# Patient Record
Sex: MALE | Race: WHITE | NOT HISPANIC OR LATINO | Employment: OTHER | URBAN - NONMETROPOLITAN AREA
[De-identification: names, ages, dates, MRNs, and addresses within clinical notes are randomized per-mention and may not be internally consistent; named-entity substitution may affect disease eponyms.]

---

## 2017-01-24 ENCOUNTER — OFFICE VISIT (OUTPATIENT)
Dept: SURGERY | Facility: CLINIC | Age: 42
End: 2017-01-24

## 2017-01-24 VITALS — WEIGHT: 158 LBS | BODY MASS INDEX: 25.39 KG/M2 | HEIGHT: 66 IN

## 2017-01-24 DIAGNOSIS — H61.91 SKIN LESION OF RIGHT EAR: Primary | ICD-10-CM

## 2017-01-24 PROCEDURE — 11442 EXC FACE-MM B9+MARG 1.1-2 CM: CPT | Performed by: SURGERY

## 2017-01-24 RX ORDER — PANTOPRAZOLE SODIUM 40 MG/1
TABLET, DELAYED RELEASE ORAL
Refills: 5 | Status: ON HOLD | COMMUNITY
Start: 2017-01-19 | End: 2019-09-06

## 2017-01-24 RX ORDER — TOPIRAMATE 100 MG/1
TABLET, FILM COATED ORAL
Refills: 5 | Status: ON HOLD | COMMUNITY
Start: 2017-01-19 | End: 2019-09-05

## 2017-01-24 RX ORDER — METHYLPREDNISOLONE 4 MG/1
TABLET ORAL
Refills: 0 | Status: ON HOLD | COMMUNITY
Start: 2017-01-19 | End: 2019-09-05

## 2017-01-24 RX ORDER — HYDROCODONE BITARTRATE AND ACETAMINOPHEN 7.5; 325 MG/1; MG/1
TABLET ORAL
Refills: 0 | COMMUNITY
Start: 2017-01-19 | End: 2018-02-16

## 2017-01-24 NOTE — MR AVS SNAPSHOT
"                        Samson Kendall   1/24/2017 10:50 AM   Office Visit    Dept Phone:  [phone]   Encounter #:  [id number]    Provider:  Kvng Allen MD   Department:  [facility] GENERAL SURGERY                Your Full Care Plan              Your Updated Medication List          This list is accurate as of: 1/24/17 11:54 AM.  Always use your most recent med list.                HYDROcodone-acetaminophen 7.5-325 MG per tablet   Commonly known as:  NORCO       MethylPREDNISolone 4 MG tablet   Commonly known as:  MEDROL (JESUS)       pantoprazole 40 MG EC tablet   Commonly known as:  PROTONIX       topiramate 100 MG tablet   Commonly known as:  TOPAMAX               You Were Diagnosed With        Codes Comments    Skin lesion of right ear    -  Primary ICD-10-CM: L98.9  ICD-9-CM: 709.9       Instructions     None    Patient Instructions History      Upcoming Appointments     Visit Type Date Time Department    NEW PATIENT 1/24/2017 10:50 AM [facility] SPEC [facility]      MyChart Signup     Our records indicate that you have declined [facility] VectorLearninghart signup. If you would like to sign up for VectorLearninghart, please email [facility] at [email or web address] or call [phone] to obtain an activation code.             Other Info from Your Visit           Allergies     No Known Allergies      Reason for Visit     Skin Lesion Lipmoa On Right Ear       Vital Signs     Height Weight Body Mass Index Smoking Status          66\" (167.6 cm) 158 lb (71.7 kg) 25.5 kg/m2 Never Smoker        Problems and Diagnoses Noted     Skin lesion of right ear        "

## 2017-01-24 NOTE — PROGRESS NOTES
Subjective   Samson Kendall is a 41 y.o. male.     History of Present Illness He had a small lump come up under his right ear a few months ago. It has not drained but there is a pigmented spot over it. It is irritated at times. No drainage. It did not respond to antibiotics.     The following portions of the patient's history were reviewed and updated as appropriate: allergies, current medications, past family history, past medical history, past social history, past surgical history and problem list.    Review of Systems    Objective   Physical Exam 1cm x 5 mm indurated area that feels sub q with a slightly darker pigmented  Area. Excised    Assessment/Plan   Samson was seen today for skin lesion.    Diagnoses and all orders for this visit:    Skin lesion of right ear    Remove sutures  In 1 wk.

## 2017-01-24 NOTE — LETTER
January 24, 2017     Marcio Hurley MD  [address] [place] [id number]    Patient: Samson Kendall   YOB: 1975   Date of Visit: 1/24/2017       Dear Dr. Xavi MD:    Thank you for referring Samson Kendall to me for evaluation. Below are the relevant portions of my assessment and plan of care.      Samson was seen today for skin lesion.    Diagnoses and all orders for this visit:    Skin lesion of right ear    Remove sutures  In 1 wk.                     If you have questions, please do not hesitate to call me. I look forward to following Samson along with you.         Sincerely,        Kvng Allen MD        CC: No Recipients

## 2017-01-31 ENCOUNTER — OFFICE VISIT (OUTPATIENT)
Dept: SURGERY | Facility: CLINIC | Age: 42
End: 2017-01-31

## 2017-01-31 ENCOUNTER — HOSPITAL ENCOUNTER (OUTPATIENT)
Dept: GENERAL RADIOLOGY | Facility: HOSPITAL | Age: 42
Discharge: HOME | End: 2017-01-31
Attending: SURGERY | Admitting: SURGERY

## 2017-01-31 VITALS — BODY MASS INDEX: 25.39 KG/M2 | WEIGHT: 158 LBS | HEIGHT: 66 IN

## 2017-01-31 DIAGNOSIS — R05.9 COUGH: ICD-10-CM

## 2017-01-31 DIAGNOSIS — H61.91 SKIN LESION OF RIGHT EAR: ICD-10-CM

## 2017-01-31 DIAGNOSIS — R05.9 COUGH: Primary | ICD-10-CM

## 2017-01-31 PROCEDURE — 99024 POSTOP FOLLOW-UP VISIT: CPT | Performed by: SURGERY

## 2017-01-31 PROCEDURE — 71020 XR CHEST 2 VW: CPT | Performed by: RADIOLOGY

## 2017-01-31 PROCEDURE — 71020 HC CHEST PA AND LATERAL: CPT

## 2017-01-31 NOTE — MR AVS SNAPSHOT
"                        Samson Kendall   1/31/2017 10:20 AM   Office Visit    Dept Phone:  [phone]   Encounter #:  [id number]    Provider:  Kvng Allen MD   Department:  [facility] GENERAL SURGERY                Your Full Care Plan              Your Updated Medication List          This list is accurate as of: 1/31/17  9:20 AM.  Always use your most recent med list.                HYDROcodone-acetaminophen 7.5-325 MG per tablet   Commonly known as:  NORCO       MethylPREDNISolone 4 MG tablet   Commonly known as:  MEDROL (JESUS)       pantoprazole 40 MG EC tablet   Commonly known as:  PROTONIX       topiramate 100 MG tablet   Commonly known as:  TOPAMAX               You Were Diagnosed With        Codes Comments    Cough    -  Primary ICD-10-CM: R05  ICD-9-CM: 786.2     Skin lesion of right ear     ICD-10-CM: L98.9  ICD-9-CM: 709.9       Instructions     None    Patient Instructions History      Upcoming Appointments     Visit Type Date Time Department    FOLLOW UP 1/31/2017 10:20 AM [facility]      MyChart Signup     Our records indicate that you have declined [facility] [employer]hart signup. If you would like to sign up for CiviQhart, please email [email or web address] or call [phone] to obtain an activation code.             Other Info from Your Visit           Your Appointments     Jan 31, 2017 10:20 AM EST   Follow Up with Kvng Allen MD   [facility] GENERAL SURGERY (--)    [address] [place] [id number] [phone]           Arrive 15 minutes prior to appointment.              Allergies     No Known Allergies      Reason for Visit     Follow-up Removal of sutures       Vital Signs     Height Weight Body Mass Index Smoking Status          66\" (167.6 cm) 158 lb (71.7 kg) 25.5 kg/m2 Never Smoker        Problems and Diagnoses Noted     Cough    Skin lesion of right ear        "

## 2017-01-31 NOTE — LETTER
January 31, 2017     Marcio Hurley MD  [address] [place] [id number]    Patient: Samson Kendall   YOB: 1975   Date of Visit: 1/31/2017       Dear Dr. Xavi MD:    Thank you for referring Samson Kendall to me for evaluation. Below are the relevant portions of my assessment and plan of care.      Samson was seen today for follow-up.    Diagnoses and all orders for this visit:    Cough  -     XR Chest 2 View; Future    Skin lesion of right ear    Check CXR and return if wound has any problems.                     If you have questions, please do not hesitate to call me. I look forward to following Samson along with you.         Sincerely,        Kvng Allen MD        CC: No Recipients

## 2017-01-31 NOTE — PROGRESS NOTES
Subjective   Samson Kendall is a 41 y.o. male.     History of Present Illness His path showed possible ruptured cyst with inflamatory reaction, but also noncaseating granulomas. No organisms. He does have a cough at times and sarcoidosis was mentioned as a possible source on the path.     The following portions of the patient's history were reviewed and updated as appropriate: allergies, current medications, past family history, past medical history, past social history, past surgical history and problem list.    Review of Systems    Objective   Physical Exam wound looks fine and sutures removed.     Assessment/Plan   Samson was seen today for follow-up.    Diagnoses and all orders for this visit:    Cough  -     XR Chest 2 View; Future    Skin lesion of right ear    Check CXR and return if wound has any problems.

## 2018-02-16 ENCOUNTER — HOSPITAL ENCOUNTER (EMERGENCY)
Facility: HOSPITAL | Age: 43
Discharge: HOME | End: 2018-02-16
Attending: EMERGENCY MEDICINE | Admitting: NURSE PRACTITIONER

## 2018-02-16 ENCOUNTER — APPOINTMENT (OUTPATIENT)
Dept: CT IMAGING | Facility: HOSPITAL | Age: 43
End: 2018-02-16

## 2018-02-16 VITALS
TEMPERATURE: 98.1 F | HEIGHT: 67 IN | BODY MASS INDEX: 27 KG/M2 | RESPIRATION RATE: 16 BRPM | WEIGHT: 172 LBS | HEART RATE: 73 BPM | OXYGEN SATURATION: 97 % | DIASTOLIC BLOOD PRESSURE: 88 MMHG | SYSTOLIC BLOOD PRESSURE: 126 MMHG

## 2018-02-16 DIAGNOSIS — N23 RENAL COLIC: Primary | ICD-10-CM

## 2018-02-16 LAB
ALBUMIN SERPL-MCNC: 4.4 G/DL (ref 3.5–5)
ALBUMIN/GLOB SERPL: 1.8 G/DL (ref 1.5–2.5)
ALP SERPL-CCNC: 50 U/L (ref 40–129)
ALT SERPL W P-5'-P-CCNC: 34 U/L (ref 10–44)
ANION GAP SERPL CALCULATED.3IONS-SCNC: 9 MMOL/L (ref 3.6–11.2)
AST SERPL-CCNC: 27 U/L (ref 10–34)
BASOPHILS # BLD AUTO: 0.03 10*3/MM3 (ref 0–0.3)
BASOPHILS NFR BLD AUTO: 0.4 % (ref 0–2)
BILIRUB SERPL-MCNC: 0.3 MG/DL (ref 0.2–1.8)
BILIRUB UR QL STRIP: NEGATIVE
BUN BLD-MCNC: 14 MG/DL (ref 7–21)
BUN/CREAT SERPL: 12.4 (ref 7–25)
CALCIUM SPEC-SCNC: 9.1 MG/DL (ref 7.7–10)
CHLORIDE SERPL-SCNC: 108 MMOL/L (ref 99–112)
CLARITY UR: CLEAR
CO2 SERPL-SCNC: 20 MMOL/L (ref 24.3–31.9)
COLOR UR: YELLOW
CREAT BLD-MCNC: 1.13 MG/DL (ref 0.43–1.29)
DEPRECATED RDW RBC AUTO: 43.2 FL (ref 37–54)
EOSINOPHIL # BLD AUTO: 0.26 10*3/MM3 (ref 0–0.7)
EOSINOPHIL NFR BLD AUTO: 3.6 % (ref 0–5)
ERYTHROCYTE [DISTWIDTH] IN BLOOD BY AUTOMATED COUNT: 13.2 % (ref 11.5–14.5)
GFR SERPL CREATININE-BSD FRML MDRD: 72 ML/MIN/1.73
GLOBULIN UR ELPH-MCNC: 2.5 GM/DL
GLUCOSE BLD-MCNC: 95 MG/DL (ref 70–110)
GLUCOSE UR STRIP-MCNC: NEGATIVE MG/DL
HCT VFR BLD AUTO: 44.2 % (ref 42–52)
HGB BLD-MCNC: 14.7 G/DL (ref 14–18)
HGB UR QL STRIP.AUTO: NEGATIVE
HOLD SPECIMEN: NORMAL
HOLD SPECIMEN: NORMAL
IMM GRANULOCYTES # BLD: 0.01 10*3/MM3 (ref 0–0.03)
IMM GRANULOCYTES NFR BLD: 0.1 % (ref 0–0.5)
KETONES UR QL STRIP: NEGATIVE
LEUKOCYTE ESTERASE UR QL STRIP.AUTO: NEGATIVE
LIPASE SERPL-CCNC: 41 U/L (ref 13–60)
LYMPHOCYTES # BLD AUTO: 2.73 10*3/MM3 (ref 1–3)
LYMPHOCYTES NFR BLD AUTO: 37.4 % (ref 21–51)
MCH RBC QN AUTO: 29.9 PG (ref 27–33)
MCHC RBC AUTO-ENTMCNC: 33.3 G/DL (ref 33–37)
MCV RBC AUTO: 90 FL (ref 80–94)
MONOCYTES # BLD AUTO: 0.46 10*3/MM3 (ref 0.1–0.9)
MONOCYTES NFR BLD AUTO: 6.3 % (ref 0–10)
NEUTROPHILS # BLD AUTO: 3.8 10*3/MM3 (ref 1.4–6.5)
NEUTROPHILS NFR BLD AUTO: 52.2 % (ref 30–70)
NITRITE UR QL STRIP: NEGATIVE
OSMOLALITY SERPL CALC.SUM OF ELEC: 274.1 MOSM/KG (ref 273–305)
PH UR STRIP.AUTO: 5.5 [PH] (ref 5–8)
PLATELET # BLD AUTO: 281 10*3/MM3 (ref 130–400)
PMV BLD AUTO: 11.1 FL (ref 6–10)
POTASSIUM BLD-SCNC: 3.6 MMOL/L (ref 3.5–5.3)
PROT SERPL-MCNC: 6.9 G/DL (ref 6–8)
PROT UR QL STRIP: NEGATIVE
RBC # BLD AUTO: 4.91 10*6/MM3 (ref 4.7–6.1)
SODIUM BLD-SCNC: 137 MMOL/L (ref 135–153)
SP GR UR STRIP: 1.01 (ref 1–1.03)
UROBILINOGEN UR QL STRIP: NORMAL
WBC NRBC COR # BLD: 7.29 10*3/MM3 (ref 4.5–12.5)
WHOLE BLOOD HOLD SPECIMEN: NORMAL
WHOLE BLOOD HOLD SPECIMEN: NORMAL

## 2018-02-16 PROCEDURE — 96376 TX/PRO/DX INJ SAME DRUG ADON: CPT

## 2018-02-16 PROCEDURE — 96374 THER/PROPH/DIAG INJ IV PUSH: CPT

## 2018-02-16 PROCEDURE — 96361 HYDRATE IV INFUSION ADD-ON: CPT

## 2018-02-16 PROCEDURE — 96375 TX/PRO/DX INJ NEW DRUG ADDON: CPT

## 2018-02-16 PROCEDURE — 36415 COLL VENOUS BLD VENIPUNCTURE: CPT

## 2018-02-16 PROCEDURE — 80053 COMPREHEN METABOLIC PANEL: CPT | Performed by: EMERGENCY MEDICINE

## 2018-02-16 PROCEDURE — 74176 CT ABD & PELVIS W/O CONTRAST: CPT | Performed by: RADIOLOGY

## 2018-02-16 PROCEDURE — 81003 URINALYSIS AUTO W/O SCOPE: CPT | Performed by: EMERGENCY MEDICINE

## 2018-02-16 PROCEDURE — 85025 COMPLETE CBC W/AUTO DIFF WBC: CPT | Performed by: EMERGENCY MEDICINE

## 2018-02-16 PROCEDURE — 25010000002 KETOROLAC TROMETHAMINE PER 15 MG: Performed by: NURSE PRACTITIONER

## 2018-02-16 PROCEDURE — 99284 EMERGENCY DEPT VISIT MOD MDM: CPT

## 2018-02-16 PROCEDURE — 25010000002 ONDANSETRON PER 1 MG: Performed by: NURSE PRACTITIONER

## 2018-02-16 PROCEDURE — 74176 CT ABD & PELVIS W/O CONTRAST: CPT

## 2018-02-16 PROCEDURE — 25010000002 HYDROMORPHONE PER 4 MG: Performed by: NURSE PRACTITIONER

## 2018-02-16 PROCEDURE — 83690 ASSAY OF LIPASE: CPT | Performed by: EMERGENCY MEDICINE

## 2018-02-16 RX ORDER — ONDANSETRON 2 MG/ML
4 INJECTION INTRAMUSCULAR; INTRAVENOUS ONCE
Status: COMPLETED | OUTPATIENT
Start: 2018-02-16 | End: 2018-02-16

## 2018-02-16 RX ORDER — NITROFURANTOIN 25; 75 MG/1; MG/1
100 CAPSULE ORAL ONCE
Status: COMPLETED | OUTPATIENT
Start: 2018-02-16 | End: 2018-02-16

## 2018-02-16 RX ORDER — SODIUM CHLORIDE 0.9 % (FLUSH) 0.9 %
10 SYRINGE (ML) INJECTION AS NEEDED
Status: DISCONTINUED | OUTPATIENT
Start: 2018-02-16 | End: 2018-02-17 | Stop reason: HOSPADM

## 2018-02-16 RX ORDER — KETOROLAC TROMETHAMINE 30 MG/ML
30 INJECTION, SOLUTION INTRAMUSCULAR; INTRAVENOUS ONCE
Status: COMPLETED | OUTPATIENT
Start: 2018-02-16 | End: 2018-02-16

## 2018-02-16 RX ORDER — HYDROCODONE BITARTRATE AND ACETAMINOPHEN 7.5; 325 MG/1; MG/1
1 TABLET ORAL EVERY 6 HOURS PRN
Qty: 12 TABLET | Refills: 0 | Status: ON HOLD | OUTPATIENT
Start: 2018-02-16 | End: 2019-09-05

## 2018-02-16 RX ORDER — HYDROMORPHONE HCL 110MG/55ML
0.5 PATIENT CONTROLLED ANALGESIA SYRINGE INTRAVENOUS ONCE
Status: COMPLETED | OUTPATIENT
Start: 2018-02-16 | End: 2018-02-16

## 2018-02-16 RX ORDER — HYDROMORPHONE HCL 110MG/55ML
1 PATIENT CONTROLLED ANALGESIA SYRINGE INTRAVENOUS ONCE
Status: COMPLETED | OUTPATIENT
Start: 2018-02-16 | End: 2018-02-16

## 2018-02-16 RX ORDER — ONDANSETRON 4 MG/1
4 TABLET, ORALLY DISINTEGRATING ORAL EVERY 6 HOURS PRN
Qty: 12 TABLET | Refills: 0 | Status: ON HOLD | OUTPATIENT
Start: 2018-02-16 | End: 2019-09-05

## 2018-02-16 RX ORDER — NITROFURANTOIN 25; 75 MG/1; MG/1
100 CAPSULE ORAL 2 TIMES DAILY
Qty: 14 CAPSULE | Refills: 0 | Status: ON HOLD | OUTPATIENT
Start: 2018-02-16 | End: 2019-09-05

## 2018-02-16 RX ADMIN — SODIUM CHLORIDE 1000 ML: 9 INJECTION, SOLUTION INTRAVENOUS at 18:37

## 2018-02-16 RX ADMIN — HYDROMORPHONE HYDROCHLORIDE 1 MG: 2 INJECTION INTRAMUSCULAR; INTRAVENOUS; SUBCUTANEOUS at 18:46

## 2018-02-16 RX ADMIN — ONDANSETRON 4 MG: 2 INJECTION, SOLUTION INTRAMUSCULAR; INTRAVENOUS at 18:38

## 2018-02-16 RX ADMIN — NITROFURANTOIN MONOHYDRATE/MACROCRYSTALLINE 100 MG: 25; 75 CAPSULE ORAL at 21:07

## 2018-02-16 RX ADMIN — HYDROMORPHONE HYDROCHLORIDE 0.5 MG: 2 INJECTION INTRAMUSCULAR; INTRAVENOUS; SUBCUTANEOUS at 21:13

## 2018-02-16 RX ADMIN — KETOROLAC TROMETHAMINE 30 MG: 30 INJECTION, SOLUTION INTRAMUSCULAR; INTRAVENOUS at 21:12

## 2018-02-17 NOTE — DISCHARGE INSTRUCTIONS
Kidney Stones  Kidney stones (urolithiasis) are rock-like masses that form inside of the kidneys. Kidneys are organs that make pee (urine). A kidney stone can cause very bad pain and can block the flow of pee. The stone usually leaves your body (passes) through your pee. You may need to have a doctor take out the stone.  Follow these instructions at home:  Eating and drinking   · Drink enough fluid to keep your pee clear or pale yellow. This will help you pass the stone.  · If told by your doctor, change the foods you eat (your diet). This may include:  ¨ Limiting how much salt (sodium) you eat.  ¨ Eating more fruits and vegetables.  ¨ Limiting how much meat, poultry, fish, and eggs you eat.  · Follow instructions from your doctor about eating or drinking restrictions.  General instructions   · Collect pee samples as told by your doctor. You may need to collect a pee sample:  ¨ 24 hours after a stone comes out.  ¨ 8-12 weeks after a stone comes out, and every 6-12 months after that.  · Strain your pee every time you pee (urinate), for as long as told. Use the strainer that your doctor recommends.  · Do not throw out the stone. Keep it so that it can be tested by your doctor.  · Take over-the-counter and prescription medicines only as told by your doctor.  · Keep all follow-up visits as told by your doctor. This is important. You may need follow-up tests.  Preventing kidney stones   To prevent another kidney stone:  · Drink enough fluid to keep your pee clear or pale yellow. This is the best way to prevent kidney stones.  · Eat healthy foods.  · Avoid certain foods as told by your doctor. You may be told to eat less protein.  · Stay at a healthy weight.  Contact a doctor if:  · You have pain that gets worse or does not get better with medicine.  Get help right away if:  · You have a fever or chills.  · You get very bad pain.  · You get new pain in your belly (abdomen).  · You pass out (faint).  · You cannot  pee.  This information is not intended to replace advice given to you by your health care provider. Make sure you discuss any questions you have with your health care provider.  Document Released: 06/05/2009 Document Revised: 09/05/2017 Document Reviewed: 09/05/2017  Elsevier Interactive Patient Education © 2017 Elsevier Inc.

## 2018-09-19 ENCOUNTER — HOSPITAL ENCOUNTER (EMERGENCY)
Facility: HOSPITAL | Age: 43
Discharge: HOME | End: 2018-09-20
Attending: EMERGENCY MEDICINE | Admitting: EMERGENCY MEDICINE

## 2018-09-19 ENCOUNTER — TRANSCRIBE ORDERS (OUTPATIENT)
Dept: ADMINISTRATIVE | Facility: HOSPITAL | Age: 43
End: 2018-09-19

## 2018-09-19 DIAGNOSIS — R10.11 RUQ ABDOMINAL PAIN: Primary | ICD-10-CM

## 2018-09-19 DIAGNOSIS — R13.10 PROBLEMS WITH SWALLOWING AND MASTICATION: Primary | ICD-10-CM

## 2018-09-19 LAB
ALBUMIN SERPL-MCNC: 4.4 G/DL (ref 3.5–5)
ALBUMIN/GLOB SERPL: 1.6 G/DL (ref 1.5–2.5)
ALP SERPL-CCNC: 48 U/L (ref 40–129)
ALT SERPL W P-5'-P-CCNC: 53 U/L (ref 10–44)
AMYLASE SERPL-CCNC: 66 U/L (ref 28–100)
ANION GAP SERPL CALCULATED.3IONS-SCNC: 8.3 MMOL/L (ref 3.6–11.2)
AST SERPL-CCNC: 33 U/L (ref 10–34)
BASOPHILS # BLD AUTO: 0.03 10*3/MM3 (ref 0–0.3)
BASOPHILS NFR BLD AUTO: 0.4 % (ref 0–2)
BILIRUB SERPL-MCNC: 0.4 MG/DL (ref 0.2–1.8)
BILIRUB UR QL STRIP: NEGATIVE
BUN BLD-MCNC: 6 MG/DL (ref 7–21)
BUN/CREAT SERPL: 5.1 (ref 7–25)
CALCIUM SPEC-SCNC: 8.9 MG/DL (ref 7.7–10)
CHLORIDE SERPL-SCNC: 107 MMOL/L (ref 99–112)
CLARITY UR: CLEAR
CO2 SERPL-SCNC: 25.7 MMOL/L (ref 24.3–31.9)
COLOR UR: YELLOW
CREAT BLD-MCNC: 1.18 MG/DL (ref 0.43–1.29)
DEPRECATED RDW RBC AUTO: 44.6 FL (ref 37–54)
EOSINOPHIL # BLD AUTO: 0.25 10*3/MM3 (ref 0–0.7)
EOSINOPHIL NFR BLD AUTO: 3.3 % (ref 0–5)
ERYTHROCYTE [DISTWIDTH] IN BLOOD BY AUTOMATED COUNT: 13.5 % (ref 11.5–14.5)
GFR SERPL CREATININE-BSD FRML MDRD: 68 ML/MIN/1.73
GLOBULIN UR ELPH-MCNC: 2.8 GM/DL
GLUCOSE BLD-MCNC: 95 MG/DL (ref 70–110)
GLUCOSE UR STRIP-MCNC: NEGATIVE MG/DL
HCT VFR BLD AUTO: 44.3 % (ref 42–52)
HGB BLD-MCNC: 14.9 G/DL (ref 14–18)
HGB UR QL STRIP.AUTO: NEGATIVE
IMM GRANULOCYTES # BLD: 0.01 10*3/MM3 (ref 0–0.03)
IMM GRANULOCYTES NFR BLD: 0.1 % (ref 0–0.5)
KETONES UR QL STRIP: NEGATIVE
LEUKOCYTE ESTERASE UR QL STRIP.AUTO: NEGATIVE
LIPASE SERPL-CCNC: 49 U/L (ref 13–60)
LYMPHOCYTES # BLD AUTO: 1.91 10*3/MM3 (ref 1–3)
LYMPHOCYTES NFR BLD AUTO: 25 % (ref 21–51)
MCH RBC QN AUTO: 31 PG (ref 27–33)
MCHC RBC AUTO-ENTMCNC: 33.6 G/DL (ref 33–37)
MCV RBC AUTO: 92.1 FL (ref 80–94)
MONOCYTES # BLD AUTO: 0.72 10*3/MM3 (ref 0.1–0.9)
MONOCYTES NFR BLD AUTO: 9.4 % (ref 0–10)
NEUTROPHILS # BLD AUTO: 4.73 10*3/MM3 (ref 1.4–6.5)
NEUTROPHILS NFR BLD AUTO: 61.8 % (ref 30–70)
NITRITE UR QL STRIP: NEGATIVE
OSMOLALITY SERPL CALC.SUM OF ELEC: 278.7 MOSM/KG (ref 273–305)
PH UR STRIP.AUTO: 7 [PH] (ref 5–8)
PLATELET # BLD AUTO: 211 10*3/MM3 (ref 130–400)
PMV BLD AUTO: 10.5 FL (ref 6–10)
POTASSIUM BLD-SCNC: 3.7 MMOL/L (ref 3.5–5.3)
PROT SERPL-MCNC: 7.2 G/DL (ref 6–8)
PROT UR QL STRIP: NEGATIVE
RBC # BLD AUTO: 4.81 10*6/MM3 (ref 4.7–6.1)
SODIUM BLD-SCNC: 141 MMOL/L (ref 135–153)
SP GR UR STRIP: <=1.005 (ref 1–1.03)
TROPONIN I SERPL-MCNC: <0.006 NG/ML
UROBILINOGEN UR QL STRIP: NORMAL
WBC NRBC COR # BLD: 7.65 10*3/MM3 (ref 4.5–12.5)

## 2018-09-19 PROCEDURE — 25010000002 KETOROLAC TROMETHAMINE PER 15 MG: Performed by: PHYSICIAN ASSISTANT

## 2018-09-19 PROCEDURE — 25010000002 ONDANSETRON PER 1 MG: Performed by: PHYSICIAN ASSISTANT

## 2018-09-19 PROCEDURE — 81003 URINALYSIS AUTO W/O SCOPE: CPT | Performed by: PHYSICIAN ASSISTANT

## 2018-09-19 PROCEDURE — 93005 ELECTROCARDIOGRAM TRACING: CPT | Performed by: PHYSICIAN ASSISTANT

## 2018-09-19 PROCEDURE — 99284 EMERGENCY DEPT VISIT MOD MDM: CPT

## 2018-09-19 PROCEDURE — 83690 ASSAY OF LIPASE: CPT | Performed by: PHYSICIAN ASSISTANT

## 2018-09-19 PROCEDURE — 96374 THER/PROPH/DIAG INJ IV PUSH: CPT

## 2018-09-19 PROCEDURE — 85025 COMPLETE CBC W/AUTO DIFF WBC: CPT | Performed by: PHYSICIAN ASSISTANT

## 2018-09-19 PROCEDURE — 84484 ASSAY OF TROPONIN QUANT: CPT | Performed by: PHYSICIAN ASSISTANT

## 2018-09-19 PROCEDURE — 36415 COLL VENOUS BLD VENIPUNCTURE: CPT

## 2018-09-19 PROCEDURE — 96361 HYDRATE IV INFUSION ADD-ON: CPT

## 2018-09-19 PROCEDURE — 82150 ASSAY OF AMYLASE: CPT | Performed by: PHYSICIAN ASSISTANT

## 2018-09-19 PROCEDURE — 96375 TX/PRO/DX INJ NEW DRUG ADDON: CPT

## 2018-09-19 PROCEDURE — 80053 COMPREHEN METABOLIC PANEL: CPT | Performed by: PHYSICIAN ASSISTANT

## 2018-09-19 RX ORDER — KETOROLAC TROMETHAMINE 30 MG/ML
30 INJECTION, SOLUTION INTRAMUSCULAR; INTRAVENOUS ONCE
Status: COMPLETED | OUTPATIENT
Start: 2018-09-19 | End: 2018-09-19

## 2018-09-19 RX ORDER — ONDANSETRON 2 MG/ML
4 INJECTION INTRAMUSCULAR; INTRAVENOUS ONCE
Status: COMPLETED | OUTPATIENT
Start: 2018-09-19 | End: 2018-09-19

## 2018-09-19 RX ORDER — SODIUM CHLORIDE 0.9 % (FLUSH) 0.9 %
10 SYRINGE (ML) INJECTION AS NEEDED
Status: DISCONTINUED | OUTPATIENT
Start: 2018-09-19 | End: 2018-09-20 | Stop reason: HOSPADM

## 2018-09-19 RX ADMIN — ONDANSETRON 4 MG: 2 INJECTION INTRAMUSCULAR; INTRAVENOUS at 23:08

## 2018-09-19 RX ADMIN — KETOROLAC TROMETHAMINE 30 MG: 30 INJECTION, SOLUTION INTRAMUSCULAR; INTRAVENOUS at 23:08

## 2018-09-19 RX ADMIN — SODIUM CHLORIDE 1000 ML: 9 INJECTION, SOLUTION INTRAVENOUS at 23:08

## 2018-09-20 ENCOUNTER — APPOINTMENT (OUTPATIENT)
Dept: ULTRASOUND IMAGING | Facility: HOSPITAL | Age: 43
End: 2018-09-20

## 2018-09-20 VITALS
OXYGEN SATURATION: 98 % | WEIGHT: 190 LBS | RESPIRATION RATE: 18 BRPM | DIASTOLIC BLOOD PRESSURE: 74 MMHG | BODY MASS INDEX: 30.53 KG/M2 | TEMPERATURE: 98.5 F | SYSTOLIC BLOOD PRESSURE: 121 MMHG | HEART RATE: 61 BPM | HEIGHT: 66 IN

## 2018-09-20 PROCEDURE — 76705 ECHO EXAM OF ABDOMEN: CPT | Performed by: RADIOLOGY

## 2018-09-20 PROCEDURE — 76705 ECHO EXAM OF ABDOMEN: CPT

## 2018-09-20 RX ORDER — PROMETHAZINE HYDROCHLORIDE 12.5 MG/1
12.5 TABLET ORAL EVERY 8 HOURS PRN
Qty: 21 TABLET | Refills: 0 | Status: SHIPPED | OUTPATIENT
Start: 2018-09-20 | End: 2018-09-27

## 2018-09-20 NOTE — ED PROVIDER NOTES
Subjective     Abdominal Pain   Pain location:  Epigastric  Pain quality: aching    Pain radiates to:  Chest  Pain severity:  Moderate  Onset quality:  Gradual  Duration:  5 days  Timing:  Intermittent  Progression:  Waxing and waning  Chronicity:  New  Context: eating    Context: not alcohol use, not awakening from sleep, not diet changes, not laxative use, not medication withdrawal, not previous surgeries, not recent illness, not recent sexual activity, not recent travel, not retching, not sick contacts, not suspicious food intake and not trauma    Context comment:  Ate a Cat[employer] dinner and has had intermittent pain since that time  Relieved by:  Nothing  Worsened by:  Position changes, palpation, movement and eating  Ineffective treatments: Zofran.  Associated symptoms: chest pain, diarrhea, nausea and vomiting    Associated symptoms: no anorexia, no belching, no chills, no constipation, no cough, no dysuria, no fatigue, no fever, no flatus, no hematemesis, no hematochezia, no hematuria, no melena, no shortness of breath, no sore throat, no vaginal bleeding and no vaginal discharge    Risk factors: no alcohol abuse, no aspirin use, not elderly, has not had multiple surgeries, no NSAID use, not obese, not pregnant and no recent hospitalization        Review of Systems   Constitutional: Negative.  Negative for chills, fatigue and fever.   HENT: Negative.  Negative for sore throat.    Respiratory: Negative.  Negative for cough and shortness of breath.    Cardiovascular: Positive for chest pain. Negative for palpitations and leg swelling.   Gastrointestinal: Positive for abdominal pain, diarrhea, nausea and vomiting. Negative for abdominal distention, anal bleeding, anorexia, blood in stool, constipation, flatus, hematemesis, hematochezia, melena and rectal pain.   Endocrine: Negative.    Genitourinary: Negative.  Negative for dysuria, hematuria, vaginal bleeding and vaginal discharge.   Skin: Negative.     Neurological: Negative.    Psychiatric/Behavioral: Negative.    All other systems reviewed and are negative.      History reviewed. No pertinent past medical history.    No Known Allergies    Past Surgical History:   Procedure Laterality Date   • HAND SURGERY     • KNEE ARTHROSCOPY     • WISDOM TOOTH EXTRACTION         History reviewed. No pertinent family history.    Social History     Social History   • Marital status:      Social History Main Topics   • Smoking status: Never Smoker   • Smokeless tobacco: Current User     Types: Snuff   • Alcohol use Yes   • Drug use: No     Other Topics Concern   • Not on file           Objective   Physical Exam   Constitutional: He is oriented to person, place, and time. He appears well-developed and well-nourished. No distress.   HENT:   Head: Normocephalic and atraumatic.   Right Ear: External ear normal.   Left Ear: External ear normal.   Nose: Nose normal.   Eyes: Pupils are equal, round, and reactive to light. Conjunctivae and EOM are normal.   Neck: Normal range of motion. Neck supple. No JVD present. No tracheal deviation present.   Cardiovascular: Normal rate, regular rhythm and normal heart sounds.  Exam reveals no gallop and no friction rub.    No murmur heard.  Pulmonary/Chest: Effort normal and breath sounds normal. No respiratory distress. He has no wheezes. He has no rales. He exhibits no tenderness.   Abdominal: Soft. Bowel sounds are normal. He exhibits no distension and no mass. There is tenderness. There is no rebound and no guarding. No hernia.   Tenderness to palpation in the epigastric region and positive Mccormack's sign    Musculoskeletal: Normal range of motion. He exhibits no edema or deformity.   Neurological: He is alert and oriented to person, place, and time. No cranial nerve deficit.   Skin: Skin is warm and dry. No rash noted. He is not diaphoretic. No erythema. No pallor.   Psychiatric: He has a normal mood and affect. His behavior is  normal. Thought content normal.   Nursing note and vitals reviewed.      Procedures           ED Course  ED Course as of Sep 20 0126   Wed Sep 19, 2018   2326 Sinus rhythm; no acute findings per Dr. Roberts.  ECG 12 Lead [MM]   Thu Sep 20, 2018   0110 Contracted gallbladder with sludge but no stones per [facility] report.  US Gallbladder [MM]   0123 Patient diagnosed with RUQ abdominal pain. Will be d/c home with rx for phenergan and see his PCP in 2 days. Will return to ER if symptoms worsen.   [MM]      ED Course User Index  [MM] Audrey Figueroa PA                HEART Score (for prediction of 6-week risk of major adverse cardiac event) reviewed and/or performed as part of the patient evaluation and treatment planning process.  The result associated with this review/performance is: 0       MDM  Number of Diagnoses or Management Options  RUQ abdominal pain:      Amount and/or Complexity of Data Reviewed  Clinical lab tests: reviewed and ordered  Tests in the radiology section of CPT®: ordered and reviewed  Discuss the patient with other providers: yes          Final diagnoses:   RUQ abdominal pain            Audrey Figueroa PA  09/20/18 0126

## 2018-09-20 NOTE — DISCHARGE INSTRUCTIONS
Please utilize a bland diet and use phenergan for nausea. Please call your PCP tomorrow to have further outpatient testing. Please return to ER if symptoms worsen.

## 2018-09-21 ENCOUNTER — TRANSCRIBE ORDERS (OUTPATIENT)
Dept: ADMINISTRATIVE | Facility: HOSPITAL | Age: 43
End: 2018-09-21

## 2018-09-21 DIAGNOSIS — R10.13 EPIGASTRIC PAIN: Primary | ICD-10-CM

## 2018-09-28 ENCOUNTER — HOSPITAL ENCOUNTER (OUTPATIENT)
Dept: NUCLEAR MEDICINE | Facility: HOSPITAL | Age: 43
Discharge: HOME | End: 2018-09-28

## 2018-09-28 DIAGNOSIS — R10.13 EPIGASTRIC PAIN: ICD-10-CM

## 2018-09-28 PROCEDURE — 78226 HEPATOBILIARY SYSTEM IMAGING: CPT

## 2018-09-28 PROCEDURE — 0 TECHNETIUM TC 99M MEBROFENIN KIT: Performed by: NURSE PRACTITIONER

## 2018-09-28 PROCEDURE — A9537 TC99M MEBROFENIN: HCPCS | Performed by: NURSE PRACTITIONER

## 2018-09-28 PROCEDURE — 78226 HEPATOBILIARY SYSTEM IMAGING: CPT | Performed by: RADIOLOGY

## 2018-09-28 RX ORDER — KIT FOR THE PREPARATION OF TECHNETIUM TC 99M MEBROFENIN 45 MG/10ML
1 INJECTION, POWDER, LYOPHILIZED, FOR SOLUTION INTRAVENOUS
Status: COMPLETED | OUTPATIENT
Start: 2018-09-28 | End: 2018-09-28

## 2018-09-28 RX ADMIN — MEBROFENIN 1 DOSE: 45 INJECTION, POWDER, LYOPHILIZED, FOR SOLUTION INTRAVENOUS at 08:08

## 2019-09-05 ENCOUNTER — HOSPITAL ENCOUNTER (OUTPATIENT)
Facility: HOSPITAL | Age: 44
Setting detail: OBSERVATION
Discharge: HOME | End: 2019-09-06
Attending: EMERGENCY MEDICINE | Admitting: INTERNAL MEDICINE

## 2019-09-05 ENCOUNTER — APPOINTMENT (OUTPATIENT)
Dept: GENERAL RADIOLOGY | Facility: HOSPITAL | Age: 44
End: 2019-09-05

## 2019-09-05 DIAGNOSIS — R55 SYNCOPE, UNSPECIFIED SYNCOPE TYPE: ICD-10-CM

## 2019-09-05 DIAGNOSIS — R07.9 CHEST PAIN, UNSPECIFIED TYPE: Primary | ICD-10-CM

## 2019-09-05 LAB
ALBUMIN SERPL-MCNC: 4.31 G/DL (ref 3.5–5.2)
ALBUMIN/GLOB SERPL: 1.2 G/DL
ALP SERPL-CCNC: 70 U/L (ref 39–117)
ALT SERPL W P-5'-P-CCNC: 33 U/L (ref 1–41)
ANION GAP SERPL CALCULATED.3IONS-SCNC: 15.6 MMOL/L (ref 5–15)
ANION GAP SERPL CALCULATED.3IONS-SCNC: 18.1 MMOL/L (ref 5–15)
AST SERPL-CCNC: 23 U/L (ref 1–40)
BASOPHILS # BLD AUTO: 0.03 10*3/MM3 (ref 0–0.2)
BASOPHILS NFR BLD AUTO: 0.3 % (ref 0–1.5)
BILIRUB SERPL-MCNC: 0.3 MG/DL (ref 0.2–1.2)
BUN BLD-MCNC: 10 MG/DL (ref 6–20)
BUN BLD-MCNC: 11 MG/DL (ref 6–20)
BUN/CREAT SERPL: 9.7 (ref 7–25)
BUN/CREAT SERPL: 9.8 (ref 7–25)
CALCIUM SPEC-SCNC: 9.3 MG/DL (ref 8.6–10.5)
CALCIUM SPEC-SCNC: 9.7 MG/DL (ref 8.6–10.5)
CHLORIDE SERPL-SCNC: 102 MMOL/L (ref 98–107)
CHLORIDE SERPL-SCNC: 104 MMOL/L (ref 98–107)
CO2 SERPL-SCNC: 17.9 MMOL/L (ref 22–29)
CO2 SERPL-SCNC: 18.4 MMOL/L (ref 22–29)
CREAT BLD-MCNC: 1.02 MG/DL (ref 0.76–1.27)
CREAT BLD-MCNC: 1.13 MG/DL (ref 0.76–1.27)
D DIMER PPP FEU-MCNC: <0.27 MCGFEU/ML (ref 0–0.5)
DEPRECATED RDW RBC AUTO: 44.3 FL (ref 37–54)
EOSINOPHIL # BLD AUTO: 0.3 10*3/MM3 (ref 0–0.4)
EOSINOPHIL NFR BLD AUTO: 3.5 % (ref 0.3–6.2)
ERYTHROCYTE [DISTWIDTH] IN BLOOD BY AUTOMATED COUNT: 13.5 % (ref 12.3–15.4)
GFR SERPL CREATININE-BSD FRML MDRD: 71 ML/MIN/1.73
GFR SERPL CREATININE-BSD FRML MDRD: 80 ML/MIN/1.73
GLOBULIN UR ELPH-MCNC: 3.7 GM/DL
GLUCOSE BLD-MCNC: 103 MG/DL (ref 65–99)
GLUCOSE BLD-MCNC: 99 MG/DL (ref 65–99)
HCT VFR BLD AUTO: 43.6 % (ref 37.5–51)
HGB BLD-MCNC: 14.4 G/DL (ref 13–17.7)
HOLD SPECIMEN: NORMAL
HOLD SPECIMEN: NORMAL
IMM GRANULOCYTES # BLD AUTO: 0.02 10*3/MM3 (ref 0–0.05)
IMM GRANULOCYTES NFR BLD AUTO: 0.2 % (ref 0–0.5)
LYMPHOCYTES # BLD AUTO: 2.44 10*3/MM3 (ref 0.7–3.1)
LYMPHOCYTES NFR BLD AUTO: 28.1 % (ref 19.6–45.3)
MAGNESIUM SERPL-MCNC: 2.2 MG/DL (ref 1.6–2.6)
MCH RBC QN AUTO: 30.2 PG (ref 26.6–33)
MCHC RBC AUTO-ENTMCNC: 33 G/DL (ref 31.5–35.7)
MCV RBC AUTO: 91.4 FL (ref 79–97)
MONOCYTES # BLD AUTO: 0.61 10*3/MM3 (ref 0.1–0.9)
MONOCYTES NFR BLD AUTO: 7 % (ref 5–12)
NEUTROPHILS # BLD AUTO: 5.29 10*3/MM3 (ref 1.7–7)
NEUTROPHILS NFR BLD AUTO: 60.9 % (ref 42.7–76)
PLATELET # BLD AUTO: 329 10*3/MM3 (ref 140–450)
PMV BLD AUTO: 10.3 FL (ref 6–12)
POTASSIUM BLD-SCNC: 4 MMOL/L (ref 3.5–5.2)
POTASSIUM BLD-SCNC: 4.1 MMOL/L (ref 3.5–5.2)
PROT SERPL-MCNC: 8 G/DL (ref 6–8.5)
RBC # BLD AUTO: 4.77 10*6/MM3 (ref 4.14–5.8)
SODIUM BLD-SCNC: 138 MMOL/L (ref 136–145)
SODIUM BLD-SCNC: 138 MMOL/L (ref 136–145)
TROPONIN T SERPL-MCNC: <0.01 NG/ML (ref 0–0.03)
TROPONIN T SERPL-MCNC: <0.01 NG/ML (ref 0–0.03)
WBC NRBC COR # BLD: 8.69 10*3/MM3 (ref 3.4–10.8)
WHOLE BLOOD HOLD SPECIMEN: NORMAL
WHOLE BLOOD HOLD SPECIMEN: NORMAL

## 2019-09-05 PROCEDURE — 93005 ELECTROCARDIOGRAM TRACING: CPT | Performed by: NURSE PRACTITIONER

## 2019-09-05 PROCEDURE — 85025 COMPLETE CBC W/AUTO DIFF WBC: CPT | Performed by: PHYSICIAN ASSISTANT

## 2019-09-05 PROCEDURE — 71045 X-RAY EXAM CHEST 1 VIEW: CPT

## 2019-09-05 PROCEDURE — 83735 ASSAY OF MAGNESIUM: CPT | Performed by: NURSE PRACTITIONER

## 2019-09-05 PROCEDURE — G0378 HOSPITAL OBSERVATION PER HR: HCPCS

## 2019-09-05 PROCEDURE — 71045 X-RAY EXAM CHEST 1 VIEW: CPT | Performed by: RADIOLOGY

## 2019-09-05 PROCEDURE — 85379 FIBRIN DEGRADATION QUANT: CPT | Performed by: PHYSICIAN ASSISTANT

## 2019-09-05 PROCEDURE — 80053 COMPREHEN METABOLIC PANEL: CPT | Performed by: PHYSICIAN ASSISTANT

## 2019-09-05 PROCEDURE — 84484 ASSAY OF TROPONIN QUANT: CPT | Performed by: NURSE PRACTITIONER

## 2019-09-05 PROCEDURE — 93010 ELECTROCARDIOGRAM REPORT: CPT | Performed by: INTERNAL MEDICINE

## 2019-09-05 PROCEDURE — 84484 ASSAY OF TROPONIN QUANT: CPT | Performed by: PHYSICIAN ASSISTANT

## 2019-09-05 PROCEDURE — 25010000002 MORPHINE PER 10 MG: Performed by: EMERGENCY MEDICINE

## 2019-09-05 PROCEDURE — 99284 EMERGENCY DEPT VISIT MOD MDM: CPT

## 2019-09-05 PROCEDURE — 93005 ELECTROCARDIOGRAM TRACING: CPT | Performed by: PHYSICIAN ASSISTANT

## 2019-09-05 PROCEDURE — 80048 BASIC METABOLIC PNL TOTAL CA: CPT | Performed by: NURSE PRACTITIONER

## 2019-09-05 PROCEDURE — 96374 THER/PROPH/DIAG INJ IV PUSH: CPT

## 2019-09-05 RX ORDER — SODIUM CHLORIDE 0.9 % (FLUSH) 0.9 %
10 SYRINGE (ML) INJECTION AS NEEDED
Status: DISCONTINUED | OUTPATIENT
Start: 2019-09-05 | End: 2019-09-06 | Stop reason: HOSPADM

## 2019-09-05 RX ORDER — ALUMINA, MAGNESIA, AND SIMETHICONE 2400; 2400; 240 MG/30ML; MG/30ML; MG/30ML
15 SUSPENSION ORAL EVERY 6 HOURS PRN
Status: DISCONTINUED | OUTPATIENT
Start: 2019-09-05 | End: 2019-09-06 | Stop reason: HOSPADM

## 2019-09-05 RX ORDER — POTASSIUM CHLORIDE 7.45 MG/ML
10 INJECTION INTRAVENOUS
Status: DISCONTINUED | OUTPATIENT
Start: 2019-09-05 | End: 2019-09-06 | Stop reason: HOSPADM

## 2019-09-05 RX ORDER — NITROGLYCERIN 0.4 MG/1
0.4 TABLET SUBLINGUAL
Status: DISCONTINUED | OUTPATIENT
Start: 2019-09-05 | End: 2019-09-06 | Stop reason: HOSPADM

## 2019-09-05 RX ORDER — ACETAMINOPHEN 650 MG/1
650 SUPPOSITORY RECTAL EVERY 4 HOURS PRN
Status: DISCONTINUED | OUTPATIENT
Start: 2019-09-05 | End: 2019-09-06 | Stop reason: HOSPADM

## 2019-09-05 RX ORDER — ACETAMINOPHEN 160 MG/5ML
650 SOLUTION ORAL EVERY 4 HOURS PRN
Status: DISCONTINUED | OUTPATIENT
Start: 2019-09-05 | End: 2019-09-06 | Stop reason: HOSPADM

## 2019-09-05 RX ORDER — SODIUM CHLORIDE 0.9 % (FLUSH) 0.9 %
10 SYRINGE (ML) INJECTION EVERY 12 HOURS SCHEDULED
Status: DISCONTINUED | OUTPATIENT
Start: 2019-09-05 | End: 2019-09-06 | Stop reason: HOSPADM

## 2019-09-05 RX ORDER — ACETAMINOPHEN 325 MG/1
650 TABLET ORAL ONCE
Status: COMPLETED | OUTPATIENT
Start: 2019-09-05 | End: 2019-09-05

## 2019-09-05 RX ORDER — ASPIRIN 325 MG
325 TABLET ORAL DAILY
Status: DISCONTINUED | OUTPATIENT
Start: 2019-09-06 | End: 2019-09-06

## 2019-09-05 RX ORDER — ASPIRIN 81 MG/1
324 TABLET, CHEWABLE ORAL ONCE
Status: COMPLETED | OUTPATIENT
Start: 2019-09-05 | End: 2019-09-05

## 2019-09-05 RX ORDER — ONDANSETRON 2 MG/ML
4 INJECTION INTRAMUSCULAR; INTRAVENOUS EVERY 6 HOURS PRN
Status: DISCONTINUED | OUTPATIENT
Start: 2019-09-05 | End: 2019-09-06 | Stop reason: HOSPADM

## 2019-09-05 RX ORDER — POTASSIUM CHLORIDE 1.5 G/1.77G
40 POWDER, FOR SOLUTION ORAL AS NEEDED
Status: DISCONTINUED | OUTPATIENT
Start: 2019-09-05 | End: 2019-09-06 | Stop reason: HOSPADM

## 2019-09-05 RX ORDER — HEPARIN SODIUM 5000 [USP'U]/ML
5000 INJECTION, SOLUTION INTRAVENOUS; SUBCUTANEOUS EVERY 12 HOURS SCHEDULED
Status: DISCONTINUED | OUTPATIENT
Start: 2019-09-05 | End: 2019-09-06 | Stop reason: HOSPADM

## 2019-09-05 RX ORDER — ACETAMINOPHEN 325 MG/1
650 TABLET ORAL EVERY 4 HOURS PRN
Status: DISCONTINUED | OUTPATIENT
Start: 2019-09-05 | End: 2019-09-06 | Stop reason: HOSPADM

## 2019-09-05 RX ORDER — SODIUM CHLORIDE 9 MG/ML
100 INJECTION, SOLUTION INTRAVENOUS CONTINUOUS
Status: DISCONTINUED | OUTPATIENT
Start: 2019-09-05 | End: 2019-09-06 | Stop reason: HOSPADM

## 2019-09-05 RX ORDER — POTASSIUM CHLORIDE 750 MG/1
40 TABLET, FILM COATED, EXTENDED RELEASE ORAL AS NEEDED
Status: DISCONTINUED | OUTPATIENT
Start: 2019-09-05 | End: 2019-09-06 | Stop reason: HOSPADM

## 2019-09-05 RX ORDER — ONDANSETRON 4 MG/1
4 TABLET, FILM COATED ORAL EVERY 6 HOURS PRN
Status: DISCONTINUED | OUTPATIENT
Start: 2019-09-05 | End: 2019-09-06 | Stop reason: HOSPADM

## 2019-09-05 RX ORDER — ASPIRIN 300 MG/1
300 SUPPOSITORY RECTAL DAILY
Status: DISCONTINUED | OUTPATIENT
Start: 2019-09-06 | End: 2019-09-06

## 2019-09-05 RX ADMIN — NITROGLYCERIN 1 INCH: 20 OINTMENT TOPICAL at 19:32

## 2019-09-05 RX ADMIN — MORPHINE SULFATE 4 MG: 4 INJECTION, SOLUTION INTRAMUSCULAR; INTRAVENOUS at 19:29

## 2019-09-05 RX ADMIN — ACETAMINOPHEN 650 MG: 325 TABLET ORAL at 20:29

## 2019-09-05 RX ADMIN — SODIUM CHLORIDE 1000 ML: 9 INJECTION, SOLUTION INTRAVENOUS at 19:46

## 2019-09-05 RX ADMIN — ASPIRIN 324 MG: 81 TABLET, CHEWABLE ORAL at 19:21

## 2019-09-05 NOTE — ED PROVIDER NOTES
"Subjective   This is a 44-year-old male who comes in with chief complaint \"left-sided chest tightness, pressure\" that started approximately 3 hours before arrival.  Patient reports he was cutting grass when chest pain.  Patient states that he has had left-sided chest pressure that radiates to his left neck and left shoulder blade.  Has had associated shortness of breath.  Patient does have significant history for hypertension.  Patient denies previous history of cardiac disease, diabetes.  Denies smoking.  Does drink alcohol occasionally.        History provided by:  Patient   used: No    Chest Pain   Pain location:  L chest  Pain quality: pressure and tightness    Pain radiates to:  Does not radiate  Pain severity:  Moderate  Onset quality:  Sudden  Duration:  2 days  Timing:  Intermittent  Progression:  Worsening  Chronicity:  New  Context: breathing    Relieved by:  Nothing  Worsened by:  Nothing  Ineffective treatments:  None tried  Associated symptoms: fatigue, palpitations and shortness of breath    Associated symptoms: no abdominal pain, no AICD problem, no altered mental status, no back pain, no dizziness, no fever, no headache, no numbness and no PND    Risk factors: hypertension    Risk factors: no aortic disease, no coronary artery disease and no diabetes mellitus        Review of Systems   Constitutional: Positive for fatigue. Negative for fever.   HENT: Negative.  Negative for drooling and ear discharge.    Eyes: Negative.  Negative for discharge, redness and itching.   Respiratory: Positive for shortness of breath.    Cardiovascular: Positive for chest pain and palpitations. Negative for PND.   Gastrointestinal: Negative for abdominal pain.   Endocrine: Negative.  Negative for cold intolerance, heat intolerance and polyphagia.   Genitourinary: Negative for difficulty urinating, dysuria, enuresis and flank pain.   Musculoskeletal: Negative for back pain.   Neurological: Negative for " dizziness, numbness and headaches.   All other systems reviewed and are negative.      No past medical history on file.    No Known Allergies    Past Surgical History:   Procedure Laterality Date   • HAND SURGERY     • KNEE ARTHROSCOPY     • WISDOM TOOTH EXTRACTION         No family history on file.    Social History     Socioeconomic History   • Marital status:      Spouse name: Not on file   • Number of children: Not on file   • Years of education: Not on file   • Highest education level: Not on file   Tobacco Use   • Smoking status: Never Smoker   • Smokeless tobacco: Current User     Types: Snuff   Substance and Sexual Activity   • Alcohol use: Yes   • Drug use: No           Objective   Physical Exam   Constitutional: He is oriented to person, place, and time. He appears well-developed and well-nourished.  Non-toxic appearance. He does not appear ill. No distress.   HENT:   Head: Normocephalic and atraumatic.   Eyes: EOM are normal. Pupils are equal, round, and reactive to light.   Neck: Normal range of motion. Neck supple. No hepatojugular reflux and no JVD present. No tracheal deviation present. No thyromegaly present.   Cardiovascular: Tachycardia present. Exam reveals no gallop, no S3, no S4, no distant heart sounds and no friction rub.   Pulses:       Carotid pulses are 2+ on the right side, and 2+ on the left side.       Radial pulses are 2+ on the right side, and 2+ on the left side.        Dorsalis pedis pulses are 2+ on the right side, and 2+ on the left side.        Posterior tibial pulses are 2+ on the right side, and 2+ on the left side.   Pulmonary/Chest: Effort normal and breath sounds normal. No accessory muscle usage or stridor. No respiratory distress. He has no decreased breath sounds. He has no wheezes. He has no rhonchi. He has no rales.   Abdominal: Soft. Bowel sounds are normal. He exhibits no distension, no ascites and no mass. There is no tenderness. There is no rebound and no  guarding.   Musculoskeletal: Normal range of motion.        Right lower leg: Normal. He exhibits no tenderness and no edema.        Left lower leg: Normal. He exhibits no tenderness and no edema.   Lymphadenopathy:     He has no cervical adenopathy.   Neurological: He is alert and oriented to person, place, and time. He is not disoriented. No cranial nerve deficit.   Skin: Skin is warm and dry. Capillary refill takes less than 2 seconds. No abrasion, no ecchymosis and no rash noted. He is not diaphoretic. No cyanosis or erythema. No pallor. Nails show no clubbing.   Psychiatric: He has a normal mood and affect. His mood appears not anxious. He is not agitated.   Nursing note and vitals reviewed.      Procedures           ED Course  ED Course as of Sep 05 2124   Thu Sep 05, 2019   1948 Endorsed to Dave Hanson.   []   2028 D/w Marjorie Grigsby- accepts.  [JI]   2121 44-year-old white male presents secondary to chest pain and syncope.  Patient states that he had mowed grass with a riding mower and had gone into talk to his mother.  He subsequently had an episode where he passed out.  He had another episode of syncope when he arrived at home.  At that time he checked his blood pressure and his heart rate was in the 170s and his systolic blood pressure was in the 80s.  He states he had pressure in the left side of his chest that radiated into his left arm and neck.  He is never had any previous similar episodes.  No history of heart disease.  No stress test or echo.  [JI]      ED Course User Index  [] Vishnu Hurtado PA-C  [JI] Corey Hanson PA                  MDM  Number of Diagnoses or Management Options  Chest pain, unspecified type: new and requires workup  Syncope, unspecified syncope type: new and requires workup     Amount and/or Complexity of Data Reviewed  Clinical lab tests: reviewed and ordered  Tests in the radiology section of CPT®: reviewed and ordered  Tests in the medicine section of CPT®:  reviewed and ordered  Discuss the patient with other providers: yes    Risk of Complications, Morbidity, and/or Mortality  Presenting problems: moderate        Final diagnoses:   Chest pain, unspecified type   Syncope, unspecified syncope type              Corey Hanson PA  09/05/19 2123       Corey Hanson PA  09/05/19 2124

## 2019-09-05 NOTE — ED NOTES
EKG completed by jermaine Hirsch @1908, and was given to Dr. Kunz.     Symes, Heather  09/05/19 1910

## 2019-09-06 ENCOUNTER — APPOINTMENT (OUTPATIENT)
Dept: CARDIOLOGY | Facility: HOSPITAL | Age: 44
End: 2019-09-06

## 2019-09-06 ENCOUNTER — APPOINTMENT (OUTPATIENT)
Dept: NUCLEAR MEDICINE | Facility: HOSPITAL | Age: 44
End: 2019-09-06

## 2019-09-06 ENCOUNTER — APPOINTMENT (OUTPATIENT)
Dept: ULTRASOUND IMAGING | Facility: HOSPITAL | Age: 44
End: 2019-09-06

## 2019-09-06 ENCOUNTER — APPOINTMENT (OUTPATIENT)
Dept: CT IMAGING | Facility: HOSPITAL | Age: 44
End: 2019-09-06

## 2019-09-06 VITALS
DIASTOLIC BLOOD PRESSURE: 82 MMHG | SYSTOLIC BLOOD PRESSURE: 150 MMHG | TEMPERATURE: 98.3 F | OXYGEN SATURATION: 98 % | RESPIRATION RATE: 18 BRPM | HEART RATE: 110 BPM | HEIGHT: 66 IN | WEIGHT: 192.2 LBS | BODY MASS INDEX: 30.89 KG/M2

## 2019-09-06 LAB
ANION GAP SERPL CALCULATED.3IONS-SCNC: 12.8 MMOL/L (ref 5–15)
BASOPHILS # BLD AUTO: 0.03 10*3/MM3 (ref 0–0.2)
BASOPHILS NFR BLD AUTO: 0.3 % (ref 0–1.5)
BH CV ECHO MEAS - % IVS THICK: -15.3 %
BH CV ECHO MEAS - % LVPW THICK: 2.6 %
BH CV ECHO MEAS - ACS: 1.4 CM
BH CV ECHO MEAS - AO MAX PG: 6.5 MMHG
BH CV ECHO MEAS - AO MEAN PG: 3.6 MMHG
BH CV ECHO MEAS - AO ROOT AREA (BSA CORRECTED): 1.4
BH CV ECHO MEAS - AO ROOT AREA: 6.3 CM^2
BH CV ECHO MEAS - AO ROOT DIAM: 2.8 CM
BH CV ECHO MEAS - AO V2 MAX: 127.6 CM/SEC
BH CV ECHO MEAS - AO V2 MEAN: 87.6 CM/SEC
BH CV ECHO MEAS - AO V2 VTI: 27.4 CM
BH CV ECHO MEAS - BSA(HAYCOCK): 2 M^2
BH CV ECHO MEAS - BSA: 2 M^2
BH CV ECHO MEAS - BZI_BMI: 31 KILOGRAMS/M^2
BH CV ECHO MEAS - BZI_METRIC_HEIGHT: 167.6 CM
BH CV ECHO MEAS - BZI_METRIC_WEIGHT: 87.1 KG
BH CV ECHO MEAS - EDV(CUBED): 59.8 ML
BH CV ECHO MEAS - EDV(MOD-SP4): 46 ML
BH CV ECHO MEAS - EDV(TEICH): 66.3 ML
BH CV ECHO MEAS - EF(CUBED): 50.1 %
BH CV ECHO MEAS - EF(MOD-SP4): 63 %
BH CV ECHO MEAS - EF(TEICH): 42.7 %
BH CV ECHO MEAS - ESV(CUBED): 29.8 ML
BH CV ECHO MEAS - ESV(MOD-SP4): 17 ML
BH CV ECHO MEAS - ESV(TEICH): 38 ML
BH CV ECHO MEAS - FS: 20.7 %
BH CV ECHO MEAS - IVS/LVPW: 0.92
BH CV ECHO MEAS - IVSD: 1.1 CM
BH CV ECHO MEAS - IVSS: 0.93 CM
BH CV ECHO MEAS - LA DIMENSION: 3.1 CM
BH CV ECHO MEAS - LA/AO: 1.1
BH CV ECHO MEAS - LV DIASTOLIC VOL/BSA (35-75): 23.4 ML/M^2
BH CV ECHO MEAS - LV MASS(C)D: 148.8 GRAMS
BH CV ECHO MEAS - LV MASS(C)DI: 75.7 GRAMS/M^2
BH CV ECHO MEAS - LV MASS(C)S: 96.3 GRAMS
BH CV ECHO MEAS - LV MASS(C)SI: 49 GRAMS/M^2
BH CV ECHO MEAS - LV SYSTOLIC VOL/BSA (12-30): 8.6 ML/M^2
BH CV ECHO MEAS - LVIDD: 3.9 CM
BH CV ECHO MEAS - LVIDS: 3.1 CM
BH CV ECHO MEAS - LVLD AP4: 6.9 CM
BH CV ECHO MEAS - LVLS AP4: 5.9 CM
BH CV ECHO MEAS - LVOT AREA (M): 2.8 CM^2
BH CV ECHO MEAS - LVOT AREA: 3 CM^2
BH CV ECHO MEAS - LVOT DIAM: 1.9 CM
BH CV ECHO MEAS - LVPWD: 1.2 CM
BH CV ECHO MEAS - LVPWS: 1.2 CM
BH CV ECHO MEAS - MV A MAX VEL: 68.1 CM/SEC
BH CV ECHO MEAS - MV E MAX VEL: 90.3 CM/SEC
BH CV ECHO MEAS - MV E/A: 1.3
BH CV ECHO MEAS - PA ACC SLOPE: 696 CM/SEC^2
BH CV ECHO MEAS - PA ACC TIME: 0.13 SEC
BH CV ECHO MEAS - PA PR(ACCEL): 22 MMHG
BH CV ECHO MEAS - RAP SYSTOLE: 10 MMHG
BH CV ECHO MEAS - RVSP: 28.4 MMHG
BH CV ECHO MEAS - SI(AO): 88.4 ML/M^2
BH CV ECHO MEAS - SI(CUBED): 15.2 ML/M^2
BH CV ECHO MEAS - SI(MOD-SP4): 14.8 ML/M^2
BH CV ECHO MEAS - SI(TEICH): 14.4 ML/M^2
BH CV ECHO MEAS - SV(AO): 173.8 ML
BH CV ECHO MEAS - SV(CUBED): 29.9 ML
BH CV ECHO MEAS - SV(MOD-SP4): 29 ML
BH CV ECHO MEAS - SV(TEICH): 28.3 ML
BH CV ECHO MEAS - TR MAX VEL: 214.5 CM/SEC
BH CV NUCLEAR PRIOR STUDY: 3
BH CV STRESS BP STAGE 1: NORMAL
BH CV STRESS BP STAGE 2: NORMAL
BH CV STRESS BP STAGE 3: NORMAL
BH CV STRESS DURATION MIN STAGE 1: 3
BH CV STRESS DURATION MIN STAGE 2: 3
BH CV STRESS DURATION MIN STAGE 3: 3
BH CV STRESS DURATION SEC STAGE 1: 0
BH CV STRESS DURATION SEC STAGE 2: 0
BH CV STRESS DURATION SEC STAGE 3: 0
BH CV STRESS GRADE STAGE 1: 10
BH CV STRESS GRADE STAGE 2: 12
BH CV STRESS GRADE STAGE 3: 14
BH CV STRESS HR STAGE 1: 117
BH CV STRESS HR STAGE 2: 141
BH CV STRESS HR STAGE 3: 159
BH CV STRESS METS STAGE 1: 5
BH CV STRESS METS STAGE 2: 7.5
BH CV STRESS METS STAGE 3: 10
BH CV STRESS PROTOCOL 1: NORMAL
BH CV STRESS RECOVERY BP: NORMAL MMHG
BH CV STRESS RECOVERY HR: 96 BPM
BH CV STRESS SPEED STAGE 1: 1.7
BH CV STRESS SPEED STAGE 2: 2.5
BH CV STRESS SPEED STAGE 3: 3.4
BH CV STRESS STAGE 1: 1
BH CV STRESS STAGE 2: 2
BH CV STRESS STAGE 3: 3
BUN BLD-MCNC: 11 MG/DL (ref 6–20)
BUN/CREAT SERPL: 10.9 (ref 7–25)
CALCIUM SPEC-SCNC: 8.6 MG/DL (ref 8.6–10.5)
CHLORIDE SERPL-SCNC: 105 MMOL/L (ref 98–107)
CHOLEST SERPL-MCNC: 161 MG/DL (ref 0–200)
CK SERPL-CCNC: 92 U/L (ref 20–200)
CO2 SERPL-SCNC: 21.2 MMOL/L (ref 22–29)
CREAT BLD-MCNC: 1.01 MG/DL (ref 0.76–1.27)
DEPRECATED RDW RBC AUTO: 44.8 FL (ref 37–54)
EOSINOPHIL # BLD AUTO: 0.29 10*3/MM3 (ref 0–0.4)
EOSINOPHIL NFR BLD AUTO: 3.2 % (ref 0.3–6.2)
ERYTHROCYTE [DISTWIDTH] IN BLOOD BY AUTOMATED COUNT: 13.6 % (ref 12.3–15.4)
GFR SERPL CREATININE-BSD FRML MDRD: 81 ML/MIN/1.73
GLUCOSE BLD-MCNC: 104 MG/DL (ref 65–99)
HBA1C MFR BLD: 5.8 % (ref 4.8–5.6)
HCT VFR BLD AUTO: 39.5 % (ref 37.5–51)
HDLC SERPL-MCNC: 25 MG/DL (ref 40–60)
HGB BLD-MCNC: 12.9 G/DL (ref 13–17.7)
IMM GRANULOCYTES # BLD AUTO: 0.02 10*3/MM3 (ref 0–0.05)
IMM GRANULOCYTES NFR BLD AUTO: 0.2 % (ref 0–0.5)
LDLC SERPL CALC-MCNC: 73 MG/DL (ref 0–100)
LDLC/HDLC SERPL: 2.92 {RATIO}
LV EF NUC BP: 60 %
LYMPHOCYTES # BLD AUTO: 2.5 10*3/MM3 (ref 0.7–3.1)
LYMPHOCYTES NFR BLD AUTO: 27.6 % (ref 19.6–45.3)
MAGNESIUM SERPL-MCNC: 1.9 MG/DL (ref 1.6–2.6)
MAXIMAL PREDICTED HEART RATE: 177 BPM
MAXIMAL PREDICTED HEART RATE: 177 BPM
MCH RBC QN AUTO: 30.1 PG (ref 26.6–33)
MCHC RBC AUTO-ENTMCNC: 32.7 G/DL (ref 31.5–35.7)
MCV RBC AUTO: 92.1 FL (ref 79–97)
MONOCYTES # BLD AUTO: 0.58 10*3/MM3 (ref 0.1–0.9)
MONOCYTES NFR BLD AUTO: 6.4 % (ref 5–12)
NEUTROPHILS # BLD AUTO: 5.63 10*3/MM3 (ref 1.7–7)
NEUTROPHILS NFR BLD AUTO: 62.3 % (ref 42.7–76)
PERCENT MAX PREDICTED HR: 89.83 %
PLATELET # BLD AUTO: 288 10*3/MM3 (ref 140–450)
PMV BLD AUTO: 10.2 FL (ref 6–12)
POTASSIUM BLD-SCNC: 4 MMOL/L (ref 3.5–5.2)
RBC # BLD AUTO: 4.29 10*6/MM3 (ref 4.14–5.8)
SODIUM BLD-SCNC: 139 MMOL/L (ref 136–145)
STRESS BASELINE BP: NORMAL MMHG
STRESS BASELINE HR: 98 BPM
STRESS PERCENT HR: 106 %
STRESS POST ESTIMATED WORKLOAD: 10.1 METS
STRESS POST EXERCISE DUR MIN: 7 MIN
STRESS POST EXERCISE DUR SEC: 0 SEC
STRESS POST PEAK BP: NORMAL MMHG
STRESS POST PEAK HR: 159 BPM
STRESS TARGET HR: 150 BPM
STRESS TARGET HR: 150 BPM
T4 FREE SERPL-MCNC: 1.12 NG/DL (ref 0.93–1.7)
TRIGL SERPL-MCNC: 315 MG/DL (ref 0–150)
TROPONIN T SERPL-MCNC: <0.01 NG/ML (ref 0–0.03)
TSH SERPL DL<=0.05 MIU/L-ACNC: 4.79 UIU/ML (ref 0.27–4.2)
VLDLC SERPL-MCNC: 63 MG/DL
WBC NRBC COR # BLD: 9.05 10*3/MM3 (ref 3.4–10.8)

## 2019-09-06 PROCEDURE — 93010 ELECTROCARDIOGRAM REPORT: CPT | Performed by: INTERNAL MEDICINE

## 2019-09-06 PROCEDURE — 78452 HT MUSCLE IMAGE SPECT MULT: CPT | Performed by: INTERNAL MEDICINE

## 2019-09-06 PROCEDURE — 93880 EXTRACRANIAL BILAT STUDY: CPT | Performed by: RADIOLOGY

## 2019-09-06 PROCEDURE — 83735 ASSAY OF MAGNESIUM: CPT | Performed by: NURSE PRACTITIONER

## 2019-09-06 PROCEDURE — 96361 HYDRATE IV INFUSION ADD-ON: CPT

## 2019-09-06 PROCEDURE — 0 TECHNETIUM SESTAMIBI: Performed by: INTERNAL MEDICINE

## 2019-09-06 PROCEDURE — G0378 HOSPITAL OBSERVATION PER HR: HCPCS

## 2019-09-06 PROCEDURE — 93005 ELECTROCARDIOGRAM TRACING: CPT | Performed by: NURSE PRACTITIONER

## 2019-09-06 PROCEDURE — 78452 HT MUSCLE IMAGE SPECT MULT: CPT

## 2019-09-06 PROCEDURE — 93306 TTE W/DOPPLER COMPLETE: CPT | Performed by: INTERNAL MEDICINE

## 2019-09-06 PROCEDURE — 25010000002 HEPARIN (PORCINE) PER 1000 UNITS: Performed by: NURSE PRACTITIONER

## 2019-09-06 PROCEDURE — 93880 EXTRACRANIAL BILAT STUDY: CPT

## 2019-09-06 PROCEDURE — 93018 CV STRESS TEST I&R ONLY: CPT | Performed by: INTERNAL MEDICINE

## 2019-09-06 PROCEDURE — 84439 ASSAY OF FREE THYROXINE: CPT | Performed by: NURSE PRACTITIONER

## 2019-09-06 PROCEDURE — 94799 UNLISTED PULMONARY SVC/PX: CPT

## 2019-09-06 PROCEDURE — 96372 THER/PROPH/DIAG INJ SC/IM: CPT

## 2019-09-06 PROCEDURE — 82550 ASSAY OF CK (CPK): CPT | Performed by: NURSE PRACTITIONER

## 2019-09-06 PROCEDURE — 80061 LIPID PANEL: CPT | Performed by: NURSE PRACTITIONER

## 2019-09-06 PROCEDURE — 83036 HEMOGLOBIN GLYCOSYLATED A1C: CPT | Performed by: NURSE PRACTITIONER

## 2019-09-06 PROCEDURE — 80048 BASIC METABOLIC PNL TOTAL CA: CPT | Performed by: NURSE PRACTITIONER

## 2019-09-06 PROCEDURE — 70450 CT HEAD/BRAIN W/O DYE: CPT

## 2019-09-06 PROCEDURE — 93017 CV STRESS TEST TRACING ONLY: CPT

## 2019-09-06 PROCEDURE — 70450 CT HEAD/BRAIN W/O DYE: CPT | Performed by: RADIOLOGY

## 2019-09-06 PROCEDURE — 99244 OFF/OP CNSLTJ NEW/EST MOD 40: CPT | Performed by: INTERNAL MEDICINE

## 2019-09-06 PROCEDURE — 85025 COMPLETE CBC W/AUTO DIFF WBC: CPT | Performed by: NURSE PRACTITIONER

## 2019-09-06 PROCEDURE — 84484 ASSAY OF TROPONIN QUANT: CPT | Performed by: NURSE PRACTITIONER

## 2019-09-06 PROCEDURE — A9500 TC99M SESTAMIBI: HCPCS | Performed by: INTERNAL MEDICINE

## 2019-09-06 PROCEDURE — 84443 ASSAY THYROID STIM HORMONE: CPT | Performed by: NURSE PRACTITIONER

## 2019-09-06 PROCEDURE — 93306 TTE W/DOPPLER COMPLETE: CPT

## 2019-09-06 RX ORDER — MAGNESIUM SULFATE HEPTAHYDRATE 40 MG/ML
2 INJECTION, SOLUTION INTRAVENOUS AS NEEDED
Status: DISCONTINUED | OUTPATIENT
Start: 2019-09-06 | End: 2019-09-06 | Stop reason: HOSPADM

## 2019-09-06 RX ORDER — DEXLANSOPRAZOLE 60 MG/1
60 CAPSULE, DELAYED RELEASE ORAL DAILY
COMMUNITY

## 2019-09-06 RX ORDER — HYDROCODONE BITARTRATE AND ACETAMINOPHEN 7.5; 325 MG/1; MG/1
1 TABLET ORAL DAILY PRN
Status: CANCELLED | OUTPATIENT
Start: 2019-09-06

## 2019-09-06 RX ORDER — HYDROCODONE BITARTRATE AND ACETAMINOPHEN 7.5; 325 MG/1; MG/1
1 TABLET ORAL DAILY PRN
Status: DISCONTINUED | OUTPATIENT
Start: 2019-09-06 | End: 2019-09-06 | Stop reason: HOSPADM

## 2019-09-06 RX ORDER — LOSARTAN POTASSIUM 50 MG/1
100 TABLET ORAL DAILY
Status: DISCONTINUED | OUTPATIENT
Start: 2019-09-06 | End: 2019-09-06

## 2019-09-06 RX ORDER — LOSARTAN POTASSIUM 50 MG/1
100 TABLET ORAL DAILY
Status: CANCELLED | OUTPATIENT
Start: 2019-09-06

## 2019-09-06 RX ORDER — LOSARTAN POTASSIUM 50 MG/1
50 TABLET ORAL DAILY
Qty: 30 TABLET | Refills: 2 | Status: SHIPPED | OUTPATIENT
Start: 2019-09-07 | End: 2019-09-10 | Stop reason: DRUGHIGH

## 2019-09-06 RX ORDER — LOSARTAN POTASSIUM 100 MG/1
100 TABLET ORAL DAILY
COMMUNITY
End: 2019-09-06 | Stop reason: HOSPADM

## 2019-09-06 RX ORDER — LOSARTAN POTASSIUM 50 MG/1
50 TABLET ORAL DAILY
Status: DISCONTINUED | OUTPATIENT
Start: 2019-09-07 | End: 2019-09-06 | Stop reason: HOSPADM

## 2019-09-06 RX ORDER — MAGNESIUM SULFATE HEPTAHYDRATE 40 MG/ML
4 INJECTION, SOLUTION INTRAVENOUS ONCE
Status: COMPLETED | OUTPATIENT
Start: 2019-09-06 | End: 2019-09-06

## 2019-09-06 RX ORDER — PANTOPRAZOLE SODIUM 40 MG/1
40 TABLET, DELAYED RELEASE ORAL EVERY MORNING
Status: DISCONTINUED | OUTPATIENT
Start: 2019-09-06 | End: 2019-09-06 | Stop reason: HOSPADM

## 2019-09-06 RX ORDER — HYDROCODONE BITARTRATE AND ACETAMINOPHEN 7.5; 325 MG/1; MG/1
1 TABLET ORAL DAILY PRN
COMMUNITY

## 2019-09-06 RX ORDER — LOSARTAN POTASSIUM 50 MG/1
50 TABLET ORAL
Status: DISCONTINUED | OUTPATIENT
Start: 2019-09-06 | End: 2019-09-06 | Stop reason: HOSPADM

## 2019-09-06 RX ORDER — MAGNESIUM SULFATE HEPTAHYDRATE 40 MG/ML
4 INJECTION, SOLUTION INTRAVENOUS AS NEEDED
Status: DISCONTINUED | OUTPATIENT
Start: 2019-09-06 | End: 2019-09-06 | Stop reason: HOSPADM

## 2019-09-06 RX ORDER — PANTOPRAZOLE SODIUM 40 MG/1
40 TABLET, DELAYED RELEASE ORAL EVERY MORNING
Status: CANCELLED | OUTPATIENT
Start: 2019-09-06

## 2019-09-06 RX ORDER — PANTOPRAZOLE SODIUM 40 MG/1
40 TABLET, DELAYED RELEASE ORAL
Status: CANCELLED | OUTPATIENT
Start: 2019-09-06

## 2019-09-06 RX ADMIN — TECHNETIUM TC 99M SESTAMIBI 1 DOSE: 1 INJECTION INTRAVENOUS at 11:35

## 2019-09-06 RX ADMIN — HYDROCODONE BITARTRATE AND ACETAMINOPHEN 1 TABLET: 7.5; 325 TABLET ORAL at 17:59

## 2019-09-06 RX ADMIN — MAGNESIUM SULFATE HEPTAHYDRATE 4 G: 40 INJECTION, SOLUTION INTRAVENOUS at 16:03

## 2019-09-06 RX ADMIN — ASPIRIN 325 MG: 325 TABLET ORAL at 10:42

## 2019-09-06 RX ADMIN — ACETAMINOPHEN 650 MG: 325 TABLET ORAL at 01:41

## 2019-09-06 RX ADMIN — HEPARIN SODIUM 5000 UNITS: 5000 INJECTION INTRAVENOUS; SUBCUTANEOUS at 10:43

## 2019-09-06 RX ADMIN — SODIUM CHLORIDE 100 ML/HR: 9 INJECTION, SOLUTION INTRAVENOUS at 10:07

## 2019-09-06 RX ADMIN — LOSARTAN POTASSIUM 50 MG: 50 TABLET ORAL at 16:05

## 2019-09-06 RX ADMIN — TECHNETIUM TC 99M SESTAMIBI 1 DOSE: 1 INJECTION INTRAVENOUS at 10:14

## 2019-09-06 RX ADMIN — HEPARIN SODIUM 5000 UNITS: 5000 INJECTION INTRAVENOUS; SUBCUTANEOUS at 01:40

## 2019-09-06 RX ADMIN — SODIUM CHLORIDE 100 ML/HR: 9 INJECTION, SOLUTION INTRAVENOUS at 01:40

## 2019-09-06 RX ADMIN — PANTOPRAZOLE SODIUM 40 MG: 40 TABLET, DELAYED RELEASE ORAL at 16:05

## 2019-09-06 NOTE — PLAN OF CARE
Problem: Cardiac: ACS (Acute Coronary Syndrome) (Adult)  Goal: Signs and Symptoms of Listed Potential Problems Will be Absent, Minimized or Managed (Cardiac: ACS)  Outcome: Ongoing (interventions implemented as appropriate)      Problem: Syncope (Adult)  Goal: Identify Related Risk Factors and Signs and Symptoms  Outcome: Ongoing (interventions implemented as appropriate)    Goal: Physical Safety/Health Maintenance  Outcome: Ongoing (interventions implemented as appropriate)    Goal: Optimal Emotional/Functional [place]  Outcome: Ongoing (interventions implemented as appropriate)

## 2019-09-06 NOTE — CONSULTS
"Date of Admit: 9/5/2019  Date of Consult: 09/06/19  No ref. provider found        Syncope      Assessment      1. Recurrent syncope, possibly due to hypotension, cannot rule out significant cardiac arrhythmias.   2. Chest pains with some typical and some atypical features for angina pectoris.Troponin negative x3, EKG reveals no evidence of ischemia.  No previously known coronary artery disease.  3. Essential hypertension, controlled with episodes of hypotension at home associated with the episodes of syncope as stated by the patient.    Recommendations     1. For his chest pains, Will schedule him to undergo a nuclear treadmill stress test today to evaluate for any possible ischemia or any exercise-induced arrhythmias.   2. For his syncope, continue to monitor on telemetry for any significant tachycardia or bradycardia arrhythmias.   3. If he has recurrent episodes of dizziness or syncope, may consider placing a 30 day event monitor as an outpatient.  4. We will decrease the losartan dose to 50 mg daily as he could potentially be having episodes of hypotension with 100 mg dose that may be potentially causing his syncopal episodes.    Reason for consultation: Recurrent syncope and Chest Pain.     Subjective       Subjective     Samson Kendall is a 44 y.o. male with problems as listed above presented to [facility] ED on 9/5/2019 with complaints of chest pain and syncope.    History of Present Illness     Samson Kendall is a 44-year-old male who presented to [facility] ED on 9/5/2019 with complaints of chest pain and syncope.  He was admitted to observation unit for further evaluation.  Cardiology was consulted for recurrent syncope and chest pain.  Upon evaluation today, the patient states that yesterday he was mowing his mother's yard and came inside to take a break and felt weak and \"blacked out\".  He states that he did not lose consciousness but that he did hit the floor and was down for a few " seconds before he could get back up.  After this he states that he still felt really bad and went home to take a shower.  Before he could take a shower as he was trying to get up from couch, he once again became weak and passed out again at home lasting again for minute or so.  After passing out for the second time he checked his blood pressure which was in the 80s systolically and his heart rate was in the 170s.  He has taken losartan 100 mg p.o. that morning before he started to mow the yard.  He also states that he had associated left-sided chest pains that were sharp with pressure and would get worse with deep breath.  They were rated as 3 out of 10 on the pain scale.  They were intermittent at first but then got worse and became constant which is why he sought medical attention.  He had associated shortness of breath.  He states that he got better when he was able to rest.    He denies any history of coronary artery disease.  He states that his grandparents did have significant heart disease in both had MIs at an early age.  He denies any heart history in his parents.  Only medical history is essential hypertension for which he takes losartan at home.  He is not a smoker.  He drinks alcohol occasionally.  Troponins have been negative x3.  Hemoglobin A1c noted to be 5.80.  TSH slightly elevated at 4.790.  triglycerides elevated at 315.  EKG revealed normal sinus rhythm with no evidence of ischemia. Echocardiogram and carotid ultrasound are currently pending.  Chest x-ray is unremarkable per radiology read.  CT of the head was negative.    Cardiac risk factors:hypertension and Positive family Hx. of premature athersclerotivc disease.    Last Echo:  Samson Kendall   Echocardiogram   Order# [id number]   Reading physician: Randall Fernández MD Ordering physician: Marjorie Grigsby APRN Study date: 19   Patient Information     Patient Name  Samson Kendall MRN  [id number] Sex  Male  (Age)  1975 (44 y.o.)    Admission Information     Admission Date/Time Discharge Date/Time Room/Bed   09/05/19  1902  216/2S   Interpretation Summary     · Normal left ventricular cavity size and wall thickness noted. There is left ventricular global hypokinesis noted.  · Left ventricular systolic function is mildly decreased  · Estimated EF appears to be in the range of 46 - 50%  · The aortic valve is structurally normal. No aortic valve regurgitation is present. No aortic valve stenosis is present.  · The mitral valve is normal in structure. No mitral valve regurgitation is present. No significant mitral valve stenosis is present.  · The tricuspid valve is normal. No evidence of tricuspid valve stenosis is present. Mild tricuspid valve regurgitation is present. Estimated right ventricular systolic pressure from tricuspid regurgitation is normal (<35 mmHg).  · There is no evidence of pericardial effusion.  · No previous studies available for comparison        No past medical history on file.  Past Surgical History:   Procedure Laterality Date   • HAND SURGERY     • KNEE ARTHROSCOPY     • WISDOM TOOTH EXTRACTION       No family history on file.  Social History     Tobacco Use   • Smoking status: Never Smoker   • Smokeless tobacco: Current User     Types: Snuff   Substance Use Topics   • Alcohol use: Yes   • Drug use: No     Medications Prior to Admission   Medication Sig Dispense Refill Last Dose   • pantoprazole (PROTONIX) 40 MG EC tablet   5 Taking     Allergies:  Patient has no known allergies.    Review of Systems   Constitutional: Negative for diaphoresis, fatigue and fever.   HENT: Negative for congestion and trouble swallowing.    Eyes: Negative for photophobia and visual disturbance.   Respiratory: Positive for chest tightness and shortness of breath. Negative for apnea.    Cardiovascular: Positive for chest pain. Negative for palpitations and leg swelling.   Gastrointestinal: Negative for abdominal distention, abdominal pain,  nausea and vomiting.   Endocrine: Negative for polyphagia and polyuria.   Genitourinary: Negative for dysuria and hematuria.   Musculoskeletal: Negative for neck pain and neck stiffness.   Skin: Negative for rash and wound.   Allergic/Immunologic: Negative for food allergies and immunocompromised state.   Neurological: Positive for dizziness, syncope and weakness. Negative for tremors.   Hematological: Does not bruise/bleed easily.   Psychiatric/Behavioral: Negative for confusion and suicidal ideas.      Objective     Objective      Vital Signs  Temp:  [98.2 °F (36.8 °C)-98.6 °F (37 °C)] 98.3 °F (36.8 °C)  Heart Rate:  [] 84  Resp:  [18-20] 18  BP: (107-124)/(64-79) 114/71  Vital Signs (last 72 hrs)       09/03 0700  -  09/04 0659 09/04 0700  -  09/05 0659 09/05 0700  -  09/06 0659 09/06 0700  -  09/06 0842   Most Recent    Temp (°F)     98.2 -  98.6      98.3     98.3 (36.8)    Heart Rate     103 -  (!)128      84     84    Resp     18 -  20      18     18    BP     107/64 -  124/79      114/71     114/71    SpO2 (%)     94 -  98      96     96        Body mass index is 31.02 kg/m².  No intake or output data in the 24 hours ending 09/06/19 0842     Physical Exam   Constitutional: He is oriented to person, place, and time. He appears well-developed and well-nourished.   HENT:   Head: Normocephalic and atraumatic.   Neck: Normal range of motion. No JVD present.   Cardiovascular: Normal rate and regular rhythm. Exam reveals no S3 and no S4.   No murmur heard.  Pulses:       Dorsalis pedis pulses are 2+ on the right side, and 2+ on the left side.        Posterior tibial pulses are 2+ on the right side, and 2+ on the left side.   Pulmonary/Chest: Effort normal and breath sounds normal. No respiratory distress. He has no wheezes.   Abdominal: Soft. Bowel sounds are normal.   Musculoskeletal: Normal range of motion. He exhibits no edema.   Lymphadenopathy:     He has no cervical adenopathy.   Neurological: He is  alert and oriented to person, place, and time.   Skin: Skin is warm and dry.   Psychiatric: He has a normal mood and affect. His behavior is normal.     Results review     Results Review:    I reviewed the patient's new clinical results.  Results from last 7 days   Lab Units 09/06/19 0352 09/05/19 2110 09/05/19 1926   CK TOTAL U/L 92  --   --    TROPONIN T ng/mL <0.010 <0.010 <0.010     Results from last 7 days   Lab Units 09/06/19  0352 09/05/19 1926   WBC 10*3/mm3 9.05 8.69   HEMOGLOBIN g/dL 12.9* 14.4   PLATELETS 10*3/mm3 288 329     Results from last 7 days   Lab Units 09/06/19 0352 09/05/19 2110 09/05/19 1926   SODIUM mmol/L 139 138 138   POTASSIUM mmol/L 4.0 4.0 4.1   CHLORIDE mmol/L 105 104 102   CO2 mmol/L 21.2* 18.4* 17.9*   BUN mg/dL 11 10 11   CREATININE mg/dL 1.01 1.02 1.13   CALCIUM mg/dL 8.6 9.3 9.7   GLUCOSE mg/dL 104* 99 103*   ALT (SGPT) U/L  --   --  33   AST (SGOT) U/L  --   --  23     No results found for: INR  Lab Results   Component Value Date    MG 1.9 09/06/2019    MG 2.2 09/05/2019     Lab Results   Component Value Date    TSH 4.790 (H) 09/06/2019    TRIG 315 (H) 09/06/2019    HDL 25 (L) 09/06/2019    LDL 73 09/06/2019      ECG  ECG/EMG Results (last 24 hours)     Procedure Component Value Units Date/Time    ECG 12 Lead [[id number]] Collected:  09/05/19 1908     Updated:  09/05/19 1940    ECG 12 Lead [[id number]] Collected:  09/05/19 2145     Updated:  09/05/19 2146    Narrative:       Test Reason : Chest Pain  Blood Pressure : **/** mmHG  Vent. Rate : 105 BPM     Atrial Rate : 105 BPM     P-R Int : 118 ms          QRS Dur : 084 ms      QT Int : 332 ms       P-R-T Axes : 021 -06 018 degrees     QTc Int : 438 ms    Sinus tachycardia  Minimal voltage criteria for LVH, may be normal variant  Borderline ECG  When compared with ECG of 05-SEP-2019 19:08, (Unconfirmed)  No significant change was found    Referred By:  LACEY           Confirmed By:     ECG 12 Lead [[id number]] Collected:   09/06/19 0403     Updated:  09/06/19 0405    Narrative:       Test Reason : Chest Pain  Blood Pressure : **/** mmHG  Vent. Rate : 079 BPM     Atrial Rate : 079 BPM     P-R Int : 118 ms          QRS Dur : 102 ms      QT Int : 382 ms       P-R-T Axes : 010 -08 009 degrees     QTc Int : 438 ms    Normal sinus rhythm  Minimal voltage criteria for LVH, may be normal variant  Cannot rule out Anterior infarct , age undetermined  Abnormal ECG  When compared with ECG of 05-SEP-2019 21:45, (Unconfirmed)  No significant change was found    Referred By:             Confirmed By:           Imaging Results (last 72 hours)     Procedure Component Value Units Date/Time     Carotid Bilateral [[id number]] Updated:  09/06/19 0755    CT Head Without Contrast [[id number]] Collected:  09/06/19 0739     Updated:  09/06/19 0742    Narrative:       CT HEAD WO CONTRAST-     CLINICAL INDICATION: Syncope/fainting; R07.9-Chest pain, unspecified;  R55-Syncope and collapse        COMPARISON: None available      TECHNIQUE: Axial images of the brain were obtained with out intravenous  contrast.  Reformatted images were created in the sagittal and coronal  planes.     DOSE: 1093.89 mGy.cm     Radiation dose reduction techniques were utilized per ALARA protocol.  Automated exposure control was initiated through either or [employer] or  [employer] software packages by  protocol.           FINDINGS:   Today's study shows no mass, hemorrhage, or midline shift.   The ventricles, cisterns, and sulci are unremarkable. There is no  hydrocephalus.   There is no evidence of acute ischemia.  I do not see epidural or subdural hematoma.  The gray-white differentiation is appropriate.   The bone window setting images show no destructive calvarial lesion or  acute calvarial fracture.   The posterior fossa is unremarkable.          Impression:       No acute intracranial pathology. Nothing is seen on this exam to  specifically account for the patient's  symptoms.     This report was finalized on 9/6/2019 7:40 AM by Dr. Nazario Lozano MD.       XR Chest 1 View [[id number]] Collected:  09/05/19 1936     Updated:  09/05/19 1939    Narrative:       XR CHEST 1 VW-     CLINICAL INDICATION: Chest pain protocol        COMPARISON: 01/31/2017      TECHNIQUE: Single frontal view of the chest.     FINDINGS:     There is no focal alveolar infiltrate or effusion.  The cardiac silhouette is normal. The pulmonary vasculature is  unremarkable.  There is no evidence of an acute osseous abnormality.   There are no suspicious-appearing parenchymal soft tissue nodules.          Impression:       No evidence of active or acute cardiopulmonary disease on today's chest  radiograph.     This report was finalized on 9/5/2019 7:36 PM by Dr. Nazario Lozano MD.             I have discussed my impression and recommendations with the patient and family.    Thank you very much for asking us to be involved in this patient's care.  We will follow along with you.      Isadora Hollins, JOSE J  09/06/19  8:42 AM     I, Randall Fernández MD, FACC, personally performed the services described in this documentation as documented by the above named individual in my presence, made necessary changes and the note is both accurate and complete.     Randall Fernández MD, FACC  9/6/2019  2:50 PM      Please note that portions of this note were completed with a voice recognition program.

## 2019-09-06 NOTE — H&P
"         HISTORY AND PHYSICAL        Patient Identification:  Name:  Samson Kendall  Age:  44 y.o.  Sex:  male  :  1975  MRN:  [id number]   Visit Number:  [id number]  Primary Care Physician:  Marcio Hurley MD       Subjective     Subjective     Chief complaint:     Chief Complaint   Patient presents with   • Chest Pain       History of presenting illness:     Patient is a 44 year old male that presented to the ED with complaints of syncopal episodes x3 with chest pain radiating to left arm and jaw. Patient reports he was riding a riding  and got up off the lawnmower to walk home and \"everything went black\". He had two more syncopal episodes at his home. Chest pain associated with syncopal episodes radiating into left arm an jaw. Patient's past medical history includes hypertension. Patient was tachycardic upon arrival to ED, now resolved. Troponins thus far negative. EKG reports as Sinus rhythym, cannot rule out anterior infarct. D dimer negative. CK normal. Hemoglobin A1C borderline at 5.8. TSH elevated at 4.79, Free T4 normal. Triglycerides elevated at 315. Chest x-ray unremarkable. CT head unremarkable. Carotid duplex reported no significant stenosis.       ---------------------------------------------------------------------------------------------------------------------     Review Of Systems:    Constitutional: no fever, chills and night sweats. No appetite change or unexpected weight change. No fatigue.  Eyes: no eye drainage, itching or redness.  HEENT: no mouth sores, dysphagia or nose bleed.  Respiratory: no for shortness of breath, cough or production of sputum.  Cardiovascular: no chest pain, no palpitations, no orthopnea.  Gastrointestinal: no nausea, vomiting or diarrhea. No abdominal pain, hematemesis or rectal bleeding.  Genitourinary: no dysuria or polyuria.  Hematologic/lymphatic: no lymph node abnormalities, no easy bruising or easy bleeding.  Musculoskeletal: no muscle " or joint pain.  Skin: No rash and no itching.  Neurological: no loss of consciousness, no seizure, no headache.  Behavioral/Psych: no depression or suicidal ideation.  Endocrine: no hot flashes.  Immunologic: negative.    ---------------------------------------------------------------------------------------------------------------------     Past Medical History    No past medical history on file.    Past Surgical History    Past Surgical History:   Procedure Laterality Date   • HAND SURGERY     • KNEE ARTHROSCOPY     • WISDOM TOOTH EXTRACTION         Family History    No family history on file.    Social History    Social History     Tobacco Use   • Smoking status: Never Smoker   • Smokeless tobacco: Current User     Types: Snuff   Substance Use Topics   • Alcohol use: Yes   • Drug use: No       Allergies    Patient has no known allergies.  ---------------------------------------------------------------------------------------------------------------------     Home Medications:    Prior to Admission Medications     Prescriptions Last Dose Informant Patient Reported? Taking?    dexlansoprazole (DEXILANT) 60 MG capsule 9/5/2019 Pharmacy Yes Yes    Take 60 mg by mouth Daily.    losartan (COZAAR) 100 MG tablet 9/5/2019 Pharmacy Yes Yes    Take 100 mg by mouth Daily.        ---------------------------------------------------------------------------------------------------------------------    Objective     Objective     Hospital Scheduled Meds:    heparin (porcine) 5,000 Units Subcutaneous Q12H   nitroglycerin 0.5 inch Topical Q6H   sodium chloride 10 mL Intravenous Q12H       sodium chloride 100 mL/hr Last Rate: 100 mL/hr (09/06/19 0556)     ---------------------------------------------------------------------------------------------------------------------   Vital Signs:  Temp:  [98.2 °F (36.8 °C)-98.6 °F (37 °C)] 98.3 °F (36.8 °C)  Heart Rate:  [] 84  Resp:  [18-20] 18  BP: (107-124)/(64-79) 114/71  Mean  Arterial Pressure (Non-Invasive) for the past 24 hrs (Last 3 readings):   Noninvasive MAP (mmHg)   09/06/19 0832 91   09/06/19 0300 88   09/05/19 2304 84     SpO2 Percentage    09/05/19 2101 09/06/19 0300 09/06/19 0832   SpO2: 96% 94% 96%     SpO2:  [94 %-98 %] 96 %  on   ;   Device (Oxygen Therapy): room air    Body mass index is 31.02 kg/m².  Wt Readings from Last 3 Encounters:   09/05/19 87.2 kg (192 lb 3.2 oz)   09/19/18 86.2 kg (190 lb)   02/16/18 78 kg (172 lb)     ---------------------------------------------------------------------------------------------------------------------     Physical Exam:    Constitutional:  Well-developed and well-nourished.  No respiratory distress.      HENT:  Head: Normocephalic and atraumatic.  Mouth:  Moist mucous membranes.    Eyes:  Conjunctivae and EOM are normal.  No scleral icterus.  Neck:  Neck supple.  No JVD present.    Cardiovascular:  Normal rate, regular rhythm and normal heart sounds with no murmur. No edema.  Pulmonary/Chest:  No respiratory distress, no wheezes, no crackles, with normal breath sounds and good air movement.  Abdominal:  Soft.  Bowel sounds are normal.  No distension and no tenderness.   Musculoskeletal:  No edema, no tenderness, and no deformity.  No swelling or redness of joints.  Neurological:  Alert and oriented to person, place, and time.  No facial droop.  No slurred speech.   Skin:  Skin is warm and dry.  No rash noted.  No pallor.   Psychiatric:  Normal mood and affect.  Behavior is normal.    ---------------------------------------------------------------------------------------------------------------------  I have personally reviewed the EKG/Telemetry strip  ---------------------------------------------------------------------------------------------------------------------   Results from last 7 days   Lab Units 09/06/19  0352 09/05/19 2110 09/05/19  1926   CK TOTAL U/L 92  --   --    TROPONIN T ng/mL <0.010 <0.010 <0.010       Results  from last 7 days   Lab Units 09/06/19  0352   CHOLESTEROL mg/dL 161   TRIGLYCERIDES mg/dL 315*   HDL CHOL mg/dL 25*   LDL CHOL mg/dL 73       Results from last 7 days   Lab Units 09/06/19  0352 09/05/19  1926   WBC 10*3/mm3 9.05 8.69   HEMOGLOBIN g/dL 12.9* 14.4   HEMATOCRIT % 39.5 43.6   MCV fL 92.1 91.4   MCHC g/dL 32.7 33.0   PLATELETS 10*3/mm3 288 329     Results from last 7 days   Lab Units 09/06/19  0352 09/05/19 2110 09/05/19  1926   SODIUM mmol/L 139 138 138   POTASSIUM mmol/L 4.0 4.0 4.1   MAGNESIUM mg/dL 1.9 2.2  --    CHLORIDE mmol/L 105 104 102   CO2 mmol/L 21.2* 18.4* 17.9*   BUN mg/dL 11 10 11   CREATININE mg/dL 1.01 1.02 1.13   EGFR IF NONAFRICN AM mL/min/1.73 81 80 71   CALCIUM mg/dL 8.6 9.3 9.7   GLUCOSE mg/dL 104* 99 103*   ALBUMIN g/dL  --   --  4.31   BILIRUBIN mg/dL  --   --  0.3   ALK PHOS U/L  --   --  70   AST (SGOT) U/L  --   --  23   ALT (SGPT) U/L  --   --  33   Estimated Creatinine Clearance: 97.6 mL/min (by C-G formula based on SCr of 1.01 mg/dL).  No results found for: AMMONIA    Hemoglobin A1C   Date/Time Value Ref Range Status   09/06/2019 0352 5.80 (H) 4.80 - 5.60 % Final     Lab Results   Component Value Date    HGBA1C 5.80 (H) 09/06/2019     Lab Results   Component Value Date    TSH 4.790 (H) 09/06/2019    FREET4 1.12 09/06/2019                      Pain Management Panel     There is no flowsheet data to display.        I have personally reviewed the above laboratory results.   ---------------------------------------------------------------------------------------------------------------------  Imaging Results (last 7 days)     Procedure Component Value Units Date/Time    US Carotid Bilateral [[id number]] Collected:  09/06/19 0846     Updated:  09/06/19 0848    Narrative:       EXAMINATION: US CAROTID BILATERAL-      Technique: Multiple real-time color Doppler images were acquired of  bilateral carotid arteries.     Stenosis measurements if obtained, were performed by the NASCET  or  similar method.        CLINICAL INDICATION:     syncope; R07.9-Chest pain, unspecified;  R55-Syncope and collapse 0      COMPARISON:    None     FINDINGS:         Right:        Mid internal carotid artery peak systolic velocity of -782.00 mm/s and  end-diastolic velocity of -309.00 mm/s.   Right ICA/CCA Ratio:   0.71     Left:     Mid internal carotid artery peak systolic velocity of -897.00 mm/s and  end-diastolic velocity of -403.00 mm/s.   Left ICA/CCA Ratio:   0.84           Anterograde flow is demonstrated in bilateral vertebral arteries.       Impression:       Impression:  No significant stenosis.     Comments:  <50% stenosis: PSV <125cm/s and Ratio of <2  50-69% stenosis: -229cm/s and Ratio of 2-3.9  70-99% stenosis: PSV >230cm/s and Ratio of >4     This report was finalized on 9/6/2019 8:46 AM by Dr. Colby Marley MD.       CT Head Without Contrast [[id number]] Collected:  09/06/19 0739     Updated:  09/06/19 0742    Narrative:       CT HEAD WO CONTRAST-     CLINICAL INDICATION: Syncope/fainting; R07.9-Chest pain, unspecified;  R55-Syncope and collapse        COMPARISON: None available      TECHNIQUE: Axial images of the brain were obtained with out intravenous  contrast.  Reformatted images were created in the sagittal and coronal  planes.     DOSE: 1093.89 mGy.cm     Radiation dose reduction techniques were utilized per ALARA protocol.  Automated exposure control was initiated through either or CareDose or  DoseRight software packages by  protocol.           FINDINGS:   Today's study shows no mass, hemorrhage, or midline shift.   The ventricles, cisterns, and sulci are unremarkable. There is no  hydrocephalus.   There is no evidence of acute ischemia.  I do not see epidural or subdural hematoma.  The gray-white differentiation is appropriate.   The bone window setting images show no destructive calvarial lesion or  acute calvarial fracture.   The posterior fossa is unremarkable.       "    Impression:       No acute intracranial pathology. Nothing is seen on this exam to  specifically account for the patient's symptoms.     This report was finalized on 9/6/2019 7:40 AM by Dr. Nazario Lozano MD.       XR Chest 1 View [[id number]] Collected:  09/05/19 1936     Updated:  09/05/19 1939    Narrative:       XR CHEST 1 VW-     CLINICAL INDICATION: Chest pain protocol        COMPARISON: 01/31/2017      TECHNIQUE: Single frontal view of the chest.     FINDINGS:     There is no focal alveolar infiltrate or effusion.  The cardiac silhouette is normal. The pulmonary vasculature is  unremarkable.  There is no evidence of an acute osseous abnormality.   There are no suspicious-appearing parenchymal soft tissue nodules.          Impression:       No evidence of active or acute cardiopulmonary disease on today's chest  radiograph.     This report was finalized on 9/5/2019 7:36 PM by Dr. Nazario Lozano MD.           I have personally reviewed the above radiology results.   ---------------------------------------------------------------------------------------------------------------------      Assessment & Plan        Assessment/Plan       ASSESSMENT:    1. Syncope  2. Chest pain    PLAN:    Patient is a 44 year old male that presented to the ED with complaints of syncopal episodes x3 with chest pain radiating to left arm and jaw. Patient reports he was riding a riding  and got up off the lawnmower to walk home and \"everything went black\". He had two more syncopal episodes at his home. Chest pain associated with syncopal episodes radiating into left arm an jaw. Patient's past medical history includes hypertension. Patient was tachycardic upon arrival to ED, now resolved. Troponins thus far negative. EKG reports as Sinus rhythym, cannot rule out anterior infarct. D dimer negative. CK normal. Hemoglobin A1C borderline at 5.8. TSH elevated at 4.79, Free T4 normal. Triglycerides elevated at 315. Chest x-ray " unremarkable. CT head unremarkable. Carotid duplex reported no significant stenosis. Chest pain has resolved overnight.    Patient was admitted to the observation unit for further monitoring and evaluation. Continuous telemetry and pulse oximetry monitoring. Serial troponins and EKGs ordered. Cardiology consulted for evaluation and recommendations. 2D Echo planned for today. Orthostatic vital signs every shift. IV hydration with NS @ 100ml/hr.         Patient's findings and recommendations were discussed with patient and nursing staff      Code Status:   Code Status and Medical Interventions:   Ordered at: 09/05/19 2107     Code Status:    CPR     Medical Interventions (Level of Support Prior to Arrest):    Full           JOSE J Mcmahon  09/06/19  9:35 AM

## 2019-09-06 NOTE — DISCHARGE SUMMARY
DISCHARGE SUMMARY        Patient Identification:  Name:  Samson Kendall  Age:  44 y.o.  Sex:  male  :  1975  MRN:  [id number]  Visit Number:  [id number]    Date of Admission: 2019  Date of Discharge:  2019    PCP: Marcio Hurley MD    Discharging Provider: JOSE J Mcmahon      Discharge Diagnoses     Chest pain, resolved  Syncope, resolved      Consults/Procedures     Consults:   Consults     Date and Time Order Name Status Description    2019 Inpatient Cardiology Consult Completed     2019 IP General Consult (Use specialty-specific consult if known)            Procedures Performed:         History of Presenting Illness       Patient is a 44 year old male that presented to the ED with complaints of syncopal episodes x3 with chest pain radiating to left arm and jaw. See admission history and physical for further details.     Hospital Course     Patient was admitted to the Observation unit for further monitoring and evaluation. Continuous cardiac and pulse oximetry monitoring. Serial troponins were completed and remained negative. Serial EKGs were completed and remained unchanged. Chest pain resolved during hospital stay. Patient had no syncopal episodes during hospital stay. CT head unremarkable. Carotid duplex reported no significant stenosis. 2D Echo reported no significant abnormalities with and EF of 50%. Stress test was completed reporting no evidence of ischemia, EF of 60%, findings consistent with low risk study. Dr. Fernández cleared patient for discharge with outpatient follow up in 1 week. Patient deemed stable for discharge with follow up with PCP and Cardiology in 1 week.     Discharge Vitals/Physical Examination     Vital Signs:  Temp:  [98.2 °F (36.8 °C)-98.6 °F (37 °C)] 98.3 °F (36.8 °C)  Heart Rate:  [] 110  Resp:  [18-20] 18  BP: (107-150)/(64-82) 150/82  Mean Arterial Pressure (Non-Invasive) for the past 24 hrs (Last 3 readings):   Noninvasive  MAP (mmHg)   09/06/19 1647 107   09/06/19 0832 91   09/06/19 0300 88     SpO2 Percentage    09/06/19 0300 09/06/19 0832 09/06/19 1647   SpO2: 94% 96% 98%     SpO2:  [94 %-98 %] 98 %  on   ;   Device (Oxygen Therapy): room air    Body mass index is 31.02 kg/m².  Wt Readings from Last 3 Encounters:   09/05/19 87.2 kg (192 lb 3.2 oz)   09/19/18 86.2 kg (190 lb)   02/16/18 78 kg (172 lb)         Physical Exam:    Constitutional:  Well-developed and well-nourished.  No respiratory distress.      HENT:  Head: Normocephalic and atraumatic.  Mouth:  Moist mucous membranes.    Eyes:  Conjunctivae and EOM are normal.  No scleral icterus.  Neck:  Neck supple.  No JVD present.    Cardiovascular:  Normal rate, regular rhythm and normal heart sounds with no murmur. No edema.  Pulmonary/Chest:  No respiratory distress, no wheezes, no crackles, with normal breath sounds and good air movement.  Abdominal:  Soft.  Bowel sounds are normal.  No distension and no tenderness.   Musculoskeletal:  No edema, no tenderness, and no deformity.  No swelling or redness of joints.  Neurological:  Alert and oriented to person, place, and time.  No facial droop.  No slurred speech.   Skin:  Skin is warm and dry.  No rash noted.  No pallor.   Psychiatric:  Normal mood and affect.  Behavior is normal.      Pertinent Laboratory/Radiology Results     Pertinent Laboratory Results:    Results from last 7 days   Lab Units 09/06/19  0352 09/05/19 2110 09/05/19 1926   CK TOTAL U/L 92  --   --    TROPONIN T ng/mL <0.010 <0.010 <0.010       Results from last 7 days   Lab Units 09/06/19  0352   CHOLESTEROL mg/dL 161   TRIGLYCERIDES mg/dL 315*   HDL CHOL mg/dL 25*   LDL CHOL mg/dL 73       Results from last 7 days   Lab Units 09/06/19  0352 09/05/19 1926   WBC 10*3/mm3 9.05 8.69   HEMOGLOBIN g/dL 12.9* 14.4   HEMATOCRIT % 39.5 43.6   MCV fL 92.1 91.4   MCHC g/dL 32.7 33.0   PLATELETS 10*3/mm3 288 329     Results from last 7 days   Lab Units 09/06/19  6264  09/05/19 2110 09/05/19  1926   SODIUM mmol/L 139 138 138   POTASSIUM mmol/L 4.0 4.0 4.1   MAGNESIUM mg/dL 1.9 2.2  --    CHLORIDE mmol/L 105 104 102   CO2 mmol/L 21.2* 18.4* 17.9*   BUN mg/dL 11 10 11   CREATININE mg/dL 1.01 1.02 1.13   EGFR IF NONAFRICN AM mL/min/1.73 81 80 71   CALCIUM mg/dL 8.6 9.3 9.7   GLUCOSE mg/dL 104* 99 103*   ALBUMIN g/dL  --   --  4.31   BILIRUBIN mg/dL  --   --  0.3   ALK PHOS U/L  --   --  70   AST (SGOT) U/L  --   --  23   ALT (SGPT) U/L  --   --  33   Estimated Creatinine Clearance: 97.6 mL/min (by C-G formula based on SCr of 1.01 mg/dL).  No results found for: AMMONIA    Hemoglobin A1C   Date/Time Value Ref Range Status   09/06/2019 0352 5.80 (H) 4.80 - 5.60 % Final     Lab Results   Component Value Date    HGBA1C 5.80 (H) 09/06/2019     Lab Results   Component Value Date    TSH 4.790 (H) 09/06/2019    FREET4 1.12 09/06/2019                      Pain Management Panel     There is no flowsheet data to display.          Pertinent Radiology Results:  Imaging Results (all)     Procedure Component Value Units Date/Time    US Carotid Bilateral [[id number]] Collected:  09/06/19 0846     Updated:  09/06/19 0848    Narrative:       EXAMINATION: US CAROTID BILATERAL-      Technique: Multiple real-time color Doppler images were acquired of  bilateral carotid arteries.     Stenosis measurements if obtained, were performed by the NASCET or  similar method.        CLINICAL INDICATION:     syncope; R07.9-Chest pain, unspecified;  R55-Syncope and collapse 0      COMPARISON:    None     FINDINGS:         Right:        Mid internal carotid artery peak systolic velocity of -782.00 mm/s and  end-diastolic velocity of -309.00 mm/s.   Right ICA/CCA Ratio:   0.71     Left:     Mid internal carotid artery peak systolic velocity of -897.00 mm/s and  end-diastolic velocity of -403.00 mm/s.   Left ICA/CCA Ratio:   0.84           Anterograde flow is demonstrated in bilateral vertebral arteries.        Impression:       Impression:  No significant stenosis.     Comments:  <50% stenosis: PSV <125cm/s and Ratio of <2  50-69% stenosis: -229cm/s and Ratio of 2-3.9  70-99% stenosis: PSV >230cm/s and Ratio of >4     This report was finalized on 9/6/2019 8:46 AM by Dr. Colby Marley MD.       CT Head Without Contrast [[id number]] Collected:  09/06/19 0739     Updated:  09/06/19 0742    Narrative:       CT HEAD WO CONTRAST-     CLINICAL INDICATION: Syncope/fainting; R07.9-Chest pain, unspecified;  R55-Syncope and collapse        COMPARISON: None available      TECHNIQUE: Axial images of the brain were obtained with out intravenous  contrast.  Reformatted images were created in the sagittal and coronal  planes.     DOSE: 1093.89 mGy.cm     Radiation dose reduction techniques were utilized per ALARA protocol.  Automated exposure control was initiated through either or [email or web address] or  DoseRight software packages by  protocol.           FINDINGS:   Today's study shows no mass, hemorrhage, or midline shift.   The ventricles, cisterns, and sulci are unremarkable. There is no  hydrocephalus.   There is no evidence of acute ischemia.  I do not see epidural or subdural hematoma.  The gray-white differentiation is appropriate.   The bone window setting images show no destructive calvarial lesion or  acute calvarial fracture.   The posterior fossa is unremarkable.          Impression:       No acute intracranial pathology. Nothing is seen on this exam to  specifically account for the patient's symptoms.     This report was finalized on 9/6/2019 7:40 AM by Dr. Nazario Lozano MD.       XR Chest 1 View [[id number]] Collected:  09/05/19 1936     Updated:  09/05/19 1939    Narrative:       XR CHEST 1 VW-     CLINICAL INDICATION: Chest pain protocol        COMPARISON: 01/31/2017      TECHNIQUE: Single frontal view of the chest.     FINDINGS:     There is no focal alveolar infiltrate or effusion.  The cardiac silhouette is  normal. The pulmonary vasculature is  unremarkable.  There is no evidence of an acute osseous abnormality.   There are no suspicious-appearing parenchymal soft tissue nodules.          Impression:       No evidence of active or acute cardiopulmonary disease on today's chest  radiograph.     This report was finalized on 9/5/2019 7:36 PM by Dr. Nazario Lozano MD.             Test Results Pending at Discharge:      Discharge Disposition/Discharge Medications/Discharge Appointments     Discharge Disposition:   Home or Self Care    Condition at Discharge:  Stable, much improved with no issues today     Code Status While Inpatient:  Code Status and Medical Interventions:   Ordered at: 09/05/19 2107     Code Status:    CPR     Medical Interventions (Level of Support Prior to Arrest):    Full       Discharge Medications:     Discharge Medications      Changes to Medications      Instructions Start Date   losartan 50 MG tablet  Commonly known as:  COZAAR  What changed:    · medication strength  · how much to take   50 mg, Oral, Daily   Start Date:  9/7/2019        Continue These Medications      Instructions Start Date   dexlansoprazole 60 MG capsule  Commonly known as:  DEXILANT   60 mg, Oral, Daily      HYDROcodone-acetaminophen 7.5-325 MG per tablet  Commonly known as:  NORCO   1 tablet, Oral, Daily PRN             Discharge Diet:   Regular    Discharge Activity:  As tolerated    Discharge Appointments:      Follow-up Information     Marcio Hurley MD Follow up in 1 week(s).    Specialty:  Family Medicine  Contact information:  [address]  [facility] [address]  [phone]             Randall Fernández MD Follow up in 1 week(s).    Specialty:  Cardiology  Contact information:  [address] [facility] [place] [address]  [phone]                           JOSE J Mcmahon  09/06/19  6:30 PM          Please note that this discharge summary required more than 30 minutes to complete.

## 2019-09-06 NOTE — PLAN OF CARE
Problem: Patient Care Overview  Goal: Plan of Care Review  Outcome: Ongoing (interventions implemented as appropriate)      Problem: Cardiac: ACS (Acute Coronary Syndrome) (Adult)  Goal: Signs and Symptoms of Listed Potential Problems Will be Absent, Minimized or Managed (Cardiac: ACS)  Outcome: Ongoing (interventions implemented as appropriate)   09/06/19 0031   Goal/Outcome Evaluation   Problems Assessed (Acute Coronary Syndrome) all   Problems Present (Acute Coronary Syn) chest pain (angina)       Problem: Syncope (Adult)  Goal: Identify Related Risk Factors and Signs and Symptoms  Outcome: Ongoing (interventions implemented as appropriate)

## 2019-09-09 ENCOUNTER — TELEPHONE (OUTPATIENT)
Dept: CARDIOLOGY | Facility: CLINIC | Age: 44
End: 2019-09-09

## 2019-09-09 NOTE — TELEPHONE ENCOUNTER
Per Dr. Fernández, make pt appt for tomorrow to discuss. Appt made for 9/10/19 @ 12:30 w/Dr. Fernández.  Called pt-no answer-left VM req callback.

## 2019-09-09 NOTE — TELEPHONE ENCOUNTER
Pt called and freddy an appt for [facility] follow up, dx syncope and cp. Dr. Fernández consulted pt 9/6/19.      Follow up is scheduled for 9/17/19. Pt works as a  and is asking if it is ok for him to return to work or if he should wait until after his follow up.  If he is to be off, he will need a letter stating so.

## 2019-09-10 ENCOUNTER — OFFICE VISIT (OUTPATIENT)
Dept: CARDIOLOGY | Facility: CLINIC | Age: 44
End: 2019-09-10

## 2019-09-10 VITALS
DIASTOLIC BLOOD PRESSURE: 86 MMHG | HEIGHT: 66 IN | BODY MASS INDEX: 30.05 KG/M2 | WEIGHT: 187 LBS | SYSTOLIC BLOOD PRESSURE: 128 MMHG | OXYGEN SATURATION: 98 % | HEART RATE: 120 BPM

## 2019-09-10 DIAGNOSIS — R00.2 PALPITATIONS: ICD-10-CM

## 2019-09-10 DIAGNOSIS — R55 SYNCOPE, UNSPECIFIED SYNCOPE TYPE: Primary | ICD-10-CM

## 2019-09-10 DIAGNOSIS — R07.9 CHEST PAIN, UNSPECIFIED TYPE: ICD-10-CM

## 2019-09-10 PROBLEM — K21.00 GERD WITH ESOPHAGITIS: Status: ACTIVE | Noted: 2019-09-10

## 2019-09-10 PROCEDURE — 93000 ELECTROCARDIOGRAM COMPLETE: CPT | Performed by: INTERNAL MEDICINE

## 2019-09-10 PROCEDURE — 93270 REMOTE 30 DAY ECG REV/REPORT: CPT | Performed by: INTERNAL MEDICINE

## 2019-09-10 PROCEDURE — 99214 OFFICE O/P EST MOD 30 MIN: CPT | Performed by: INTERNAL MEDICINE

## 2019-09-10 RX ORDER — SILDENAFIL CITRATE 20 MG/1
1 TABLET ORAL
COMMUNITY
Start: 2019-08-13

## 2019-09-10 RX ORDER — LOSARTAN POTASSIUM 100 MG/1
100 TABLET ORAL DAILY
COMMUNITY
End: 2019-09-10 | Stop reason: SINTOL

## 2019-09-10 RX ORDER — TOPIRAMATE 100 MG/1
100 TABLET, FILM COATED ORAL 2 TIMES DAILY
COMMUNITY

## 2019-09-10 RX ORDER — METOPROLOL SUCCINATE 50 MG/1
50 TABLET, EXTENDED RELEASE ORAL DAILY
Qty: 30 TABLET | Refills: 3 | Status: SHIPPED | OUTPATIENT
Start: 2019-09-10 | End: 2019-09-24 | Stop reason: SDUPTHER

## 2019-09-10 NOTE — PROGRESS NOTES
Marcio Hurley MD  Samson Kendall  1975  09/10/2019    Patient Active Problem List   Diagnosis   • Skin lesion of right ear   • Cough   • Syncope   • GERD with esophagitis   • Palpitations   • Chest pain       Dear Marcio Hurley MD:    Subjective     Samson Kendall is a 44 y.o. male with the problems as listed above, presents    Chief complaint: Follow-up of recent hospitalization for syncope and chest pains.    History of Present Illness: Ms. Kendall is a pleasant 43-year-old  male with recent history of syncope and chest pains for which she was evaluated with a stress sestamibi study and echo Doppler study recently.  The nuclear stress test was normal at moderate level of exercise and at more than target heart rate.  His echo Doppler study revealed mildly depressed LV systolic function with an estimated allergic fraction about 45 to 50% with no significant valvular abnormalities noted.  He is here for regular cardiology follow-up.  On today's visit he says that he passed out once this past Sunday since his been discharged from the hospital.  This apparently happened as he was trying to get out of the truck.  This reportedly lasted for about 20 to 30 minutes when he finally woke up on his own and called his parents to come and help him.  He says he feels like his heart is running fast all the time.  His heart rate today in the office was 120 bpm.  He denies any recent fever or chills.  He has some intermittent chest pains.  He denies excessive caffeine consumption.  He says he has been having diarrhea since he started taking losartan.    No Known Allergies:      Current Outpatient Medications:   •  dexlansoprazole (DEXILANT) 60 MG capsule, Take 60 mg by mouth Daily., Disp: , Rfl:   •  HYDROcodone-acetaminophen (NORCO) 7.5-325 MG per tablet, Take 1 tablet by mouth Daily As Needed for Moderate Pain ., Disp: , Rfl:   •  sildenafil (REVATIO) 20 MG tablet, Take 1 tablet by mouth., Disp: ,  "Rfl:   •  topiramate (TOPAMAX) 100 MG tablet, Take 100 mg by mouth 2 (Two) Times a Day., Disp: , Rfl:   •  metoprolol succinate XL (TOPROL-XL) 50 MG 24 hr tablet, Take 1 tablet by mouth Daily., Disp: 30 tablet, Rfl: 3      The following portions of the patient's history were reviewed and updated as appropriate: allergies, current medications, past family history, past medical history, past social history, past surgical history and problem list.    Social History     Tobacco Use   • Smoking status: Never Smoker   • Smokeless tobacco: Current User     Types: Snuff   Substance Use Topics   • Alcohol use: Yes   • Drug use: No       Review of Systems   Constitution: Negative for chills and fever.   HENT: Negative for nosebleeds and sore throat.    Cardiovascular: Positive for chest pain, near-syncope and syncope. Palpitations: racing.   Respiratory: Negative for cough, hemoptysis and wheezing.    Gastrointestinal: Negative for abdominal pain, hematemesis, hematochezia, melena, nausea and vomiting.   Genitourinary: Negative for dysuria and hematuria.   Neurological: Positive for dizziness. Negative for headaches.       Objective   Vitals:    09/10/19 1158   BP: 128/86   BP Location: Left arm   Patient Position: Sitting   Cuff Size: Adult   Pulse: 120   SpO2: 98%   Weight: 84.8 kg (187 lb)   Height: 167.6 cm (66\")     Body mass index is 30.18 kg/m².    Physical Exam   Constitutional: He is oriented to person, place, and time. He appears well-developed and well-nourished.   HENT:   Mouth/Throat: Oropharynx is clear and moist.   Eyes: EOM are normal. Pupils are equal, round, and reactive to light.   Neck: Neck supple. No JVD present. No tracheal deviation present. No thyromegaly present.   Cardiovascular: Normal rate, regular rhythm, S1 normal and S2 normal. Exam reveals no gallop and no friction rub.   No murmur heard.  Pulmonary/Chest: Effort normal and breath sounds normal.   Abdominal: Soft. Bowel sounds are normal. He " exhibits no mass. There is no tenderness.   Musculoskeletal: Normal range of motion. He exhibits no edema.   Lymphadenopathy:     He has no cervical adenopathy.   Neurological: He is alert and oriented to person, place, and time.   Skin: Skin is warm and dry. No rash noted.   Psychiatric: He has a normal mood and affect.       Lab Results   Component Value Date     2019    K 4.0 2019     2019    CO2 21.2 (L) 2019    BUN 11 2019    CREATININE 1.01 2019    GLUCOSE 104 (H) 2019    CALCIUM 8.6 2019    AST 23 2019    ALT 33 2019    ALKPHOS 70 2019     Lab Results   Component Value Date    CKTOTAL 92 2019     Lab Results   Component Value Date    WBC 9.05 2019    HGB 12.9 (L) 2019    HCT 39.5 2019     2019     No results found for: INR  Lab Results   Component Value Date    MG 1.9 2019     Lab Results   Component Value Date    TSH 4.790 (H) 2019    TRIG 315 (H) 2019    HDL 25 (L) 2019    LDL 73 2019      Samson Kendall   Exercise Stress Test With Myocardial Perfusion SPECT (Multi Study)   Order# [id number]   Reading physician: Randall Fernández MD Ordering physician: Isadora Hollins APRN Study date: 19   Patient Information     Patient Name  Samson Kendall MRN  [id number] Sex  Male  (Age)  1975 (44 y.o.)   Interpretation Summary     · Stress Procedure  · A stress test was performed following the Masood protocol.  · Exercise duration (min) 7 min Exercise duration (sec) 0 sec Estimated workload 10.1 METS  · Baseline Vitals Baseline HR 98 bpm Baseline /78 mmHg Peak Stress Vitals Peak  bpm Peak /130 mmHg Recovery Vitals Recovery HR 96 bpm Recovery /71 mmHg Exercise Data Target HR (85%) 150 bpm Max. Pred. HR (100%) 177 bpm Percent Max Pred HR 89.83 %  · No complaint of chest pain  · There was no ST segment deviation noted during  stress.  · There were no significant arrhythmias noted during the test.  · No ECG evidence of myocardial ischemia  · Findings consistent with a normal ECG stress test.  · Nuclear Perfusion Findings  · Myocardial perfusion imaging indicates a normal myocardial perfusion study with no evidence of ischemia.  · Normal LV cavity size. Normal LV wall motion noted.  · Left ventricular ejection fraction is normal (Calculated EF = 60%).  · Impressions are consistent with a low risk study.     Samson Kendall   Echocardiogram   Order# [id number]   Reading physician: Randall Fernández MD Ordering physician: Marjorie Grigsby APRN Study date: 19   Patient Information     Patient Name  Samson Kendall MRN  [id number] Sex  Male  (Age)  1975 (44 y.o.)   Sedation Narrator Report     Interpretation Summary     · Normal left ventricular cavity size and wall thickness noted. There is left ventricular global hypokinesis noted.  · Left ventricular systolic function is mildly decreased  · Estimated EF appears to be in the range of 46 - 50%  · The aortic valve is structurally normal. No aortic valve regurgitation is present. No aortic valve stenosis is present.  · The mitral valve is normal in structure. No mitral valve regurgitation is present. No significant mitral valve stenosis is present.  · The tricuspid valve is normal. No evidence of tricuspid valve stenosis is present. Mild tricuspid valve regurgitation is present. Estimated right ventricular systolic pressure from tricuspid regurgitation is normal (<35 mmHg).  · There is no evidence of pericardial effusion.  · No previous studies available for comparison       During this visit the following were done:  Labs Reviewed [x]    Labs Ordered []    Radiology Reports Reviewed [x]    Referring Provider Records Reviewed []    ER Records Reviewed [x]    Hospital Records Reviewed [x]        ECG 12 Lead  Date/Time: 9/10/2019 4:32 PM  Performed by: Randall Fernández MD  Authorized by:  Randall Fernández MD   Comparison: compared with previous ECG from 9/6/2019  Similar to previous ECG  Rhythm: sinus tachycardia  Conduction: conduction normal  ST Segments: ST segments normal  Comments: Poor R wave progression across leads V3 through V5.            Assessment/Plan :   Diagnosis Plan   1. Syncope, unspecified syncope type     2. Palpitations     3. Chest pain, unspecified type          Recommendations:  1. Since he is still having episodes of syncope, will go ahead and arrange an event monitor today.  2. Since he is having diarrhea with losartan, will go to discontinue this and start him on Toprol-XL 50 mg daily.  3. I have encouraged him to drink a lot of liquids such as Gatorade and/or water.  4. I told him that he should not be driving until we make sure that he is not going to be passing out anymore.  He will be staying off work until we complete the monitoring.  5. I told him to keep a check on his blood pressure twice a day at home and bring it with next visit.      Return in about 2 weeks (around 9/24/2019).    As always, Rodolfo I appreciate very much the opportunity to participate in the cardiovascular care of your patients. Please do not hesitate to call me with any questions with regards to Samson Kendall's evaluation and management.           With Best Regards,        Randall Fernández MD, [facility]    Dragon disclaimer:  Much of this encounter note is an electronic transcription/translation of spoken language to printed text. The electronic translation of spoken language may permit erroneous, or at times, nonsensical words or phrases to be inadvertently transcribed; Although I have reviewed the note for such errors, some may still exist.

## 2019-09-24 ENCOUNTER — OFFICE VISIT (OUTPATIENT)
Dept: CARDIOLOGY | Facility: CLINIC | Age: 44
End: 2019-09-24

## 2019-09-24 VITALS
HEIGHT: 67 IN | DIASTOLIC BLOOD PRESSURE: 86 MMHG | SYSTOLIC BLOOD PRESSURE: 130 MMHG | RESPIRATION RATE: 16 BRPM | BODY MASS INDEX: 29.82 KG/M2 | WEIGHT: 190 LBS | HEART RATE: 99 BPM

## 2019-09-24 DIAGNOSIS — R55 SYNCOPE, UNSPECIFIED SYNCOPE TYPE: Primary | ICD-10-CM

## 2019-09-24 DIAGNOSIS — R00.2 PALPITATIONS: ICD-10-CM

## 2019-09-24 DIAGNOSIS — R53.83 FATIGUE, UNSPECIFIED TYPE: ICD-10-CM

## 2019-09-24 PROCEDURE — 99213 OFFICE O/P EST LOW 20 MIN: CPT | Performed by: INTERNAL MEDICINE

## 2019-09-24 RX ORDER — METOPROLOL SUCCINATE 50 MG/1
100 TABLET, EXTENDED RELEASE ORAL DAILY
Qty: 60 TABLET | Refills: 3 | Status: SHIPPED | OUTPATIENT
Start: 2019-09-24 | End: 2019-10-14 | Stop reason: SDUPTHER

## 2019-10-14 ENCOUNTER — TREATMENT (OUTPATIENT)
Dept: CARDIOLOGY | Facility: CLINIC | Age: 44
End: 2019-10-14

## 2019-10-14 ENCOUNTER — OFFICE VISIT (OUTPATIENT)
Dept: CARDIOLOGY | Facility: CLINIC | Age: 44
End: 2019-10-14

## 2019-10-14 VITALS
HEART RATE: 74 BPM | WEIGHT: 195 LBS | OXYGEN SATURATION: 96 % | SYSTOLIC BLOOD PRESSURE: 140 MMHG | HEIGHT: 67 IN | BODY MASS INDEX: 30.61 KG/M2 | DIASTOLIC BLOOD PRESSURE: 88 MMHG

## 2019-10-14 DIAGNOSIS — R55 SYNCOPE AND COLLAPSE: Primary | ICD-10-CM

## 2019-10-14 DIAGNOSIS — R55 SYNCOPE, UNSPECIFIED SYNCOPE TYPE: ICD-10-CM

## 2019-10-14 DIAGNOSIS — R00.2 PALPITATIONS: Primary | ICD-10-CM

## 2019-10-14 PROCEDURE — 93272 ECG/REVIEW INTERPRET ONLY: CPT | Performed by: INTERNAL MEDICINE

## 2019-10-14 PROCEDURE — 99213 OFFICE O/P EST LOW 20 MIN: CPT | Performed by: PHYSICIAN ASSISTANT

## 2019-10-14 RX ORDER — METOPROLOL SUCCINATE 100 MG/1
100 TABLET, EXTENDED RELEASE ORAL DAILY
Qty: 60 TABLET | Refills: 3 | Status: SHIPPED | OUTPATIENT
Start: 2019-10-14 | End: 2019-11-07

## 2019-10-14 RX ORDER — METOPROLOL SUCCINATE 25 MG/1
25 TABLET, EXTENDED RELEASE ORAL DAILY
Qty: 90 TABLET | Refills: 3 | Status: SHIPPED | OUTPATIENT
Start: 2019-10-14 | End: 2019-11-07 | Stop reason: DRUGHIGH

## 2019-10-14 NOTE — PROGRESS NOTES
Marcio Hurley MD  Samson Kendall  1975  10/14/2019    Patient Active Problem List   Diagnosis   • Skin lesion of right ear   • Cough   • Syncope   • GERD with esophagitis   • Palpitations   • Chest pain   • Fatigue       Dear Marcio Hurley MD:    Subjective     History of Present Illness:    Chief Complaint   Patient presents with   • Follow-up   • Med Management     Verbal.   • Shortness of Breath   • Palpitations   • Edema       Samson Kendall is a pleasant 44 y.o. male with a past medical history significant for recent episode of syncope and palpitations.  He comes in to discuss results of event monitor.    Patient's event monitor revealed frequent episodes of sinus tachycardia but otherwise showed no dysrhythmias.  He was reevaluated on 9/24/2019 at that time he was still wearing his event monitor Dr. Fernández increase his Toprol to 100 mg daily this did have a good correlation with his event monitor as after that date his heart rates were much lower but he was still having some episodes of tachycardia all in sinus rhythm.  Speaking to the patient he does report improvement with the symptoms on the higher dose of metoprolol succinate but he has still been having the palpitations that also causes some chest pressure when they come on.  He does report that these have been coming on at random with no correlation that he is aware of.  Thankfully, however, he does deny any further episodes of syncope since he was seen in the first time on September 10.      No Known Allergies:      Current Outpatient Medications:   •  dexlansoprazole (DEXILANT) 60 MG capsule, Take 60 mg by mouth Daily., Disp: , Rfl:   •  HYDROcodone-acetaminophen (NORCO) 7.5-325 MG per tablet, Take 1 tablet by mouth Daily As Needed for Moderate Pain ., Disp: , Rfl:   •  metoprolol succinate XL (TOPROL-XL) 100 MG 24 hr tablet, Take 1 tablet by mouth Daily., Disp: 60 tablet, Rfl: 3  •  metoprolol succinate XL (TOPROL-XL) 25 MG 24 hr  "tablet, Take 1 tablet by mouth Daily., Disp: 90 tablet, Rfl: 3  •  sildenafil (REVATIO) 20 MG tablet, Take 1 tablet by mouth., Disp: , Rfl:   •  topiramate (TOPAMAX) 100 MG tablet, Take 100 mg by mouth 2 (Two) Times a Day., Disp: , Rfl:     The following portions of the patient's history were reviewed and updated as appropriate: allergies, current medications, past family history, past medical history, past social history, past surgical history and problem list.    Social History     Tobacco Use   • Smoking status: Never Smoker   • Smokeless tobacco: Current User     Types: Snuff   Substance Use Topics   • Alcohol use: Yes   • Drug use: No       Review of Systems   Constitution: Negative for weakness and malaise/fatigue.   Cardiovascular: Positive for chest pain, irregular heartbeat and palpitations. Negative for dyspnea on exertion.   Respiratory: Negative for cough and shortness of breath.    Hematologic/Lymphatic: Negative for bleeding problem. Does not bruise/bleed easily.   Gastrointestinal: Negative for nausea and vomiting.       Objective   Vitals:    10/14/19 1004   BP: 140/88   BP Location: Left arm   Patient Position: Sitting   Cuff Size: Adult   Pulse: 74   SpO2: 96%   Weight: 88.5 kg (195 lb)   Height: 170.2 cm (67\")     Body mass index is 30.54 kg/m².    Physical Exam   Constitutional: He is oriented to person, place, and time. He appears well-developed and well-nourished. No distress.   HENT:   Head: Normocephalic and atraumatic.   Cardiovascular: Normal rate, regular rhythm and normal heart sounds.   Pulmonary/Chest: Effort normal and breath sounds normal. No respiratory distress.   Musculoskeletal: He exhibits no edema.   Neurological: He is alert and oriented to person, place, and time.   Skin: He is not diaphoretic.       Lab Results   Component Value Date     09/06/2019    K 4.0 09/06/2019     09/06/2019    CO2 21.2 (L) 09/06/2019    BUN 11 09/06/2019    CREATININE 1.01 09/06/2019    " GLUCOSE 104 (H) 09/06/2019    CALCIUM 8.6 09/06/2019    AST 23 09/05/2019    ALT 33 09/05/2019    ALKPHOS 70 09/05/2019     Lab Results   Component Value Date    CKTOTAL 92 09/06/2019     Lab Results   Component Value Date    WBC 9.05 09/06/2019    HGB 12.9 (L) 09/06/2019    HCT 39.5 09/06/2019     09/06/2019     No results found for: INR  Lab Results   Component Value Date    MG 1.9 09/06/2019     Lab Results   Component Value Date    TSH 4.790 (H) 09/06/2019    TRIG 315 (H) 09/06/2019    HDL 25 (L) 09/06/2019    LDL 73 09/06/2019      No results found for: BNP    During this visit the following were done:  Labs Reviewed [x]    Labs Ordered []    Radiology Reports Reviewed [x]    Radiology Ordered []    PCP Records Reviewed []    Referring Provider Records Reviewed []    ER Records Reviewed []    Hospital Records Reviewed []    History Obtained From Family []    Radiology Images Reviewed []    Other Reviewed []    Records Requested []       Procedures    Assessment/Plan    Diagnosis Plan   1. Palpitations     2. Syncope, unspecified syncope type              Recommendations:  1. I did discuss the case with Dr. Bojorquez who recommended increasing his metoprolol to 125 mg daily, so I will do this and I explained to the patient.  I asked him to continue monitoring blood pressure and heart rate daily which she reports he does.  2. Regards to being cleared to go back to work, I did discuss the case with Dr. Fernández since he does feel the work does require prolonged areas of driving Dr. Fernández feels he should still have with restrictions for at least 3 months from the last syncopal episode which was on 9/5/2019 which means you will have to have with restrictions at least until December 5.  As long as he can go back to work and avoid driving he will clear him.    Return in about 4 weeks (around 11/11/2019).    As always, I appreciate very much the opportunity to participate in the cardiovascular care of your  patients.      With Best Regards,    Dustin Galindo PA-C

## 2019-10-15 ENCOUNTER — TELEPHONE (OUTPATIENT)
Dept: CARDIOLOGY | Facility: CLINIC | Age: 44
End: 2019-10-15

## 2019-10-15 NOTE — TELEPHONE ENCOUNTER
----- Message from Dustin Galindo PA-C sent at 10/15/2019  9:35 AM EDT -----  Can we try to fill out a letter stating that it is okay for the patient to go back to work so long as he avoids driving.

## 2019-10-15 NOTE — TELEPHONE ENCOUNTER
He will likely need a letter for his work, if they dont, then I do not have any problem with this.

## 2019-10-15 NOTE — TELEPHONE ENCOUNTER
Please call patient regarding a release to go back to work.  Patient was under the understanding he was cleared to return to work after yesterdays appointment.

## 2019-11-07 ENCOUNTER — OFFICE VISIT (OUTPATIENT)
Dept: CARDIOLOGY | Facility: CLINIC | Age: 44
End: 2019-11-07

## 2019-11-07 VITALS
SYSTOLIC BLOOD PRESSURE: 139 MMHG | DIASTOLIC BLOOD PRESSURE: 92 MMHG | HEART RATE: 91 BPM | WEIGHT: 196.4 LBS | OXYGEN SATURATION: 99 % | BODY MASS INDEX: 30.83 KG/M2 | HEIGHT: 67 IN

## 2019-11-07 DIAGNOSIS — R00.2 PALPITATIONS: Primary | ICD-10-CM

## 2019-11-07 DIAGNOSIS — R55 SYNCOPE, UNSPECIFIED SYNCOPE TYPE: ICD-10-CM

## 2019-11-07 DIAGNOSIS — I42.9 CARDIOMYOPATHY, UNSPECIFIED TYPE (HCC): ICD-10-CM

## 2019-11-07 PROCEDURE — 99213 OFFICE O/P EST LOW 20 MIN: CPT | Performed by: INTERNAL MEDICINE

## 2019-11-07 RX ORDER — LOSARTAN POTASSIUM 25 MG/1
25 TABLET ORAL DAILY
Qty: 30 TABLET | Refills: 5 | Status: SHIPPED | OUTPATIENT
Start: 2019-11-07 | End: 2020-08-12 | Stop reason: SDUPTHER

## 2019-11-07 RX ORDER — METOPROLOL SUCCINATE 100 MG/1
100 TABLET, EXTENDED RELEASE ORAL DAILY
Qty: 90 TABLET | Refills: 3 | Status: SHIPPED | OUTPATIENT
Start: 2019-11-07 | End: 2020-06-05

## 2019-11-07 RX ORDER — METOPROLOL SUCCINATE 100 MG/1
100 TABLET, EXTENDED RELEASE ORAL DAILY
Qty: 90 TABLET | Refills: 3 | Status: SHIPPED | OUTPATIENT
Start: 2019-11-07 | End: 2019-11-07 | Stop reason: SDUPTHER

## 2019-11-07 NOTE — PROGRESS NOTES
Marcio Hurley MD  Samson Kendall  1975 11/07/2019    Patient Active Problem List   Diagnosis   • Skin lesion of right ear   • Cough   • Syncope   • GERD with esophagitis   • Palpitations   • Chest pain   • Fatigue   • Cardiomyopathy (CMS/HCC)       Dear Marcio Hurley MD:    Tal Kendall is a 44 y.o. male with the problems as listed above, presents    Chief Complaint   Patient presents with   • Palpitations     follow- up   • Med Management     pt brought med list to appt       History of Present Illness: Mr. Kendall is a pleasant 43-year-old  male with history of palpitations and episodes of sinus tachycardia up to 156 bpm associated with symptoms of lightheadedness.  Patient has been started on Toprol-XL which seems to be helping his symptoms although he still has some intermittent palpitations especially in the evenings.  He denies any more episodes of syncope.  His nuclear stress test recently revealed no evidence of myocardial ischemia.  His echo Doppler study revealed mildly depressed LV systolic function with an estimated allergic fraction about 45 to 50%. He denies any significant dyspnea, PND, orthopnea pedal edema.    No Known Allergies:      Current Outpatient Medications:   •  dexlansoprazole (DEXILANT) 60 MG capsule, Take 60 mg by mouth Daily., Disp: , Rfl:   •  HYDROcodone-acetaminophen (NORCO) 7.5-325 MG per tablet, Take 1 tablet by mouth Daily As Needed for Moderate Pain ., Disp: , Rfl:   •  metoprolol succinate XL (TOPROL-XL) 100 MG 24 hr tablet, Take 1 tablet by mouth Daily. Take 100 mg in a.m. and 50 mg in p.m., Disp: 90 tablet, Rfl: 3  •  losartan (COZAAR) 25 MG tablet, Take 1 tablet by mouth Daily., Disp: 30 tablet, Rfl: 5  •  sildenafil (REVATIO) 20 MG tablet, Take 1 tablet by mouth., Disp: , Rfl:   •  topiramate (TOPAMAX) 100 MG tablet, Take 100 mg by mouth 2 (Two) Times a Day., Disp: , Rfl:       The following portions of the patient's history  "were reviewed and updated as appropriate: allergies, current medications, past family history, past medical history, past social history, past surgical history and problem list.    Social History     Tobacco Use   • Smoking status: Never Smoker   • Smokeless tobacco: Current User     Types: Snuff   Substance Use Topics   • Alcohol use: Yes   • Drug use: No       Review of Systems   Constitution: Negative for chills and fever.   HENT: Negative for nosebleeds and sore throat.    Cardiovascular: Positive for palpitations.   Respiratory: Negative for cough, hemoptysis and wheezing.    Gastrointestinal: Negative for abdominal pain, hematemesis, hematochezia, melena, nausea and vomiting.   Genitourinary: Negative for dysuria and hematuria.   Neurological: Negative for headaches.       Objective   Vitals:    11/07/19 1339   BP: 139/92   BP Location: Right arm   Patient Position: Sitting   Cuff Size: Adult   Pulse: 91   SpO2: 99%   Weight: 89.1 kg (196 lb 6.4 oz)   Height: 170.2 cm (67\")     Body mass index is 30.76 kg/m².        Physical Exam   Constitutional: He is oriented to person, place, and time. He appears well-developed and well-nourished.   HENT:   Mouth/Throat: Oropharynx is clear and moist.   Eyes: EOM are normal. Pupils are equal, round, and reactive to light.   Neck: Neck supple. No JVD present. No tracheal deviation present. No thyromegaly present.   Cardiovascular: Normal rate, regular rhythm, S1 normal and S2 normal. Exam reveals no gallop and no friction rub.   No murmur heard.  Pulmonary/Chest: Effort normal and breath sounds normal.   Abdominal: Soft. Bowel sounds are normal. He exhibits no mass. There is no tenderness.   Musculoskeletal: Normal range of motion. He exhibits no edema.   Lymphadenopathy:     He has no cervical adenopathy.   Neurological: He is alert and oriented to person, place, and time.   Skin: Skin is warm and dry. No rash noted.   Psychiatric: He has a normal mood and affect. "       Lab Results   Component Value Date     09/06/2019    K 4.0 09/06/2019     09/06/2019    CO2 21.2 (L) 09/06/2019    BUN 11 09/06/2019    CREATININE 1.01 09/06/2019    GLUCOSE 104 (H) 09/06/2019    CALCIUM 8.6 09/06/2019    AST 23 09/05/2019    ALT 33 09/05/2019    ALKPHOS 70 09/05/2019     Lab Results   Component Value Date    CKTOTAL 92 09/06/2019     Lab Results   Component Value Date    WBC 9.05 09/06/2019    HGB 12.9 (L) 09/06/2019    HCT 39.5 09/06/2019     09/06/2019     No results found for: INR  Lab Results   Component Value Date    MG 1.9 09/06/2019     Lab Results   Component Value Date    TSH 4.790 (H) 09/06/2019    TRIG 315 (H) 09/06/2019    HDL 25 (L) 09/06/2019    LDL 73 09/06/2019        Procedures    Assessment/Plan    Diagnosis Plan   1. Palpitations improved with Toprol-XL.     2. Syncope, unspecified syncope type no recurrence.  Basic Metabolic Panel   3. Cardiomyopathy, unspecified type probably rate related with mildly depressed LV systolic function with LV ejection fraction 45 to 50%, well compensated..         Recommendations:  1. Since he is having symptoms towards the evening, will change his Toprol-XL to 100 in the morning and 50 mg in the evening.  2. We will add losartan 25 mg daily for his cardiomyopathy as well.  3. BMP in 1 week.    Return in about 3 months (around 2/7/2020).    As always, Rodolfo  I appreciate very much the opportunity to participate in the cardiovascular care of your patients. Please do not hesitate to call me with any questions with regards to Samson Enoch evaluation and management.           With Best Regards,        Randall Fernández MD, [facility]    Please note that portions of this note were completed with a voice recognition program.

## 2020-01-15 ENCOUNTER — APPOINTMENT (OUTPATIENT)
Dept: CT IMAGING | Facility: HOSPITAL | Age: 45
End: 2020-01-15

## 2020-01-15 ENCOUNTER — HOSPITAL ENCOUNTER (EMERGENCY)
Facility: HOSPITAL | Age: 45
Discharge: HOME | End: 2020-01-15
Attending: EMERGENCY MEDICINE | Admitting: EMERGENCY MEDICINE

## 2020-01-15 VITALS
BODY MASS INDEX: 30.76 KG/M2 | WEIGHT: 196 LBS | HEIGHT: 67 IN | DIASTOLIC BLOOD PRESSURE: 92 MMHG | TEMPERATURE: 98 F | RESPIRATION RATE: 18 BRPM | SYSTOLIC BLOOD PRESSURE: 148 MMHG | OXYGEN SATURATION: 98 % | HEART RATE: 100 BPM

## 2020-01-15 DIAGNOSIS — N20.0 KIDNEY STONE: Primary | ICD-10-CM

## 2020-01-15 LAB
ALBUMIN SERPL-MCNC: 4.54 G/DL (ref 3.5–5.2)
ALBUMIN/GLOB SERPL: 1.3 G/DL
ALP SERPL-CCNC: 56 U/L (ref 39–117)
ALT SERPL W P-5'-P-CCNC: 52 U/L (ref 1–41)
ANION GAP SERPL CALCULATED.3IONS-SCNC: 13.6 MMOL/L (ref 5–15)
AST SERPL-CCNC: 29 U/L (ref 1–40)
BASOPHILS # BLD AUTO: 0.05 10*3/MM3 (ref 0–0.2)
BASOPHILS NFR BLD AUTO: 0.8 % (ref 0–1.5)
BILIRUB SERPL-MCNC: 0.3 MG/DL (ref 0.2–1.2)
BUN BLD-MCNC: 10 MG/DL (ref 6–20)
BUN/CREAT SERPL: 9.3 (ref 7–25)
CALCIUM SPEC-SCNC: 9.6 MG/DL (ref 8.6–10.5)
CHLORIDE SERPL-SCNC: 103 MMOL/L (ref 98–107)
CO2 SERPL-SCNC: 23.4 MMOL/L (ref 22–29)
CREAT BLD-MCNC: 1.07 MG/DL (ref 0.76–1.27)
DEPRECATED RDW RBC AUTO: 40.1 FL (ref 37–54)
EOSINOPHIL # BLD AUTO: 0.34 10*3/MM3 (ref 0–0.4)
EOSINOPHIL NFR BLD AUTO: 5.4 % (ref 0.3–6.2)
ERYTHROCYTE [DISTWIDTH] IN BLOOD BY AUTOMATED COUNT: 12.1 % (ref 12.3–15.4)
GFR SERPL CREATININE-BSD FRML MDRD: 75 ML/MIN/1.73
GLOBULIN UR ELPH-MCNC: 3.5 GM/DL
GLUCOSE BLD-MCNC: 121 MG/DL (ref 65–99)
HCT VFR BLD AUTO: 44.8 % (ref 37.5–51)
HGB BLD-MCNC: 15.2 G/DL (ref 13–17.7)
IMM GRANULOCYTES # BLD AUTO: 0.01 10*3/MM3 (ref 0–0.05)
IMM GRANULOCYTES NFR BLD AUTO: 0.2 % (ref 0–0.5)
LYMPHOCYTES # BLD AUTO: 2.01 10*3/MM3 (ref 0.7–3.1)
LYMPHOCYTES NFR BLD AUTO: 31.7 % (ref 19.6–45.3)
MCH RBC QN AUTO: 30.8 PG (ref 26.6–33)
MCHC RBC AUTO-ENTMCNC: 33.9 G/DL (ref 31.5–35.7)
MCV RBC AUTO: 90.7 FL (ref 79–97)
MONOCYTES # BLD AUTO: 0.45 10*3/MM3 (ref 0.1–0.9)
MONOCYTES NFR BLD AUTO: 7.1 % (ref 5–12)
NEUTROPHILS # BLD AUTO: 3.49 10*3/MM3 (ref 1.7–7)
NEUTROPHILS NFR BLD AUTO: 54.8 % (ref 42.7–76)
NRBC BLD AUTO-RTO: 0 /100 WBC (ref 0–0.2)
PLATELET # BLD AUTO: 262 10*3/MM3 (ref 140–450)
PMV BLD AUTO: 11.1 FL (ref 6–12)
POTASSIUM BLD-SCNC: 4.1 MMOL/L (ref 3.5–5.2)
PROT SERPL-MCNC: 8 G/DL (ref 6–8.5)
RBC # BLD AUTO: 4.94 10*6/MM3 (ref 4.14–5.8)
SODIUM BLD-SCNC: 140 MMOL/L (ref 136–145)
WBC NRBC COR # BLD: 6.35 10*3/MM3 (ref 3.4–10.8)

## 2020-01-15 PROCEDURE — 85025 COMPLETE CBC W/AUTO DIFF WBC: CPT | Performed by: EMERGENCY MEDICINE

## 2020-01-15 PROCEDURE — 96375 TX/PRO/DX INJ NEW DRUG ADDON: CPT

## 2020-01-15 PROCEDURE — 74176 CT ABD & PELVIS W/O CONTRAST: CPT | Performed by: RADIOLOGY

## 2020-01-15 PROCEDURE — 74176 CT ABD & PELVIS W/O CONTRAST: CPT

## 2020-01-15 PROCEDURE — 25010000002 KETOROLAC TROMETHAMINE PER 15 MG: Performed by: EMERGENCY MEDICINE

## 2020-01-15 PROCEDURE — 80053 COMPREHEN METABOLIC PANEL: CPT | Performed by: EMERGENCY MEDICINE

## 2020-01-15 PROCEDURE — 96374 THER/PROPH/DIAG INJ IV PUSH: CPT

## 2020-01-15 PROCEDURE — 96376 TX/PRO/DX INJ SAME DRUG ADON: CPT

## 2020-01-15 PROCEDURE — 99284 EMERGENCY DEPT VISIT MOD MDM: CPT

## 2020-01-15 PROCEDURE — 25010000002 DIPHENHYDRAMINE PER 50 MG: Performed by: EMERGENCY MEDICINE

## 2020-01-15 PROCEDURE — 25010000002 HYDROMORPHONE 1 MG/ML SOLUTION: Performed by: EMERGENCY MEDICINE

## 2020-01-15 PROCEDURE — 25010000002 ONDANSETRON PER 1 MG: Performed by: EMERGENCY MEDICINE

## 2020-01-15 RX ORDER — DIPHENHYDRAMINE HYDROCHLORIDE 50 MG/ML
12.5 INJECTION INTRAMUSCULAR; INTRAVENOUS ONCE
Status: COMPLETED | OUTPATIENT
Start: 2020-01-15 | End: 2020-01-15

## 2020-01-15 RX ORDER — HYDROCODONE BITARTRATE AND ACETAMINOPHEN 7.5; 325 MG/1; MG/1
1 TABLET ORAL EVERY 8 HOURS PRN
Qty: 12 TABLET | Refills: 0 | Status: SHIPPED | OUTPATIENT
Start: 2020-01-15 | End: 2023-01-26 | Stop reason: SDUPTHER

## 2020-01-15 RX ORDER — ONDANSETRON 2 MG/ML
4 INJECTION INTRAMUSCULAR; INTRAVENOUS ONCE
Status: COMPLETED | OUTPATIENT
Start: 2020-01-15 | End: 2020-01-15

## 2020-01-15 RX ORDER — HYDROMORPHONE HYDROCHLORIDE 1 MG/ML
0.5 INJECTION, SOLUTION INTRAMUSCULAR; INTRAVENOUS; SUBCUTANEOUS ONCE
Status: DISCONTINUED | OUTPATIENT
Start: 2020-01-15 | End: 2020-01-15

## 2020-01-15 RX ORDER — TAMSULOSIN HYDROCHLORIDE 0.4 MG/1
1 CAPSULE ORAL DAILY
Qty: 30 CAPSULE | Refills: 0 | Status: SHIPPED | OUTPATIENT
Start: 2020-01-15 | End: 2023-01-26

## 2020-01-15 RX ORDER — KETOROLAC TROMETHAMINE 30 MG/ML
15 INJECTION, SOLUTION INTRAMUSCULAR; INTRAVENOUS ONCE
Status: COMPLETED | OUTPATIENT
Start: 2020-01-15 | End: 2020-01-15

## 2020-01-15 RX ORDER — SODIUM CHLORIDE 0.9 % (FLUSH) 0.9 %
10 SYRINGE (ML) INJECTION AS NEEDED
Status: DISCONTINUED | OUTPATIENT
Start: 2020-01-15 | End: 2020-01-15 | Stop reason: HOSPADM

## 2020-01-15 RX ORDER — CIPROFLOXACIN 250 MG/1
250 TABLET, FILM COATED ORAL 2 TIMES DAILY
Qty: 14 TABLET | Refills: 0 | Status: SHIPPED | OUTPATIENT
Start: 2020-01-15 | End: 2023-01-26

## 2020-01-15 RX ORDER — LIDOCAINE HYDROCHLORIDE ANHYDROUS AND DEXTROSE MONOHYDRATE 5; 400 G/100ML; MG/100ML
2 INJECTION, SOLUTION INTRAVENOUS CONTINUOUS
Status: DISCONTINUED | OUTPATIENT
Start: 2020-01-15 | End: 2020-01-15

## 2020-01-15 RX ADMIN — HYDROMORPHONE HYDROCHLORIDE 1 MG: 1 INJECTION, SOLUTION INTRAMUSCULAR; INTRAVENOUS; SUBCUTANEOUS at 14:45

## 2020-01-15 RX ADMIN — HYDROMORPHONE HYDROCHLORIDE 1 MG: 1 INJECTION, SOLUTION INTRAMUSCULAR; INTRAVENOUS; SUBCUTANEOUS at 13:07

## 2020-01-15 RX ADMIN — ONDANSETRON 4 MG: 2 INJECTION INTRAMUSCULAR; INTRAVENOUS at 13:07

## 2020-01-15 RX ADMIN — KETOROLAC TROMETHAMINE 15 MG: 30 INJECTION, SOLUTION INTRAMUSCULAR at 13:09

## 2020-01-15 RX ADMIN — SODIUM CHLORIDE 1000 ML: 9 INJECTION, SOLUTION INTRAVENOUS at 14:37

## 2020-01-15 RX ADMIN — HYDROMORPHONE HYDROCHLORIDE 1 MG: 1 INJECTION, SOLUTION INTRAMUSCULAR; INTRAVENOUS; SUBCUTANEOUS at 16:05

## 2020-01-15 RX ADMIN — LIDOCAINE HYDROCHLORIDE 125 MG: 10 INJECTION, SOLUTION INFILTRATION; PERINEURAL at 16:45

## 2020-01-15 RX ADMIN — DIPHENHYDRAMINE HYDROCHLORIDE 12.5 MG: 50 INJECTION, SOLUTION INTRAMUSCULAR; INTRAVENOUS at 16:05

## 2020-01-15 NOTE — ED PROVIDER NOTES
Subjective   Patient presents to ER with severe right flank pain. He states he has history of kidney stones.      Flank Pain   Pain location:  R flank  Pain quality: aching, cramping, sharp and shooting    Pain radiates to:  RLQ  Pain severity:  Severe  Onset quality:  Sudden  Chronicity:  Recurrent  Context comment:  Hx of kidney stones  Associated symptoms: fatigue, hematuria and nausea        Review of Systems   Constitutional: Positive for activity change and fatigue.   HENT: Negative.    Eyes: Negative.    Respiratory: Negative.    Cardiovascular: Negative.    Gastrointestinal: Positive for abdominal pain and nausea.   Endocrine: Negative.    Genitourinary: Positive for flank pain and hematuria.   Skin: Negative.    Allergic/Immunologic: Negative.    Neurological: Negative.    Hematological: Negative.    Psychiatric/Behavioral: Negative.        No past medical history on file.    No Known Allergies    Past Surgical History:   Procedure Laterality Date   • HAND SURGERY     • KNEE ARTHROSCOPY     • WISDOM TOOTH EXTRACTION         Family History   Problem Relation Age of Onset   • Heart attack Maternal Grandfather    • Heart failure Maternal Grandfather    • Heart disease Maternal Grandfather        Social History     Socioeconomic History   • Marital status:      Spouse name: Not on file   • Number of children: Not on file   • Years of education: Not on file   • Highest education level: Not on file   Tobacco Use   • Smoking status: Never Smoker   • Smokeless tobacco: Current User     Types: Snuff   Substance and Sexual Activity   • Alcohol use: Yes   • Drug use: No           Objective   Physical Exam   Constitutional: He appears well-developed. He appears distressed.   HENT:   Head: Normocephalic and atraumatic.   Eyes: Pupils are equal, round, and reactive to light.   Neck: Normal range of motion.   Cardiovascular: Normal rate and regular rhythm.   Pulmonary/Chest: Effort normal.   Abdominal: Soft.    Tender right flank   Musculoskeletal: Normal range of motion.   Neurological: He is alert.   Skin: Skin is warm.   Psychiatric: He has a normal mood and affect.   Nursing note and vitals reviewed.      Procedures           ED Course  ED Course as of Jan 21 1340   Wed Carroll 15, 2020   1544 CT 4.2mm ureteral stone mid right ureter with hydronephrosis    [SALONI]      ED Course User Index  [SALONI] William Roberts MD                                               [facility]    Final diagnoses:   Kidney stone            William Roberts MD  01/21/20 1340

## 2020-01-15 NOTE — ED NOTES
Patient continues to complain of sharp left flank pain currently rated at a 10; Dr. Roberts aware with orders noted     Robin Pat, RN  01/15/20 0380

## 2020-01-17 ENCOUNTER — OFFICE VISIT (OUTPATIENT)
Dept: UROLOGY | Facility: CLINIC | Age: 45
End: 2020-01-17

## 2020-01-17 ENCOUNTER — HOSPITAL ENCOUNTER (EMERGENCY)
Facility: HOSPITAL | Age: 45
Discharge: HOME | End: 2020-01-17
Attending: EMERGENCY MEDICINE | Admitting: EMERGENCY MEDICINE

## 2020-01-17 VITALS
DIASTOLIC BLOOD PRESSURE: 89 MMHG | OXYGEN SATURATION: 98 % | HEART RATE: 66 BPM | WEIGHT: 196 LBS | RESPIRATION RATE: 16 BRPM | SYSTOLIC BLOOD PRESSURE: 127 MMHG | BODY MASS INDEX: 30.76 KG/M2 | TEMPERATURE: 98.6 F | HEIGHT: 67 IN

## 2020-01-17 VITALS
BODY MASS INDEX: 30.76 KG/M2 | WEIGHT: 195.99 LBS | DIASTOLIC BLOOD PRESSURE: 87 MMHG | HEIGHT: 67 IN | SYSTOLIC BLOOD PRESSURE: 125 MMHG

## 2020-01-17 DIAGNOSIS — N20.1 URETERAL CALCULUS, RIGHT: Primary | ICD-10-CM

## 2020-01-17 DIAGNOSIS — R10.9 RIGHT FLANK PAIN: ICD-10-CM

## 2020-01-17 DIAGNOSIS — N23 RENAL COLIC ON RIGHT SIDE: Primary | ICD-10-CM

## 2020-01-17 LAB
ALBUMIN SERPL-MCNC: 4.01 G/DL (ref 3.5–5.2)
ALBUMIN/GLOB SERPL: 1.4 G/DL
ALP SERPL-CCNC: 49 U/L (ref 39–117)
ALT SERPL W P-5'-P-CCNC: 38 U/L (ref 1–41)
ANION GAP SERPL CALCULATED.3IONS-SCNC: 12.8 MMOL/L (ref 5–15)
AST SERPL-CCNC: 19 U/L (ref 1–40)
BACTERIA UR QL AUTO: ABNORMAL /HPF
BASOPHILS # BLD AUTO: 0.05 10*3/MM3 (ref 0–0.2)
BASOPHILS NFR BLD AUTO: 0.7 % (ref 0–1.5)
BILIRUB SERPL-MCNC: 0.2 MG/DL (ref 0.2–1.2)
BILIRUB UR QL STRIP: NEGATIVE
BUN BLD-MCNC: 8 MG/DL (ref 6–20)
BUN/CREAT SERPL: 8.2 (ref 7–25)
CALCIUM SPEC-SCNC: 9.2 MG/DL (ref 8.6–10.5)
CHLORIDE SERPL-SCNC: 101 MMOL/L (ref 98–107)
CLARITY UR: CLEAR
CO2 SERPL-SCNC: 24.2 MMOL/L (ref 22–29)
COLOR UR: YELLOW
CREAT BLD-MCNC: 0.98 MG/DL (ref 0.76–1.27)
DEPRECATED RDW RBC AUTO: 40.9 FL (ref 37–54)
EOSINOPHIL # BLD AUTO: 0.48 10*3/MM3 (ref 0–0.4)
EOSINOPHIL NFR BLD AUTO: 7.1 % (ref 0.3–6.2)
ERYTHROCYTE [DISTWIDTH] IN BLOOD BY AUTOMATED COUNT: 12.2 % (ref 12.3–15.4)
GFR SERPL CREATININE-BSD FRML MDRD: 83 ML/MIN/1.73
GLOBULIN UR ELPH-MCNC: 2.9 GM/DL
GLUCOSE BLD-MCNC: 108 MG/DL (ref 65–99)
GLUCOSE UR STRIP-MCNC: NEGATIVE MG/DL
HCT VFR BLD AUTO: 40.3 % (ref 37.5–51)
HGB BLD-MCNC: 13.5 G/DL (ref 13–17.7)
HGB UR QL STRIP.AUTO: ABNORMAL
HYALINE CASTS UR QL AUTO: ABNORMAL /LPF
IMM GRANULOCYTES # BLD AUTO: 0.01 10*3/MM3 (ref 0–0.05)
IMM GRANULOCYTES NFR BLD AUTO: 0.1 % (ref 0–0.5)
KETONES UR QL STRIP: NEGATIVE
LEUKOCYTE ESTERASE UR QL STRIP.AUTO: NEGATIVE
LYMPHOCYTES # BLD AUTO: 2.19 10*3/MM3 (ref 0.7–3.1)
LYMPHOCYTES NFR BLD AUTO: 32.5 % (ref 19.6–45.3)
MCH RBC QN AUTO: 30.8 PG (ref 26.6–33)
MCHC RBC AUTO-ENTMCNC: 33.5 G/DL (ref 31.5–35.7)
MCV RBC AUTO: 91.8 FL (ref 79–97)
MONOCYTES # BLD AUTO: 0.52 10*3/MM3 (ref 0.1–0.9)
MONOCYTES NFR BLD AUTO: 7.7 % (ref 5–12)
NEUTROPHILS # BLD AUTO: 3.48 10*3/MM3 (ref 1.7–7)
NEUTROPHILS NFR BLD AUTO: 51.9 % (ref 42.7–76)
NITRITE UR QL STRIP: NEGATIVE
NRBC BLD AUTO-RTO: 0 /100 WBC (ref 0–0.2)
PH UR STRIP.AUTO: <=5 [PH] (ref 5–8)
PLATELET # BLD AUTO: 247 10*3/MM3 (ref 140–450)
PMV BLD AUTO: 11.2 FL (ref 6–12)
POTASSIUM BLD-SCNC: 4 MMOL/L (ref 3.5–5.2)
PROT SERPL-MCNC: 6.9 G/DL (ref 6–8.5)
PROT UR QL STRIP: NEGATIVE
RBC # BLD AUTO: 4.39 10*6/MM3 (ref 4.14–5.8)
RBC # UR: ABNORMAL /HPF
REF LAB TEST METHOD: ABNORMAL
SODIUM BLD-SCNC: 138 MMOL/L (ref 136–145)
SP GR UR STRIP: 1.01 (ref 1–1.03)
SQUAMOUS #/AREA URNS HPF: ABNORMAL /HPF
UROBILINOGEN UR QL STRIP: ABNORMAL
WBC NRBC COR # BLD: 6.73 10*3/MM3 (ref 3.4–10.8)
WBC UR QL AUTO: ABNORMAL /HPF

## 2020-01-17 PROCEDURE — 96375 TX/PRO/DX INJ NEW DRUG ADDON: CPT

## 2020-01-17 PROCEDURE — 85025 COMPLETE CBC W/AUTO DIFF WBC: CPT | Performed by: PHYSICIAN ASSISTANT

## 2020-01-17 PROCEDURE — 25010000002 HYDROMORPHONE PER 4 MG: Performed by: EMERGENCY MEDICINE

## 2020-01-17 PROCEDURE — 25010000002 ONDANSETRON PER 1 MG: Performed by: PHYSICIAN ASSISTANT

## 2020-01-17 PROCEDURE — 99284 EMERGENCY DEPT VISIT MOD MDM: CPT

## 2020-01-17 PROCEDURE — 25010000002 MORPHINE PER 10 MG: Performed by: EMERGENCY MEDICINE

## 2020-01-17 PROCEDURE — 36415 COLL VENOUS BLD VENIPUNCTURE: CPT

## 2020-01-17 PROCEDURE — 96374 THER/PROPH/DIAG INJ IV PUSH: CPT

## 2020-01-17 PROCEDURE — 81001 URINALYSIS AUTO W/SCOPE: CPT | Performed by: PHYSICIAN ASSISTANT

## 2020-01-17 PROCEDURE — 99204 OFFICE O/P NEW MOD 45 MIN: CPT | Performed by: NURSE PRACTITIONER

## 2020-01-17 PROCEDURE — 80053 COMPREHEN METABOLIC PANEL: CPT | Performed by: PHYSICIAN ASSISTANT

## 2020-01-17 RX ORDER — SODIUM CHLORIDE 0.9 % (FLUSH) 0.9 %
10 SYRINGE (ML) INJECTION AS NEEDED
Status: DISCONTINUED | OUTPATIENT
Start: 2020-01-17 | End: 2020-01-17 | Stop reason: HOSPADM

## 2020-01-17 RX ORDER — HYDROMORPHONE HYDROCHLORIDE 1 MG/ML
0.5 INJECTION, SOLUTION INTRAMUSCULAR; INTRAVENOUS; SUBCUTANEOUS ONCE
Status: COMPLETED | OUTPATIENT
Start: 2020-01-17 | End: 2020-01-17

## 2020-01-17 RX ORDER — TAMSULOSIN HYDROCHLORIDE 0.4 MG/1
1 CAPSULE ORAL NIGHTLY
Qty: 30 CAPSULE | Refills: 5 | Status: SHIPPED | OUTPATIENT
Start: 2020-01-17 | End: 2023-01-26

## 2020-01-17 RX ORDER — ONDANSETRON 2 MG/ML
4 INJECTION INTRAMUSCULAR; INTRAVENOUS ONCE
Status: COMPLETED | OUTPATIENT
Start: 2020-01-17 | End: 2020-01-17

## 2020-01-17 RX ORDER — PROMETHAZINE HYDROCHLORIDE 25 MG/1
25 TABLET ORAL EVERY 6 HOURS PRN
Qty: 21 TABLET | Refills: 2 | Status: SHIPPED | OUTPATIENT
Start: 2020-01-17 | End: 2023-01-26

## 2020-01-17 RX ORDER — OXYCODONE AND ACETAMINOPHEN 10; 325 MG/1; MG/1
1 TABLET ORAL EVERY 6 HOURS PRN
Qty: 12 TABLET | Refills: 0 | Status: SHIPPED | OUTPATIENT
Start: 2020-01-17 | End: 2020-01-20 | Stop reason: SDUPTHER

## 2020-01-17 RX ADMIN — MORPHINE SULFATE 4 MG: 4 INJECTION, SOLUTION INTRAMUSCULAR; INTRAVENOUS at 04:11

## 2020-01-17 RX ADMIN — SODIUM CHLORIDE 1000 ML: 9 INJECTION, SOLUTION INTRAVENOUS at 04:11

## 2020-01-17 RX ADMIN — ONDANSETRON 4 MG: 2 INJECTION INTRAMUSCULAR; INTRAVENOUS at 04:11

## 2020-01-17 RX ADMIN — HYDROMORPHONE HYDROCHLORIDE 0.5 MG: 1 INJECTION, SOLUTION INTRAMUSCULAR; INTRAVENOUS; SUBCUTANEOUS at 05:26

## 2020-01-17 NOTE — DISCHARGE INSTRUCTIONS
Please use your pain and nausea medications at home as well as your flomax. Please make appointment for Dr. Callahna or return to ER if symptoms worsen.

## 2020-01-17 NOTE — ED NOTES
Gave pt urinal and asked pt to provide urine sample, states he will try soon     Kayleen Peres, RN  01/17/20 0411

## 2020-01-17 NOTE — PROGRESS NOTES
Chief Complaint:          Chief Complaint   Patient presents with   • Nephrolithiasis     ER FOLLOW UP      Right Flank Pain/Right Ureteral calculus  HPI:   44 y.o. male.  Patient presents to clinic with right flank pain that has been ongoing for five days. He describes his pain as colicky, intermittent with aching, cramping feelings. At its worse he describes it as sharp and shooting, radiating down his lower abdomen and groin area.     He reports an ER visit two days ago for uncontrolled pain 10/10. Where he had a CT scan showing 4.2 mm size stone in the mid right ureter causing mild Hydronephrosis. He has been referred to us.    Upon exam todday, he still has right flank  colicky pain 6/10. He has urinary urgency with it, and hematuria. He has nausea, no vomiting, chills with no fevers. He denies frequency or dysuria. He describes a significant history of kidney stones for which he always passes them. He would like to pass this one.    Past Medical History:      History reviewed. No pertinent past medical history.    The following portions of the patient's history were reviewed and updated as appropriate: allergies, current medications, past family history, past medical history, past social history, past surgical history and problem list  Current Meds:     Current Outpatient Medications   Medication Sig Dispense Refill   • ciprofloxacin (CIPRO) 250 MG tablet Take 1 tablet by mouth 2 (Two) Times a Day. 14 tablet 0   • dexlansoprazole (DEXILANT) 60 MG capsule Take 60 mg by mouth Daily.     • HYDROcodone-acetaminophen (NORCO) 7.5-325 MG per tablet Take 1 tablet by mouth Daily As Needed for Moderate Pain .     • HYDROcodone-acetaminophen (NORCO) 7.5-325 MG per tablet Take 1 tablet by mouth Every 8 (Eight) Hours As Needed for Moderate Pain . 12 tablet 0   • losartan (COZAAR) 25 MG tablet Take 1 tablet by mouth Daily. 30 tablet 5   • metoprolol succinate XL (TOPROL-XL) 100 MG 24 hr tablet Take 1 tablet by mouth Daily.  Take 100 mg in a.m. and 50 mg in p.m. 90 tablet 3   • oxyCODONE-acetaminophen (PERCOCET)  MG per tablet Take 1 tablet by mouth Every 6 (Six) Hours As Needed for Moderate Pain . 12 tablet 0   • promethazine (PHENERGAN) 25 MG tablet Take 1 tablet by mouth Every 6 (Six) Hours As Needed for Nausea or Vomiting. 21 tablet 2   • sildenafil (REVATIO) 20 MG tablet Take 1 tablet by mouth.     • tamsulosin (FLOMAX) 0.4 MG capsule 24 hr capsule Take 1 capsule by mouth Daily. 30 capsule 0   • tamsulosin (FLOMAX) 0.4 MG capsule 24 hr capsule Take 1 capsule by mouth Every Night. 30 capsule 5   • topiramate (TOPAMAX) 100 MG tablet Take 100 mg by mouth 2 (Two) Times a Day.       No current facility-administered medications for this visit.         Allergies:      No Known Allergies     Past Surgical History:     Past Surgical History:   Procedure Laterality Date   • HAND SURGERY     • KNEE ARTHROSCOPY     • WISDOM TOOTH EXTRACTION           Social History:     Social History     Socioeconomic History   • Marital status:      Spouse name: Not on file   • Number of children: Not on file   • Years of education: Not on file   • Highest education level: Not on file   Tobacco Use   • Smoking status: Never Smoker   • Smokeless tobacco: Current User     Types: Snuff   Substance and Sexual Activity   • Alcohol use: Yes   • Drug use: No       Family History:     Family History   Problem Relation Age of Onset   • Heart attack Maternal Grandfather    • Heart failure Maternal Grandfather    • Heart disease Maternal Grandfather        Review of Systems:     Review of Systems   Constitutional: Positive for activity change, chills and fatigue. Negative for appetite change and fever.   HENT: Negative for congestion and sinus pressure.    Eyes: Negative for blurred vision and double vision.   Respiratory: Negative for shortness of breath and wheezing.    Cardiovascular: Negative for chest pain.   Gastrointestinal: Positive for abdominal  pain and nausea. Negative for constipation, diarrhea and vomiting.   Genitourinary: Positive for flank pain, frequency, hematuria and urgency. Negative for difficulty urinating, discharge, dysuria, genital sores, penile pain, penile swelling, scrotal swelling, testicular pain and urinary incontinence.   Musculoskeletal: Positive for back pain. Negative for neck pain.   Neurological: Positive for weakness. Negative for dizziness, headache and confusion.   Hematological: Does not bruise/bleed easily.   Psychiatric/Behavioral: Positive for stress. Negative for agitation, behavioral problems and decreased concentration. The patient is not nervous/anxious.         Physical Exam:     Physical Exam   Constitutional: He is oriented to person, place, and time. He appears well-developed and well-nourished. He appears distressed.   HENT:   Head: Normocephalic and atraumatic.   Right Ear: External ear normal.   Left Ear: External ear normal.   Eyes: Pupils are equal, round, and reactive to light. Conjunctivae and EOM are normal. Right eye exhibits no discharge. Left eye exhibits no discharge.   Neck: Normal range of motion. Neck supple. No tracheal deviation present. No thyromegaly present.   Cardiovascular: Normal rate and regular rhythm. Exam reveals no friction rub.   No murmur heard.  Pulmonary/Chest: Effort normal and breath sounds normal. No stridor. No respiratory distress.   Abdominal: Soft. Bowel sounds are normal. He exhibits no distension. There is tenderness. There is no guarding.   Genitourinary: Rectum normal, testes normal and penis normal. Rectal exam shows guaiac negative stool. Uncircumcised. No penile tenderness. No discharge found.   Musculoskeletal: Normal range of motion. He exhibits tenderness. He exhibits no edema or deformity.   Right flank pain   Neurological: He is alert and oriented to person, place, and time. He displays normal reflexes. No cranial nerve deficit or sensory deficit. He exhibits  normal muscle tone. Coordination normal.   Skin: Skin is warm and dry. Capillary refill takes less than 2 seconds. No pallor.   Psychiatric: He has a normal mood and affect. His behavior is normal. Judgment and thought content normal.       Procedure:       Assessment/Plan:      Right Flank Pain/Right Ureteral calculus: The Patient has been diagnosed with a ureteral calculus. We both reviewed his CT scan done at the ED which shows a 4.2 mm size stone in the mid right ureter causing mild hydronephrosis     We have discussed the various parameters regarding spontaneous passage including the notion that a 1- 3 mm stone has a high likelihood of spontaneous passage versus a larger stone of > 6 mm  has  being caught up in the upper areas of the urinary tract.      We also discussed the medical management of stone disease and the use of medical expulsive therapy in the form of Flomax which is used in an off label setting.Patient states he is very dany with this. I also talked about nonoperative management including ambulation and increasing fluids and hot tub as being an effective adjuncts in the treatment of a ureteral stone.     We discussed the indicators for intervention including  absolute indicators such as sepsis and uncontrollable severe pain as well as  the relative indicators of moderate pain that is well-controlled with various analgesia.      I Discussed this case with Dr Grayson who gave him a refill of pain medication just in case and Nausea medication and Flomax for medical expulsive therapy.    Will See him back on Monday 1/20/20 for follow up.        Patient reports that he is not currently experiencing any symptoms of urinary incontinence.    Patient's Body mass index is 30.69 kg/m². BMI is above normal parameters. Recommendations include: educational material, exercise counseling and nutrition counseling.    Smoking Cessation Counseling:  Never a smoker.  Patient does not currently use any tobacco  products.        Counseling was given to patient for the following topics diagnostic results includin.2mm right ureteral ston and instructions for management as follows: pain control, nause management, flomax, increase po fluid intake and exercise as tolerated. The interim medical history and current results were reviewed.     A treatment plan with follow-up was made for Ureteral calculus, right [N20.1].            This document has been electronically signed by Griselda Cheng-Akwa, APRN 2020 5:22 AM

## 2020-01-17 NOTE — ED PROVIDER NOTES
Requesting Omeprazole and Chlorthalidone refills    Chlorthalidone failed FM refill protocol  Omeprazole refilled per FM refill protocol    Hypertensive Medications  Protocol Criteria:  · Appointment scheduled in the past 6 months or in the next 3 months Subjective     Flank Pain   Pain location:  R flank  Pain quality: aching and sharp    Pain radiates to:  Does not radiate  Pain severity:  Moderate  Onset quality:  Gradual  Duration:  5 days  Timing:  Constant  Progression:  Worsening  Chronicity:  New  Context: awakening from sleep    Context comment:  Was seen in our ER on 01/15/20 and diagnosed with a right ureteral stone  Relieved by:  Nothing  Worsened by:  Palpation, position changes and movement  Ineffective treatments: Norco, zofran.  Associated symptoms: hematuria, nausea and vomiting    Associated symptoms: no chest pain, no constipation, no diarrhea, no dysuria and no fever        Review of Systems   Constitutional: Negative.  Negative for fever.   HENT: Negative.    Respiratory: Negative.    Cardiovascular: Negative.  Negative for chest pain.   Gastrointestinal: Positive for nausea and vomiting. Negative for abdominal distention, abdominal pain, anal bleeding, blood in stool, constipation, diarrhea and rectal pain.   Endocrine: Negative.    Genitourinary: Positive for flank pain and hematuria. Negative for decreased urine volume, difficulty urinating, discharge, dysuria, enuresis, frequency, genital sores, penile pain, penile swelling, scrotal swelling, testicular pain and urgency.   Skin: Negative.    Neurological: Negative.    Psychiatric/Behavioral: Negative.    All other systems reviewed and are negative.      No past medical history on file.    No Known Allergies    Past Surgical History:   Procedure Laterality Date   • HAND SURGERY     • KNEE ARTHROSCOPY     • WISDOM TOOTH EXTRACTION         Family History   Problem Relation Age of Onset   • Heart attack Maternal Grandfather    • Heart failure Maternal Grandfather    • Heart disease Maternal Grandfather        Social History     Socioeconomic History   • Marital status:      Spouse name: Not on file   • Number of children: Not on file   • Years of education: Not on file   • Highest  [address], D[place]    Office Visit    6 months ago Other microscopic colitis    [address] [place]    Office Visit        Future Appointments       Provider Department Appt Notes    In 2 weeks Zoey education level: Not on file   Tobacco Use   • Smoking status: Never Smoker   • Smokeless tobacco: Current User     Types: Snuff   Substance and Sexual Activity   • Alcohol use: Yes   • Drug use: No           Objective   Physical Exam   Constitutional: He is oriented to person, place, and time. He appears well-developed and well-nourished. No distress.   HENT:   Head: Normocephalic and atraumatic.   Right Ear: External ear normal.   Left Ear: External ear normal.   Nose: Nose normal.   Eyes: Pupils are equal, round, and reactive to light. Conjunctivae and EOM are normal.   Neck: Normal range of motion. Neck supple. No JVD present. No tracheal deviation present.   Cardiovascular: Normal rate, regular rhythm and normal heart sounds.   No murmur heard.  Pulmonary/Chest: Effort normal and breath sounds normal. No respiratory distress. He has no wheezes.   Abdominal: Soft. Bowel sounds are normal. He exhibits no distension and no mass. There is tenderness. There is no rebound and no guarding. No hernia.   Right flank tenderness to palpation   Musculoskeletal: Normal range of motion. He exhibits no edema or deformity.   Neurological: He is alert and oriented to person, place, and time. No cranial nerve deficit.   Skin: Skin is warm and dry. No rash noted. He is not diaphoretic. No erythema. No pallor.   Psychiatric: He has a normal mood and affect. His behavior is normal. Thought content normal.   Nursing note and vitals reviewed.      Procedures           ED Course  ED Course as of Jan 17 0533 Fri Jan 17, 2020   0532 Patient diagnosed with right sided renal colic. Pain alleviated. Kidney function is normal. Will be d/c home to f/u with [facility] or return to ER if symptoms worsen.     [MM]      ED Course User Index  [MM] Audrey Figueroa PA                                               MDM  Number of Diagnoses or Management Options  Renal colic on right side:      Amount and/or Complexity of Data Reviewed  Clinical lab  tests: ordered and reviewed  Review and summarize past medical records: yes        Final diagnoses:   Renal colic on right side            Audrey Figueroa PA  01/17/20 0548

## 2020-01-20 ENCOUNTER — OFFICE VISIT (OUTPATIENT)
Dept: UROLOGY | Facility: CLINIC | Age: 45
End: 2020-01-20

## 2020-01-20 VITALS — WEIGHT: 207 LBS | HEIGHT: 67 IN | BODY MASS INDEX: 32.49 KG/M2

## 2020-01-20 DIAGNOSIS — N20.1 URETERAL CALCULUS, RIGHT: ICD-10-CM

## 2020-01-20 PROCEDURE — 99213 OFFICE O/P EST LOW 20 MIN: CPT | Performed by: UROLOGY

## 2020-01-20 RX ORDER — OXYCODONE AND ACETAMINOPHEN 10; 325 MG/1; MG/1
1 TABLET ORAL EVERY 6 HOURS PRN
Qty: 12 TABLET | Refills: 0 | Status: SHIPPED | OUTPATIENT
Start: 2020-01-20 | End: 2023-01-26

## 2020-01-20 NOTE — PROGRESS NOTES
Chief Complaint:          Chief Complaint   Patient presents with   • Ureteral Calculus, Right     3 day f/u       HPI:   44 y.o. male returns today he is testalgia frequency urgency he is just about to pass a stone I am to give him 1 week I do not think he needs a surgical intervention      Past Medical History:      History reviewed. No pertinent past medical history.      Current Meds:     Current Outpatient Medications   Medication Sig Dispense Refill   • ciprofloxacin (CIPRO) 250 MG tablet Take 1 tablet by mouth 2 (Two) Times a Day. 14 tablet 0   • dexlansoprazole (DEXILANT) 60 MG capsule Take 60 mg by mouth Daily.     • HYDROcodone-acetaminophen (NORCO) 7.5-325 MG per tablet Take 1 tablet by mouth Daily As Needed for Moderate Pain .     • HYDROcodone-acetaminophen (NORCO) 7.5-325 MG per tablet Take 1 tablet by mouth Every 8 (Eight) Hours As Needed for Moderate Pain . 12 tablet 0   • losartan (COZAAR) 25 MG tablet Take 1 tablet by mouth Daily. 30 tablet 5   • metoprolol succinate XL (TOPROL-XL) 100 MG 24 hr tablet Take 1 tablet by mouth Daily. Take 100 mg in a.m. and 50 mg in p.m. 90 tablet 3   • oxyCODONE-acetaminophen (PERCOCET)  MG per tablet Take 1 tablet by mouth Every 6 (Six) Hours As Needed for Moderate Pain . 12 tablet 0   • promethazine (PHENERGAN) 25 MG tablet Take 1 tablet by mouth Every 6 (Six) Hours As Needed for Nausea or Vomiting. 21 tablet 2   • sildenafil (REVATIO) 20 MG tablet Take 1 tablet by mouth.     • tamsulosin (FLOMAX) 0.4 MG capsule 24 hr capsule Take 1 capsule by mouth Daily. 30 capsule 0   • tamsulosin (FLOMAX) 0.4 MG capsule 24 hr capsule Take 1 capsule by mouth Every Night. 30 capsule 5   • topiramate (TOPAMAX) 100 MG tablet Take 100 mg by mouth 2 (Two) Times a Day.       No current facility-administered medications for this visit.         Allergies:      No Known Allergies     Past Surgical History:     Past Surgical History:   Procedure Laterality Date   • HAND SURGERY      • KNEE ARTHROSCOPY     • WISDOM TOOTH EXTRACTION           Social History:     Social History     Socioeconomic History   • Marital status:      Spouse name: Not on file   • Number of children: Not on file   • Years of education: Not on file   • Highest education level: Not on file   Tobacco Use   • Smoking status: Never Smoker   • Smokeless tobacco: Current User     Types: Snuff   Substance and Sexual Activity   • Alcohol use: Yes   • Drug use: No       Family History:     Family History   Problem Relation Age of Onset   • Heart attack Maternal Grandfather    • Heart failure Maternal Grandfather    • Heart disease Maternal Grandfather        Review of Systems:     Review of Systems   Constitutional: Negative.    HENT: Negative.    Eyes: Negative.    Respiratory: Negative.    Cardiovascular: Negative.    Gastrointestinal: Negative.    Endocrine: Negative.    Musculoskeletal: Negative.    Allergic/Immunologic: Negative.    Neurological: Negative.    Hematological: Negative.    Psychiatric/Behavioral: Negative.        Physical Exam:     Physical Exam   Constitutional: He is oriented to person, place, and time. He appears well-developed and well-nourished.   HENT:   Head: Normocephalic and atraumatic.   Eyes: Pupils are equal, round, and reactive to light. Conjunctivae and EOM are normal.   Neck: Normal range of motion.   Cardiovascular: Normal rate, regular rhythm, normal heart sounds and intact distal pulses.   Pulmonary/Chest: Effort normal and breath sounds normal.   Abdominal: Soft. Bowel sounds are normal.   Musculoskeletal: Normal range of motion.   Neurological: He is alert and oriented to person, place, and time. He has normal reflexes.   Skin: Skin is warm and dry.   Psychiatric: He has a normal mood and affect. His behavior is normal. Judgment and thought content normal.   Nursing note and vitals reviewed.      I have reviewed the following portions of the patient's history: allergies, current  medications, past family history, past medical history, past social history, past surgical history, problem list and ROS and confirm it's accurate.      Procedure:       Assessment/Plan:   Ureteral calculus-patient has been diagnosed with a ureteral calculus.  We have discussed the various parameters regarding spontaneous passage including the notion that a 2 mm stone has a high likelihood of spontaneous passage versus a larger stone being caught up in the upper areas of the urinary tract.  We also discussed the medical management of stone disease and the use of medical expulsive therapy in the form of Flomax.  This is used in an off label setting.  We discussed the indicators for intervention including  absolute indicators such as sepsis and uncontrollable severe pain as well as  the relative indicators of moderate pain that is well-controlled with various analgesia.  I also talked about nonoperative management including ambulation and increasing fluids and hot tub as being an effective adjuncts in the treatment of a ureteral stone.  Ureteral calculus-patient has been diagnosed with a ureteral calculus.  We have discussed the various parameters regarding spontaneous passage including the notion that a 2 mm stone has a high likelihood of spontaneous passage versus a larger stone being caught up in the upper areas of the urinary tract.  We also discussed the medical management of stone disease and the use of medical expulsive therapy in the form of Flomax.  This is used in an off label setting.  We discussed the indicators for intervention including  absolute indicators such as sepsis and uncontrollable severe pain as well as  the relative indicators of moderate pain that is well-controlled with various analgesia.  I also talked about nonoperative management including ambulation and increasing fluids and hot tub as being an effective adjuncts in the treatment of a ureteral stone.            Patient's Body mass index  is 30.69 kg/m². BMI is above normal parameters. Recommendations include: educational material.              This document has been electronically signed by ALDAIR DELEON MD January 20, 2020 3:15 PM

## 2020-01-23 PROBLEM — N20.1 URETERAL CALCULUS, RIGHT: Status: ACTIVE | Noted: 2020-01-23

## 2020-04-20 RX ORDER — METOPROLOL SUCCINATE 50 MG/1
TABLET, EXTENDED RELEASE ORAL
Qty: 30 TABLET | Refills: 3 | Status: SHIPPED | OUTPATIENT
Start: 2020-04-20 | End: 2020-10-13 | Stop reason: SDUPTHER

## 2020-06-05 RX ORDER — METOPROLOL SUCCINATE 100 MG/1
TABLET, EXTENDED RELEASE ORAL
Qty: 60 TABLET | Refills: 2 | Status: SHIPPED | OUTPATIENT
Start: 2020-06-05 | End: 2020-12-14

## 2020-08-12 ENCOUNTER — OFFICE VISIT (OUTPATIENT)
Dept: CARDIOLOGY | Facility: CLINIC | Age: 45
End: 2020-08-12

## 2020-08-12 VITALS
DIASTOLIC BLOOD PRESSURE: 73 MMHG | HEIGHT: 66 IN | WEIGHT: 228.8 LBS | BODY MASS INDEX: 36.77 KG/M2 | SYSTOLIC BLOOD PRESSURE: 117 MMHG | TEMPERATURE: 98.6 F | HEART RATE: 78 BPM | RESPIRATION RATE: 16 BRPM

## 2020-08-12 DIAGNOSIS — I42.9 CARDIOMYOPATHY, UNSPECIFIED TYPE (HCC): Primary | ICD-10-CM

## 2020-08-12 PROCEDURE — 93000 ELECTROCARDIOGRAM COMPLETE: CPT | Performed by: PHYSICIAN ASSISTANT

## 2020-08-12 PROCEDURE — 99214 OFFICE O/P EST MOD 30 MIN: CPT | Performed by: PHYSICIAN ASSISTANT

## 2020-08-12 RX ORDER — LOSARTAN POTASSIUM 25 MG/1
25 TABLET ORAL DAILY
Qty: 30 TABLET | Refills: 5 | Status: SHIPPED | OUTPATIENT
Start: 2020-08-12 | End: 2023-01-26

## 2020-08-12 NOTE — PROGRESS NOTES
Marcio Hurley MD  Samson Kendall  1975 08/12/2020    Patient Active Problem List   Diagnosis   • Skin lesion of right ear   • Cough   • Syncope   • GERD with esophagitis   • Palpitations   • Chest pain   • Fatigue   • Cardiomyopathy (CMS/HCC)   • Ureteral calculus, right       Dear Marcio Hurley MD:    Subjective     History of Present Illness:    Chief Complaint   Patient presents with   • Palpitations     9 mos, continued epis of racing heart   • Shortness of Breath     routine activity   • Edema     LE   • Med Management     list from phone       Samson Kendall is a pleasant 45 y.o. male with a past medical history significant for mild nonischemic cardiomyopathy with left ventricular ejection fraction of 45 to 50%.  He has borderline prediabetes with hemoglobin A1c of 5.8.  He comes in today for routine cardiology follow-up.    Patient reports overall he has been doing well from cardiac standpoint.  He still reports some palpitations but reports they have significantly improved from occurring daily to now only occurring once sometimes twice monthly and that they will last 30 minutes to an hour in duration.  He typically reports that they will resolve on their own after he sits and rests.  He denies any recent chest pains, shortness of breath outside of these episodes of palpitations, dizziness, or syncope.    No Known Allergies:      Current Outpatient Medications:   •  dexlansoprazole (DEXILANT) 60 MG capsule, Take 60 mg by mouth Daily., Disp: , Rfl:   •  HYDROcodone-acetaminophen (NORCO) 7.5-325 MG per tablet, Take 1 tablet by mouth Every 8 (Eight) Hours As Needed for Moderate Pain ., Disp: 12 tablet, Rfl: 0  •  metoprolol succinate XL (TOPROL-XL) 100 MG 24 hr tablet, TAKE ONE TABLET BY MOUTH EVERY DAY FOR BLOOD PRESSURE, Disp: 60 tablet, Rfl: 2  •  metoprolol succinate XL (TOPROL-XL) 50 MG 24 hr tablet, TAKE ONE TABLET BY MOUTH EVERY DAY FOR BLOOD PRESSURE, Disp: 30 tablet, Rfl: 3  •   ciprofloxacin (CIPRO) 250 MG tablet, Take 1 tablet by mouth 2 (Two) Times a Day., Disp: 14 tablet, Rfl: 0  •  HYDROcodone-acetaminophen (NORCO) 7.5-325 MG per tablet, Take 1 tablet by mouth Daily As Needed for Moderate Pain ., Disp: , Rfl:   •  losartan (Cozaar) 25 MG tablet, Take 1 tablet by mouth Daily., Disp: 30 tablet, Rfl: 5  •  oxyCODONE-acetaminophen (PERCOCET)  MG per tablet, Take 1 tablet by mouth Every 6 (Six) Hours As Needed for Moderate Pain ., Disp: 12 tablet, Rfl: 0  •  promethazine (PHENERGAN) 25 MG tablet, Take 1 tablet by mouth Every 6 (Six) Hours As Needed for Nausea or Vomiting., Disp: 21 tablet, Rfl: 2  •  sildenafil (REVATIO) 20 MG tablet, Take 1 tablet by mouth., Disp: , Rfl:   •  tamsulosin (FLOMAX) 0.4 MG capsule 24 hr capsule, Take 1 capsule by mouth Daily., Disp: 30 capsule, Rfl: 0  •  tamsulosin (FLOMAX) 0.4 MG capsule 24 hr capsule, Take 1 capsule by mouth Every Night., Disp: 30 capsule, Rfl: 5  •  topiramate (TOPAMAX) 100 MG tablet, Take 100 mg by mouth 2 (Two) Times a Day., Disp: , Rfl:     The following portions of the patient's history were reviewed and updated as appropriate: allergies, current medications, past family history, past medical history, past social history, past surgical history and problem list.    Social History     Tobacco Use   • Smoking status: Never Smoker   • Smokeless tobacco: Current User     Types: Snuff   Substance Use Topics   • Alcohol use: Yes   • Drug use: No       Review of Systems   Constitution: Negative for malaise/fatigue.   Cardiovascular: Positive for dyspnea on exertion and palpitations. Negative for chest pain, irregular heartbeat and leg swelling.   Respiratory: Negative for cough and shortness of breath.    Hematologic/Lymphatic: Negative for bleeding problem. Does not bruise/bleed easily.   Gastrointestinal: Negative for nausea and vomiting.   Neurological: Negative for weakness.       Objective   Vitals:    08/12/20 1425   BP: 117/73  "  Pulse: 78   Resp: 16   Temp: 98.6 °F (37 °C)   Weight: 104 kg (228 lb 12.8 oz)   Height: 167.6 cm (66\")     Body mass index is 36.93 kg/m².    Physical Exam   Constitutional: He is oriented to person, place, and time. He appears well-developed and well-nourished. No distress.   HENT:   Head: Normocephalic and atraumatic.   Cardiovascular: Normal rate, regular rhythm and normal heart sounds.   Pulmonary/Chest: Effort normal and breath sounds normal. No respiratory distress.   Musculoskeletal: He exhibits no edema.   Neurological: He is alert and oriented to person, place, and time.   Skin: He is not diaphoretic.       Lab Results   Component Value Date     01/17/2020    K 4.0 01/17/2020     01/17/2020    CO2 24.2 01/17/2020    BUN 8 01/17/2020    CREATININE 0.98 01/17/2020    GLUCOSE 108 (H) 01/17/2020    CALCIUM 9.2 01/17/2020    AST 19 01/17/2020    ALT 38 01/17/2020    ALKPHOS 49 01/17/2020     Lab Results   Component Value Date    CKTOTAL 92 09/06/2019     Lab Results   Component Value Date    WBC 6.73 01/17/2020    HGB 13.5 01/17/2020    HCT 40.3 01/17/2020     01/17/2020     No results found for: INR  Lab Results   Component Value Date    MG 1.9 09/06/2019     Lab Results   Component Value Date    TSH 4.790 (H) 09/06/2019    TRIG 315 (H) 09/06/2019    HDL 25 (L) 09/06/2019    LDL 73 09/06/2019      No results found for: BNP    During this visit the following were done:  Labs Reviewed [x]    Labs Ordered []    Radiology Reports Reviewed [x]    Radiology Ordered []    PCP Records Reviewed []    Referring Provider Records Reviewed []    ER Records Reviewed []    Hospital Records Reviewed []    History Obtained From Family []    Radiology Images Reviewed []    Other Reviewed []    Records Requested []         ECG 12 Lead  Date/Time: 8/12/2020 2:26 PM  Performed by: Dustin Galindo PA-C  Authorized by: Dustin Galindo PA-C   Comparison: compared with previous ECG   Similar to previous " ECG  Rhythm: sinus rhythm  Q waves: III, aVF and V3      Clinical impression: non-specific ECG            Assessment/Plan    Diagnosis Plan   1. Cardiomyopathy, unspecified type probably rate related with mildly depressed LV systolic function with LV ejection fraction 45 to 50%, well compensated..  Basic Metabolic Panel    Adult Transthoracic Echo Complete W/ Cont if Necessary Per Protocol    Lipid Panel            Recommendations:  1. I am going to repeat echocardiogram since it has been roughly 1 year since the last 1 to make sure LV function has not worsened.  2. Patient reports he was never aware of losartan being prescribed so we will start this and will check a BMP in 1 week.  3. Also check lipid panel.        Return in about 6 months (around 2/12/2021).    As always, I appreciate very much the opportunity to participate in the cardiovascular care of your patients.      With Best Regards,    Dustin Galindo PA-C

## 2020-10-13 RX ORDER — METOPROLOL SUCCINATE 50 MG/1
50 TABLET, EXTENDED RELEASE ORAL DAILY
Qty: 30 TABLET | Refills: 3 | Status: SHIPPED | OUTPATIENT
Start: 2020-10-13 | End: 2021-05-06

## 2020-10-13 RX ORDER — METOPROLOL SUCCINATE 50 MG/1
TABLET, EXTENDED RELEASE ORAL
Qty: 30 TABLET | Refills: 3 | OUTPATIENT
Start: 2020-10-13

## 2020-11-11 ENCOUNTER — TELEPHONE (OUTPATIENT)
Dept: CARDIOLOGY | Facility: CLINIC | Age: 45
End: 2020-11-11

## 2020-11-11 NOTE — TELEPHONE ENCOUNTER
Left message on answering machine requesting patient return my call.  Patient needs to contact the hospital to schedule testing.

## 2020-11-11 NOTE — TELEPHONE ENCOUNTER
Left message on voicemail requesting patient return my call.  Patient needs to contact hospital to schedule testing.

## 2020-12-14 RX ORDER — METOPROLOL SUCCINATE 100 MG/1
TABLET, EXTENDED RELEASE ORAL
Qty: 30 TABLET | Refills: 2 | Status: SHIPPED | OUTPATIENT
Start: 2020-12-14 | End: 2021-07-07 | Stop reason: SDUPTHER

## 2021-02-16 ENCOUNTER — TRANSCRIBE ORDERS (OUTPATIENT)
Dept: ADMINISTRATIVE | Facility: HOSPITAL | Age: 46
End: 2021-02-16

## 2021-02-16 DIAGNOSIS — K42.9 UMBILICAL HERNIA WITHOUT OBSTRUCTION OR GANGRENE: Primary | ICD-10-CM

## 2021-03-11 ENCOUNTER — OFFICE VISIT (OUTPATIENT)
Dept: CARDIOLOGY | Facility: CLINIC | Age: 46
End: 2021-03-11

## 2021-03-11 VITALS
WEIGHT: 230.6 LBS | SYSTOLIC BLOOD PRESSURE: 127 MMHG | HEIGHT: 65 IN | TEMPERATURE: 98.4 F | BODY MASS INDEX: 38.42 KG/M2 | RESPIRATION RATE: 16 BRPM | DIASTOLIC BLOOD PRESSURE: 76 MMHG | HEART RATE: 93 BPM

## 2021-03-11 DIAGNOSIS — R00.2 PALPITATIONS: Primary | ICD-10-CM

## 2021-03-11 PROCEDURE — 99214 OFFICE O/P EST MOD 30 MIN: CPT | Performed by: PHYSICIAN ASSISTANT

## 2021-03-11 PROCEDURE — 93000 ELECTROCARDIOGRAM COMPLETE: CPT | Performed by: PHYSICIAN ASSISTANT

## 2021-03-11 RX ORDER — FENOFIBRATE 145 MG/1
145 TABLET, COATED ORAL DAILY
COMMUNITY

## 2021-03-11 RX ORDER — HYDROCHLOROTHIAZIDE 12.5 MG/1
12.5 TABLET ORAL DAILY
COMMUNITY

## 2021-03-11 RX ORDER — FUROSEMIDE 20 MG/1
20 TABLET ORAL DAILY
COMMUNITY

## 2021-03-11 NOTE — PROGRESS NOTES
Marcio Hurley MD  Samson Kendall  1975 03/11/2021    Patient Active Problem List   Diagnosis   • Skin lesion of right ear   • Cough   • Syncope   • GERD with esophagitis   • Palpitations   • Chest pain   • Fatigue   • Cardiomyopathy (CMS/HCC)   • Ureteral calculus, right       Dear Marcio Hurley MD:    Subjective     History of Present Illness:    Chief Complaint   Patient presents with   • Cardiomyopathy     7 mos follow   • Palpitations     races   • Shortness of Breath     routine activity   • Edema     LE   • Med Management     verbal       aSmson Kendall is a pleasant 46 y.o. male with a past medical history significant for mild nonischemic cardiomyopathy with left ventricular ejection fraction of 45 to 50%.  He has borderline prediabetes with hemoglobin A1c of 5.8.  He comes in today for routine cardiology follow-up.     Mr. Salmon reports that he has been suffering from increased palpitations lately he does still take metoprolol succinate 100 mg in the morning and 50 at night reports he is still having a heart rate around 120 bpm during this time he becomes very weak and short of breath and will have to sit and rest until this resolves.  He reports that this does happen 2-3 times weekly but can occasionally go several weeks before having an episode.  He does deny any chest pains today and otherwise does not have increased shortness of breath from his baseline.  He does report he has been taking both HCTZ and Lasix prescribed by his PCP for increased swelling which has been controlled with these 2 medicines.  He also reports he has been taking potassium each day as well.    No Known Allergies:      Current Outpatient Medications:   •  dexlansoprazole (DEXILANT) 60 MG capsule, Take 60 mg by mouth Daily., Disp: , Rfl:   •  fenofibrate (TRICOR) 145 MG tablet, Take 145 mg by mouth Daily., Disp: , Rfl:   •  furosemide (LASIX) 20 MG tablet, Take 20 mg by mouth Daily., Disp: , Rfl:   •   hydroCHLOROthiazide (HYDRODIURIL) 12.5 MG tablet, Take 12.5 mg by mouth Daily., Disp: , Rfl:   •  HYDROcodone-acetaminophen (NORCO) 7.5-325 MG per tablet, Take 1 tablet by mouth Daily As Needed for Moderate Pain ., Disp: , Rfl:   •  metoprolol succinate XL (TOPROL-XL) 100 MG 24 hr tablet, TAKE ONE TABLET BY MOUTH EVERY DAY FOR BLOOD PRESSURE, Disp: 30 tablet, Rfl: 2  •  metoprolol succinate XL (TOPROL-XL) 50 MG 24 hr tablet, Take 1 tablet by mouth Daily. for blood pressure, Disp: 30 tablet, Rfl: 3  •  sildenafil (REVATIO) 20 MG tablet, Take 1 tablet by mouth., Disp: , Rfl:   •  topiramate (TOPAMAX) 100 MG tablet, Take 100 mg by mouth 2 (Two) Times a Day., Disp: , Rfl:   •  ciprofloxacin (CIPRO) 250 MG tablet, Take 1 tablet by mouth 2 (Two) Times a Day., Disp: 14 tablet, Rfl: 0  •  HYDROcodone-acetaminophen (NORCO) 7.5-325 MG per tablet, Take 1 tablet by mouth Every 8 (Eight) Hours As Needed for Moderate Pain ., Disp: 12 tablet, Rfl: 0  •  losartan (Cozaar) 25 MG tablet, Take 1 tablet by mouth Daily., Disp: 30 tablet, Rfl: 5  •  oxyCODONE-acetaminophen (PERCOCET)  MG per tablet, Take 1 tablet by mouth Every 6 (Six) Hours As Needed for Moderate Pain ., Disp: 12 tablet, Rfl: 0  •  promethazine (PHENERGAN) 25 MG tablet, Take 1 tablet by mouth Every 6 (Six) Hours As Needed for Nausea or Vomiting., Disp: 21 tablet, Rfl: 2  •  tamsulosin (FLOMAX) 0.4 MG capsule 24 hr capsule, Take 1 capsule by mouth Daily., Disp: 30 capsule, Rfl: 0  •  tamsulosin (FLOMAX) 0.4 MG capsule 24 hr capsule, Take 1 capsule by mouth Every Night., Disp: 30 capsule, Rfl: 5    The following portions of the patient's history were reviewed and updated as appropriate: allergies, current medications, past family history, past medical history, past social history, past surgical history and problem list.    Social History     Tobacco Use   • Smoking status: Never Smoker   • Smokeless tobacco: Current User     Types: Snuff   Substance Use Topics   •  "Alcohol use: Yes   • Drug use: No       ROS    Objective   Vitals:    03/11/21 0953   BP: 127/76   Pulse: 93   Resp: 16   Temp: 98.4 °F (36.9 °C)   Weight: 105 kg (230 lb 9.6 oz)   Height: 165.1 cm (65\")     Body mass index is 38.37 kg/m².    Constitutional:       General: Not in acute distress.     Appearance: Healthy appearance. Well-developed and not in distress. Not diaphoretic.   Eyes:      Conjunctiva/sclera: Conjunctivae normal.      Pupils: Pupils are equal, round, and reactive to light.   HENT:      Head: Normocephalic and atraumatic.   Neck:      Vascular: No carotid bruit or JVD.   Pulmonary:      Effort: Pulmonary effort is normal. No respiratory distress.      Breath sounds: Normal breath sounds.   Cardiovascular:      Normal rate. Regular rhythm.   Skin:     General: Skin is cool.   Neurological:      Mental Status: Alert, oriented to person, place, and time and oriented to person, place and time.         Lab Results   Component Value Date     01/17/2020    K 4.0 01/17/2020     01/17/2020    CO2 24.2 01/17/2020    BUN 8 01/17/2020    CREATININE 0.98 01/17/2020    GLUCOSE 108 (H) 01/17/2020    CALCIUM 9.2 01/17/2020    AST 19 01/17/2020    ALT 38 01/17/2020    ALKPHOS 49 01/17/2020     Lab Results   Component Value Date    CKTOTAL 92 09/06/2019     Lab Results   Component Value Date    WBC 6.73 01/17/2020    HGB 13.5 01/17/2020    HCT 40.3 01/17/2020     01/17/2020     No results found for: INR  Lab Results   Component Value Date    MG 1.9 09/06/2019     Lab Results   Component Value Date    TSH 4.790 (H) 09/06/2019    TRIG 315 (H) 09/06/2019    HDL 25 (L) 09/06/2019    LDL 73 09/06/2019      No results found for: BNP    During this visit the following were done:  Labs Reviewed [x]    Labs Ordered []    Radiology Reports Reviewed [x]    Radiology Ordered []    PCP Records Reviewed []    Referring Provider Records Reviewed []    ER Records Reviewed []    Hospital Records Reviewed []  "   History Obtained From Family []    Radiology Images Reviewed []    Other Reviewed []    Records Requested []         ECG 12 Lead    Date/Time: 3/11/2021 9:55 AM  Performed by: Dustin Galindo PA-C  Authorized by: Dustin Galindo PA-C   Comparison: compared with previous ECG   Similar to previous ECG  Rhythm: sinus rhythm  Conduction: conduction normal  Other findings: poor R wave progression    Clinical impression: non-specific ECG            Assessment/Plan    Diagnosis Plan   1. Palpitations  Holter Monitor - 72 Hour Up To 15 Days            Recommendations:  1. Palpitations  1. I will investigate further with 14-day Holter monitor.  2. He is still taking metoprolol succinate 100 in the morning and 50 at night I did inform that he can take an extra 50 mg half tablet if he is having these palpitations along his blood pressure is greater than 110 mmHg systolic.  2. Nonischemic cardiomyopathy  1. Patient currently on Lasix and HCTZ which is kept him in a negative fluid balance and is euvolemic today.  I will request recent BMP to monitor renal function and potassium levels.  2. We will also continue losartan.      Return in about 3 months (around 6/11/2021).    As always, I appreciate very much the opportunity to participate in the cardiovascular care of your patients.      With Best Regards,    Dustin Galindo PA-C

## 2021-03-12 ENCOUNTER — TELEPHONE (OUTPATIENT)
Dept: CARDIOLOGY | Facility: CLINIC | Age: 46
End: 2021-03-12

## 2021-03-12 NOTE — TELEPHONE ENCOUNTER
----- Message from Dustin Galindo PA-C sent at 3/11/2021 10:16 AM EST -----  Can we get recent blood work from pcp?     REQUESTED

## 2021-03-18 ENCOUNTER — TELEPHONE (OUTPATIENT)
Dept: CARDIOLOGY | Facility: CLINIC | Age: 46
End: 2021-03-18

## 2021-03-18 NOTE — TELEPHONE ENCOUNTER
Called pt to advise them they can come by the office any day in between 8 am and 11 am to have there monitor put on or we can mail it. If they want it mailed please confirm their address.     No answer HANNA.

## 2021-04-15 ENCOUNTER — TRANSCRIBE ORDERS (OUTPATIENT)
Dept: ADMINISTRATIVE | Facility: HOSPITAL | Age: 46
End: 2021-04-15

## 2021-04-15 DIAGNOSIS — M25.561 RIGHT KNEE PAIN, UNSPECIFIED CHRONICITY: ICD-10-CM

## 2021-04-15 DIAGNOSIS — M25.562 LEFT KNEE PAIN, UNSPECIFIED CHRONICITY: Primary | ICD-10-CM

## 2021-05-06 ENCOUNTER — APPOINTMENT (OUTPATIENT)
Dept: MRI IMAGING | Facility: HOSPITAL | Age: 46
End: 2021-05-06

## 2021-05-06 ENCOUNTER — HOSPITAL ENCOUNTER (OUTPATIENT)
Dept: MRI IMAGING | Facility: HOSPITAL | Age: 46
Discharge: HOME | End: 2021-05-06

## 2021-05-06 DIAGNOSIS — M25.562 LEFT KNEE PAIN, UNSPECIFIED CHRONICITY: ICD-10-CM

## 2021-05-06 DIAGNOSIS — M25.561 RIGHT KNEE PAIN, UNSPECIFIED CHRONICITY: ICD-10-CM

## 2021-05-06 PROCEDURE — 73721 MRI JNT OF LWR EXTRE W/O DYE: CPT

## 2021-05-06 RX ORDER — METOPROLOL SUCCINATE 50 MG/1
TABLET, EXTENDED RELEASE ORAL
Qty: 30 TABLET | Refills: 3 | Status: SHIPPED | OUTPATIENT
Start: 2021-05-06 | End: 2021-07-08

## 2021-05-06 NOTE — TELEPHONE ENCOUNTER
Last time we spoke he was taking 100mg in the am and 50 in the evening.  We have tried to contact him several times and left messages

## 2021-05-07 PROCEDURE — 73721 MRI JNT OF LWR EXTRE W/O DYE: CPT | Performed by: RADIOLOGY

## 2021-05-17 ENCOUNTER — TREATMENT (OUTPATIENT)
Dept: CARDIOLOGY | Facility: CLINIC | Age: 46
End: 2021-05-17

## 2021-05-17 DIAGNOSIS — R00.2 PALPITATIONS: ICD-10-CM

## 2021-05-17 PROCEDURE — 93228 REMOTE 30 DAY ECG REV/REPORT: CPT | Performed by: INTERNAL MEDICINE

## 2021-07-08 RX ORDER — METOPROLOL SUCCINATE 100 MG/1
100 TABLET, EXTENDED RELEASE ORAL DAILY
Qty: 30 TABLET | Refills: 2 | Status: SHIPPED | OUTPATIENT
Start: 2021-07-08

## 2021-10-28 ENCOUNTER — APPOINTMENT (OUTPATIENT)
Dept: CT IMAGING | Facility: HOSPITAL | Age: 46
End: 2021-10-28

## 2021-10-28 ENCOUNTER — HOSPITAL ENCOUNTER (EMERGENCY)
Facility: HOSPITAL | Age: 46
Discharge: HOME | End: 2021-10-28
Attending: EMERGENCY MEDICINE | Admitting: EMERGENCY MEDICINE

## 2021-10-28 VITALS
HEIGHT: 66 IN | DIASTOLIC BLOOD PRESSURE: 86 MMHG | OXYGEN SATURATION: 96 % | RESPIRATION RATE: 20 BRPM | BODY MASS INDEX: 35.36 KG/M2 | TEMPERATURE: 97.8 F | HEART RATE: 79 BPM | WEIGHT: 220 LBS | SYSTOLIC BLOOD PRESSURE: 132 MMHG

## 2021-10-28 DIAGNOSIS — N20.0 KIDNEY STONE ON LEFT SIDE: ICD-10-CM

## 2021-10-28 DIAGNOSIS — N23 RENAL COLIC ON LEFT SIDE: Primary | ICD-10-CM

## 2021-10-28 LAB
ALBUMIN SERPL-MCNC: 4.25 G/DL (ref 3.5–5.2)
ALBUMIN/GLOB SERPL: 1.4 G/DL
ALP SERPL-CCNC: 58 U/L (ref 39–117)
ALT SERPL W P-5'-P-CCNC: 29 U/L (ref 1–41)
ANION GAP SERPL CALCULATED.3IONS-SCNC: 9.8 MMOL/L (ref 5–15)
AST SERPL-CCNC: 20 U/L (ref 1–40)
BASOPHILS # BLD AUTO: 0.05 10*3/MM3 (ref 0–0.2)
BASOPHILS NFR BLD AUTO: 0.7 % (ref 0–1.5)
BILIRUB SERPL-MCNC: 0.2 MG/DL (ref 0–1.2)
BUN SERPL-MCNC: 11 MG/DL (ref 6–20)
BUN/CREAT SERPL: 12.4 (ref 7–25)
CALCIUM SPEC-SCNC: 9.5 MG/DL (ref 8.6–10.5)
CHLORIDE SERPL-SCNC: 109 MMOL/L (ref 98–107)
CO2 SERPL-SCNC: 23.2 MMOL/L (ref 22–29)
CREAT SERPL-MCNC: 0.89 MG/DL (ref 0.76–1.27)
DEPRECATED RDW RBC AUTO: 46.2 FL (ref 37–54)
EOSINOPHIL # BLD AUTO: 0.39 10*3/MM3 (ref 0–0.4)
EOSINOPHIL NFR BLD AUTO: 5.2 % (ref 0.3–6.2)
ERYTHROCYTE [DISTWIDTH] IN BLOOD BY AUTOMATED COUNT: 13.8 % (ref 12.3–15.4)
GFR SERPL CREATININE-BSD FRML MDRD: 92 ML/MIN/1.73
GLOBULIN UR ELPH-MCNC: 3 GM/DL
GLUCOSE SERPL-MCNC: 110 MG/DL (ref 65–99)
HCT VFR BLD AUTO: 37.3 % (ref 37.5–51)
HGB BLD-MCNC: 11.9 G/DL (ref 13–17.7)
IMM GRANULOCYTES # BLD AUTO: 0.02 10*3/MM3 (ref 0–0.05)
IMM GRANULOCYTES NFR BLD AUTO: 0.3 % (ref 0–0.5)
LYMPHOCYTES # BLD AUTO: 2.61 10*3/MM3 (ref 0.7–3.1)
LYMPHOCYTES NFR BLD AUTO: 35.1 % (ref 19.6–45.3)
MCH RBC QN AUTO: 29 PG (ref 26.6–33)
MCHC RBC AUTO-ENTMCNC: 31.9 G/DL (ref 31.5–35.7)
MCV RBC AUTO: 91 FL (ref 79–97)
MONOCYTES # BLD AUTO: 0.65 10*3/MM3 (ref 0.1–0.9)
MONOCYTES NFR BLD AUTO: 8.7 % (ref 5–12)
NEUTROPHILS NFR BLD AUTO: 3.71 10*3/MM3 (ref 1.7–7)
NEUTROPHILS NFR BLD AUTO: 50 % (ref 42.7–76)
NRBC BLD AUTO-RTO: 0 /100 WBC (ref 0–0.2)
PLATELET # BLD AUTO: 272 10*3/MM3 (ref 140–450)
PMV BLD AUTO: 10.9 FL (ref 6–12)
POTASSIUM SERPL-SCNC: 4.1 MMOL/L (ref 3.5–5.2)
PROT SERPL-MCNC: 7.2 G/DL (ref 6–8.5)
RBC # BLD AUTO: 4.1 10*6/MM3 (ref 4.14–5.8)
SODIUM SERPL-SCNC: 142 MMOL/L (ref 136–145)
WBC # BLD AUTO: 7.43 10*3/MM3 (ref 3.4–10.8)

## 2021-10-28 PROCEDURE — 25010000002 MORPHINE PER 10 MG: Performed by: EMERGENCY MEDICINE

## 2021-10-28 PROCEDURE — 74176 CT ABD & PELVIS W/O CONTRAST: CPT | Performed by: RADIOLOGY

## 2021-10-28 PROCEDURE — 74176 CT ABD & PELVIS W/O CONTRAST: CPT

## 2021-10-28 PROCEDURE — 80053 COMPREHEN METABOLIC PANEL: CPT | Performed by: PHYSICIAN ASSISTANT

## 2021-10-28 PROCEDURE — 25010000002 HYDROMORPHONE 1 MG/ML SOLUTION: Performed by: EMERGENCY MEDICINE

## 2021-10-28 PROCEDURE — 25010000002 KETOROLAC TROMETHAMINE PER 15 MG: Performed by: PHYSICIAN ASSISTANT

## 2021-10-28 PROCEDURE — 85025 COMPLETE CBC W/AUTO DIFF WBC: CPT | Performed by: PHYSICIAN ASSISTANT

## 2021-10-28 PROCEDURE — 25010000002 ONDANSETRON PER 1 MG: Performed by: PHYSICIAN ASSISTANT

## 2021-10-28 PROCEDURE — 99283 EMERGENCY DEPT VISIT LOW MDM: CPT

## 2021-10-28 PROCEDURE — 96374 THER/PROPH/DIAG INJ IV PUSH: CPT

## 2021-10-28 PROCEDURE — 96375 TX/PRO/DX INJ NEW DRUG ADDON: CPT

## 2021-10-28 RX ORDER — TAMSULOSIN HYDROCHLORIDE 0.4 MG/1
1 CAPSULE ORAL DAILY
Qty: 30 CAPSULE | Refills: 0 | Status: SHIPPED | OUTPATIENT
Start: 2021-10-28 | End: 2023-01-26

## 2021-10-28 RX ORDER — OXYCODONE AND ACETAMINOPHEN 7.5; 325 MG/1; MG/1
1 TABLET ORAL EVERY 4 HOURS PRN
Qty: 12 TABLET | Refills: 0 | Status: SHIPPED | OUTPATIENT
Start: 2021-10-28 | End: 2023-01-26

## 2021-10-28 RX ORDER — SODIUM CHLORIDE 0.9 % (FLUSH) 0.9 %
10 SYRINGE (ML) INJECTION AS NEEDED
Status: DISCONTINUED | OUTPATIENT
Start: 2021-10-28 | End: 2021-10-28 | Stop reason: HOSPADM

## 2021-10-28 RX ORDER — ONDANSETRON 2 MG/ML
4 INJECTION INTRAMUSCULAR; INTRAVENOUS ONCE
Status: COMPLETED | OUTPATIENT
Start: 2021-10-28 | End: 2021-10-28

## 2021-10-28 RX ORDER — ONDANSETRON 4 MG/1
4 TABLET, ORALLY DISINTEGRATING ORAL EVERY 6 HOURS PRN
Qty: 12 TABLET | Refills: 0 | Status: SHIPPED | OUTPATIENT
Start: 2021-10-28 | End: 2023-01-26

## 2021-10-28 RX ORDER — KETOROLAC TROMETHAMINE 30 MG/ML
30 INJECTION, SOLUTION INTRAMUSCULAR; INTRAVENOUS ONCE
Status: COMPLETED | OUTPATIENT
Start: 2021-10-28 | End: 2021-10-28

## 2021-10-28 RX ADMIN — HYDROMORPHONE HYDROCHLORIDE 1 MG: 1 INJECTION, SOLUTION INTRAMUSCULAR; INTRAVENOUS; SUBCUTANEOUS at 09:53

## 2021-10-28 RX ADMIN — ONDANSETRON 4 MG: 2 INJECTION INTRAMUSCULAR; INTRAVENOUS at 08:31

## 2021-10-28 RX ADMIN — SODIUM CHLORIDE 1000 ML: 9 INJECTION, SOLUTION INTRAVENOUS at 08:30

## 2021-10-28 RX ADMIN — KETOROLAC TROMETHAMINE 30 MG: 30 INJECTION, SOLUTION INTRAMUSCULAR; INTRAVENOUS at 08:31

## 2021-10-28 RX ADMIN — MORPHINE SULFATE 4 MG: 4 INJECTION INTRAVENOUS at 09:22

## 2022-03-08 NOTE — PROGRESS NOTES
[facility] stating [facility] need an ultrasound of right lower extremity non-vascular. Marcio Hurley MD  Samson Kendall  1975 09/24/2019    Patient Active Problem List   Diagnosis   • Skin lesion of right ear   • Cough   • Syncope   • GERD with esophagitis   • Palpitations   • Chest pain   • Fatigue       Dear Marcio Hurley MD:    Subjective     Samson Kendall is a 44 y.o. male with the problems as listed above, presents    Chief Complaint: Follow-up of history of syncope and dizziness.     History of Present Illness: Mr. Kendall is a pleasant 43-year-old  male with history of syncope recently.  He is undergoing cardiac evaluation currently with an event monitor.  He is here for regular cardiology follow-up.  On further questioning he denies any further episodes of syncope since the last visit.  He still feels weak and tired at times.  He has some intermittent chest tightness on the left side of his chest which seem to occur at any time with no relation to exertion and resolves by itself after several minutes.  His recent event monitor so far has revealed episodes of sinus tachycardia up to 156 bpm with symptoms of lightheadedness with some of these episodes of tachycardia.  There were no other cardiac arrhythmias noted.  No bradycardia arrhythmias noted either.  His recent cardiac evaluation with a stress sestamibi study was normal at moderate level of exercise and at more than target heart rate.  His echo Doppler study revealed interestingly mildly depressed global LV systolic function with an estimated LV ejection fraction of about 46 to 50% although the LV systolic function on the gated SPECT scanning was reported to be normal.  He is a non-smoker, nondiabetic and nonhypertensive.  He does not have any family history of premature coronary artery disease.      No Known Allergies:      Current Outpatient Medications:   •  dexlansoprazole (DEXILANT) 60 MG capsule, Take 60 mg by mouth Daily., Disp: , Rfl:   •  metoprolol succinate XL (TOPROL-XL) 50 MG 24 hr tablet, Take 1  "tablet by mouth Daily., Disp: 30 tablet, Rfl: 3  •  HYDROcodone-acetaminophen (NORCO) 7.5-325 MG per tablet, Take 1 tablet by mouth Daily As Needed for Moderate Pain ., Disp: , Rfl:   •  sildenafil (REVATIO) 20 MG tablet, Take 1 tablet by mouth., Disp: , Rfl:   •  topiramate (TOPAMAX) 100 MG tablet, Take 100 mg by mouth 2 (Two) Times a Day., Disp: , Rfl:       The following portions of the patient's history were reviewed and updated as appropriate: allergies, current medications, past family history, past medical history, past social history, past surgical history and problem list.    Social History     Tobacco Use   • Smoking status: Never Smoker   • Smokeless tobacco: Current User     Types: Snuff   Substance Use Topics   • Alcohol use: Yes   • Drug use: No       Review of Systems   Constitution: Positive for malaise/fatigue.   Cardiovascular: Positive for palpitations. Negative for chest pain.   Respiratory: Negative for shortness of breath.    Neurological: Positive for excessive daytime sleepiness.       Objective   Vitals:    09/24/19 1229   BP: 130/86   Pulse: 99   Resp: 16   Weight: 86.2 kg (190 lb)   Height: 170.2 cm (67\")     Body mass index is 29.76 kg/m².    Physical Exam   Constitutional: He is oriented to person, place, and time. He appears well-developed and well-nourished.   HENT:   Mouth/Throat: Oropharynx is clear and moist.   Eyes: EOM are normal. Pupils are equal, round, and reactive to light.   Neck: Neck supple. No JVD present. No tracheal deviation present. No thyromegaly present.   Cardiovascular: Normal rate, regular rhythm, S1 normal and S2 normal. Exam reveals no gallop and no friction rub.   No murmur heard.  Pulmonary/Chest: Effort normal and breath sounds normal.   Abdominal: Soft. Bowel sounds are normal. He exhibits no mass. There is no tenderness.   Musculoskeletal: Normal range of motion. He exhibits no edema.   Lymphadenopathy:     He has no cervical adenopathy.   Neurological: He " is alert and oriented to person, place, and time.   Skin: Skin is warm and dry. No rash noted.   Psychiatric: He has a normal mood and affect.       Lab Results   Component Value Date     2019    K 4.0 2019     2019    CO2 21.2 (L) 2019    BUN 11 2019    CREATININE 1.01 2019    GLUCOSE 104 (H) 2019    CALCIUM 8.6 2019    AST 23 2019    ALT 33 2019    ALKPHOS 70 2019     Lab Results   Component Value Date    CKTOTAL 92 2019     Lab Results   Component Value Date    WBC 9.05 2019    HGB 12.9 (L) 2019    HCT 39.5 2019     2019     No results found for: INR  Lab Results   Component Value Date    MG 1.9 2019     Lab Results   Component Value Date    TSH 4.790 (H) 2019    TRIG 315 (H) 2019    HDL 25 (L) 2019    LDL 73 2019      Samson Kendall   Echocardiogram   Order# [id number]   Reading physician: Randall Fernández MD Ordering physician: Marjorie Grigsby APRN Study date: 19   Patient Information     Patient Name  Samson Kendall MRN  [id number] Sex  Male  (Age)  1975 (44 y.o.)   Sedation Narrator Report     Interpretation Summary     · Normal left ventricular cavity size and wall thickness noted. There is left ventricular global hypokinesis noted.  · Left ventricular systolic function is mildly decreased  · Estimated EF appears to be in the range of 46 - 50%  · The aortic valve is structurally normal. No aortic valve regurgitation is present. No aortic valve stenosis is present.  · The mitral valve is normal in structure. No mitral valve regurgitation is present. No significant mitral valve stenosis is present.  · The tricuspid valve is normal. No evidence of tricuspid valve stenosis is present. Mild tricuspid valve regurgitation is present. Estimated right ventricular systolic pressure from tricuspid regurgitation is normal (<35 mmHg).  · There is no evidence of  pericardial effusion.  · No previous studies available for comparison     Samson Kendall   Exercise Stress Test With Myocardial Perfusion SPECT (Multi Study)   Order# [id number]   Reading physician:   Randall Fernández MD Ordering physician:   Isadora Hollins APRN Study date: 19   Patient Information     Patient Name  Samson Kendall MRN  [id number] Sex  Male  (Age)  1975 (44 y.o.)   Interpretation Summary     · Stress Procedure  · A stress test was performed following the Masood protocol.  · Exercise duration (min) 7 min Exercise duration (sec) 0 sec Estimated workload 10.1 METS  · Baseline Vitals Baseline HR 98 bpm Baseline /78 mmHg Peak Stress Vitals Peak  bpm Peak /130 mmHg Recovery Vitals Recovery HR 96 bpm Recovery /71 mmHg Exercise Data Target HR (85%) 150 bpm Max. Pred. HR (100%) 177 bpm Percent Max Pred HR 89.83 %  · No complaint of chest pain  · There was no ST segment deviation noted during stress.  · There were no significant arrhythmias noted during the test.  · No ECG evidence of myocardial ischemia  · Findings consistent with a normal ECG stress test.  · Nuclear Perfusion Findings  · Myocardial perfusion imaging indicates a normal myocardial perfusion study with no evidence of ischemia.  · Normal LV cavity size. Normal LV wall motion noted.  · Left ventricular ejection fraction is normal (Calculated EF = 60%).  · Impressions are consistent with a low risk study.         Procedures      Assessment/Plan    Diagnosis Plan   1. Syncope, unspecified syncope type     2. Palpitations     3. Fatigue, unspecified type         Recommendations:  1. I have reviewed the event monitor results with the patient.  2. Since he is having recurrent episodes of sinus tach, will increase the Toprol-XL dose to 100 mg daily and see.  3. Follow-up in 3 weeks.    Return in about 3 weeks (around 10/15/2019).    As always, Rodolfo I appreciate very much the opportunity to participate in the  cardiovascular care of your patients. Please do not hesitate to call me with any questions with regards to Samson Kendall evaluation and management.           With Best Regards,        Randall Fernández MD, FACC    Please note that portions of this note were completed with a voice recognition program.

## 2022-12-21 ENCOUNTER — OFFICE VISIT (OUTPATIENT)
Dept: SURGERY | Facility: CLINIC | Age: 47
End: 2022-12-21

## 2022-12-21 VITALS
WEIGHT: 228.2 LBS | BODY MASS INDEX: 36.67 KG/M2 | HEIGHT: 66 IN | SYSTOLIC BLOOD PRESSURE: 114 MMHG | DIASTOLIC BLOOD PRESSURE: 70 MMHG

## 2022-12-21 DIAGNOSIS — M67.40 GANGLION CYST: Primary | ICD-10-CM

## 2022-12-21 PROCEDURE — 99202 OFFICE O/P NEW SF 15 MIN: CPT | Performed by: SURGERY

## 2022-12-21 RX ORDER — DEXTROMETHORPHAN HYDROBROMIDE AND PROMETHAZINE HYDROCHLORIDE 15; 6.25 MG/5ML; MG/5ML
SYRUP ORAL
COMMUNITY
Start: 2022-12-14 | End: 2023-01-26

## 2022-12-21 RX ORDER — ERGOCALCIFEROL 1.25 MG/1
CAPSULE ORAL
COMMUNITY
Start: 2022-12-14

## 2022-12-21 NOTE — PROGRESS NOTES
Subjective   Samson Kendall is a 47 y.o. male is being seen for consultation today at the request of Rosalina Pandya APRN    Samson Kendall is a 47 y.o. male History of Present Illness  Right-hand-dominant male with left hand showing ganglion cyst of the extensor surface of the left hand between the second and third metacarpals.  Occasional discomfort.  Fluctuates in size.  No previous excision.      Past Medical History:   Diagnosis Date   • Asthma     childhood   • Cardiomyopathy (HCC)    • Diabetes mellitus (HCC)    • Heart murmur     as child   • Hyperlipidemia    • Hypertension    • Palpitation    • Sleep apnea     C pap use       Family History   Problem Relation Age of Onset   • Heart attack Maternal Grandfather    • Heart failure Maternal Grandfather    • Heart disease Maternal Grandfather    • Arthritis Mother    • Diabetes Father    • Hearing loss Father    • Asthma Maternal Grandmother        Social History     Socioeconomic History   • Marital status:    Tobacco Use   • Smoking status: Never   • Smokeless tobacco: Current     Types: Snuff   Vaping Use   • Vaping Use: Never used   Substance and Sexual Activity   • Alcohol use: Not Currently   • Drug use: No   • Sexual activity: Yes     Partners: Female     Birth control/protection: Surgical, Same-sex partner       Past Surgical History:   Procedure Laterality Date   • APPENDECTOMY     • HAND SURGERY     • KNEE ARTHROSCOPY     • VASECTOMY     • WISDOM TOOTH EXTRACTION         Review of Systems   Constitutional: Negative for activity change, appetite change, chills and fever.   HENT: Negative for sore throat and trouble swallowing.    Eyes: Negative for visual disturbance.   Respiratory: Negative for cough and shortness of breath.    Cardiovascular: Negative for chest pain and palpitations.   Gastrointestinal: Negative for abdominal distention, abdominal pain, blood in stool, constipation, diarrhea, nausea and vomiting.   Endocrine: Negative for cold  "intolerance and heat intolerance.   Genitourinary: Negative for dysuria.   Musculoskeletal: Negative for joint swelling.   Skin: Negative for color change, rash and wound.   Allergic/Immunologic: Negative for immunocompromised state.   Neurological: Negative for dizziness, seizures, weakness and headaches.   Hematological: Negative for adenopathy. Does not bruise/bleed easily.   Psychiatric/Behavioral: Negative for agitation and confusion.         /70   Ht 167.6 cm (66\")   Wt 104 kg (228 lb 3.2 oz)   BMI 36.83 kg/m²   Objective   Physical Exam  Constitutional:       Appearance: He is well-developed.   HENT:      Head: Normocephalic and atraumatic.   Eyes:      Conjunctiva/sclera: Conjunctivae normal.      Pupils: Pupils are equal, round, and reactive to light.   Neck:      Thyroid: No thyromegaly.      Vascular: No JVD.      Trachea: No tracheal deviation.   Cardiovascular:      Rate and Rhythm: Normal rate and regular rhythm.      Heart sounds: No murmur heard.    No friction rub. No gallop.   Pulmonary:      Effort: Pulmonary effort is normal.      Breath sounds: Normal breath sounds.   Abdominal:      General: There is no distension.      Palpations: Abdomen is soft. There is no hepatomegaly or splenomegaly.      Tenderness: There is no abdominal tenderness.      Hernia: No hernia is present.   Musculoskeletal:         General: No deformity. Normal range of motion.      Right hand: Normal.      Cervical back: Neck supple.      Comments: Left hand 1.5 cm ganglion cyst second third metacarpal region extensor surface   Skin:     General: Skin is warm and dry.   Neurological:      Mental Status: He is alert and oriented to person, place, and time.               Assessment   Diagnoses and all orders for this visit:    1. Ganglion cyst (Primary)      Samson Kendall is a 47 y.o. male with 1.5 cm ganglion cyst causing discomfort on the extensor surface of the left hand between the second and third metacarpals.  " Patient is right-hand dominant.  Patient will be referred to orthopedic surgery for further management.    Class 2 Severe Obesity (BMI >=35 and <=39.9). Obesity-related health conditions include the following: hypertension. Obesity is unchanged. BMI is is above average; BMI management plan is completed. We discussed portion control and increasing exercise.

## 2022-12-29 DIAGNOSIS — M25.532 LEFT WRIST PAIN: Primary | ICD-10-CM

## 2023-01-03 ENCOUNTER — HOSPITAL ENCOUNTER (OUTPATIENT)
Dept: GENERAL RADIOLOGY | Facility: HOSPITAL | Age: 48
Discharge: HOME | End: 2023-01-03
Admitting: PHYSICIAN ASSISTANT
Payer: COMMERCIAL

## 2023-01-03 ENCOUNTER — OFFICE VISIT (OUTPATIENT)
Dept: ORTHOPEDIC SURGERY | Facility: CLINIC | Age: 48
End: 2023-01-03
Payer: COMMERCIAL

## 2023-01-03 VITALS
SYSTOLIC BLOOD PRESSURE: 107 MMHG | BODY MASS INDEX: 35.36 KG/M2 | HEIGHT: 66 IN | WEIGHT: 220 LBS | DIASTOLIC BLOOD PRESSURE: 71 MMHG | HEART RATE: 77 BPM

## 2023-01-03 DIAGNOSIS — M25.532 LEFT WRIST PAIN: ICD-10-CM

## 2023-01-03 DIAGNOSIS — R20.0 NUMBNESS AND TINGLING IN LEFT HAND: ICD-10-CM

## 2023-01-03 DIAGNOSIS — R20.2 NUMBNESS AND TINGLING IN LEFT HAND: ICD-10-CM

## 2023-01-03 DIAGNOSIS — M67.439 DORSAL WRIST GANGLION: Primary | ICD-10-CM

## 2023-01-03 PROCEDURE — 99203 OFFICE O/P NEW LOW 30 MIN: CPT | Performed by: PHYSICIAN ASSISTANT

## 2023-01-03 PROCEDURE — 73110 X-RAY EXAM OF WRIST: CPT | Performed by: RADIOLOGY

## 2023-01-03 PROCEDURE — 73110 X-RAY EXAM OF WRIST: CPT

## 2023-01-03 RX ORDER — TIRZEPATIDE 5 MG/.5ML
INJECTION, SOLUTION SUBCUTANEOUS
COMMUNITY
Start: 2022-10-06

## 2023-01-03 RX ORDER — MELOXICAM 15 MG/1
15 TABLET ORAL DAILY
COMMUNITY
Start: 2022-12-05

## 2023-01-03 RX ORDER — TESTOSTERONE CYPIONATE 200 MG/ML
INJECTION, SOLUTION INTRAMUSCULAR
COMMUNITY
Start: 2022-12-05

## 2023-01-17 ENCOUNTER — OFFICE VISIT (OUTPATIENT)
Dept: ORTHOPEDIC SURGERY | Facility: CLINIC | Age: 48
End: 2023-01-17
Payer: COMMERCIAL

## 2023-01-17 VITALS — BODY MASS INDEX: 35.36 KG/M2 | WEIGHT: 220.02 LBS | HEIGHT: 66 IN

## 2023-01-17 DIAGNOSIS — M25.532 LEFT WRIST PAIN: ICD-10-CM

## 2023-01-17 DIAGNOSIS — M67.439 DORSAL WRIST GANGLION: Primary | ICD-10-CM

## 2023-01-17 DIAGNOSIS — R20.0 NUMBNESS AND TINGLING IN LEFT HAND: ICD-10-CM

## 2023-01-17 DIAGNOSIS — R20.2 NUMBNESS AND TINGLING IN LEFT HAND: ICD-10-CM

## 2023-01-17 PROCEDURE — 99213 OFFICE O/P EST LOW 20 MIN: CPT | Performed by: PHYSICIAN ASSISTANT

## 2023-01-17 PROCEDURE — 20612 ASPIRATE/INJ GANGLION CYST: CPT | Performed by: PHYSICIAN ASSISTANT

## 2023-01-17 RX ORDER — LIDOCAINE HYDROCHLORIDE 10 MG/ML
5 INJECTION, SOLUTION INFILTRATION; PERINEURAL
Status: COMPLETED | OUTPATIENT
Start: 2023-01-17 | End: 2023-01-17

## 2023-01-17 RX ADMIN — LIDOCAINE HYDROCHLORIDE 5 MG: 10 INJECTION, SOLUTION INFILTRATION; PERINEURAL at 16:24

## 2023-01-17 NOTE — PROGRESS NOTES
[facility] Established Patient Visit          Patient: Samson Kendall  YOB: 1975  Date of Encounter: 01/17/2023  PCP: Rosalina Pandya APRN      Subjective     Chief Complaint   Patient presents with   • Left Wrist - Pain, Follow-up           History of Present Illness:     Samson Kendall is a 47 y.o. male presents today as result of left wrist pain and cystic formation along the base of the left wrist as well as numbness and tingling into the second and third digits.  Patient is seen minimal improvement with previous bracing and medication.  He continues to have evidence of the cystic formation.  He presents for aspiration of the left dorsal wrist cyst.  He is yet undergoing EMG/nerve conduction studies.  No new complaints today. Denies any paresthesias.              Patient Active Problem List   Diagnosis   • Skin lesion of right ear   • Cough   • Syncope   • GERD with esophagitis   • Palpitations   • Chest pain   • Fatigue   • Cardiomyopathy (HCC)   • Ureteral calculus, right   • Ganglion cyst     Past Medical History:   Diagnosis Date   • Asthma     childhood   • Cardiomyopathy (HCC)    • Diabetes mellitus (HCC)    • Heart murmur     as child   • Hyperlipidemia    • Hypertension    • Palpitation    • Sleep apnea     C pap use     Past Surgical History:   Procedure Laterality Date   • APPENDECTOMY     • HAND SURGERY     • KNEE ARTHROSCOPY     • VASECTOMY     • WISDOM TOOTH EXTRACTION       Social History     Occupational History   • Not on file   Tobacco Use   • Smoking status: Never   • Smokeless tobacco: Current     Types: Snuff   Vaping Use   • Vaping Use: Never used   Substance and Sexual Activity   • Alcohol use: Not Currently   • Drug use: No   • Sexual activity: Yes     Partners: Female     Birth control/protection: Surgical, Same-sex partner    Samson Kendall  reports that he has never smoked. His smokeless tobacco use includes snuff.. I have educated him on the risk of  diseases from using tobacco products such as cancer and heart disease.     I advised him to quit and he is not willing to quit.    I spent 3  minutes counseling the patient.          Social History     Social History Narrative   • Not on file     Family History   Problem Relation Age of Onset   • Heart attack Maternal Grandfather    • Heart failure Maternal Grandfather    • Heart disease Maternal Grandfather    • Arthritis Mother    • Diabetes Father    • Hearing loss Father    • Asthma Maternal Grandmother      Current Outpatient Medications   Medication Sig Dispense Refill   • ciprofloxacin (CIPRO) 250 MG tablet Take 1 tablet by mouth 2 (Two) Times a Day. 14 tablet 0   • dexlansoprazole (DEXILANT) 60 MG capsule Take 60 mg by mouth Daily.     • fenofibrate (TRICOR) 145 MG tablet Take 145 mg by mouth Daily.     • furosemide (LASIX) 20 MG tablet Take 20 mg by mouth Daily.     • hydroCHLOROthiazide (HYDRODIURIL) 12.5 MG tablet Take 12.5 mg by mouth Daily.     • HYDROcodone-acetaminophen (NORCO) 7.5-325 MG per tablet Take 1 tablet by mouth Daily As Needed for Moderate Pain .     • HYDROcodone-acetaminophen (NORCO) 7.5-325 MG per tablet Take 1 tablet by mouth Every 8 (Eight) Hours As Needed for Moderate Pain . 12 tablet 0   • losartan (Cozaar) 25 MG tablet Take 1 tablet by mouth Daily. 30 tablet 5   • meloxicam (MOBIC) 15 MG tablet Take 15 mg by mouth Daily.     • metoprolol succinate XL (TOPROL-XL) 100 MG 24 hr tablet Take 1 tablet by mouth Daily. for blood pressure 30 tablet 2   • ondansetron ODT (ZOFRAN-ODT) 4 MG disintegrating tablet Place 1 tablet on the tongue Every 6 (Six) Hours As Needed for Nausea. 12 tablet 0   • promethazine (PHENERGAN) 25 MG tablet Take 1 tablet by mouth Every 6 (Six) Hours As Needed for Nausea or Vomiting. 21 tablet 2   • promethazine-dextromethorphan (PROMETHAZINE-DM) 6.25-15 MG/5ML syrup TAKE 5 ML BY MOUTH EVERY 4 TO 6 HOURS AS NEEDED FOR COUGH AND CONGESTION     • sildenafil (REVATIO) 20  "MG tablet Take 1 tablet by mouth.     • tamsulosin (FLOMAX) 0.4 MG capsule 24 hr capsule Take 1 capsule by mouth Daily. 30 capsule 0   • tamsulosin (FLOMAX) 0.4 MG capsule 24 hr capsule Take 1 capsule by mouth Every Night. 30 capsule 5   • tamsulosin (FLOMAX) 0.4 MG capsule 24 hr capsule Take 1 capsule by mouth Daily. 30 capsule 0   • Testosterone Cypionate (DEPOTESTOTERONE CYPIONATE) 200 MG/ML injection inject 1 cc by intramuscular route every  2 weeks     • Tirzepatide (Mounjaro) 5 MG/0.5ML solution pen-injector Inject  under the skin into the appropriate area as directed.     • topiramate (TOPAMAX) 100 MG tablet Take 100 mg by mouth 2 (Two) Times a Day.     • vitamin D (ERGOCALCIFEROL) 1.25 MG (31101 UT) capsule capsule TAKE ONE CAPSULE BY MOUTH ONCE EVERY WEEK FOR VITAMIN D DEFICIENCY     • oxyCODONE-acetaminophen (PERCOCET)  MG per tablet Take 1 tablet by mouth Every 6 (Six) Hours As Needed for Moderate Pain . 12 tablet 0   • oxyCODONE-acetaminophen (PERCOCET) 7.5-325 MG per tablet Take 1 tablet by mouth Every 4 (Four) Hours As Needed for Moderate Pain . 12 tablet 0     No current facility-administered medications for this visit.     No Known Allergies         Review of Systems   Constitutional: Negative.   HENT: Negative.    Eyes: Negative.    Cardiovascular: Negative.    Respiratory: Negative.    Endocrine: Negative.    Hematologic/Lymphatic: Negative.    Skin: Negative.    Musculoskeletal:        Pertinent positives listed in HPI   Gastrointestinal: Negative.    Genitourinary: Negative.    Neurological: Negative.    Psychiatric/Behavioral: Negative.    Allergic/Immunologic: Negative.          Objective      Vitals:    01/17/23 0941   Weight: 99.8 kg (220 lb 0.3 oz)   Height: 167.6 cm (65.98\")      Class 2 Severe Obesity (BMI >=35 and <=39.9). Obesity-related health conditions include the following: Listed in PMH. Obesity is newly identified. BMI is is above average; BMI management plan is completed. We " discussed portion control and increasing exercise.      Physical Exam  Vitals and nursing note reviewed.   Constitutional:       General: He is not in acute distress.     Appearance: Normal appearance. He is not ill-appearing.   HENT:      Head: Normocephalic and atraumatic.      Right Ear: External ear normal.      Left Ear: External ear normal.      Nose: Nose normal.      Mouth/Throat:      Mouth: Mucous membranes are moist.      Pharynx: Oropharynx is clear.   Eyes:      Extraocular Movements: Extraocular movements intact.      Conjunctiva/sclera: Conjunctivae normal.      Pupils: Pupils are equal, round, and reactive to light.   Cardiovascular:      Rate and Rhythm: Normal rate.      Pulses: Normal pulses.   Pulmonary:      Effort: Pulmonary effort is normal.   Abdominal:      General: There is no distension.   Musculoskeletal:      Cervical back: Normal range of motion. No rigidity.      Comments: Examination today of patient's left wrist and hand reveals there is mild generalized swelling with a palpable small pea-sized cystic nodule along the dorsal carpal metacarpal joint range radially.  This does not appear to be communicated with the extensor tendons.  There is no skin discoloration.  Patient has altered sensation in second and third and first digits with palpation.  Phalen's and Tinel sign negative left wrist.  Remainder the neurovascular status grossly intact.   Skin:     General: Skin is warm and dry.      Capillary Refill: Capillary refill takes less than 2 seconds.   Neurological:      General: No focal deficit present.      Mental Status: He is alert and oriented to person, place, and time.   Psychiatric:         Mood and Affect: Mood normal.         Behavior: Behavior normal.                   Radiology:        XR Wrist 3+ View Left    Result Date: 1/3/2023    No acute findings in the left wrist.  This report was finalized on 1/3/2023 9:44 AM by Dr. Colby Marley MD.              Assessment/Plan         ICD-10-CM ICD-9-CM   1. Left wrist pain  M25.532 719.43   2. Numbness and tingling in left hand  R20.0 782.0    R20.2    3. Dorsal wrist ganglion  M67.439 727.41         47-year-old male with a 2-month history of a ganglion type cyst dorsal left wrist with left hand numbness and tingling.  We are still awaiting EMG/nerve conduction studies.  The patient received aspiration yielding 3 cc of cyst aspirate ganglion type I nature from the cyst in which the cyst wall was broken up.  Patient tolerated this she will.  Compressive bandage was placed.  He was asked to follow in 2 weeks for further evaluation of the efficacy of the conservative treatment.    Dorsal left wrist cyst excision    Date/Time: 1/17/2023 4:24 PM  Performed by: Evans Crespo PA  Authorized by: Evans Crespo PA   Consent: Verbal consent obtained. Written consent obtained.  Risks and benefits: risks, benefits and alternatives were discussed  Consent given by: patient  Patient understanding: patient states understanding of the procedure being performed  Patient consent: the patient's understanding of the procedure matches consent given  Site marked: the operative site was marked  Imaging studies: imaging studies available  Patient identity confirmed: verbally with patient  Local anesthesia used: yes  Anesthesia: local infiltration    Anesthesia:  Local anesthesia used: yes  Local Anesthetic: lidocaine 1% without epinephrine  Anesthetic total: 0.5 mL  Patient tolerance: patient tolerated the procedure well with no immediate complications  Comments: 18-gauge 5/8 inch needle used for aspiration yielding 3 cc of ganglion type cyst aspirate.  Medications administered: 5 mg lidocaine 1 %                  This document was signed by Evans Crespo PA-C January 17, 2023     CC: Rosalina Pandya APRN      Dictated Utilizing Dragon Dictation     Please note that portions of this note were completed with a voice recognition program.     Part of this note  may be an electronic transcription/translation of spoken language to printed text using the Dragon Dictation System.

## 2023-01-17 NOTE — PROGRESS NOTES
[facility] [place] [facility]          Patient: Samson Kendall  YOB: 1975  Date of Encounter: 01/03/2023  PCP: Rosalina Pandya APRN      Subjective     Chief Complaint   Patient presents with   • Left Hand - Initial Evaluation, Pain           History of Present Illness:     Samson Kendall is a 47 y.o. male presents today as result of left wrist pain and cystic formation along the base of the left wrist as well as numbness and tingling into the second and third digits.  Patient reports occasional soreness this is been ongoing for the last 2 months.  He reports no conservative treatment options.  He reports that there has been no significant worsening or improvement of this.  He states that this nodule began to increase in size and decrease depending on his activity.  He reports no other new complaints.  Denies any paresthesias              Patient Active Problem List   Diagnosis   • Skin lesion of right ear   • Cough   • Syncope   • GERD with esophagitis   • Palpitations   • Chest pain   • Fatigue   • Cardiomyopathy (HCC)   • Ureteral calculus, right   • Ganglion cyst     Past Medical History:   Diagnosis Date   • Asthma     childhood   • Cardiomyopathy (HCC)    • Diabetes mellitus (HCC)    • Heart murmur     as child   • Hyperlipidemia    • Hypertension    • Palpitation    • Sleep apnea     C pap use     Past Surgical History:   Procedure Laterality Date   • APPENDECTOMY     • HAND SURGERY     • KNEE ARTHROSCOPY     • VASECTOMY     • WISDOM TOOTH EXTRACTION       Social History     Occupational History   • Not on file   Tobacco Use   • Smoking status: Never   • Smokeless tobacco: Current     Types: Snuff   Vaping Use   • Vaping Use: Never used   Substance and Sexual Activity   • Alcohol use: Not Currently   • Drug use: No   • Sexual activity: Yes     Partners: Female     Birth control/protection: Surgical, Same-sex partner    Samson Kendall  reports that he has never smoked. His  smokeless tobacco use includes snuff.. I have educated him on the risk of diseases from using tobacco products such as cancer and heart disease.     I advised him to quit and he is not willing to quit.    I spent 3  minutes counseling the patient.          Social History     Social History Narrative   • Not on file     Family History   Problem Relation Age of Onset   • Heart attack Maternal Grandfather    • Heart failure Maternal Grandfather    • Heart disease Maternal Grandfather    • Arthritis Mother    • Diabetes Father    • Hearing loss Father    • Asthma Maternal Grandmother      Current Outpatient Medications   Medication Sig Dispense Refill   • dexlansoprazole (DEXILANT) 60 MG capsule Take 60 mg by mouth Daily.     • fenofibrate (TRICOR) 145 MG tablet Take 145 mg by mouth Daily.     • furosemide (LASIX) 20 MG tablet Take 20 mg by mouth Daily.     • hydroCHLOROthiazide (HYDRODIURIL) 12.5 MG tablet Take 12.5 mg by mouth Daily.     • HYDROcodone-acetaminophen (NORCO) 7.5-325 MG per tablet Take 1 tablet by mouth Daily As Needed for Moderate Pain .     • meloxicam (MOBIC) 15 MG tablet Take 15 mg by mouth Daily.     • metoprolol succinate XL (TOPROL-XL) 100 MG 24 hr tablet Take 1 tablet by mouth Daily. for blood pressure 30 tablet 2   • sildenafil (REVATIO) 20 MG tablet Take 1 tablet by mouth.     • Testosterone Cypionate (DEPOTESTOTERONE CYPIONATE) 200 MG/ML injection inject 1 cc by intramuscular route every  2 weeks     • Tirzepatide (Mounjaro) 5 MG/0.5ML solution pen-injector Inject  under the skin into the appropriate area as directed.     • topiramate (TOPAMAX) 100 MG tablet Take 100 mg by mouth 2 (Two) Times a Day.     • vitamin D (ERGOCALCIFEROL) 1.25 MG (86327 UT) capsule capsule TAKE ONE CAPSULE BY MOUTH ONCE EVERY WEEK FOR VITAMIN D DEFICIENCY     • ciprofloxacin (CIPRO) 250 MG tablet Take 1 tablet by mouth 2 (Two) Times a Day. 14 tablet 0   • HYDROcodone-acetaminophen (NORCO) 7.5-325 MG per tablet Take 1  "tablet by mouth Every 8 (Eight) Hours As Needed for Moderate Pain . 12 tablet 0   • losartan (Cozaar) 25 MG tablet Take 1 tablet by mouth Daily. 30 tablet 5   • ondansetron ODT (ZOFRAN-ODT) 4 MG disintegrating tablet Place 1 tablet on the tongue Every 6 (Six) Hours As Needed for Nausea. 12 tablet 0   • oxyCODONE-acetaminophen (PERCOCET)  MG per tablet Take 1 tablet by mouth Every 6 (Six) Hours As Needed for Moderate Pain . 12 tablet 0   • oxyCODONE-acetaminophen (PERCOCET) 7.5-325 MG per tablet Take 1 tablet by mouth Every 4 (Four) Hours As Needed for Moderate Pain . 12 tablet 0   • promethazine (PHENERGAN) 25 MG tablet Take 1 tablet by mouth Every 6 (Six) Hours As Needed for Nausea or Vomiting. 21 tablet 2   • promethazine-dextromethorphan (PROMETHAZINE-DM) 6.25-15 MG/5ML syrup TAKE 5 ML BY MOUTH EVERY 4 TO 6 HOURS AS NEEDED FOR COUGH AND CONGESTION     • tamsulosin (FLOMAX) 0.4 MG capsule 24 hr capsule Take 1 capsule by mouth Daily. 30 capsule 0   • tamsulosin (FLOMAX) 0.4 MG capsule 24 hr capsule Take 1 capsule by mouth Every Night. 30 capsule 5   • tamsulosin (FLOMAX) 0.4 MG capsule 24 hr capsule Take 1 capsule by mouth Daily. 30 capsule 0     No current facility-administered medications for this visit.     No Known Allergies         Review of Systems   Constitutional: Negative.   HENT: Negative.    Eyes: Negative.    Cardiovascular: Negative.    Respiratory: Negative.    Endocrine: Negative.    Hematologic/Lymphatic: Negative.    Skin: Negative.    Musculoskeletal:        Pertinent positives listed in HPI   Gastrointestinal: Negative.    Genitourinary: Negative.    Neurological: Negative.    Psychiatric/Behavioral: Negative.    Allergic/Immunologic: Negative.          Objective      Vitals:    01/03/23 0855   BP: 107/71   Pulse: 77   Weight: 99.8 kg (220 lb)   Height: 167.6 cm (66\")      Class 2 Severe Obesity (BMI >=35 and <=39.9). Obesity-related health conditions include the following: Listed in PMH. " Obesity is newly identified. BMI is is above average; BMI management plan is completed. We discussed portion control and increasing exercise.      Physical Exam  Vitals and nursing note reviewed.   Constitutional:       General: He is not in acute distress.     Appearance: Normal appearance. He is not ill-appearing.   HENT:      Head: Normocephalic and atraumatic.      Right Ear: External ear normal.      Left Ear: External ear normal.      Nose: Nose normal.      Mouth/Throat:      Mouth: Mucous membranes are moist.      Pharynx: Oropharynx is clear.   Eyes:      Extraocular Movements: Extraocular movements intact.      Conjunctiva/sclera: Conjunctivae normal.      Pupils: Pupils are equal, round, and reactive to light.   Cardiovascular:      Rate and Rhythm: Normal rate.      Pulses: Normal pulses.   Pulmonary:      Effort: Pulmonary effort is normal.   Abdominal:      General: There is no distension.   Musculoskeletal:      Cervical back: Normal range of motion. No rigidity.      Comments: Examination today of patient's left wrist and hand reveals there is mild generalized swelling with a palpable small pea-sized cystic nodule along the dorsal carpal metacarpal joint range radially.  This does not appear to be communicated with the extensor tendons.  There is no skin discoloration.  Patient has altered sensation in second and third and first digits with palpation.  Phalen's and Tinel sign negative left wrist.  Remainder the neurovascular status grossly intact.   Skin:     General: Skin is warm and dry.      Capillary Refill: Capillary refill takes less than 2 seconds.   Neurological:      General: No focal deficit present.      Mental Status: He is alert and oriented to person, place, and time.   Psychiatric:         Mood and Affect: Mood normal.         Behavior: Behavior normal.                 Radiology:        XR Wrist 3+ View Left    Result Date: 1/3/2023    No acute findings in the left wrist.  This report  was finalized on 1/3/2023 9:44 AM by Dr. Colby Marley MD.              Assessment/Plan        ICD-10-CM ICD-9-CM   1. Left wrist pain  M25.532 719.43   2. Numbness and tingling in left hand  R20.0 782.0    R20.2        47-year-old male with a 2-month history of a ganglion type suspected cyst to the dorsal left wrist.  The patient has waxing and waning approach with this with a small diameter of the cyst.  The patient will implement a cock-up wrist brace to be worn over the course of the next 2 weeks as well as EMG/nerve conduction study ordered.  Patient may implement anti-inflammatory medication as well.  He will return back in 2 weeks.  If no significant improvement of the cystic nodule we discussed possibility of aspiration.                      This document was signed by Evans Crespo PA-C January 3, 2023     CC: Rosalina Pandya APRN      Dictated Utilizing Dragon Dictation     Please note that portions of this note were completed with a voice recognition program.     Part of this note may be an electronic transcription/translation of spoken language to printed text using the Dragon Dictation System.

## 2023-01-26 ENCOUNTER — OFFICE VISIT (OUTPATIENT)
Dept: CARDIOLOGY | Facility: CLINIC | Age: 48
End: 2023-01-26
Payer: COMMERCIAL

## 2023-01-26 VITALS
WEIGHT: 228 LBS | BODY MASS INDEX: 37.99 KG/M2 | HEART RATE: 100 BPM | DIASTOLIC BLOOD PRESSURE: 65 MMHG | OXYGEN SATURATION: 95 % | SYSTOLIC BLOOD PRESSURE: 115 MMHG | HEIGHT: 65 IN

## 2023-01-26 DIAGNOSIS — E11.9 TYPE 2 DIABETES MELLITUS WITHOUT COMPLICATION, WITHOUT LONG-TERM CURRENT USE OF INSULIN: ICD-10-CM

## 2023-01-26 DIAGNOSIS — R00.2 PALPITATIONS: ICD-10-CM

## 2023-01-26 DIAGNOSIS — I42.8 NON-ISCHEMIC CARDIOMYOPATHY: Primary | ICD-10-CM

## 2023-01-26 DIAGNOSIS — R07.2 PRECORDIAL PAIN: ICD-10-CM

## 2023-01-26 DIAGNOSIS — E78.5 DYSLIPIDEMIA: ICD-10-CM

## 2023-01-26 PROCEDURE — 99214 OFFICE O/P EST MOD 30 MIN: CPT | Performed by: INTERNAL MEDICINE

## 2023-01-26 RX ORDER — VALSARTAN 40 MG/1
40 TABLET ORAL DAILY
Qty: 30 TABLET | Refills: 11 | Status: SHIPPED | OUTPATIENT
Start: 2023-01-26

## 2023-01-26 RX ORDER — POTASSIUM CHLORIDE 750 MG/1
10 TABLET, FILM COATED, EXTENDED RELEASE ORAL 2 TIMES DAILY
COMMUNITY

## 2023-01-26 RX ORDER — DIPHENOXYLATE HYDROCHLORIDE AND ATROPINE SULFATE 2.5; .025 MG/1; MG/1
TABLET ORAL DAILY
COMMUNITY

## 2023-01-26 RX ORDER — ROSUVASTATIN CALCIUM 10 MG/1
10 TABLET, COATED ORAL DAILY
Qty: 30 TABLET | Refills: 3 | Status: SHIPPED | OUTPATIENT
Start: 2023-01-26

## 2023-01-26 RX ORDER — METOPROLOL SUCCINATE 50 MG/1
50 TABLET, EXTENDED RELEASE ORAL DAILY
COMMUNITY

## 2023-01-26 NOTE — PROGRESS NOTES
Rosalina Pandya, JOSE J  Samson Kendall  1975 01/26/2023    Patient Active Problem List   Diagnosis   • Skin lesion of right ear   • Cough   • Syncope   • GERD with esophagitis   • Palpitations   • Chest pain   • Fatigue   • Cardiomyopathy (HCC)   • Ureteral calculus, right   • Ganglion cyst       Dear Rosalina Pandya, APRN:    Subjective     Samson Kendall is a 47 y.o. male with the problems as listed above, presents    Chief complaint: Reestablish cardiac care and follow-up to our office in a patient with history of nonischemic cardiomyopathy.    History of Present Illness: Mr. Kendall is a pleasant 47-year-old  male with history of nonischemic dilated cardiomyopathy with an ejection fraction of about 45 to 50% with no significant valvular disease on echo Doppler study in September 2019.  He has been lost for follow-up over the last couple of years.  He now wants to reestablish cardiac care and follow-up through our office.      On further questioning complains of dyspnea with moderate exertion with no PND, orthopnea.  He has some intermittent bilateral leg edema.  He states he has gained some weight about 7 pounds in the last 5 to 6 weeks.  He also complains of intermittent episodes of chest tightness and pressure especially when his heart rate goes fast associated with shortness of breath.  His wife stated that he has intermittent palpitations with rapid heartbeat and on 1 occasion his rhythm on the apple watch recorded as A. fib.  This apparently lasted just for a few minutes and resolve spontaneously.      47 to 50% with no        No Known Allergies:      Current Outpatient Medications:   •  Calcium-Magnesium-Zinc (OMERO-MAG-ZINC PO), Take  by mouth 3 (Three) Times a Day., Disp: , Rfl:   •  Cyanocobalamin (VITAMIN B-12 IJ), Inject  as directed., Disp: , Rfl:   •  dexlansoprazole (DEXILANT) 60 MG capsule, Take 60 mg by mouth Daily., Disp: , Rfl:   •  fenofibrate (TRICOR) 145 MG tablet, Take 145 mg by  mouth Daily., Disp: , Rfl:   •  furosemide (LASIX) 20 MG tablet, Take 20 mg by mouth Daily., Disp: , Rfl:   •  hydroCHLOROthiazide (HYDRODIURIL) 12.5 MG tablet, Take 12.5 mg by mouth Daily., Disp: , Rfl:   •  HYDROcodone-acetaminophen (NORCO) 7.5-325 MG per tablet, Take 1 tablet by mouth Daily As Needed for Moderate Pain ., Disp: , Rfl:   •  meloxicam (MOBIC) 15 MG tablet, Take 15 mg by mouth Daily., Disp: , Rfl:   •  metoprolol succinate XL (TOPROL-XL) 100 MG 24 hr tablet, Take 1 tablet by mouth Daily. for blood pressure, Disp: 30 tablet, Rfl: 2  •  metoprolol succinate XL (TOPROL-XL) 50 MG 24 hr tablet, Take 50 mg by mouth Daily., Disp: , Rfl:   •  multivitamin (MULTI-VITAMIN PO), Take  by mouth Daily., Disp: , Rfl:   •  potassium chloride 10 MEQ CR tablet, Take 10 mEq by mouth 2 (Two) Times a Day., Disp: , Rfl:   •  sildenafil (REVATIO) 20 MG tablet, Take 1 tablet by mouth., Disp: , Rfl:   •  Testosterone Cypionate (DEPOTESTOTERONE CYPIONATE) 200 MG/ML injection, inject 1 cc by intramuscular route every  2 weeks, Disp: , Rfl:   •  Tirzepatide (Mounjaro) 5 MG/0.5ML solution pen-injector, Inject  under the skin into the appropriate area as directed., Disp: , Rfl:   •  topiramate (TOPAMAX) 100 MG tablet, Take 100 mg by mouth 2 (Two) Times a Day., Disp: , Rfl:   •  vitamin D (ERGOCALCIFEROL) 1.25 MG (90923 UT) capsule capsule, TAKE ONE CAPSULE BY MOUTH ONCE EVERY WEEK FOR VITAMIN D DEFICIENCY, Disp: , Rfl:   •  valsartan (DIOVAN) 40 MG tablet, Take 1 tablet by mouth Daily., Disp: 30 tablet, Rfl: 11      The following portions of the patient's history were reviewed and updated as appropriate: allergies, current medications, past family history, past medical history, past social history, past surgical history and problem list.    Social History     Tobacco Use   • Smoking status: Never   • Smokeless tobacco: Current     Types: Snuff   Vaping Use   • Vaping Use: Never used   Substance Use Topics   • Alcohol use: Not  "Currently   • Drug use: No     Review of Systems   Constitutional: Negative for chills and fever.   HENT: Negative for nosebleeds and sore throat.    Respiratory: Positive for shortness of breath. Negative for cough, hemoptysis and wheezing.    Gastrointestinal: Negative for abdominal pain, hematemesis, hematochezia, melena, nausea and vomiting.   Genitourinary: Negative for dysuria and hematuria.   Neurological: Negative for headaches.     Objective   Vitals:    01/26/23 1358   BP: 115/65   BP Location: Right arm   Patient Position: Sitting   Cuff Size: Adult   Pulse: 100   SpO2: 95%   Weight: 103 kg (228 lb)   Height: 165.1 cm (65\")     Body mass index is 37.94 kg/m².        Vitals reviewed.   Constitutional:       Appearance: Well-developed.   Eyes:      Conjunctiva/sclera: Conjunctivae normal.   HENT:      Head: Normocephalic.   Neck:      Thyroid: No thyromegaly.      Vascular: No JVD.      Trachea: No tracheal deviation.   Pulmonary:      Effort: No respiratory distress.      Breath sounds: Normal breath sounds. No wheezing. No rales.   Cardiovascular:      PMI at left midclavicular line. Normal rate. Regular rhythm. Normal S1. Normal S2.      Murmurs: There is no murmur.      No gallop. No click. No rub.   Pulses:     Intact distal pulses.   Edema:     Peripheral edema absent.   Abdominal:      General: Bowel sounds are normal.      Palpations: Abdomen is soft. There is no abdominal mass.      Tenderness: There is no abdominal tenderness.   Musculoskeletal:      Cervical back: Normal range of motion and neck supple. Skin:     General: Skin is warm and dry.   Neurological:      Mental Status: Alert and oriented to person, place, and time.      Cranial Nerves: No cranial nerve deficit.           Assessment & Plan :   Diagnosis Plan   1. Non-ischemic cardiomyopathy (HCC)  Adult Transthoracic Echo Complete.      2. Precordial pain  Stress Test With Myocardial Perfusion (1 Day)      3. Type 2 diabetes mellitus " without complication, without long-term current use of insulin (HCC)        4. Dyslipidemia  Lipid Panel    Comprehensive Metabolic Panel      5. Palpitations  Cardiac Event Monitor           Recommendations:  1. We will start him on an ARB such as valsartan.  Continue with metoprolol succinate  2. Reevaluate his LV systolic function with an echo Doppler study.  3. Evaluate his chest pains with a Lexiscan sestamibi study.  4. Evaluate his recurrent palpitations with an event monitor.  5. We will start him on rosuvastatin 10 mg daily as patient is diabetic.    Return in about 6 weeks (around 3/9/2023).    As always, Rosalina Pandya APRN  I appreciate very much the opportunity to participate in the cardiovascular care of your patients. Please do not hesitate to call me with any questions with regards to Samson Kendall's evaluation and management.       With Best Regards,        Randall Fernández MD, [facility]    Please note that portions of this note were completed with a voice recognition program.

## 2023-01-26 NOTE — LETTER
January 28, 2023     JOSE J Snede  [address] [facility] [address]    Patient: Samosn Kendall   YOB: 1975   Date of Visit: 1/26/2023       Dear Rosalina:    Thank you for referring Samson Kendall to me for evaluation. Below are the relevant portions of my assessment and plan of care.    If you have questions, please do not hesitate to call me. I look forward to following Samson along with you.         Sincerely,        Randall Fernández MD        CC: No Recipients  Randall Fernández MD  01/28/23 1810  Sign when Signing Visit  Rosalina Pandya APRN  Samson Kendall  1975 01/26/2023    Patient Active Problem List   Diagnosis   • Skin lesion of right ear   • Cough   • Syncope   • GERD with esophagitis   • Palpitations   • Chest pain   • Fatigue   • Cardiomyopathy (HCC)   • Ureteral calculus, right   • Ganglion cyst       Dear Rosalina Pandya APRN:    Subjective      Samson Kendall is a 47 y.o. male with the problems as listed above, presents    Chief complaint: Reestablish cardiac care and follow-up to our office in a patient with history of nonischemic cardiomyopathy.    History of Present Illness: Mr. Kendall is a pleasant 47-year-old  male with history of nonischemic dilated cardiomyopathy with an ejection fraction of about 45 to 50% with no significant valvular disease on echo Doppler study in September 2019.  He has been lost for follow-up over the last couple of years.  He now wants to reestablish cardiac care and follow-up through our office.      On further questioning complains of dyspnea with moderate exertion with no PND, orthopnea.  He has some intermittent bilateral leg edema.  He states he has gained some weight about 7 pounds in the last 5 to 6 weeks.  He also complains of intermittent episodes of chest tightness and pressure especially when his heart rate goes fast associated with shortness of breath.  His wife stated that he has intermittent palpitations with rapid  heartbeat and on 1 occasion his rhythm on the apple watch recorded as A. fib.  This apparently lasted just for a few minutes and resolve spontaneously.      47 to 50% with no        No Known Allergies:      Current Outpatient Medications:   •  Calcium-Magnesium-Zinc (OMERO-MAG-ZINC PO), Take  by mouth 3 (Three) Times a Day., Disp: , Rfl:   •  Cyanocobalamin (VITAMIN B-12 IJ), Inject  as directed., Disp: , Rfl:   •  dexlansoprazole (DEXILANT) 60 MG capsule, Take 60 mg by mouth Daily., Disp: , Rfl:   •  fenofibrate (TRICOR) 145 MG tablet, Take 145 mg by mouth Daily., Disp: , Rfl:   •  furosemide (LASIX) 20 MG tablet, Take 20 mg by mouth Daily., Disp: , Rfl:   •  hydroCHLOROthiazide (HYDRODIURIL) 12.5 MG tablet, Take 12.5 mg by mouth Daily., Disp: , Rfl:   •  HYDROcodone-acetaminophen (NORCO) 7.5-325 MG per tablet, Take 1 tablet by mouth Daily As Needed for Moderate Pain ., Disp: , Rfl:   •  meloxicam (MOBIC) 15 MG tablet, Take 15 mg by mouth Daily., Disp: , Rfl:   •  metoprolol succinate XL (TOPROL-XL) 100 MG 24 hr tablet, Take 1 tablet by mouth Daily. for blood pressure, Disp: 30 tablet, Rfl: 2  •  metoprolol succinate XL (TOPROL-XL) 50 MG 24 hr tablet, Take 50 mg by mouth Daily., Disp: , Rfl:   •  multivitamin (MULTI-VITAMIN PO), Take  by mouth Daily., Disp: , Rfl:   •  potassium chloride 10 MEQ CR tablet, Take 10 mEq by mouth 2 (Two) Times a Day., Disp: , Rfl:   •  sildenafil (REVATIO) 20 MG tablet, Take 1 tablet by mouth., Disp: , Rfl:   •  Testosterone Cypionate (DEPOTESTOTERONE CYPIONATE) 200 MG/ML injection, inject 1 cc by intramuscular route every  2 weeks, Disp: , Rfl:   •  Tirzepatide (Mounjaro) 5 MG/0.5ML solution pen-injector, Inject  under the skin into the appropriate area as directed., Disp: , Rfl:   •  topiramate (TOPAMAX) 100 MG tablet, Take 100 mg by mouth 2 (Two) Times a Day., Disp: , Rfl:   •  vitamin D (ERGOCALCIFEROL) 1.25 MG (58018 UT) capsule capsule, TAKE ONE CAPSULE BY MOUTH ONCE EVERY WEEK FOR  "VITAMIN D DEFICIENCY, Disp: , Rfl:   •  valsartan (DIOVAN) 40 MG tablet, Take 1 tablet by mouth Daily., Disp: 30 tablet, Rfl: 11      The following portions of the patient's history were reviewed and updated as appropriate: allergies, current medications, past family history, past medical history, past social history, past surgical history and problem list.    Social History     Tobacco Use   • Smoking status: Never   • Smokeless tobacco: Current     Types: Snuff   Vaping Use   • Vaping Use: Never used   Substance Use Topics   • Alcohol use: Not Currently   • Drug use: No     Review of Systems   Constitutional: Negative for chills and fever.   HENT: Negative for nosebleeds and sore throat.    Respiratory: Positive for shortness of breath. Negative for cough, hemoptysis and wheezing.    Gastrointestinal: Negative for abdominal pain, hematemesis, hematochezia, melena, nausea and vomiting.   Genitourinary: Negative for dysuria and hematuria.   Neurological: Negative for headaches.     Objective    Vitals:    01/26/23 1358   BP: 115/65   BP Location: Right arm   Patient Position: Sitting   Cuff Size: Adult   Pulse: 100   SpO2: 95%   Weight: 103 kg (228 lb)   Height: 165.1 cm (65\")     Body mass index is 37.94 kg/m².        Vitals reviewed.   Constitutional:       Appearance: Well-developed.   Eyes:      Conjunctiva/sclera: Conjunctivae normal.   HENT:      Head: Normocephalic.   Neck:      Thyroid: No thyromegaly.      Vascular: No JVD.      Trachea: No tracheal deviation.   Pulmonary:      Effort: No respiratory distress.      Breath sounds: Normal breath sounds. No wheezing. No rales.   Cardiovascular:      PMI at left midclavicular line. Normal rate. Regular rhythm. Normal S1. Normal S2.      Murmurs: There is no murmur.      No gallop. No click. No rub.   Pulses:     Intact distal pulses.   Edema:     Peripheral edema absent.   Abdominal:      General: Bowel sounds are normal.      Palpations: Abdomen is soft. There " is no abdominal mass.      Tenderness: There is no abdominal tenderness.   Musculoskeletal:      Cervical back: Normal range of motion and neck supple. Skin:     General: Skin is warm and dry.   Neurological:      Mental Status: Alert and oriented to person, place, and time.      Cranial Nerves: No cranial nerve deficit.           Assessment & Plan :   Diagnosis Plan   1. Non-ischemic cardiomyopathy (HCC)  Adult Transthoracic Echo Complete.      2. Precordial pain  Stress Test With Myocardial Perfusion (1 Day)      3. Type 2 diabetes mellitus without complication, without long-term current use of insulin (HCC)        4. Dyslipidemia  Lipid Panel    Comprehensive Metabolic Panel      5. Palpitations  Cardiac Event Monitor          Recommendations:  1. We will start him on an ARB such as valsartan.  Continue with metoprolol succinate  2. Reevaluate his LV systolic function with an echo Doppler study.  3. Evaluate his chest pains with a Lexiscan sestamibi study.  4. Evaluate his recurrent palpitations with an event monitor.  5. We will start him on rosuvastatin 10 mg daily as patient is diabetic.    Return in about 6 weeks (around 3/9/2023).    As always, Rosalina Pandya APRN  I appreciate very much the opportunity to participate in the cardiovascular care of your patients. Please do not hesitate to call me with any questions with regards to Samson Kendall's evaluation and management.       With Best Regards,        Randall Fernández MD, [facility]    Please note that portions of this note were completed with a voice recognition program.

## 2023-02-03 ENCOUNTER — TELEPHONE (OUTPATIENT)
Dept: CARDIOLOGY | Facility: CLINIC | Age: 48
End: 2023-02-03
Payer: COMMERCIAL

## 2023-02-03 DIAGNOSIS — R00.2 PALPITATIONS: ICD-10-CM

## 2023-02-26 ENCOUNTER — HOSPITAL ENCOUNTER (EMERGENCY)
Facility: HOSPITAL | Age: 48
Discharge: HOME | End: 2023-02-27
Attending: EMERGENCY MEDICINE | Admitting: EMERGENCY MEDICINE
Payer: COMMERCIAL

## 2023-02-26 DIAGNOSIS — R10.13 EPIGASTRIC ABDOMINAL PAIN: Primary | ICD-10-CM

## 2023-02-26 LAB
ALBUMIN SERPL-MCNC: 4.6 G/DL (ref 3.5–5.2)
ALBUMIN/GLOB SERPL: 1.5 G/DL
ALP SERPL-CCNC: 33 U/L (ref 39–117)
ALT SERPL W P-5'-P-CCNC: 23 U/L (ref 1–41)
ANION GAP SERPL CALCULATED.3IONS-SCNC: 13.3 MMOL/L (ref 5–15)
AST SERPL-CCNC: 23 U/L (ref 1–40)
BACTERIA UR QL AUTO: ABNORMAL /HPF
BASOPHILS # BLD AUTO: 0.05 10*3/MM3 (ref 0–0.2)
BASOPHILS NFR BLD AUTO: 0.5 % (ref 0–1.5)
BILIRUB SERPL-MCNC: 0.3 MG/DL (ref 0–1.2)
BILIRUB UR QL STRIP: NEGATIVE
BUN SERPL-MCNC: 30 MG/DL (ref 6–20)
BUN/CREAT SERPL: 17 (ref 7–25)
CALCIUM SPEC-SCNC: 9.7 MG/DL (ref 8.6–10.5)
CHLORIDE SERPL-SCNC: 106 MMOL/L (ref 98–107)
CLARITY UR: CLEAR
CO2 SERPL-SCNC: 24.7 MMOL/L (ref 22–29)
COLOR UR: YELLOW
CREAT SERPL-MCNC: 1.76 MG/DL (ref 0.76–1.27)
CRP SERPL-MCNC: 0.98 MG/DL (ref 0–0.5)
DEPRECATED RDW RBC AUTO: 44.5 FL (ref 37–54)
EGFRCR SERPLBLD CKD-EPI 2021: 47.4 ML/MIN/1.73
EOSINOPHIL # BLD AUTO: 0.19 10*3/MM3 (ref 0–0.4)
EOSINOPHIL NFR BLD AUTO: 1.9 % (ref 0.3–6.2)
ERYTHROCYTE [DISTWIDTH] IN BLOOD BY AUTOMATED COUNT: 14.2 % (ref 12.3–15.4)
GLOBULIN UR ELPH-MCNC: 3.1 GM/DL
GLUCOSE SERPL-MCNC: 103 MG/DL (ref 65–99)
GLUCOSE UR STRIP-MCNC: NEGATIVE MG/DL
H PYLORI IGG SER IA-ACNC: NEGATIVE
HCT VFR BLD AUTO: 41.2 % (ref 37.5–51)
HGB BLD-MCNC: 13.4 G/DL (ref 13–17.7)
HGB UR QL STRIP.AUTO: NEGATIVE
HYALINE CASTS UR QL AUTO: ABNORMAL /LPF
IMM GRANULOCYTES # BLD AUTO: 0.02 10*3/MM3 (ref 0–0.05)
IMM GRANULOCYTES NFR BLD AUTO: 0.2 % (ref 0–0.5)
KETONES UR QL STRIP: ABNORMAL
LEUKOCYTE ESTERASE UR QL STRIP.AUTO: ABNORMAL
LIPASE SERPL-CCNC: 66 U/L (ref 13–60)
LYMPHOCYTES # BLD AUTO: 2.93 10*3/MM3 (ref 0.7–3.1)
LYMPHOCYTES NFR BLD AUTO: 29.3 % (ref 19.6–45.3)
MCH RBC QN AUTO: 28 PG (ref 26.6–33)
MCHC RBC AUTO-ENTMCNC: 32.5 G/DL (ref 31.5–35.7)
MCV RBC AUTO: 86.2 FL (ref 79–97)
MONOCYTES # BLD AUTO: 0.46 10*3/MM3 (ref 0.1–0.9)
MONOCYTES NFR BLD AUTO: 4.6 % (ref 5–12)
NEUTROPHILS NFR BLD AUTO: 6.34 10*3/MM3 (ref 1.7–7)
NEUTROPHILS NFR BLD AUTO: 63.5 % (ref 42.7–76)
NITRITE UR QL STRIP: NEGATIVE
NRBC BLD AUTO-RTO: 0 /100 WBC (ref 0–0.2)
PH UR STRIP.AUTO: 6.5 [PH] (ref 5–8)
PLATELET # BLD AUTO: 380 10*3/MM3 (ref 140–450)
PMV BLD AUTO: 10.9 FL (ref 6–12)
POTASSIUM SERPL-SCNC: 3.8 MMOL/L (ref 3.5–5.2)
PROT SERPL-MCNC: 7.7 G/DL (ref 6–8.5)
PROT UR QL STRIP: ABNORMAL
RBC # BLD AUTO: 4.78 10*6/MM3 (ref 4.14–5.8)
RBC # UR STRIP: ABNORMAL /HPF
REF LAB TEST METHOD: ABNORMAL
SODIUM SERPL-SCNC: 144 MMOL/L (ref 136–145)
SP GR UR STRIP: 1.02 (ref 1–1.03)
SQUAMOUS #/AREA URNS HPF: ABNORMAL /HPF
UROBILINOGEN UR QL STRIP: ABNORMAL
WBC # UR STRIP: ABNORMAL /HPF
WBC NRBC COR # BLD: 9.99 10*3/MM3 (ref 3.4–10.8)

## 2023-02-26 PROCEDURE — 81001 URINALYSIS AUTO W/SCOPE: CPT | Performed by: PHYSICIAN ASSISTANT

## 2023-02-26 PROCEDURE — 25010000002 ONDANSETRON PER 1 MG: Performed by: PHYSICIAN ASSISTANT

## 2023-02-26 PROCEDURE — 83690 ASSAY OF LIPASE: CPT | Performed by: PHYSICIAN ASSISTANT

## 2023-02-26 PROCEDURE — 99284 EMERGENCY DEPT VISIT MOD MDM: CPT

## 2023-02-26 PROCEDURE — 96374 THER/PROPH/DIAG INJ IV PUSH: CPT

## 2023-02-26 PROCEDURE — 96375 TX/PRO/DX INJ NEW DRUG ADDON: CPT

## 2023-02-26 PROCEDURE — 86140 C-REACTIVE PROTEIN: CPT | Performed by: PHYSICIAN ASSISTANT

## 2023-02-26 PROCEDURE — 86677 HELICOBACTER PYLORI ANTIBODY: CPT | Performed by: PHYSICIAN ASSISTANT

## 2023-02-26 PROCEDURE — 85025 COMPLETE CBC W/AUTO DIFF WBC: CPT | Performed by: PHYSICIAN ASSISTANT

## 2023-02-26 PROCEDURE — 25010000002 MORPHINE PER 10 MG: Performed by: EMERGENCY MEDICINE

## 2023-02-26 PROCEDURE — 80053 COMPREHEN METABOLIC PANEL: CPT | Performed by: PHYSICIAN ASSISTANT

## 2023-02-26 RX ORDER — ONDANSETRON 2 MG/ML
4 INJECTION INTRAMUSCULAR; INTRAVENOUS ONCE
Status: COMPLETED | OUTPATIENT
Start: 2023-02-26 | End: 2023-02-26

## 2023-02-26 RX ORDER — SODIUM CHLORIDE 0.9 % (FLUSH) 0.9 %
10 SYRINGE (ML) INJECTION AS NEEDED
Status: DISCONTINUED | OUTPATIENT
Start: 2023-02-26 | End: 2023-02-27 | Stop reason: HOSPADM

## 2023-02-26 RX ADMIN — MORPHINE SULFATE 4 MG: 4 INJECTION, SOLUTION INTRAMUSCULAR; INTRAVENOUS at 23:23

## 2023-02-26 RX ADMIN — ONDANSETRON 4 MG: 2 INJECTION INTRAMUSCULAR; INTRAVENOUS at 22:59

## 2023-02-27 ENCOUNTER — APPOINTMENT (OUTPATIENT)
Dept: CT IMAGING | Facility: HOSPITAL | Age: 48
End: 2023-02-27
Payer: COMMERCIAL

## 2023-02-27 VITALS
WEIGHT: 235 LBS | DIASTOLIC BLOOD PRESSURE: 70 MMHG | BODY MASS INDEX: 37.77 KG/M2 | TEMPERATURE: 97.6 F | SYSTOLIC BLOOD PRESSURE: 114 MMHG | OXYGEN SATURATION: 98 % | HEART RATE: 77 BPM | RESPIRATION RATE: 16 BRPM | HEIGHT: 66 IN

## 2023-02-27 LAB
HOLD SPECIMEN: NORMAL
HOLD SPECIMEN: NORMAL
WHOLE BLOOD HOLD COAG: NORMAL
WHOLE BLOOD HOLD SPECIMEN: NORMAL

## 2023-02-27 PROCEDURE — 96375 TX/PRO/DX INJ NEW DRUG ADDON: CPT

## 2023-02-27 PROCEDURE — 96376 TX/PRO/DX INJ SAME DRUG ADON: CPT

## 2023-02-27 PROCEDURE — 25510000001 IOPAMIDOL 61 % SOLUTION: Performed by: EMERGENCY MEDICINE

## 2023-02-27 PROCEDURE — 74177 CT ABD & PELVIS W/CONTRAST: CPT

## 2023-02-27 PROCEDURE — 25010000002 MORPHINE PER 10 MG: Performed by: EMERGENCY MEDICINE

## 2023-02-27 RX ORDER — FAMOTIDINE 10 MG/ML
20 INJECTION, SOLUTION INTRAVENOUS ONCE
Status: COMPLETED | OUTPATIENT
Start: 2023-02-27 | End: 2023-02-27

## 2023-02-27 RX ADMIN — FAMOTIDINE 20 MG: 10 INJECTION, SOLUTION INTRAVENOUS at 00:58

## 2023-02-27 RX ADMIN — SODIUM CHLORIDE 1000 ML: 9 INJECTION, SOLUTION INTRAVENOUS at 00:00

## 2023-02-27 RX ADMIN — IOPAMIDOL 80 ML: 612 INJECTION, SOLUTION INTRAVENOUS at 00:22

## 2023-02-27 RX ADMIN — MORPHINE SULFATE 4 MG: 4 INJECTION, SOLUTION INTRAMUSCULAR; INTRAVENOUS at 00:58

## 2023-03-13 ENCOUNTER — TREATMENT (OUTPATIENT)
Dept: CARDIOLOGY | Facility: CLINIC | Age: 48
End: 2023-03-13
Payer: COMMERCIAL

## 2023-03-13 DIAGNOSIS — R00.2 PALPITATIONS: Primary | ICD-10-CM

## 2023-05-08 ENCOUNTER — HOSPITAL ENCOUNTER (EMERGENCY)
Facility: HOSPITAL | Age: 48
Discharge: HOME | End: 2023-05-08
Attending: STUDENT IN AN ORGANIZED HEALTH CARE EDUCATION/TRAINING PROGRAM | Admitting: STUDENT IN AN ORGANIZED HEALTH CARE EDUCATION/TRAINING PROGRAM
Payer: COMMERCIAL

## 2023-05-08 ENCOUNTER — APPOINTMENT (OUTPATIENT)
Dept: CT IMAGING | Facility: HOSPITAL | Age: 48
End: 2023-05-08
Payer: COMMERCIAL

## 2023-05-08 VITALS
OXYGEN SATURATION: 97 % | TEMPERATURE: 98.8 F | WEIGHT: 202 LBS | DIASTOLIC BLOOD PRESSURE: 100 MMHG | HEART RATE: 79 BPM | HEIGHT: 66 IN | RESPIRATION RATE: 20 BRPM | BODY MASS INDEX: 32.47 KG/M2 | SYSTOLIC BLOOD PRESSURE: 144 MMHG

## 2023-05-08 DIAGNOSIS — S16.1XXA STRAIN OF NECK MUSCLE, INITIAL ENCOUNTER: Primary | ICD-10-CM

## 2023-05-08 PROCEDURE — 72125 CT NECK SPINE W/O DYE: CPT

## 2023-05-08 PROCEDURE — 25010000002 KETOROLAC TROMETHAMINE PER 15 MG: Performed by: PHYSICIAN ASSISTANT

## 2023-05-08 PROCEDURE — 25010000002 MORPHINE PER 10 MG: Performed by: STUDENT IN AN ORGANIZED HEALTH CARE EDUCATION/TRAINING PROGRAM

## 2023-05-08 PROCEDURE — 99283 EMERGENCY DEPT VISIT LOW MDM: CPT

## 2023-05-08 PROCEDURE — 25010000002 DEXAMETHASONE SODIUM PHOSPHATE 10 MG/ML SOLUTION: Performed by: PHYSICIAN ASSISTANT

## 2023-05-08 PROCEDURE — 96372 THER/PROPH/DIAG INJ SC/IM: CPT

## 2023-05-08 RX ORDER — KETOROLAC TROMETHAMINE 30 MG/ML
60 INJECTION, SOLUTION INTRAMUSCULAR; INTRAVENOUS ONCE
Status: COMPLETED | OUTPATIENT
Start: 2023-05-08 | End: 2023-05-08

## 2023-05-08 RX ORDER — DEXAMETHASONE SODIUM PHOSPHATE 10 MG/ML
10 INJECTION, SOLUTION INTRAMUSCULAR; INTRAVENOUS ONCE
Status: COMPLETED | OUTPATIENT
Start: 2023-05-08 | End: 2023-05-08

## 2023-05-08 RX ORDER — METHYLPREDNISOLONE 4 MG/1
TABLET ORAL
Qty: 21 TABLET | Refills: 0 | Status: SHIPPED | OUTPATIENT
Start: 2023-05-08

## 2023-05-08 RX ORDER — CYCLOBENZAPRINE HCL 10 MG
10 TABLET ORAL 3 TIMES DAILY PRN
Qty: 21 TABLET | Refills: 0 | Status: SHIPPED | OUTPATIENT
Start: 2023-05-08 | End: 2023-05-15

## 2023-05-08 RX ADMIN — DEXAMETHASONE SODIUM PHOSPHATE 10 MG: 10 INJECTION INTRAMUSCULAR; INTRAVENOUS at 15:30

## 2023-05-08 RX ADMIN — KETOROLAC TROMETHAMINE 60 MG: 30 INJECTION, SOLUTION INTRAMUSCULAR; INTRAVENOUS at 15:31

## 2023-05-08 RX ADMIN — MORPHINE SULFATE 4 MG: 4 INJECTION, SOLUTION INTRAMUSCULAR; INTRAVENOUS at 13:58

## 2023-05-08 NOTE — ED PROVIDER NOTES
Subjective   History of Present Illness  This is a 48 year old male patient who presents to the ER with chief complaint of neck pain. Patient has struggled for several years with intermittent neck pain. 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 Today, he was lifting above his head at work and the pain worsened. He has bilateral neck pain radiating into the bilateral shoulders. Pain is aching and intermittent. He also has some tingling in the bilateral hands occasionally. Denies fever.         Review of Systems   Constitutional: Negative.  Negative for fever.   HENT: Negative.    Respiratory: Negative.    Cardiovascular: Negative.  Negative for chest pain.   Gastrointestinal: Negative.  Negative for abdominal pain.   Endocrine: Negative.    Genitourinary: Negative.  Negative for dysuria.   Musculoskeletal: Positive for neck pain. Negative for arthralgias, back pain, gait problem, joint swelling, myalgias and neck stiffness.   Skin: Negative.    Neurological: Positive for numbness. Negative for dizziness, tremors, seizures, syncope, facial asymmetry, speech difficulty, weakness, light-headedness and headaches.   Psychiatric/Behavioral: Negative.    All other systems reviewed and are negative.      Past Medical History:   Diagnosis Date   • Asthma     childhood   • Cardiomyopathy    • Diabetes mellitus    • Heart murmur     as child   • Hyperlipidemia    • Hypertension    • Palpitation    • Sleep apnea     C pap use       No Known Allergies    Past Surgical History:   Procedure Laterality Date   • APPENDECTOMY     • HAND SURGERY     • KNEE ARTHROSCOPY     • VASECTOMY     • WISDOM TOOTH EXTRACTION         Family History   Problem Relation Age of Onset   • Heart attack Maternal Grandfather    • Heart failure Maternal Grandfather    • Heart disease Maternal Grandfather    • Arthritis Mother    • Diabetes Father    • Hearing loss Father    • Asthma Maternal Grandmother        Social  History     Socioeconomic History   • Marital status:    Tobacco Use   • Smoking status: Never   • Smokeless tobacco: Current     Types: Snuff   Vaping Use   • Vaping Use: Never used   Substance and Sexual Activity   • Alcohol use: Not Currently   • Drug use: No   • Sexual activity: Yes     Partners: Female     Birth control/protection: Surgical, Same-sex partner           Objective   Physical Exam  Vitals and nursing note reviewed.   Constitutional:       General: He is not in acute distress.     Appearance: He is well-developed. He is not diaphoretic.   HENT:      Head: Normocephalic and atraumatic.      Right Ear: External ear normal.      Left Ear: External ear normal.      Nose: Nose normal.   Eyes:      Conjunctiva/sclera: Conjunctivae normal.      Pupils: Pupils are equal, round, and reactive to light.   Neck:      Vascular: No JVD.      Trachea: No tracheal deviation.   Cardiovascular:      Rate and Rhythm: Normal rate and regular rhythm.      Heart sounds: Normal heart sounds. No murmur heard.  Pulmonary:      Effort: Pulmonary effort is normal. No respiratory distress.      Breath sounds: Normal breath sounds. No wheezing.   Abdominal:      General: Bowel sounds are normal.      Palpations: Abdomen is soft.      Tenderness: There is no abdominal tenderness.   Musculoskeletal:         General: Tenderness and signs of injury present. No swelling or deformity. Normal range of motion.      Cervical back: Normal range of motion and neck supple.      Comments: Skin about the Cspine is intact with no bruising, abrasion or edema. Tenderness to palpation noted in the paraspinal muscles of the Cspine. Full ROM BUE. Neurovascular status and sensation BUE intact.    Skin:     General: Skin is warm and dry.      Coloration: Skin is not pale.      Findings: No erythema or rash.   Neurological:      Mental Status: He is alert and oriented to person, place, and time.      Cranial Nerves: No cranial nerve deficit.    Psychiatric:         Behavior: Behavior normal.         Thought Content: Thought content normal.         Procedures           ED Course  ED Course as of 05/08/23 1541   Mon May 08, 2023   1442 CT Cervical Spine Without Contrast  IMPRESSION:     1. Arthritic change and anterolisthesis of C3 with respect to C4.  2. No acute cervical abnormality     This report was finalized on 5/8/2023 2:24 PM by Dr. Nazario Lozano MD [MM]   1208 Patient diagnosed with neck strain. Will be d/c home with rx for medrol dose packet and flexeril. Will f/u with spinal specialist. Will return to ER if symptoms worsen.  [MM]      ED Course User Index  [MM] Audrey Figueroa PA                                           Medical Decision Making    This is a 48 year old male patient who presents to the ER with chief complaint of neck pain. Patient has struggled for several years with intermittent neck pain. 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 Today, he was lifting above his head at work and the pain worsened. He has bilateral neck pain radiating into the bilateral shoulders. Pain is aching and intermittent. He also has some tingling in the bilateral hands occasionally. Denies fever.         Strain of neck muscle, initial encounter: complicated acute illness or injury  Amount and/or Complexity of Data Reviewed  Radiology: ordered. Decision-making details documented in ED Course.      Risk  Prescription drug management.          Final diagnoses:   Strain of neck muscle, initial encounter       ED Disposition  ED Disposition     ED Disposition   Discharge    Condition   Stable    Comment   --             Rosalina Pandya, APRDREA  [address] [facility] [address]  [phone]    In 2 days      Ru Haji DO  [address] [place] [id number] [phone]    In 2 days           Medication List      New Prescriptions    cyclobenzaprine 10 MG tablet  Commonly known as: FLEXERIL  Take 1 tablet  by mouth 3 (Three) Times a Day As Needed for Muscle Spasms for up to 7 days.     methylPREDNISolone 4 MG dose pack  Commonly known as: MEDROL  Take as directed on package instructions.           Where to Get Your Medications      These medications were sent to [facility] - [place], [place] - [address] [place] [address] - [phone]  - [phone]   [address] [place] [id number]    Phone: [phone]   · cyclobenzaprine 10 MG tablet  · methylPREDNISolone 4 MG dose pack          Audrey Figueroa PA  05/08/23 1549

## 2023-05-08 NOTE — DISCHARGE INSTRUCTIONS
Please take your new medications and follow up with the spinal specialist. Please return to ER if symptoms worsen.

## 2024-02-04 ENCOUNTER — TELEMEDICINE (OUTPATIENT)
Dept: FAMILY MEDICINE CLINIC | Facility: TELEHEALTH | Age: 49
End: 2024-02-04
Payer: COMMERCIAL

## 2024-02-04 DIAGNOSIS — J02.9 ACUTE PHARYNGITIS, UNSPECIFIED ETIOLOGY: Primary | ICD-10-CM

## 2024-02-04 RX ORDER — AMOXICILLIN 875 MG/1
875 TABLET, COATED ORAL 2 TIMES DAILY
Qty: 20 TABLET | Refills: 0 | Status: SHIPPED | OUTPATIENT
Start: 2024-02-04 | End: 2024-02-14

## 2024-02-04 NOTE — PROGRESS NOTES
CHIEF COMPLAINT  Chief Complaint   Patient presents with    Sore Throat         HPI  Samson Kendall is a 48 y.o. male  presents with complaint of sore throat and HA. He has been exposed to strep.   Covid-19 home test negative.   His wife is with him in his care and she reports she did view his throat earlier.     Review of Systems   Constitutional:  Positive for chills, diaphoresis, fatigue and fever (101).   HENT:  Positive for rhinorrhea and sore throat. Negative for congestion.    Respiratory:  Negative for cough.    Gastrointestinal:  Negative for diarrhea, nausea and vomiting.   Neurological:  Positive for headaches.   Hematological:  Positive for adenopathy.       Past Medical History:   Diagnosis Date    Asthma     childhood    Cardiomyopathy     Diabetes mellitus     Heart murmur     as child    Hyperlipidemia     Hypertension     Palpitation     Sleep apnea     C pap use       Family History   Problem Relation Age of Onset    Heart attack Maternal Grandfather     Heart failure Maternal Grandfather     Heart disease Maternal Grandfather     Arthritis Mother     Diabetes Father     Hearing loss Father     Asthma Maternal Grandmother        Social History     Socioeconomic History    Marital status:    Tobacco Use    Smoking status: Never    Smokeless tobacco: Current     Types: Snuff   Vaping Use    Vaping Use: Never used   Substance and Sexual Activity    Alcohol use: Not Currently    Drug use: No    Sexual activity: Yes     Partners: Female     Birth control/protection: Surgical, Same-sex partner       Samson Kendall  reports that he has never smoked. His smokeless tobacco use includes snuff.      There were no vitals taken for this visit.    PHYSICAL EXAM  Physical Exam   Constitutional: He is oriented to person, place, and time. He appears well-developed and well-nourished. He does not have a sickly appearance. He does not appear ill. No distress.   HENT:   Head: Normocephalic and atraumatic.    Wife is an RN and she reports erythema and white patches.    Eyes: EOM are normal.   Pulmonary/Chest: Effort normal.  No respiratory distress.  Lymphadenopathy:     He has cervical adenopathy (bilaterally per wife).   Neurological: He is alert and oriented to person, place, and time.   Skin: Skin is dry.   Psychiatric: He has a normal mood and affect.           Diagnoses and all orders for this visit:    1. Acute pharyngitis, unspecified etiology (Primary)    Other orders  -     amoxicillin (AMOXIL) 875 MG tablet; Take 1 tablet by mouth 2 (Two) Times a Day for 10 days.  Dispense: 20 tablet; Refill: 0        The use of a video visit has been reviewed with the patient and verbal informed consent has been obtained. Myself and Samson Kendall participated in this visit. The patient is located in [address] [place], [place] [id number]. I am located in [place], [place]. [email or web address] and [facility] were utilized.       Note Disclaimer: At [facility], we believe that sharing information builds trust and better   relationships. You are receiving this note because you recently visited [facility]. It is possible you   will see health information before a provider has talked with you about it. This kind of information can   be easy to misunderstand. To help you fully understand what it means for your health, we urge you to   discuss this note with your provider.    Mila Faulkner, JOSE J  02/04/2024  18:59 EST

## 2024-02-05 NOTE — PATIENT INSTRUCTIONS
Drink plenty of water  Over the counter pain relievers okay   If symptoms do not improve in 3-5 days follow up with your primary care provider or urgent care       Pharyngitis  Pharyngitis is a sore throat (pharynx). This is when there is redness, pain, and swelling in your throat. Most of the time, this condition gets better on its own. In some cases, you may need medicine.  What are the causes?  An infection from a virus.  An infection from bacteria.  Allergies.  What increases the risk?  Being 5-24 years old.  Being in crowded environments. These include:  Daycares.  Schools.  Dormitories.  Living in a place with cold temperatures outside.  Having a weakened disease-fighting (immune) system.  What are the signs or symptoms?  Symptoms may vary depending on the cause. Common symptoms include:  Sore throat.  Tiredness (fatigue).  Low-grade fever.  Stuffy nose.  Cough.  Headache.  Other symptoms may include:  Glands in the neck (lymph nodes) that are swollen.  Skin rashes.  Film on the throat or tonsils. This can be caused by an infection from bacteria.  Vomiting.  Red, itchy eyes.  Loss of appetite.  Joint pain and muscle aches.  Tonsils that are temporarily bigger than usual (enlarged).  How is this treated?  Many times, treatment is not needed. This condition usually gets better in 3-4 days without treatment.  If the infection is caused by a bacteria, you may be need to take antibiotics.  Follow these instructions at home:  Medicines  Take over-the-counter and prescription medicines only as told by your doctor.  If you were prescribed an antibiotic medicine, take it as told by your doctor. Do not stop taking the antibiotic even if you start to feel better.  Use throat lozenges or sprays to soothe your throat as told by your doctor.  Children can get pharyngitis. Do not give your child aspirin.  Managing pain  To help with pain, try:  Sipping warm liquids, such as:  Broth.  Herbal tea.  Warm water.  Eating or  drinking cold or frozen liquids, such as frozen ice pops.  Rinsing your mouth (gargle) with a salt water mixture 3-4 times a day or as needed.  To make salt water, dissolve ½-1 tsp (3-6 g) of salt in 1 cup (237 mL) of warm water.  Do not swallow this mixture.  Sucking on hard candy or throat lozenges.  Putting a cool-mist humidifier in your bedroom at night to moisten the air.  Sitting in the bathroom with the door closed for 5-10 minutes while you run hot water in the shower.     General instructions  Do not smoke or use any products that contain nicotine or tobacco. If you need help quitting, ask your doctor.  Rest as told by your doctor.  Drink enough fluid to keep your pee (urine) pale yellow.  How is this prevented?  Wash your hands often for at least 20 seconds with soap and water. If soap and water are not available, use hand .  Do not touch your eyes, nose, or mouth with unwashed hands. Wash hands after touching these areas.  Do not share cups or eating utensils.  Avoid close contact with people who are sick.  Contact a doctor if:  You have large, tender lumps in your neck.  You have a rash.  You cough up green, yellow-brown, or bloody spit.  Get help right away if:  You have a stiff neck.  You drool or cannot swallow liquids.  You cannot drink or take medicines without vomiting.  You have very bad pain that does not go away with medicine.  You have problems breathing, and it is not from a stuffy nose.  You have new pain and swelling in your knees, ankles, wrists, or elbows.  These symptoms may be an emergency. Get help right away. Call your local emergency services (911 in the [place]  Do not wait to see if the symptoms will go away.  Do not drive yourself to the hospital.  Summary  Pharyngitis is a sore throat (pharynx). This is when there is redness, pain, and swelling in your throat.  Most of the time, pharyngitis gets better on its own. Sometimes, you may need medicine.  If you were prescribed  an antibiotic medicine, take it as told by your doctor. Do not stop taking the antibiotic even if you start to feel better.  This information is not intended to replace advice given to you by your health care provider. Make sure you discuss any questions you have with your health care provider.  Document Revised: 03/16/2022 Document Reviewed: 03/16/2022  Elsevier Patient Education © 2023 [employer]

## 2024-03-01 ENCOUNTER — TRANSCRIBE ORDERS (OUTPATIENT)
Facility: HOSPITAL | Age: 49
End: 2024-03-01
Payer: COMMERCIAL

## 2024-03-01 ENCOUNTER — HOSPITAL ENCOUNTER (OUTPATIENT)
Facility: HOSPITAL | Age: 49
Discharge: HOME | End: 2024-03-01
Admitting: NURSE PRACTITIONER
Payer: COMMERCIAL

## 2024-03-01 DIAGNOSIS — K42.9 UMBILICAL HERNIA WITHOUT OBSTRUCTION OR GANGRENE: Primary | ICD-10-CM

## 2024-03-01 DIAGNOSIS — K42.9 UMBILICAL HERNIA WITHOUT OBSTRUCTION OR GANGRENE: ICD-10-CM

## 2024-03-01 PROCEDURE — 76705 ECHO EXAM OF ABDOMEN: CPT

## 2024-03-19 ENCOUNTER — OFFICE VISIT (OUTPATIENT)
Dept: SURGERY | Facility: CLINIC | Age: 49
End: 2024-03-19
Payer: COMMERCIAL

## 2024-03-19 VITALS — WEIGHT: 187 LBS | HEIGHT: 66 IN | BODY MASS INDEX: 30.05 KG/M2

## 2024-03-19 DIAGNOSIS — K42.9 UMBILICAL HERNIA WITHOUT OBSTRUCTION AND WITHOUT GANGRENE: Primary | ICD-10-CM

## 2024-03-19 PROBLEM — K43.9 VENTRAL HERNIA WITHOUT OBSTRUCTION OR GANGRENE: Status: ACTIVE | Noted: 2024-03-19

## 2024-03-19 PROCEDURE — 99213 OFFICE O/P EST LOW 20 MIN: CPT | Performed by: SURGERY

## 2024-03-19 NOTE — PROGRESS NOTES
Subjective   Samson Kendall is a 49 y.o. male.     Chief Complaint: ventral hernia    History of Present Illness He is a 48 yo who has had a lump in the mid abdomen for a few months that has gotten more noticeable since losing weight.     The following portions of the patient's history were reviewed and updated as appropriate: current medications, past family history, past medical history, past social history, past surgical history and problem list.    Review of Systems   Constitutional:  Negative for activity change, appetite change, chills, fever and unexpected weight change.   HENT:  Negative for congestion, facial swelling and sore throat.    Eyes:  Negative for photophobia and visual disturbance.   Respiratory:  Negative for chest tightness, shortness of breath and wheezing.    Cardiovascular:  Negative for chest pain, palpitations and leg swelling.   Gastrointestinal:  Positive for abdominal pain. Negative for abdominal distention, anal bleeding, blood in stool, constipation, diarrhea, nausea, rectal pain and vomiting.   Endocrine: Negative for cold intolerance, heat intolerance, polydipsia and polyuria.   Genitourinary:  Negative for difficulty urinating, dysuria, flank pain and urgency.   Musculoskeletal:  Negative for back pain and myalgias.   Skin:  Negative for rash and wound.   Allergic/Immunologic: Negative for immunocompromised state.   Neurological:  Negative for dizziness, seizures, syncope, light-headedness, numbness and headaches.   Hematological:  Negative for adenopathy. Does not bruise/bleed easily.   Psychiatric/Behavioral:  Negative for behavioral problems and confusion. The patient is not nervous/anxious.        Objective   Physical Exam  Vitals reviewed.   Constitutional:       General: He is not in acute distress.     Appearance: He is well-developed. He is not ill-appearing.      Comments: Small soft bulge above the umbilicus that is easily reducible   HENT:      Head: Normocephalic. No  laceration. Hair is normal.      Right Ear: Hearing and ear canal normal.      Left Ear: Hearing and ear canal normal.      Nose: Nose normal.      Right Sinus: No maxillary sinus tenderness or frontal sinus tenderness.      Left Sinus: No maxillary sinus tenderness or frontal sinus tenderness.   Eyes:      General: Lids are normal.      Conjunctiva/sclera: Conjunctivae normal.      Pupils: Pupils are equal, round, and reactive to light.   Neck:      Thyroid: No thyroid mass or thyromegaly.      Vascular: No JVD.      Trachea: No tracheal tenderness or tracheal deviation.   Cardiovascular:      Rate and Rhythm: Normal rate and regular rhythm.      Heart sounds: No murmur heard.     No gallop.   Pulmonary:      Effort: Pulmonary effort is normal.      Breath sounds: Normal breath sounds. No stridor. No wheezing.   Chest:      Chest wall: No tenderness.   Abdominal:      General: Bowel sounds are normal. There is no distension.      Palpations: Abdomen is soft. There is no mass.      Tenderness: There is no abdominal tenderness. There is no guarding or rebound.      Hernia: A hernia is present.   Musculoskeletal:         General: No deformity.      Cervical back: Normal range of motion.   Lymphadenopathy:      Cervical: No cervical adenopathy.      Upper Body:      Right upper body: No supraclavicular adenopathy.      Left upper body: No supraclavicular adenopathy.   Skin:     General: Skin is warm and dry.      Coloration: Skin is not pale.      Findings: No erythema or rash.   Neurological:      Mental Status: He is alert and oriented to person, place, and time.      Motor: No abnormal muscle tone.   Psychiatric:         Behavior: Behavior normal.         Thought Content: Thought content normal.         Past Medical History:   Diagnosis Date    Asthma     childhood    Cardiomyopathy     Diabetes mellitus     Heart murmur     as child    Hyperlipidemia     Hypertension     Palpitation     Sleep apnea     C pap use        Family History   Problem Relation Age of Onset    Heart attack Maternal Grandfather     Heart failure Maternal Grandfather     Heart disease Maternal Grandfather     Arthritis Mother     Diabetes Father     Hearing loss Father     Asthma Maternal Grandmother        Social History     Tobacco Use    Smoking status: Never     Passive exposure: Current    Smokeless tobacco: Current     Types: Snuff   Vaping Use    Vaping status: Never Used   Substance Use Topics    Alcohol use: Not Currently    Drug use: No       Past Surgical History:   Procedure Laterality Date    APPENDECTOMY      HAND SURGERY      KNEE ARTHROSCOPY      LUMBAR FUSION      VASECTOMY      WISDOM TOOTH EXTRACTION         Current Outpatient Medications   Medication Instructions    Calcium-Magnesium-Zinc (OMERO-MAG-ZINC PO) Oral, 3 Times Daily    Cyanocobalamin (VITAMIN B-12 IJ) Injection    dexlansoprazole (DEXILANT) 60 mg, Oral, Daily    fenofibrate (TRICOR) 145 mg, Oral, Daily    furosemide (LASIX) 20 mg, Daily    hydroCHLOROthiazide 12.5 mg, Daily    HYDROcodone-acetaminophen (NORCO) 7.5-325 MG per tablet 1 tablet, Oral, Daily PRN    meloxicam (MOBIC) 15 mg, Oral, Daily    methylPREDNISolone (MEDROL) 4 MG dose pack Take as directed on package instructions.    metoprolol succinate XL (TOPROL-XL) 100 mg, Oral, Daily, for blood pressure    metoprolol succinate XL (TOPROL-XL) 50 mg, Oral, Daily    multivitamin (MULTI-VITAMIN PO) Oral, Daily    potassium chloride 10 MEQ CR tablet 10 mEq, 2 Times Daily    rosuvastatin (CRESTOR) 10 mg, Oral, Daily    sildenafil (REVATIO) 20 MG tablet 1 tablet, Oral    Testosterone Cypionate (DEPOTESTOTERONE CYPIONATE) 200 MG/ML injection inject 1 cc by intramuscular route every  2 weeks    Tirzepatide (Mounjaro) 5 MG/0.5ML solution pen-injector Subcutaneous    Tirzepatide (Mounjaro) 5 MG/0.5ML solution pen-injector Inject 5MG by subcutaneous route  once every week.    topiramate (TOPAMAX) 100 mg, Oral, 2 Times Daily     valsartan (DIOVAN) 40 mg, Oral, Daily    vitamin D (ERGOCALCIFEROL) 1.25 MG (92863 UT) capsule capsule TAKE ONE CAPSULE BY MOUTH ONCE EVERY WEEK FOR VITAMIN D DEFICIENCY         Assessment & Plan   Diagnoses and all orders for this visit:    1. Umbilical hernia without obstruction and without gangrene (Primary)    Repair hernia

## 2024-03-29 ENCOUNTER — TELEPHONE (OUTPATIENT)
Dept: SURGERY | Facility: CLINIC | Age: 49
End: 2024-03-29
Payer: COMMERCIAL

## 2024-03-29 NOTE — TELEPHONE ENCOUNTER
Patient will call to [facility] surgery after he speaks with his wife and makes arrangements to be off work.

## 2024-06-14 ENCOUNTER — APPOINTMENT (OUTPATIENT)
Dept: ULTRASOUND IMAGING | Facility: HOSPITAL | Age: 49
End: 2024-06-14
Payer: COMMERCIAL

## 2024-06-14 ENCOUNTER — HOSPITAL ENCOUNTER (EMERGENCY)
Facility: HOSPITAL | Age: 49
Discharge: HOME | End: 2024-06-14
Attending: EMERGENCY MEDICINE
Payer: COMMERCIAL

## 2024-06-14 ENCOUNTER — APPOINTMENT (OUTPATIENT)
Dept: CT IMAGING | Facility: HOSPITAL | Age: 49
End: 2024-06-14
Payer: COMMERCIAL

## 2024-06-14 VITALS
OXYGEN SATURATION: 97 % | SYSTOLIC BLOOD PRESSURE: 110 MMHG | HEIGHT: 66 IN | TEMPERATURE: 100.6 F | HEART RATE: 94 BPM | RESPIRATION RATE: 14 BRPM | BODY MASS INDEX: 30.53 KG/M2 | DIASTOLIC BLOOD PRESSURE: 65 MMHG | WEIGHT: 190 LBS

## 2024-06-14 DIAGNOSIS — N45.1 EPIDIDYMITIS: ICD-10-CM

## 2024-06-14 DIAGNOSIS — K57.92 DIVERTICULITIS: Primary | ICD-10-CM

## 2024-06-14 LAB
ALBUMIN SERPL-MCNC: 4.5 G/DL (ref 3.5–5.2)
ALBUMIN/GLOB SERPL: 1.3 G/DL
ALP SERPL-CCNC: 43 U/L (ref 39–117)
ALT SERPL W P-5'-P-CCNC: 16 U/L (ref 1–41)
ANION GAP SERPL CALCULATED.3IONS-SCNC: 13.7 MMOL/L (ref 5–15)
AST SERPL-CCNC: 20 U/L (ref 1–40)
BASOPHILS # BLD AUTO: 0.04 10*3/MM3 (ref 0–0.2)
BASOPHILS NFR BLD AUTO: 0.3 % (ref 0–1.5)
BILIRUB SERPL-MCNC: 0.3 MG/DL (ref 0–1.2)
BILIRUB UR QL STRIP: NEGATIVE
BUN SERPL-MCNC: 34 MG/DL (ref 6–20)
BUN/CREAT SERPL: 18.4 (ref 7–25)
CALCIUM SPEC-SCNC: 9.8 MG/DL (ref 8.6–10.5)
CHLORIDE SERPL-SCNC: 104 MMOL/L (ref 98–107)
CLARITY UR: CLEAR
CO2 SERPL-SCNC: 20.3 MMOL/L (ref 22–29)
COLOR UR: YELLOW
CREAT SERPL-MCNC: 1.85 MG/DL (ref 0.76–1.27)
CRP SERPL-MCNC: 10.71 MG/DL (ref 0–0.5)
D-LACTATE SERPL-SCNC: 0.7 MMOL/L (ref 0.5–2)
DEPRECATED RDW RBC AUTO: 45.8 FL (ref 37–54)
EGFRCR SERPLBLD CKD-EPI 2021: 44.4 ML/MIN/1.73
EOSINOPHIL # BLD AUTO: 0.06 10*3/MM3 (ref 0–0.4)
EOSINOPHIL NFR BLD AUTO: 0.5 % (ref 0.3–6.2)
ERYTHROCYTE [DISTWIDTH] IN BLOOD BY AUTOMATED COUNT: 14 % (ref 12.3–15.4)
ERYTHROCYTE [SEDIMENTATION RATE] IN BLOOD: 22 MM/HR (ref 0–15)
GLOBULIN UR ELPH-MCNC: 3.4 GM/DL
GLUCOSE SERPL-MCNC: 120 MG/DL (ref 65–99)
GLUCOSE UR STRIP-MCNC: NEGATIVE MG/DL
HCT VFR BLD AUTO: 35.8 % (ref 37.5–51)
HGB BLD-MCNC: 11.8 G/DL (ref 13–17.7)
HGB UR QL STRIP.AUTO: NEGATIVE
IMM GRANULOCYTES # BLD AUTO: 0.04 10*3/MM3 (ref 0–0.05)
IMM GRANULOCYTES NFR BLD AUTO: 0.3 % (ref 0–0.5)
KETONES UR QL STRIP: NEGATIVE
LEUKOCYTE ESTERASE UR QL STRIP.AUTO: NEGATIVE
LYMPHOCYTES # BLD AUTO: 1.7 10*3/MM3 (ref 0.7–3.1)
LYMPHOCYTES NFR BLD AUTO: 13.3 % (ref 19.6–45.3)
MCH RBC QN AUTO: 29.4 PG (ref 26.6–33)
MCHC RBC AUTO-ENTMCNC: 33 G/DL (ref 31.5–35.7)
MCV RBC AUTO: 89.3 FL (ref 79–97)
MONOCYTES # BLD AUTO: 0.7 10*3/MM3 (ref 0.1–0.9)
MONOCYTES NFR BLD AUTO: 5.5 % (ref 5–12)
NEUTROPHILS NFR BLD AUTO: 10.21 10*3/MM3 (ref 1.7–7)
NEUTROPHILS NFR BLD AUTO: 80.1 % (ref 42.7–76)
NITRITE UR QL STRIP: NEGATIVE
NRBC BLD AUTO-RTO: 0 /100 WBC (ref 0–0.2)
PH UR STRIP.AUTO: <=5 [PH] (ref 5–8)
PLATELET # BLD AUTO: 275 10*3/MM3 (ref 140–450)
PMV BLD AUTO: 11.5 FL (ref 6–12)
POTASSIUM SERPL-SCNC: 4.1 MMOL/L (ref 3.5–5.2)
PROCALCITONIN SERPL-MCNC: 0.25 NG/ML (ref 0–0.25)
PROT SERPL-MCNC: 7.9 G/DL (ref 6–8.5)
PROT UR QL STRIP: NEGATIVE
RBC # BLD AUTO: 4.01 10*6/MM3 (ref 4.14–5.8)
SODIUM SERPL-SCNC: 138 MMOL/L (ref 136–145)
SP GR UR STRIP: 1.02 (ref 1–1.03)
UROBILINOGEN UR QL STRIP: NORMAL
WBC NRBC COR # BLD AUTO: 12.75 10*3/MM3 (ref 3.4–10.8)

## 2024-06-14 PROCEDURE — 96365 THER/PROPH/DIAG IV INF INIT: CPT

## 2024-06-14 PROCEDURE — 99284 EMERGENCY DEPT VISIT MOD MDM: CPT

## 2024-06-14 PROCEDURE — 85652 RBC SED RATE AUTOMATED: CPT | Performed by: EMERGENCY MEDICINE

## 2024-06-14 PROCEDURE — 80053 COMPREHEN METABOLIC PANEL: CPT | Performed by: EMERGENCY MEDICINE

## 2024-06-14 PROCEDURE — 76870 US EXAM SCROTUM: CPT

## 2024-06-14 PROCEDURE — 81003 URINALYSIS AUTO W/O SCOPE: CPT | Performed by: EMERGENCY MEDICINE

## 2024-06-14 PROCEDURE — 36415 COLL VENOUS BLD VENIPUNCTURE: CPT

## 2024-06-14 PROCEDURE — 74176 CT ABD & PELVIS W/O CONTRAST: CPT | Performed by: RADIOLOGY

## 2024-06-14 PROCEDURE — 86140 C-REACTIVE PROTEIN: CPT | Performed by: EMERGENCY MEDICINE

## 2024-06-14 PROCEDURE — 76870 US EXAM SCROTUM: CPT | Performed by: RADIOLOGY

## 2024-06-14 PROCEDURE — 85025 COMPLETE CBC W/AUTO DIFF WBC: CPT | Performed by: EMERGENCY MEDICINE

## 2024-06-14 PROCEDURE — 25010000002 METRONIDAZOLE 500 MG/100ML SOLUTION: Performed by: EMERGENCY MEDICINE

## 2024-06-14 PROCEDURE — 84145 PROCALCITONIN (PCT): CPT | Performed by: EMERGENCY MEDICINE

## 2024-06-14 PROCEDURE — 96375 TX/PRO/DX INJ NEW DRUG ADDON: CPT

## 2024-06-14 PROCEDURE — 25010000002 MORPHINE PER 10 MG: Performed by: EMERGENCY MEDICINE

## 2024-06-14 PROCEDURE — 83605 ASSAY OF LACTIC ACID: CPT | Performed by: EMERGENCY MEDICINE

## 2024-06-14 PROCEDURE — 25010000002 KETOROLAC TROMETHAMINE PER 15 MG: Performed by: EMERGENCY MEDICINE

## 2024-06-14 PROCEDURE — 25810000003 SODIUM CHLORIDE 0.9 % SOLUTION: Performed by: EMERGENCY MEDICINE

## 2024-06-14 PROCEDURE — 87040 BLOOD CULTURE FOR BACTERIA: CPT | Performed by: EMERGENCY MEDICINE

## 2024-06-14 PROCEDURE — 74176 CT ABD & PELVIS W/O CONTRAST: CPT

## 2024-06-14 RX ORDER — ONDANSETRON 4 MG/1
4 TABLET, ORALLY DISINTEGRATING ORAL EVERY 8 HOURS PRN
Qty: 30 TABLET | Refills: 0 | Status: SHIPPED | OUTPATIENT
Start: 2024-06-14

## 2024-06-14 RX ORDER — HYDROCODONE BITARTRATE AND ACETAMINOPHEN 5; 325 MG/1; MG/1
1 TABLET ORAL EVERY 6 HOURS PRN
Qty: 10 TABLET | Refills: 0 | Status: ON HOLD | OUTPATIENT
Start: 2024-06-14 | End: 2024-06-16

## 2024-06-14 RX ORDER — METRONIDAZOLE 500 MG/100ML
500 INJECTION, SOLUTION INTRAVENOUS ONCE
Status: COMPLETED | OUTPATIENT
Start: 2024-06-14 | End: 2024-06-14

## 2024-06-14 RX ORDER — DOXYCYCLINE 100 MG/1
100 CAPSULE ORAL 2 TIMES DAILY
Qty: 20 CAPSULE | Refills: 0 | Status: SHIPPED | OUTPATIENT
Start: 2024-06-14 | End: 2024-06-24 | Stop reason: HOSPADM

## 2024-06-14 RX ORDER — KETOROLAC TROMETHAMINE 30 MG/ML
30 INJECTION, SOLUTION INTRAMUSCULAR; INTRAVENOUS ONCE
Status: COMPLETED | OUTPATIENT
Start: 2024-06-14 | End: 2024-06-14

## 2024-06-14 RX ORDER — ACETAMINOPHEN 500 MG
1000 TABLET ORAL ONCE
Status: COMPLETED | OUTPATIENT
Start: 2024-06-14 | End: 2024-06-14

## 2024-06-14 RX ORDER — AMOXICILLIN AND CLAVULANATE POTASSIUM 875; 125 MG/1; MG/1
1 TABLET, FILM COATED ORAL 2 TIMES DAILY
Qty: 20 TABLET | Refills: 0 | Status: SHIPPED | OUTPATIENT
Start: 2024-06-14 | End: 2024-06-24 | Stop reason: HOSPADM

## 2024-06-14 RX ORDER — CIPROFLOXACIN 500 MG/1
500 TABLET, FILM COATED ORAL ONCE
Status: COMPLETED | OUTPATIENT
Start: 2024-06-14 | End: 2024-06-14

## 2024-06-14 RX ORDER — SODIUM CHLORIDE 0.9 % (FLUSH) 0.9 %
10 SYRINGE (ML) INJECTION AS NEEDED
Status: DISCONTINUED | OUTPATIENT
Start: 2024-06-14 | End: 2024-06-14 | Stop reason: HOSPADM

## 2024-06-14 RX ADMIN — METRONIDAZOLE 500 MG: 500 INJECTION, SOLUTION INTRAVENOUS at 13:46

## 2024-06-14 RX ADMIN — SODIUM CHLORIDE 1000 ML: 9 INJECTION, SOLUTION INTRAVENOUS at 12:31

## 2024-06-14 RX ADMIN — MORPHINE SULFATE 4 MG: 4 INJECTION, SOLUTION INTRAMUSCULAR; INTRAVENOUS at 14:12

## 2024-06-14 RX ADMIN — ACETAMINOPHEN 1000 MG: 500 TABLET ORAL at 13:46

## 2024-06-14 RX ADMIN — KETOROLAC TROMETHAMINE 30 MG: 30 INJECTION, SOLUTION INTRAMUSCULAR; INTRAVENOUS at 12:31

## 2024-06-14 RX ADMIN — CIPROFLOXACIN 500 MG: 500 TABLET, FILM COATED ORAL at 13:45

## 2024-06-15 NOTE — ED PROVIDER NOTES
Subjective     History provided by:  Patient   used: No    Abdominal Pain  Pain location:  Generalized  Pain quality: aching, cramping and dull    Pain radiates to:  Groin  Pain severity:  Moderate  Onset quality:  Gradual  Duration:  1 day  Timing:  Constant  Progression:  Worsening  Chronicity:  New  Context: not alcohol use, not awakening from sleep, not diet changes, not eating, not laxative use, not medication withdrawal, not previous surgeries, not recent illness, not recent sexual activity, not recent travel, not retching, not sick contacts, not suspicious food intake and not trauma    Relieved by:  Nothing  Worsened by:  Nothing  Ineffective treatments:  None tried  Associated symptoms: no anorexia, no belching, no chest pain, no chills, no constipation, no cough, no diarrhea, no dysuria, no fatigue, no fever, no flatus, no hematemesis, no hematochezia, no hematuria, no melena, no nausea, no shortness of breath, no sore throat and no vomiting    Risk factors: obesity    Risk factors: no alcohol abuse, no aspirin use, not elderly, has not had multiple surgeries, no NSAID use and no recent hospitalization        Review of Systems   Constitutional:  Negative for activity change, appetite change, chills, diaphoresis, fatigue and fever.   HENT:  Negative for congestion, ear pain and sore throat.    Eyes:  Negative for redness.   Respiratory:  Negative for cough, chest tightness, shortness of breath and wheezing.    Cardiovascular:  Negative for chest pain, palpitations and leg swelling.   Gastrointestinal:  Positive for abdominal pain. Negative for anorexia, constipation, diarrhea, flatus, hematemesis, hematochezia, melena, nausea and vomiting.   Genitourinary:  Negative for dysuria, hematuria and urgency.   Musculoskeletal:  Negative for arthralgias, back pain, myalgias and neck pain.   Skin:  Negative for pallor, rash and wound.   Neurological:  Negative for dizziness, speech difficulty,  weakness and headaches.   Psychiatric/Behavioral:  Negative for agitation, behavioral problems, confusion and decreased concentration.    All other systems reviewed and are negative.      Past Medical History:   Diagnosis Date    Asthma     childhood    Cardiomyopathy     Diabetes mellitus     Heart murmur     as child    Hyperlipidemia     Hypertension     Palpitation     Sleep apnea     C pap use       No Known Allergies    Past Surgical History:   Procedure Laterality Date    APPENDECTOMY      HAND SURGERY      KNEE ARTHROSCOPY      LUMBAR FUSION      VASECTOMY      WISDOM TOOTH EXTRACTION         Family History   Problem Relation Age of Onset    Heart attack Maternal Grandfather     Heart failure Maternal Grandfather     Heart disease Maternal Grandfather     Arthritis Mother     Diabetes Father     Hearing loss Father     Asthma Maternal Grandmother        Social History     Socioeconomic History    Marital status:    Tobacco Use    Smoking status: Never     Passive exposure: Current    Smokeless tobacco: Current     Types: Snuff   Vaping Use    Vaping status: Never Used   Substance and Sexual Activity    Alcohol use: Not Currently    Drug use: No    Sexual activity: Yes     Partners: Female     Birth control/protection: Surgical, Same-sex partner           Objective   Physical Exam  Vitals and nursing note reviewed.   Constitutional:       General: He is not in acute distress.     Appearance: Normal appearance. He is well-developed. He is not toxic-appearing or diaphoretic.   HENT:      Head: Normocephalic and atraumatic.      Right Ear: External ear normal.      Left Ear: External ear normal.      Nose: Nose normal.      Mouth/Throat:      Pharynx: No oropharyngeal exudate.      Tonsils: No tonsillar exudate.   Eyes:      General: Lids are normal.      Conjunctiva/sclera: Conjunctivae normal.      Pupils: Pupils are equal, round, and reactive to light.   Neck:      Thyroid: No thyromegaly.    Cardiovascular:      Rate and Rhythm: Normal rate and regular rhythm.      Pulses: Normal pulses.      Heart sounds: Normal heart sounds, S1 normal and S2 normal.   Pulmonary:      Effort: Pulmonary effort is normal. No tachypnea or respiratory distress.      Breath sounds: Normal breath sounds. No decreased breath sounds, wheezing or rales.   Chest:      Chest wall: No tenderness.   Abdominal:      General: Bowel sounds are normal. There is no distension.      Palpations: Abdomen is soft.      Tenderness: There is generalized abdominal tenderness and tenderness in the left upper quadrant. There is no guarding or rebound.   Musculoskeletal:         General: No tenderness or deformity. Normal range of motion.      Cervical back: Full passive range of motion without pain, normal range of motion and neck supple.   Lymphadenopathy:      Cervical: No cervical adenopathy.   Skin:     General: Skin is warm and dry.      Coloration: Skin is not pale.      Findings: No erythema or rash.   Neurological:      Mental Status: He is alert and oriented to person, place, and time.      GCS: GCS eye subscore is 4. GCS verbal subscore is 5. GCS motor subscore is 6.      Cranial Nerves: No cranial nerve deficit.      Sensory: No sensory deficit.   Psychiatric:         Speech: Speech normal.         Behavior: Behavior normal.         Thought Content: Thought content normal.         Judgment: Judgment normal.         Procedures           ED Course  ED Course as of 06/15/24 1422   Fri Jun 14, 2024   1307 CT Abdomen Pelvis Without Contrast    IMPRESSION:  1.  Possibly minimal stranding around sigmoid colon that could represent  noncomplicated acute diverticulitis.  2.  Bladder wall thickening which may be due to the decompressed state  of the bladder or due to cystitis.  3.  Small umbilical hernia containing only fat.      [ES]   1354 US Scrotum & Testicles  IMPRESSION:    The right epididymis is enlarged and shows hypervascularity  suggestive  of epididymitis.   [ES]      ED Course User Index  [ES] Omid Frausto MD                                             Medical Decision Making  Problems Addressed:  Diverticulitis: complicated acute illness or injury  Epididymitis: complicated acute illness or injury    Amount and/or Complexity of Data Reviewed  Labs: ordered.  Radiology: ordered. Decision-making details documented in ED Course.    Risk  OTC drugs.  Prescription drug management.        Final diagnoses:   Diverticulitis   Epididymitis       ED Disposition  ED Disposition       ED Disposition   Discharge    Condition   Stable    Comment   --               No follow-up provider specified.       Medication List        New Prescriptions      amoxicillin-clavulanate 875-125 MG per tablet  Commonly known as: AUGMENTIN  Take 1 tablet by mouth 2 (Two) Times a Day for 10 days.     doxycycline 100 MG capsule  Commonly known as: MONODOX  Take 1 capsule by mouth 2 (Two) Times a Day for 10 days.     ondansetron ODT 4 MG disintegrating tablet  Commonly known as: ZOFRAN-ODT  Place 1 tablet on the tongue Every 8 (Eight) Hours As Needed for Vomiting.            Changed      * HYDROcodone-acetaminophen 7.5-325 MG per tablet  Commonly known as: NORCO  What changed: Another medication with the same name was added. Make sure you understand how and when to take each.     * HYDROcodone-acetaminophen 5-325 MG per tablet  Commonly known as: NORCO  Take 1 tablet by mouth Every 6 (Six) Hours As Needed for Severe Pain.  What changed: You were already taking a medication with the same name, and this prescription was added. Make sure you understand how and when to take each.           * This list has 2 medication(s) that are the same as other medications prescribed for you. Read the directions carefully, and ask your doctor or other care provider to review them with you.                   Where to Get Your Medications        These medications were sent to  [facility] [place] - [address] - [phone] [facility] [phone]   [address] [place] [address]      Phone: [phone]   amoxicillin-clavulanate 875-125 MG per tablet  doxycycline 100 MG capsule  HYDROcodone-acetaminophen 5-325 MG per tablet  ondansetron ODT 4 MG disintegrating tablet            Omid Frausto MD  06/15/24 9597

## 2024-06-16 ENCOUNTER — APPOINTMENT (OUTPATIENT)
Dept: CT IMAGING | Facility: HOSPITAL | Age: 49
DRG: 853 | End: 2024-06-16
Payer: COMMERCIAL

## 2024-06-16 ENCOUNTER — HOSPITAL ENCOUNTER (INPATIENT)
Facility: HOSPITAL | Age: 49
LOS: 8 days | Discharge: HOME | DRG: 853 | End: 2024-06-24
Attending: STUDENT IN AN ORGANIZED HEALTH CARE EDUCATION/TRAINING PROGRAM | Admitting: STUDENT IN AN ORGANIZED HEALTH CARE EDUCATION/TRAINING PROGRAM
Payer: COMMERCIAL

## 2024-06-16 DIAGNOSIS — K57.20 DIVERTICULITIS OF COLON WITH PERFORATION: Primary | ICD-10-CM

## 2024-06-16 PROBLEM — K57.92 DIVERTICULITIS: Status: ACTIVE | Noted: 2024-06-16

## 2024-06-16 LAB
ALBUMIN SERPL-MCNC: 3.7 G/DL (ref 3.5–5.2)
ALBUMIN/GLOB SERPL: 1 G/DL
ALP SERPL-CCNC: 50 U/L (ref 39–117)
ALT SERPL W P-5'-P-CCNC: 13 U/L (ref 1–41)
ANION GAP SERPL CALCULATED.3IONS-SCNC: 11.4 MMOL/L (ref 5–15)
AST SERPL-CCNC: 14 U/L (ref 1–40)
BACTERIA UR QL AUTO: ABNORMAL /HPF
BASOPHILS # BLD AUTO: 0.04 10*3/MM3 (ref 0–0.2)
BASOPHILS NFR BLD AUTO: 0.4 % (ref 0–1.5)
BILIRUB SERPL-MCNC: 0.3 MG/DL (ref 0–1.2)
BILIRUB UR QL STRIP: NEGATIVE
BUN SERPL-MCNC: 29 MG/DL (ref 6–20)
BUN/CREAT SERPL: 13.9 (ref 7–25)
CALCIUM SPEC-SCNC: 10.1 MG/DL (ref 8.6–10.5)
CHLORIDE SERPL-SCNC: 103 MMOL/L (ref 98–107)
CHOLEST SERPL-MCNC: 131 MG/DL (ref 0–200)
CK SERPL-CCNC: 79 U/L (ref 20–200)
CLARITY UR: ABNORMAL
CO2 SERPL-SCNC: 22.6 MMOL/L (ref 22–29)
COLOR UR: ABNORMAL
CREAT SERPL-MCNC: 2.09 MG/DL (ref 0.76–1.27)
CRP SERPL-MCNC: 44.82 MG/DL (ref 0–0.5)
D-LACTATE SERPL-SCNC: 2 MMOL/L (ref 0.5–2)
DEPRECATED RDW RBC AUTO: 49.5 FL (ref 37–54)
EGFRCR SERPLBLD CKD-EPI 2021: 38.3 ML/MIN/1.73
EOSINOPHIL # BLD AUTO: 0.26 10*3/MM3 (ref 0–0.4)
EOSINOPHIL NFR BLD AUTO: 2.4 % (ref 0.3–6.2)
ERYTHROCYTE [DISTWIDTH] IN BLOOD BY AUTOMATED COUNT: 14.6 % (ref 12.3–15.4)
ERYTHROCYTE [SEDIMENTATION RATE] IN BLOOD: 82 MM/HR (ref 0–15)
GLOBULIN UR ELPH-MCNC: 3.6 GM/DL
GLUCOSE BLDC GLUCOMTR-MCNC: 92 MG/DL (ref 70–130)
GLUCOSE SERPL-MCNC: 108 MG/DL (ref 65–99)
GLUCOSE UR STRIP-MCNC: NEGATIVE MG/DL
HBA1C MFR BLD: 6.2 % (ref 4.8–5.6)
HCT VFR BLD AUTO: 33.9 % (ref 37.5–51)
HDLC SERPL-MCNC: 21 MG/DL (ref 40–60)
HGB BLD-MCNC: 10.7 G/DL (ref 13–17.7)
HGB UR QL STRIP.AUTO: NEGATIVE
HOLD SPECIMEN: NORMAL
HOLD SPECIMEN: NORMAL
HYALINE CASTS UR QL AUTO: ABNORMAL /LPF
IMM GRANULOCYTES # BLD AUTO: 0.05 10*3/MM3 (ref 0–0.05)
IMM GRANULOCYTES NFR BLD AUTO: 0.5 % (ref 0–0.5)
KETONES UR QL STRIP: ABNORMAL
LDLC SERPL CALC-MCNC: 73 MG/DL (ref 0–100)
LDLC/HDLC SERPL: 3.1 {RATIO}
LEUKOCYTE ESTERASE UR QL STRIP.AUTO: NEGATIVE
LIPASE SERPL-CCNC: 14 U/L (ref 13–60)
LYMPHOCYTES # BLD AUTO: 1.3 10*3/MM3 (ref 0.7–3.1)
LYMPHOCYTES NFR BLD AUTO: 11.8 % (ref 19.6–45.3)
MCH RBC QN AUTO: 28.9 PG (ref 26.6–33)
MCHC RBC AUTO-ENTMCNC: 31.6 G/DL (ref 31.5–35.7)
MCV RBC AUTO: 91.6 FL (ref 79–97)
MONOCYTES # BLD AUTO: 0.6 10*3/MM3 (ref 0.1–0.9)
MONOCYTES NFR BLD AUTO: 5.4 % (ref 5–12)
NEUTROPHILS NFR BLD AUTO: 79.5 % (ref 42.7–76)
NEUTROPHILS NFR BLD AUTO: 8.8 10*3/MM3 (ref 1.7–7)
NITRITE UR QL STRIP: NEGATIVE
NRBC BLD AUTO-RTO: 0 /100 WBC (ref 0–0.2)
PH UR STRIP.AUTO: <=5 [PH] (ref 5–8)
PLATELET # BLD AUTO: 241 10*3/MM3 (ref 140–450)
PMV BLD AUTO: 11.3 FL (ref 6–12)
POTASSIUM SERPL-SCNC: 4.1 MMOL/L (ref 3.5–5.2)
PROT SERPL-MCNC: 7.3 G/DL (ref 6–8.5)
PROT UR QL STRIP: ABNORMAL
RBC # BLD AUTO: 3.7 10*6/MM3 (ref 4.14–5.8)
RBC # UR STRIP: ABNORMAL /HPF
REF LAB TEST METHOD: ABNORMAL
SODIUM SERPL-SCNC: 137 MMOL/L (ref 136–145)
SP GR UR STRIP: >1.03 (ref 1–1.03)
SQUAMOUS #/AREA URNS HPF: ABNORMAL /HPF
TRIGL SERPL-MCNC: 224 MG/DL (ref 0–150)
TSH SERPL DL<=0.05 MIU/L-ACNC: 1.86 UIU/ML (ref 0.27–4.2)
UROBILINOGEN UR QL STRIP: ABNORMAL
VLDLC SERPL-MCNC: 37 MG/DL (ref 5–40)
WBC # UR STRIP: ABNORMAL /HPF
WBC NRBC COR # BLD AUTO: 11.05 10*3/MM3 (ref 3.4–10.8)
WHOLE BLOOD HOLD COAG: NORMAL
WHOLE BLOOD HOLD SPECIMEN: NORMAL

## 2024-06-16 PROCEDURE — 25010000002 MORPHINE PER 10 MG: Performed by: STUDENT IN AN ORGANIZED HEALTH CARE EDUCATION/TRAINING PROGRAM

## 2024-06-16 PROCEDURE — 25810000003 SODIUM CHLORIDE 0.9 % SOLUTION: Performed by: PHYSICIAN ASSISTANT

## 2024-06-16 PROCEDURE — 74177 CT ABD & PELVIS W/CONTRAST: CPT | Performed by: RADIOLOGY

## 2024-06-16 PROCEDURE — 85652 RBC SED RATE AUTOMATED: CPT | Performed by: PHYSICIAN ASSISTANT

## 2024-06-16 PROCEDURE — 25010000002 CEFEPIME PER 500 MG: Performed by: PHYSICIAN ASSISTANT

## 2024-06-16 PROCEDURE — 82948 REAGENT STRIP/BLOOD GLUCOSE: CPT

## 2024-06-16 PROCEDURE — 93005 ELECTROCARDIOGRAM TRACING: CPT | Performed by: STUDENT IN AN ORGANIZED HEALTH CARE EDUCATION/TRAINING PROGRAM

## 2024-06-16 PROCEDURE — 25010000002 ONDANSETRON PER 1 MG: Performed by: PHYSICIAN ASSISTANT

## 2024-06-16 PROCEDURE — 81001 URINALYSIS AUTO W/SCOPE: CPT | Performed by: PHYSICIAN ASSISTANT

## 2024-06-16 PROCEDURE — 93010 ELECTROCARDIOGRAM REPORT: CPT | Performed by: INTERNAL MEDICINE

## 2024-06-16 PROCEDURE — 80050 GENERAL HEALTH PANEL: CPT | Performed by: PHYSICIAN ASSISTANT

## 2024-06-16 PROCEDURE — 86140 C-REACTIVE PROTEIN: CPT | Performed by: PHYSICIAN ASSISTANT

## 2024-06-16 PROCEDURE — 82550 ASSAY OF CK (CPK): CPT | Performed by: STUDENT IN AN ORGANIZED HEALTH CARE EDUCATION/TRAINING PROGRAM

## 2024-06-16 PROCEDURE — 25510000001 IOPAMIDOL 61 % SOLUTION: Performed by: STUDENT IN AN ORGANIZED HEALTH CARE EDUCATION/TRAINING PROGRAM

## 2024-06-16 PROCEDURE — 36415 COLL VENOUS BLD VENIPUNCTURE: CPT

## 2024-06-16 PROCEDURE — 99285 EMERGENCY DEPT VISIT HI MDM: CPT

## 2024-06-16 PROCEDURE — 80061 LIPID PANEL: CPT | Performed by: STUDENT IN AN ORGANIZED HEALTH CARE EDUCATION/TRAINING PROGRAM

## 2024-06-16 PROCEDURE — 83036 HEMOGLOBIN GLYCOSYLATED A1C: CPT | Performed by: STUDENT IN AN ORGANIZED HEALTH CARE EDUCATION/TRAINING PROGRAM

## 2024-06-16 PROCEDURE — 99223 1ST HOSP IP/OBS HIGH 75: CPT | Performed by: STUDENT IN AN ORGANIZED HEALTH CARE EDUCATION/TRAINING PROGRAM

## 2024-06-16 PROCEDURE — 83605 ASSAY OF LACTIC ACID: CPT | Performed by: PHYSICIAN ASSISTANT

## 2024-06-16 PROCEDURE — 25010000002 HYDROMORPHONE 1 MG/ML SOLUTION: Performed by: STUDENT IN AN ORGANIZED HEALTH CARE EDUCATION/TRAINING PROGRAM

## 2024-06-16 PROCEDURE — 25010000002 ENOXAPARIN PER 10 MG: Performed by: STUDENT IN AN ORGANIZED HEALTH CARE EDUCATION/TRAINING PROGRAM

## 2024-06-16 PROCEDURE — 83690 ASSAY OF LIPASE: CPT | Performed by: PHYSICIAN ASSISTANT

## 2024-06-16 PROCEDURE — 25010000002 PIPERACILLIN SOD-TAZOBACTAM PER 1 G: Performed by: STUDENT IN AN ORGANIZED HEALTH CARE EDUCATION/TRAINING PROGRAM

## 2024-06-16 PROCEDURE — 25010000002 METRONIDAZOLE 500 MG/100ML SOLUTION: Performed by: PHYSICIAN ASSISTANT

## 2024-06-16 PROCEDURE — 74177 CT ABD & PELVIS W/CONTRAST: CPT

## 2024-06-16 PROCEDURE — 25810000003 LACTATED RINGERS PER 1000 ML: Performed by: STUDENT IN AN ORGANIZED HEALTH CARE EDUCATION/TRAINING PROGRAM

## 2024-06-16 PROCEDURE — 87040 BLOOD CULTURE FOR BACTERIA: CPT | Performed by: PHYSICIAN ASSISTANT

## 2024-06-16 RX ORDER — SODIUM CHLORIDE, SODIUM LACTATE, POTASSIUM CHLORIDE, CALCIUM CHLORIDE 600; 310; 30; 20 MG/100ML; MG/100ML; MG/100ML; MG/100ML
100 INJECTION, SOLUTION INTRAVENOUS CONTINUOUS
Status: DISCONTINUED | OUTPATIENT
Start: 2024-06-16 | End: 2024-06-20

## 2024-06-16 RX ORDER — LEVOCETIRIZINE DIHYDROCHLORIDE 5 MG/1
5 TABLET, FILM COATED ORAL EVERY EVENING
COMMUNITY

## 2024-06-16 RX ORDER — ACETAMINOPHEN 325 MG/1
650 TABLET ORAL EVERY 4 HOURS PRN
Status: DISCONTINUED | OUTPATIENT
Start: 2024-06-16 | End: 2024-06-20

## 2024-06-16 RX ORDER — PANTOPRAZOLE SODIUM 40 MG/10ML
40 INJECTION, POWDER, LYOPHILIZED, FOR SOLUTION INTRAVENOUS
Status: DISCONTINUED | OUTPATIENT
Start: 2024-06-17 | End: 2024-06-24 | Stop reason: HOSPADM

## 2024-06-16 RX ORDER — BISACODYL 10 MG
10 SUPPOSITORY, RECTAL RECTAL DAILY PRN
Status: DISCONTINUED | OUTPATIENT
Start: 2024-06-16 | End: 2024-06-24 | Stop reason: HOSPADM

## 2024-06-16 RX ORDER — DOXYCYCLINE HYCLATE 100 MG/1
100 CAPSULE ORAL 2 TIMES DAILY
Status: ON HOLD | COMMUNITY
End: 2024-06-16 | Stop reason: SDUPTHER

## 2024-06-16 RX ORDER — SODIUM CHLORIDE 0.9 % (FLUSH) 0.9 %
10 SYRINGE (ML) INJECTION AS NEEDED
Status: DISCONTINUED | OUTPATIENT
Start: 2024-06-16 | End: 2024-06-24 | Stop reason: HOSPADM

## 2024-06-16 RX ORDER — ONDANSETRON 2 MG/ML
4 INJECTION INTRAMUSCULAR; INTRAVENOUS ONCE
Status: COMPLETED | OUTPATIENT
Start: 2024-06-16 | End: 2024-06-16

## 2024-06-16 RX ORDER — METRONIDAZOLE 500 MG/100ML
500 INJECTION, SOLUTION INTRAVENOUS ONCE
Status: DISCONTINUED | OUTPATIENT
Start: 2024-06-16 | End: 2024-06-16

## 2024-06-16 RX ORDER — FUROSEMIDE 20 MG/1
20 TABLET ORAL DAILY
COMMUNITY

## 2024-06-16 RX ORDER — BISACODYL 5 MG/1
5 TABLET, DELAYED RELEASE ORAL DAILY PRN
Status: DISCONTINUED | OUTPATIENT
Start: 2024-06-16 | End: 2024-06-24 | Stop reason: HOSPADM

## 2024-06-16 RX ORDER — CYANOCOBALAMIN 1000 UG/ML
1000 INJECTION, SOLUTION INTRAMUSCULAR; SUBCUTANEOUS
COMMUNITY

## 2024-06-16 RX ORDER — MORPHINE SULFATE 2 MG/ML
2 INJECTION, SOLUTION INTRAMUSCULAR; INTRAVENOUS
Status: DISCONTINUED | OUTPATIENT
Start: 2024-06-16 | End: 2024-06-21

## 2024-06-16 RX ORDER — SODIUM PHOSPHATE,MONO-DIBASIC 19G-7G/118
1 ENEMA (ML) RECTAL 2 TIMES DAILY WITH MEALS
COMMUNITY

## 2024-06-16 RX ORDER — FOLIC ACID 1 MG/1
1 TABLET ORAL DAILY
COMMUNITY

## 2024-06-16 RX ORDER — POLYETHYLENE GLYCOL 3350 17 G/17G
17 POWDER, FOR SOLUTION ORAL DAILY PRN
Status: DISCONTINUED | OUTPATIENT
Start: 2024-06-16 | End: 2024-06-24 | Stop reason: HOSPADM

## 2024-06-16 RX ORDER — ENOXAPARIN SODIUM 100 MG/ML
40 INJECTION SUBCUTANEOUS NIGHTLY
Status: DISCONTINUED | OUTPATIENT
Start: 2024-06-16 | End: 2024-06-24 | Stop reason: HOSPADM

## 2024-06-16 RX ORDER — FUROSEMIDE 20 MG/1
20 TABLET ORAL DAILY
Status: CANCELLED | OUTPATIENT
Start: 2024-06-16

## 2024-06-16 RX ORDER — SODIUM CHLORIDE 9 MG/ML
40 INJECTION, SOLUTION INTRAVENOUS AS NEEDED
Status: DISCONTINUED | OUTPATIENT
Start: 2024-06-16 | End: 2024-06-24 | Stop reason: HOSPADM

## 2024-06-16 RX ORDER — ACETAMINOPHEN 500 MG
1000 TABLET ORAL 3 TIMES DAILY
Status: DISCONTINUED | OUTPATIENT
Start: 2024-06-16 | End: 2024-06-20

## 2024-06-16 RX ORDER — ANASTROZOLE 1 MG/1
1 TABLET ORAL DAILY
COMMUNITY

## 2024-06-16 RX ORDER — AMOXICILLIN 250 MG
2 CAPSULE ORAL 2 TIMES DAILY PRN
Status: DISCONTINUED | OUTPATIENT
Start: 2024-06-16 | End: 2024-06-24 | Stop reason: HOSPADM

## 2024-06-16 RX ORDER — PROCHLORPERAZINE EDISYLATE 5 MG/ML
10 INJECTION INTRAMUSCULAR; INTRAVENOUS EVERY 6 HOURS PRN
Status: DISCONTINUED | OUTPATIENT
Start: 2024-06-16 | End: 2024-06-24 | Stop reason: HOSPADM

## 2024-06-16 RX ORDER — ROSUVASTATIN CALCIUM 10 MG/1
10 TABLET, COATED ORAL DAILY
Status: DISCONTINUED | OUTPATIENT
Start: 2024-06-16 | End: 2024-06-24 | Stop reason: HOSPADM

## 2024-06-16 RX ORDER — OXYCODONE HYDROCHLORIDE 5 MG/1
5 TABLET ORAL EVERY 4 HOURS PRN
Status: DISCONTINUED | OUTPATIENT
Start: 2024-06-16 | End: 2024-06-21

## 2024-06-16 RX ORDER — METOPROLOL TARTRATE 50 MG/1
50 TABLET, FILM COATED ORAL 2 TIMES DAILY
COMMUNITY

## 2024-06-16 RX ORDER — ERGOCALCIFEROL 1.25 MG/1
50000 CAPSULE ORAL WEEKLY
Status: ON HOLD | COMMUNITY
End: 2024-06-16

## 2024-06-16 RX ORDER — SODIUM CHLORIDE 0.9 % (FLUSH) 0.9 %
10 SYRINGE (ML) INJECTION EVERY 12 HOURS SCHEDULED
Status: DISCONTINUED | OUTPATIENT
Start: 2024-06-16 | End: 2024-06-24 | Stop reason: HOSPADM

## 2024-06-16 RX ORDER — DOXYCYCLINE 100 MG/1
100 CAPSULE ORAL 2 TIMES DAILY
Status: CANCELLED | OUTPATIENT
Start: 2024-06-16 | End: 2024-06-24

## 2024-06-16 RX ADMIN — PIPERACILLIN AND TAZOBACTAM 3.38 G: 3; .375 INJECTION, POWDER, FOR SOLUTION INTRAVENOUS at 18:38

## 2024-06-16 RX ADMIN — HYDROMORPHONE HYDROCHLORIDE 1 MG: 1 INJECTION, SOLUTION INTRAMUSCULAR; INTRAVENOUS; SUBCUTANEOUS at 20:10

## 2024-06-16 RX ADMIN — SODIUM CHLORIDE 1000 ML: 9 INJECTION, SOLUTION INTRAVENOUS at 15:08

## 2024-06-16 RX ADMIN — IOPAMIDOL 85 ML: 612 INJECTION, SOLUTION INTRAVENOUS at 14:35

## 2024-06-16 RX ADMIN — MORPHINE SULFATE 2 MG: 2 INJECTION, SOLUTION INTRAMUSCULAR; INTRAVENOUS at 21:56

## 2024-06-16 RX ADMIN — SODIUM CHLORIDE, POTASSIUM CHLORIDE, SODIUM LACTATE AND CALCIUM CHLORIDE 100 ML/HR: 600; 310; 30; 20 INJECTION, SOLUTION INTRAVENOUS at 17:52

## 2024-06-16 RX ADMIN — CEFEPIME 2000 MG: 2 INJECTION, POWDER, FOR SOLUTION INTRAVENOUS at 16:27

## 2024-06-16 RX ADMIN — Medication 10 ML: at 20:11

## 2024-06-16 RX ADMIN — ACETAMINOPHEN 650 MG: 325 TABLET ORAL at 18:45

## 2024-06-16 RX ADMIN — MORPHINE SULFATE 4 MG: 4 INJECTION, SOLUTION INTRAMUSCULAR; INTRAVENOUS at 13:51

## 2024-06-16 RX ADMIN — ENOXAPARIN SODIUM 40 MG: 40 INJECTION SUBCUTANEOUS at 20:10

## 2024-06-16 RX ADMIN — OXYCODONE HYDROCHLORIDE 5 MG: 5 TABLET ORAL at 18:45

## 2024-06-16 RX ADMIN — SODIUM CHLORIDE 1000 ML: 9 INJECTION, SOLUTION INTRAVENOUS at 13:50

## 2024-06-16 RX ADMIN — HYDROMORPHONE HYDROCHLORIDE 1 MG: 1 INJECTION, SOLUTION INTRAMUSCULAR; INTRAVENOUS; SUBCUTANEOUS at 15:53

## 2024-06-16 RX ADMIN — METRONIDAZOLE 500 MG: 500 INJECTION, SOLUTION INTRAVENOUS at 17:21

## 2024-06-16 RX ADMIN — HYDROMORPHONE HYDROCHLORIDE 1 MG: 1 INJECTION, SOLUTION INTRAMUSCULAR; INTRAVENOUS; SUBCUTANEOUS at 15:09

## 2024-06-16 RX ADMIN — HYDROMORPHONE HYDROCHLORIDE 1 MG: 1 INJECTION, SOLUTION INTRAMUSCULAR; INTRAVENOUS; SUBCUTANEOUS at 18:04

## 2024-06-16 RX ADMIN — ROSUVASTATIN CALCIUM 10 MG: 10 TABLET, FILM COATED ORAL at 18:38

## 2024-06-16 RX ADMIN — ACETAMINOPHEN 1000 MG: 500 TABLET ORAL at 20:10

## 2024-06-16 RX ADMIN — ONDANSETRON 4 MG: 2 INJECTION INTRAMUSCULAR; INTRAVENOUS at 15:08

## 2024-06-16 NOTE — CASE MANAGEMENT/SOCIAL WORK
Discharge Planning Assessment   Phillip     Patient Name: Samson Kendall  MRN: [id number]  Today's Date: 6/16/2024    Admit Date: 6/16/2024    Plan: Pt lives at home and plans to return home at discharge family to provide transportation. Pt has [employer] insurance. Pt is independent with adl's and does not use any dme, home health or home o2.   Discharge Needs Assessment       Row Name 06/16/24 1852       Living Environment    Current Living Arrangements home    Potentially Unsafe Housing Conditions none    In the past 12 months has the electric, gas, oil, or water company threatened to shut off services in your home? No    Primary Care Provided by self    Family Caregiver if Needed significant other    Quality of Family Relationships helpful;involved;supportive    Able to Return to Prior Arrangements yes       Resource/Environmental Concerns    Resource/Environmental Concerns none       Transportation Needs    In the past 12 months, has lack of transportation kept you from medical appointments or from getting medications? no    In the past 12 months, has lack of transportation kept you from meetings, work, or from getting things needed for daily living? No       Food Insecurity    Within the past 12 months, you worried that your food would run out before you got the money to buy more. Never true    Within the past 12 months, the food you bought just didn't last and you didn't have money to get more. Never true       Transition Planning    Patient/Family Anticipates Transition to home with family    Patient/Family Anticipated Services at Transition none    Transportation Anticipated family or friend will provide       Discharge Needs Assessment    Readmission Within the Last 30 Days no previous admission in last 30 days    Equipment Currently Used at Home none    Concerns to be Addressed no discharge needs identified;denies needs/concerns at this time    Anticipated Changes Related to Illness none     Equipment Needed After Discharge none                   Discharge Plan       Row Name 06/16/24 3514       Plan    Plan Pt lives at home and plans to return home at discharge family to provide transportation. Pt has [place] [employer] insurance. Pt is independent with adl's and does not use any dme, home health or home o2.    Patient/Family in Agreement with Plan yes                  Continued Care and Services - Admitted Since 6/16/2024    No active coordination exists for this encounter.       Expected Discharge Date and Time       Expected Discharge Date Expected Discharge Time    Jun 18, 2024            Demographic Summary       Row Name 06/16/24 0766       General Information    Admission Type inpatient    Arrived From home    Referral Source emergency department    Reason for Consult discharge planning                  Kandy Gonzalez RN

## 2024-06-16 NOTE — H&P
[facility]    Patient Identification:  Name:  Samson Kendall  Age:  49 y.o.  Sex:  male  :  1975  MRN:  [id number]   Admit Date: 2024   Visit Number:  [id number]  Room number:  104/04  Primary Care Physician:  Rosalina Pandya APRN     Subjective     Chief complaint:    Chief Complaint   Patient presents with    Abdominal Pain    Rectal Pain       History of presenting illness:   Patient is a 49-year-old male with history significant for nonischemic cardiomyopathy (2018), type 2 diabetes mellitus and hypertension who presented to the ER with complaints of abdominal pain, fevers and poor oral intake.  Patient was seen in the ER few days ago and diagnosed with uncomplicated diverticulitis.  He was discharged with appropriate Augmentin therapy.  He says since being home he has had decreased oral intake, increasing abdominal pain Tmax of 102 earlier this morning.  He states today his abdominal pain became significantly worsened which prompted his presentation today to the ER.  He states he has been compliant with the outpatient antibiotics he was prescribed earlier this week but symptoms seem to worsen.  He says his pain currently is better and this is the first time he has been able to get relief today after receiving multiple doses of IV opiates.    In the ER, labs noted BUN/creatinine of 29/2, CRP of 44, WBC of 11 K and a CT abdomen pelvis with contrast that noted sigmoid diverticulitis with contained perforation.  No obvious drainable abscess was noted at this time.  He received several doses of IV opiates, cefepime and Flagyl.  General surgery was consulted from the ER who recommended conservative treatment for now and close monitoring.    Of note, he does take Mounjaro.    ---------------------------------------------------------------------------------------------------------------------   Review of Systems   Constitutional:  Positive for chills and  fever. Negative for fatigue.   HENT:  Negative for congestion, sinus pain and sore throat.    Respiratory:  Negative for cough, chest tightness, shortness of breath and wheezing.    Cardiovascular:  Negative for chest pain, palpitations and leg swelling.   Gastrointestinal:  Positive for abdominal pain. Negative for constipation, diarrhea, nausea and vomiting.   Genitourinary:  Negative for dysuria, frequency, hematuria and urgency.   Musculoskeletal:  Negative for arthralgias and myalgias.   Neurological:  Negative for dizziness, numbness and headaches.   Psychiatric/Behavioral:  Negative for confusion.      ---------------------------------------------------------------------------------------------------------------------   Past Medical History:   Diagnosis Date    Asthma     childhood    Cardiomyopathy     Diabetes mellitus     Heart murmur     as child    Hyperlipidemia     Hypertension     Palpitation     Sleep apnea     C pap use     Past Surgical History:   Procedure Laterality Date    APPENDECTOMY      HAND SURGERY      KNEE ARTHROSCOPY      LUMBAR FUSION      VASECTOMY      WISDOM TOOTH EXTRACTION       Family History   Problem Relation Age of Onset    Heart attack Maternal Grandfather     Heart failure Maternal Grandfather     Heart disease Maternal Grandfather     Arthritis Mother     Diabetes Father     Hearing loss Father     Asthma Maternal Grandmother      Social History     Socioeconomic History    Marital status:    Tobacco Use    Smoking status: Never     Passive exposure: Current    Smokeless tobacco: Current     Types: Snuff   Vaping Use    Vaping status: Never Used   Substance and Sexual Activity    Alcohol use: Not Currently    Drug use: No    Sexual activity: Yes     Partners: Female     Birth control/protection: Surgical, Same-sex partner     ---------------------------------------------------------------------------------------------------------------------   Allergies:  Patient has  no known allergies.  ---------------------------------------------------------------------------------------------------------------------   Medications below are reported home medications pulling from within the system; at this time, these medications have not been reconciled unless otherwise specified and are in the verification process for further verifcation as current home medications.    Prior to Admission Medications       Prescriptions Last Dose Informant Patient Reported? Taking?    amoxicillin-clavulanate (AUGMENTIN) 875-125 MG per tablet   No No    Take 1 tablet by mouth 2 (Two) Times a Day for 10 days.    Calcium-Magnesium-Zinc (OMERO-MAG-ZINC PO)   Yes No    Take  by mouth 3 (Three) Times a Day.    Cyanocobalamin (VITAMIN B-12 IJ)   Yes No    Inject  as directed.    dexlansoprazole (DEXILANT) 60 MG capsule  Pharmacy Yes No    Take 1 capsule by mouth Daily.    doxycycline (MONODOX) 100 MG capsule   No No    Take 1 capsule by mouth 2 (Two) Times a Day for 10 days.    fenofibrate (TRICOR) 145 MG tablet   Yes No    Take 1 tablet by mouth Daily.    furosemide (LASIX) 20 MG tablet   Yes No    Take 20 mg by mouth Daily.    Patient not taking:  Reported on 3/19/2024    hydroCHLOROthiazide (HYDRODIURIL) 12.5 MG tablet   Yes No    Take 12.5 mg by mouth Daily.    Patient not taking:  Reported on 3/19/2024    HYDROcodone-acetaminophen (NORCO) 5-325 MG per tablet   No No    Take 1 tablet by mouth Every 6 (Six) Hours As Needed for Severe Pain.    HYDROcodone-acetaminophen (NORCO) 7.5-325 MG per tablet  Self Yes No    Take 1 tablet by mouth Daily As Needed for Moderate Pain.    meloxicam (MOBIC) 15 MG tablet   Yes No    Take 1 tablet by mouth Daily.    methylPREDNISolone (MEDROL) 4 MG dose pack   No No    Take as directed on package instructions.    metoprolol succinate XL (TOPROL-XL) 100 MG 24 hr tablet   No No    Take 1 tablet by mouth Daily. for blood pressure    metoprolol succinate XL (TOPROL-XL) 50 MG 24 hr tablet    Yes No    Take 1 tablet by mouth Daily.    multivitamin (MULTI-VITAMIN PO)   Yes No    Take  by mouth Daily.    ondansetron ODT (ZOFRAN-ODT) 4 MG disintegrating tablet   No No    Place 1 tablet on the tongue Every 8 (Eight) Hours As Needed for Vomiting.    potassium chloride 10 MEQ CR tablet   Yes No    Take 10 mEq by mouth 2 (Two) Times a Day.    Patient not taking:  Reported on 3/19/2024    rosuvastatin (CRESTOR) 10 MG tablet   No No    Take 1 tablet by mouth Daily.    sildenafil (REVATIO) 20 MG tablet   Yes No    Take 1 tablet by mouth.    Testosterone Cypionate (DEPOTESTOTERONE CYPIONATE) 200 MG/ML injection   Yes No    inject 1 cc by intramuscular route every  2 weeks    Tirzepatide (Mounjaro) 5 MG/0.5ML solution pen-injector   Yes No    Inject  under the skin into the appropriate area as directed.    Tirzepatide (Mounjaro) 5 MG/0.5ML solution pen-injector   No No    Inject 5MG by subcutaneous route  once every week.    topiramate (TOPAMAX) 100 MG tablet   Yes No    Take 1 tablet by mouth 2 (Two) Times a Day.    valsartan (DIOVAN) 40 MG tablet   No No    Take 1 tablet by mouth Daily.    Patient not taking:  Reported on 3/19/2024    vitamin D (ERGOCALCIFEROL) 1.25 MG (43143 UT) capsule capsule   Yes No    TAKE ONE CAPSULE BY MOUTH ONCE EVERY WEEK FOR VITAMIN D DEFICIENCY    Patient not taking:  Reported on 3/19/2024          Objective     Vital Signs:  Temp:  [99.4 °F (37.4 °C)] 99.4 °F (37.4 °C)  Heart Rate:  [] 104  Resp:  [14] 14  BP: (110-147)/(37-88) 147/88    Mean Arterial Pressure (Non-Invasive) for the past 24 hrs (Last 3 readings):   Noninvasive MAP (mmHg)   06/16/24 1545 104   06/16/24 1530 91   06/16/24 1515 95     SpO2:  [97 %-100 %] 100 %  on   ;   Device (Oxygen Therapy): room air  Body mass index is 30.67 kg/m².    Wt Readings from Last 3 Encounters:   06/16/24 86.2 kg (190 lb)   06/14/24 86.2 kg (190 lb)   03/19/24 84.8 kg (187 lb)       ---------------------------------------------------------------------------------------------------------------------   Physical Exam:  Constitutional: Middle-age male, awake, alert, nontoxic, well-developed and well-nourished.  No respiratory distress.      HENT:  Head: Normocephalic and atraumatic.  Mouth:  Moist mucous membranes.    Eyes:  Conjunctivae and EOM are normal.  No scleral icterus.  Neck:  Neck supple.  No JVD present.    Cardiovascular: Tachycardic, regular rhythm and normal heart sounds with no murmur.  Pulmonary/Chest:  No respiratory distress, no wheezes, no crackles, with normal breath sounds and good air movement.  Abdominal: Mildly distended, generalized tenderness to palpation, no guarding or rebound, he does not have an acute abdomen  Musculoskeletal:  No tenderness, and no deformity.  No red or swollen joints anywhere.    Neurological:  Alert and oriented to person, place, and time.  No cranial nerve deficit.  No tongue deviation.  No facial droop.  No slurred speech.   Skin:  Skin is warm and dry.  No rash noted.  No pallor.   Peripheral vascular:  No edema and pulses on all 4 extremities.    ---------------------------------------------------------------------------------------------------------------------  EKG: Ordered  ECG 12 Lead Pre-Op / Pre-Procedure    (Results Pending)       Telemetry: Reviewed    I have personally looked at both the EKG and the telemetry strips.    Last echocardiogram:  Results for orders placed during the hospital encounter of 09/05/19    Adult Transthoracic Echo Complete With Contrast if Necessary Per Protocol    Interpretation Summary  · Normal left ventricular cavity size and wall thickness noted. There is left ventricular global hypokinesis noted.  · Left ventricular systolic function is mildly decreased  · Estimated EF appears to be in the range of 46 - 50%  · The aortic valve is structurally normal. No aortic valve regurgitation is present. No aortic valve  "stenosis is present.  · The mitral valve is normal in structure. No mitral valve regurgitation is present. No significant mitral valve stenosis is present.  · The tricuspid valve is normal. No evidence of tricuspid valve stenosis is present. Mild tricuspid valve regurgitation is present. Estimated right ventricular systolic pressure from tricuspid regurgitation is normal (<35 mmHg).  · There is no evidence of pericardial effusion.  · No previous studies available for comparison    --------------------------------------------------------------------------------------------------------------------  Labs:  Results from last 7 days   Lab Units 06/16/24  1615 06/16/24  1344 06/14/24  1313 06/14/24  1217   PROCALCITONIN ng/mL  --   --  0.25  --    LACTATE mmol/L 2.0  --  0.7  --    SED RATE mm/hr  --  82*  --  22*   CRP mg/dL  --  44.82*  --  10.71*   WBC 10*3/mm3  --  11.05*  --  12.75*   HEMOGLOBIN g/dL  --  10.7*  --  11.8*   HEMATOCRIT %  --  33.9*  --  35.8*   MCV fL  --  91.6  --  89.3   MCHC g/dL  --  31.6  --  33.0   PLATELETS 10*3/mm3  --  241  --  275         Results from last 7 days   Lab Units 06/16/24  1344 06/14/24  1217   SODIUM mmol/L 137 138   POTASSIUM mmol/L 4.1 4.1   CHLORIDE mmol/L 103 104   CO2 mmol/L 22.6 20.3*   BUN mg/dL 29* 34*   CREATININE mg/dL 2.09* 1.85*   CALCIUM mg/dL 10.1 9.8   GLUCOSE mg/dL 108* 120*   ALBUMIN g/dL 3.7 4.5   BILIRUBIN mg/dL 0.3 0.3   ALK PHOS U/L 50 43   AST (SGOT) U/L 14 20   ALT (SGPT) U/L 13 16   Estimated Creatinine Clearance: 44.5 mL/min (A) (by C-G formula based on SCr of 2.09 mg/dL (H)).    No results found for: \"AMMONIA\"          No results found for: \"HGBA1C\", \"POCGLU\"  Lab Results   Component Value Date    TSH 4.790 (H) 09/06/2019    FREET4 1.12 09/06/2019     No results found for: \"PREGTESTUR\", \"PREGSERUM\", \"HCG\", \"HCGQUANT\"  Pain Management Panel           No data to display              Brief Urine Lab Results  (Last result in the past 365 days)        Color  " " Clarity   Blood   Leuk Est   Nitrite   Protein   CREAT   Urine HCG        06/16/24 1442 Dark Yellow   Cloudy   Negative   Negative   Negative   30 mg/dL (1+)                 Blood Culture   Date Value Ref Range Status   06/14/2024 No growth at 2 days  Preliminary   06/14/2024 No growth at 2 days  Preliminary     No results found for: \"URINECX\"  No results found for: \"WOUNDCX\"  No results found for: \"STOOLCX\"    I have personally looked at the labs and they are summarized above.  ----------------------------------------------------------------------------------------------------------------------  Detailed radiology reports for the last 24 hours:    Imaging Results (Last 24 Hours)       Procedure Component Value Units Date/Time    CT Abdomen Pelvis With Contrast [[id number]] Collected: 06/16/24 1453     Updated: 06/16/24 1457    Narrative:      INDICATION: Abdominal pain. Rectal pain     COMPARISON: CT from 6/14/2024.     TECHNIQUE: Axial CT images of the abdomen and pelvis were obtained  following IV contrast administration. Coronal and sagittal reformations  were reviewed.     FINDINGS:  Visualized lung bases appear unremarkable.     The liver, gallbladder, spleen, pancreas and adrenal glands appear  unremarkable. No hydronephrosis. Punctate left renal calculus. Small  bilateral renal cysts. Fat-containing umbilical hernia.     Moderate inflammation surrounding the sigmoid colon with wall thickening  and adjacent inflammatory fat stranding. Small to moderate amount of  fluid adjacent to the sigmoid colon. No rim-enhancing drainable abscess  at this time. A few small foci of air likely related to contained  perforation. Small bowel dilation.     Moderate urinary bladder wall thickening. Small fat-containing bilateral  inguinal hernias     Degenerative changes in the spine. No acute osseous abnormality evident.       Impression:      1.  Sigmoid diverticulitis. Moderate inflammation surrounding the  sigmoid colon " with wall thickening and adjacent inflammatory fat  stranding. Small to moderate amount of fluid adjacent to the sigmoid  colon. No rim-enhancing drainable abscess at this time. A few small foci  of air likely related to contained perforation.  2.  Small bowel dilation could relate to ileus from above inflammation  or obstruction. Follow-up recommended.  3.  Moderate urinary bladder wall thickening likely from above  inflammation.        This report was finalized on 6/16/2024 2:55 PM by Alex Pallas, DO.             Final impressions for the last 30 days of radiology reports:    CT Abdomen Pelvis With Contrast    Result Date: 6/16/2024  1.  Sigmoid diverticulitis. Moderate inflammation surrounding the sigmoid colon with wall thickening and adjacent inflammatory fat stranding. Small to moderate amount of fluid adjacent to the sigmoid colon. No rim-enhancing drainable abscess at this time. A few small foci of air likely related to contained perforation. 2.  Small bowel dilation could relate to ileus from above inflammation or obstruction. Follow-up recommended. 3.  Moderate urinary bladder wall thickening likely from above inflammation.   This report was finalized on 6/16/2024 2:55 PM by Alex Pallas, DO.      US Scrotum & Testicles    Result Date: 6/14/2024    The right epididymis is enlarged and shows hypervascularity suggestive of epididymitis.   This report was finalized on 6/14/2024 1:14 PM by Dr. Colby Marley MD.      CT Abdomen Pelvis Without Contrast    Result Date: 6/14/2024  1.  Possibly minimal stranding around sigmoid colon that could represent noncomplicated acute diverticulitis. 2.  Bladder wall thickening which may be due to the decompressed state of the bladder or due to cystitis. 3.  Small umbilical hernia containing only fat.   This report was finalized on 6/14/2024 1:02 PM by Dr. Colby Marley MD.     I have personally looked at the radiology images and read the final radiology report.    Assessment  & Plan      Patient is a 49-year-old male with history significant for nonischemic cardiomyopathy (2018), type 2 diabetes mellitus and hypertension who presented to the ER with complaints of abdominal pain, fevers and poor oral intake.    #Sepsis d/t sigmoid diverticulitis with contained perforation  #Acute intractable abdominal pain  #Possible ileus versus early SBO  --Patient presented w/ 3-day history of worsening abdominal pain, fevers and chills.  He was seen a few days ago in the ER and diagnosed with uncomplicated diverticulitis, discharged with Augmentin which she was compliant with.  Symptoms progressed resulting in representation today.  --Question the role of Mounjaro in this acute presentation  --Admission labs showed WBC 11 K, CRP 44, lactic 2, ESR 82  --Blood cultures x 2  --CT abdomen pelvis 6/14 without contrast noted uncomplicated diverticulitis  --CT A/P with contrast today showed sigmoid diverticulitis with contained perforation, no rim-enhancing drainable abscess, possible SBO vs ileus-->clinically do not suspect obs-no nausea/no active emesis  --Okay for clear liquid diet  --As needed antiemetics, as needed Dilaudid/morphine for breakthrough, p.o. Roxicodone and scheduled Tylenol  --Start LR to 100 cc an hour  --Start Zosyn 3.375 every 6 hours for empiric intra-abdominal coverage  --Stat repeat CT abdomen pelvis with any worsening abdominal pain  --General surgery consulted, appreciate recommendations  --Admit to [facility]    #Nonischemic cardiomyopathy  #Essential hypertension  --patient denies any CP/pressure/tightness-has followed outpatient with cardiology-last appointment noted plans to repeat echo, order Holter and consider stress test but patient had not had these completed  --last echo noted EF 45-50%  --Repeat echo ordered for follow-up  --hold ace/arb d/t renal function  --No role for inpatient stress test at this time as he is asymptomatic  --resume BB, statin, hold lasix  --follows w/  cardiology outpatient    #CKD stage 3a  --last labs noted Cr 1.7 &1.8 earlier this year and in 2023, Cr 2 on admit  --CK ordered  --CT a/p w/out evidence of obstructive uropathy  --Continue IV fluids, repeat in a.m.    #Type 2 diabetes mellitus, SSI, adjust as needed  #Hyperlipidemia, statin  #RANJEET, cpap HS    Checklist:  Antibiotics: zosyn  Steroids: none  DVT ppx: lovenox  GI ppx: ppi  Diet: clears  Code: CPR, full  Risk/dispo: Patient is a high risk due to acute sigmoid diverticulitis with contained perforation.  Anticipate greater than 2 midnight stay.  Disposition expected Home when medically stable.    Stefan Kendall DO  [facility]  06/16/24  16:56 EDT

## 2024-06-16 NOTE — ED PROVIDER NOTES
Subjective   History of Present Illness  49-year-old male that presents to the emergency department chief complaint abdominal pain.  Patient states he has had generalized abdominal pain it has been worsening over the last 4 to 5 days.  Was seen here several days ago and diagnosed with diverticulitis.  Has been taking oral Augmentin as prescribed.  Denies any fever, chills, body aches.  Has had associated nausea, vomiting, diarrhea.    History provided by:  Patient   used: No    Abdominal Pain  Pain location:  Generalized  Pain quality: cramping, fullness, gnawing and sharp    Pain quality: not aching and not bloating    Pain radiates to:  Does not radiate  Pain severity:  Moderate  Onset quality:  Gradual  Duration:  3 days  Timing:  Intermittent  Progression:  Worsening  Chronicity:  New  Context: not awakening from sleep, not diet changes, not eating, not previous surgeries, not recent illness, not recent sexual activity, not retching, not sick contacts and not suspicious food intake    Relieved by:  Nothing  Worsened by:  Nothing  Ineffective treatments:  None tried  Associated symptoms: nausea    Associated symptoms: no anorexia, no chest pain, no chills, no cough, no diarrhea, no fatigue, no fever, no melena, no shortness of breath, no sore throat and no vaginal bleeding    Risk factors: no alcohol abuse, no aspirin use, not elderly, has not had multiple surgeries and no NSAID use        Review of Systems   Constitutional:  Negative for chills, fatigue and fever.   HENT: Negative.  Negative for drooling, ear discharge, ear pain, facial swelling, hearing loss and sore throat.    Eyes: Negative.  Negative for photophobia, redness and itching.   Respiratory: Negative.  Negative for cough, choking, chest tightness and shortness of breath.    Cardiovascular:  Negative for chest pain.   Gastrointestinal:  Positive for abdominal distention, abdominal pain and nausea. Negative for anorexia, diarrhea  and melena.   Genitourinary:  Negative for enuresis, flank pain, frequency, penile discharge and vaginal bleeding.   Musculoskeletal: Negative.  Negative for back pain, gait problem, joint swelling and myalgias.   Hematological: Negative.  Negative for adenopathy. Does not bruise/bleed easily.   Psychiatric/Behavioral: Negative.  Negative for confusion, decreased concentration, dysphoric mood and hallucinations. The patient is not nervous/anxious and is not hyperactive.    All other systems reviewed and are negative.      Past Medical History:   Diagnosis Date    Asthma     childhood    Cardiomyopathy     Diabetes mellitus     Heart murmur     as child    Hyperlipidemia     Hypertension     Palpitation     Sleep apnea     C pap use       No Known Allergies    Past Surgical History:   Procedure Laterality Date    APPENDECTOMY      HAND SURGERY      KNEE ARTHROSCOPY      LUMBAR FUSION      VASECTOMY      WISDOM TOOTH EXTRACTION         Family History   Problem Relation Age of Onset    Heart attack Maternal Grandfather     Heart failure Maternal Grandfather     Heart disease Maternal Grandfather     Arthritis Mother     Diabetes Father     Hearing loss Father     Asthma Maternal Grandmother        Social History     Socioeconomic History    Marital status:    Tobacco Use    Smoking status: Never     Passive exposure: Current    Smokeless tobacco: Current     Types: Snuff   Vaping Use    Vaping status: Never Used   Substance and Sexual Activity    Alcohol use: Not Currently    Drug use: No    Sexual activity: Yes     Partners: Female     Birth control/protection: Surgical           Objective   Physical Exam  Vitals and nursing note reviewed.   Constitutional:       General: He is not in acute distress.     Appearance: He is well-developed and normal weight. He is not ill-appearing, toxic-appearing or diaphoretic.   HENT:      Head: Normocephalic and atraumatic.      Mouth/Throat:      Mouth: Mucous membranes are  moist.      Pharynx: Oropharynx is clear. No pharyngeal swelling or oropharyngeal exudate.   Eyes:      General: No scleral icterus.     Extraocular Movements: Extraocular movements intact.      Pupils: Pupils are equal, round, and reactive to light.   Cardiovascular:      Rate and Rhythm: Normal rate and regular rhythm.      Heart sounds: Normal heart sounds. No murmur heard.     No friction rub. No gallop.   Pulmonary:      Effort: Pulmonary effort is normal. No respiratory distress.      Breath sounds: Normal breath sounds. No stridor. No wheezing, rhonchi or rales.   Abdominal:      General: Abdomen is flat. There is no distension or abdominal bruit. There are no signs of injury.      Palpations: Abdomen is rigid. There is no shifting dullness, fluid wave, hepatomegaly, splenomegaly or mass.      Tenderness: There is abdominal tenderness. There is guarding and rebound.      Hernia: No hernia is present. There is no hernia in the umbilical area, ventral area, left inguinal area, right femoral area or right inguinal area.   Skin:     General: Skin is warm and dry.      Coloration: Skin is not cyanotic, jaundiced, mottled or pale.      Findings: No erythema.   Neurological:      General: No focal deficit present.      Mental Status: He is alert and oriented to person, place, and time.      Cranial Nerves: No cranial nerve deficit.      Motor: No weakness.   Psychiatric:         Mood and Affect: Mood normal. Mood is not anxious or depressed.         Behavior: Behavior normal.         Procedures           ED Course  ED Course as of 06/16/24 1832   Sun Jun 16, 2024   9237 IMPRESSION:  1.  Sigmoid diverticulitis. Moderate inflammation surrounding the  sigmoid colon with wall thickening and adjacent inflammatory fat  stranding. Small to moderate amount of fluid adjacent to the sigmoid  colon. No rim-enhancing drainable abscess at this time. A few small foci  of air likely related to contained perforation.  2.  Small  bowel dilation could relate to ileus from above inflammation  or obstruction. Follow-up recommended.  3.  Moderate urinary bladder wall thickening likely from above  inflammation.   []   1503 Discussed care with Dr. Haque will consult patient.  Start IV cefepime as well as Flagyl. []   1538 Discussed care with Dr. Kendall.  Patient will be admitted for further evaluation. []   1717 EKG notes sinus tachycardia 110 bpm.  No acute ST elevation.  QTc 422.  Electronically signed by Michi Ferrara DO, 06/16/24, 5:17 PM EDT.   [SF]      ED Course User Index  [] Vishnu Hurtado PA-C  [SF] Michi Ferrara DO                                             Medical Decision Making  Problems Addressed:  Diverticulitis of colon with perforation: complicated acute illness or injury    Amount and/or Complexity of Data Reviewed  Labs: ordered.  Radiology: ordered.  ECG/medicine tests: ordered.    Risk  OTC drugs.  Prescription drug management.  Decision regarding hospitalization.        Final diagnoses:   Diverticulitis of colon with perforation       ED Disposition  ED Disposition       ED Disposition   Decision to Admit    Condition   --    Comment   Level of Care: Med/Surg [1]   Diagnosis: Diverticulitis [078625]   Certification: I Certify That Inpatient Hospital Services Are Medically Necessary For Greater Than 2 Midnights                 No follow-up provider specified.       Medication List        ASK your doctor about these medications      cyanocobalamin 1000 MCG/ML injection  Ask about: Which instructions should I use?     furosemide 20 MG tablet  Commonly known as: LASIX  Ask about: Which instructions should I use?     HYDROcodone-acetaminophen 7.5-325 MG per tablet  Commonly known as: NORCO  Ask about: Which instructions should I use?     Tirzepatide 7.5 MG/0.5ML solution pen-injector pen  Commonly known as: MOUNJARO  Ask about: Which instructions should I use?                 Vishnu Hurtado,  NINA  06/16/24 1539       Vishnu Hurtado, NINA  06/16/24 [id number]

## 2024-06-16 NOTE — PLAN OF CARE
Goal Outcome Evaluation:    Pt received from ER this shift, resting in bed with no s/s of distress noted. VSS. IV access maintained. Fluids infusing per order. Antibiotics infusing per orders. Will continue with plan of care.

## 2024-06-17 ENCOUNTER — APPOINTMENT (OUTPATIENT)
Dept: CT IMAGING | Facility: HOSPITAL | Age: 49
DRG: 853 | End: 2024-06-17
Payer: COMMERCIAL

## 2024-06-17 ENCOUNTER — APPOINTMENT (OUTPATIENT)
Dept: CARDIOLOGY | Facility: HOSPITAL | Age: 49
DRG: 853 | End: 2024-06-17
Payer: COMMERCIAL

## 2024-06-17 PROBLEM — K57.20 DIVERTICULITIS OF COLON WITH PERFORATION: Status: ACTIVE | Noted: 2024-06-16

## 2024-06-17 LAB
ALBUMIN SERPL-MCNC: 3.6 G/DL (ref 3.5–5.2)
ALBUMIN/GLOB SERPL: 1 G/DL
ALP SERPL-CCNC: 42 U/L (ref 39–117)
ALT SERPL W P-5'-P-CCNC: 13 U/L (ref 1–41)
ANION GAP SERPL CALCULATED.3IONS-SCNC: 11.6 MMOL/L (ref 5–15)
AST SERPL-CCNC: 15 U/L (ref 1–40)
BASOPHILS # BLD AUTO: 0.05 10*3/MM3 (ref 0–0.2)
BASOPHILS NFR BLD AUTO: 0.5 % (ref 0–1.5)
BILIRUB SERPL-MCNC: 0.3 MG/DL (ref 0–1.2)
BUN SERPL-MCNC: 21 MG/DL (ref 6–20)
BUN/CREAT SERPL: 12.1 (ref 7–25)
CALCIUM SPEC-SCNC: 10.1 MG/DL (ref 8.6–10.5)
CHLORIDE SERPL-SCNC: 103 MMOL/L (ref 98–107)
CO2 SERPL-SCNC: 21.4 MMOL/L (ref 22–29)
CREAT SERPL-MCNC: 1.73 MG/DL (ref 0.76–1.27)
D-LACTATE SERPL-SCNC: 0.9 MMOL/L (ref 0.5–2)
DEPRECATED RDW RBC AUTO: 49.6 FL (ref 37–54)
EGFRCR SERPLBLD CKD-EPI 2021: 48.1 ML/MIN/1.73
EOSINOPHIL # BLD AUTO: 0.44 10*3/MM3 (ref 0–0.4)
EOSINOPHIL NFR BLD AUTO: 4.1 % (ref 0.3–6.2)
ERYTHROCYTE [DISTWIDTH] IN BLOOD BY AUTOMATED COUNT: 14.6 % (ref 12.3–15.4)
GLOBULIN UR ELPH-MCNC: 3.6 GM/DL
GLUCOSE SERPL-MCNC: 99 MG/DL (ref 65–99)
HCT VFR BLD AUTO: 32.6 % (ref 37.5–51)
HGB BLD-MCNC: 10.4 G/DL (ref 13–17.7)
IMM GRANULOCYTES # BLD AUTO: 0.07 10*3/MM3 (ref 0–0.05)
IMM GRANULOCYTES NFR BLD AUTO: 0.6 % (ref 0–0.5)
INR PPP: 1.19 (ref 0.9–1.1)
LYMPHOCYTES # BLD AUTO: 1.53 10*3/MM3 (ref 0.7–3.1)
LYMPHOCYTES NFR BLD AUTO: 14.2 % (ref 19.6–45.3)
MCH RBC QN AUTO: 29.3 PG (ref 26.6–33)
MCHC RBC AUTO-ENTMCNC: 31.9 G/DL (ref 31.5–35.7)
MCV RBC AUTO: 91.8 FL (ref 79–97)
MONOCYTES # BLD AUTO: 0.6 10*3/MM3 (ref 0.1–0.9)
MONOCYTES NFR BLD AUTO: 5.6 % (ref 5–12)
NEUTROPHILS NFR BLD AUTO: 75 % (ref 42.7–76)
NEUTROPHILS NFR BLD AUTO: 8.08 10*3/MM3 (ref 1.7–7)
NRBC BLD AUTO-RTO: 0 /100 WBC (ref 0–0.2)
PLATELET # BLD AUTO: 258 10*3/MM3 (ref 140–450)
PMV BLD AUTO: 11.3 FL (ref 6–12)
POTASSIUM SERPL-SCNC: 3.5 MMOL/L (ref 3.5–5.2)
PROT SERPL-MCNC: 7.2 G/DL (ref 6–8.5)
PROTHROMBIN TIME: 15.2 SECONDS (ref 12.1–14.7)
RBC # BLD AUTO: 3.55 10*6/MM3 (ref 4.14–5.8)
SODIUM SERPL-SCNC: 136 MMOL/L (ref 136–145)
WBC NRBC COR # BLD AUTO: 10.77 10*3/MM3 (ref 3.4–10.8)

## 2024-06-17 PROCEDURE — 74176 CT ABD & PELVIS W/O CONTRAST: CPT

## 2024-06-17 PROCEDURE — 74176 CT ABD & PELVIS W/O CONTRAST: CPT | Performed by: RADIOLOGY

## 2024-06-17 PROCEDURE — 25010000002 PIPERACILLIN SOD-TAZOBACTAM PER 1 G

## 2024-06-17 PROCEDURE — 85025 COMPLETE CBC W/AUTO DIFF WBC: CPT | Performed by: STUDENT IN AN ORGANIZED HEALTH CARE EDUCATION/TRAINING PROGRAM

## 2024-06-17 PROCEDURE — 99221 1ST HOSP IP/OBS SF/LOW 40: CPT | Performed by: SURGERY

## 2024-06-17 PROCEDURE — 85610 PROTHROMBIN TIME: CPT | Performed by: STUDENT IN AN ORGANIZED HEALTH CARE EDUCATION/TRAINING PROGRAM

## 2024-06-17 PROCEDURE — 87040 BLOOD CULTURE FOR BACTERIA: CPT | Performed by: INTERNAL MEDICINE

## 2024-06-17 PROCEDURE — 83605 ASSAY OF LACTIC ACID: CPT | Performed by: INTERNAL MEDICINE

## 2024-06-17 PROCEDURE — 36415 COLL VENOUS BLD VENIPUNCTURE: CPT | Performed by: STUDENT IN AN ORGANIZED HEALTH CARE EDUCATION/TRAINING PROGRAM

## 2024-06-17 PROCEDURE — 25810000003 LACTATED RINGERS PER 1000 ML: Performed by: STUDENT IN AN ORGANIZED HEALTH CARE EDUCATION/TRAINING PROGRAM

## 2024-06-17 PROCEDURE — 25010000002 PIPERACILLIN SOD-TAZOBACTAM PER 1 G: Performed by: STUDENT IN AN ORGANIZED HEALTH CARE EDUCATION/TRAINING PROGRAM

## 2024-06-17 PROCEDURE — 25010000002 CYANOCOBALAMIN PER 1000 MCG: Performed by: INTERNAL MEDICINE

## 2024-06-17 PROCEDURE — 25010000002 HYDROMORPHONE 1 MG/ML SOLUTION: Performed by: STUDENT IN AN ORGANIZED HEALTH CARE EDUCATION/TRAINING PROGRAM

## 2024-06-17 PROCEDURE — 80053 COMPREHEN METABOLIC PANEL: CPT | Performed by: STUDENT IN AN ORGANIZED HEALTH CARE EDUCATION/TRAINING PROGRAM

## 2024-06-17 PROCEDURE — 99232 SBSQ HOSP IP/OBS MODERATE 35: CPT

## 2024-06-17 RX ORDER — ONDANSETRON 4 MG/1
4 TABLET, ORALLY DISINTEGRATING ORAL EVERY 8 HOURS PRN
Status: DISCONTINUED | OUTPATIENT
Start: 2024-06-17 | End: 2024-06-24 | Stop reason: HOSPADM

## 2024-06-17 RX ORDER — FUROSEMIDE 20 MG/1
20 TABLET ORAL DAILY
Status: DISCONTINUED | OUTPATIENT
Start: 2024-06-17 | End: 2024-06-18

## 2024-06-17 RX ORDER — ANASTROZOLE 1 MG/1
1 TABLET ORAL DAILY
Status: DISCONTINUED | OUTPATIENT
Start: 2024-06-17 | End: 2024-06-24 | Stop reason: HOSPADM

## 2024-06-17 RX ORDER — FOLIC ACID 1 MG/1
1 TABLET ORAL DAILY
Status: DISCONTINUED | OUTPATIENT
Start: 2024-06-17 | End: 2024-06-24 | Stop reason: HOSPADM

## 2024-06-17 RX ORDER — TOPIRAMATE 25 MG/1
100 TABLET ORAL 2 TIMES DAILY
Status: DISCONTINUED | OUTPATIENT
Start: 2024-06-17 | End: 2024-06-24 | Stop reason: HOSPADM

## 2024-06-17 RX ORDER — CETIRIZINE HYDROCHLORIDE 10 MG/1
10 TABLET ORAL DAILY
Status: DISCONTINUED | OUTPATIENT
Start: 2024-06-17 | End: 2024-06-24 | Stop reason: HOSPADM

## 2024-06-17 RX ORDER — METOPROLOL TARTRATE 50 MG/1
50 TABLET, FILM COATED ORAL 2 TIMES DAILY
Status: DISCONTINUED | OUTPATIENT
Start: 2024-06-17 | End: 2024-06-24 | Stop reason: HOSPADM

## 2024-06-17 RX ORDER — CYANOCOBALAMIN 1000 UG/ML
1000 INJECTION, SOLUTION INTRAMUSCULAR; SUBCUTANEOUS
Status: DISCONTINUED | OUTPATIENT
Start: 2024-06-17 | End: 2024-06-24 | Stop reason: HOSPADM

## 2024-06-17 RX ORDER — TESTOSTERONE CYPIONATE 200 MG/ML
100 INJECTION, SOLUTION INTRAMUSCULAR
Status: DISCONTINUED | OUTPATIENT
Start: 2024-06-21 | End: 2024-06-24 | Stop reason: HOSPADM

## 2024-06-17 RX ADMIN — HYDROMORPHONE HYDROCHLORIDE 1 MG: 1 INJECTION, SOLUTION INTRAMUSCULAR; INTRAVENOUS; SUBCUTANEOUS at 06:49

## 2024-06-17 RX ADMIN — ACETAMINOPHEN 1000 MG: 500 TABLET ORAL at 21:55

## 2024-06-17 RX ADMIN — TOPIRAMATE 100 MG: 100 TABLET, FILM COATED ORAL at 09:08

## 2024-06-17 RX ADMIN — PIPERACILLIN AND TAZOBACTAM 3.38 G: 3; .375 INJECTION, POWDER, FOR SOLUTION INTRAVENOUS at 06:49

## 2024-06-17 RX ADMIN — METOPROLOL TARTRATE 50 MG: 50 TABLET, FILM COATED ORAL at 21:54

## 2024-06-17 RX ADMIN — CETIRIZINE HYDROCHLORIDE 10 MG: 10 TABLET, FILM COATED ORAL at 09:08

## 2024-06-17 RX ADMIN — CYANOCOBALAMIN 1000 MCG: 1000 INJECTION, SOLUTION INTRAMUSCULAR at 11:30

## 2024-06-17 RX ADMIN — HYDROMORPHONE HYDROCHLORIDE 1 MG: 1 INJECTION, SOLUTION INTRAMUSCULAR; INTRAVENOUS; SUBCUTANEOUS at 11:32

## 2024-06-17 RX ADMIN — PIPERACILLIN AND TAZOBACTAM 3.38 G: 3; .375 INJECTION, POWDER, FOR SOLUTION INTRAVENOUS at 23:15

## 2024-06-17 RX ADMIN — Medication 1 MG: at 09:08

## 2024-06-17 RX ADMIN — OXYCODONE HYDROCHLORIDE 5 MG: 5 TABLET ORAL at 23:15

## 2024-06-17 RX ADMIN — OXYCODONE HYDROCHLORIDE 5 MG: 5 TABLET ORAL at 04:48

## 2024-06-17 RX ADMIN — HYDROMORPHONE HYDROCHLORIDE 1 MG: 1 INJECTION, SOLUTION INTRAMUSCULAR; INTRAVENOUS; SUBCUTANEOUS at 13:25

## 2024-06-17 RX ADMIN — ANASTROZOLE 1 MG: 1 TABLET, COATED ORAL at 09:08

## 2024-06-17 RX ADMIN — HYDROMORPHONE HYDROCHLORIDE 1 MG: 1 INJECTION, SOLUTION INTRAMUSCULAR; INTRAVENOUS; SUBCUTANEOUS at 19:38

## 2024-06-17 RX ADMIN — METOPROLOL TARTRATE 50 MG: 50 TABLET, FILM COATED ORAL at 09:08

## 2024-06-17 RX ADMIN — OXYCODONE HYDROCHLORIDE 5 MG: 5 TABLET ORAL at 19:36

## 2024-06-17 RX ADMIN — Medication 10 ML: at 21:55

## 2024-06-17 RX ADMIN — Medication 10 ML: at 09:01

## 2024-06-17 RX ADMIN — HYDROMORPHONE HYDROCHLORIDE 1 MG: 1 INJECTION, SOLUTION INTRAMUSCULAR; INTRAVENOUS; SUBCUTANEOUS at 16:03

## 2024-06-17 RX ADMIN — HYDROMORPHONE HYDROCHLORIDE 1 MG: 1 INJECTION, SOLUTION INTRAMUSCULAR; INTRAVENOUS; SUBCUTANEOUS at 01:42

## 2024-06-17 RX ADMIN — OXYCODONE HYDROCHLORIDE 5 MG: 5 TABLET ORAL at 00:05

## 2024-06-17 RX ADMIN — PANTOPRAZOLE SODIUM 40 MG: 40 INJECTION, POWDER, FOR SOLUTION INTRAVENOUS at 06:49

## 2024-06-17 RX ADMIN — HYDROMORPHONE HYDROCHLORIDE 1 MG: 1 INJECTION, SOLUTION INTRAMUSCULAR; INTRAVENOUS; SUBCUTANEOUS at 21:55

## 2024-06-17 RX ADMIN — SODIUM CHLORIDE, POTASSIUM CHLORIDE, SODIUM LACTATE AND CALCIUM CHLORIDE 100 ML/HR: 600; 310; 30; 20 INJECTION, SOLUTION INTRAVENOUS at 14:18

## 2024-06-17 RX ADMIN — ROSUVASTATIN CALCIUM 10 MG: 10 TABLET, FILM COATED ORAL at 09:08

## 2024-06-17 RX ADMIN — PIPERACILLIN AND TAZOBACTAM 3.38 G: 3; .375 INJECTION, POWDER, FOR SOLUTION INTRAVENOUS at 15:23

## 2024-06-17 RX ADMIN — ACETAMINOPHEN 1000 MG: 500 TABLET ORAL at 09:08

## 2024-06-17 RX ADMIN — HYDROMORPHONE HYDROCHLORIDE 1 MG: 1 INJECTION, SOLUTION INTRAMUSCULAR; INTRAVENOUS; SUBCUTANEOUS at 09:01

## 2024-06-17 RX ADMIN — ACETAMINOPHEN 1000 MG: 500 TABLET ORAL at 14:28

## 2024-06-17 RX ADMIN — TOPIRAMATE 100 MG: 100 TABLET, FILM COATED ORAL at 21:55

## 2024-06-17 RX ADMIN — PIPERACILLIN AND TAZOBACTAM 3.38 G: 3; .375 INJECTION, POWDER, FOR SOLUTION INTRAVENOUS at 01:09

## 2024-06-17 RX ADMIN — OXYCODONE HYDROCHLORIDE 5 MG: 5 TABLET ORAL at 14:29

## 2024-06-17 NOTE — PAYOR COMM NOTE
"CONTACT:  PAGE WHITT, RN  UTILIZATION MANAGEMENT DEPT.   [facility]   [address] [facility], [address]   PHONE:  [phone]   FAX: [phone]         INPATIENT AUTH REQUEST        Samson Carbajal (49 y.o. Male)       Date of Birth   1975    Social Security Number       Address   [address] [address]    Home Phone   [phone]    MRN   [id number]       Sikhism   [facility]    Marital Status                               Admission Date   6/16/24    Admission Type   Emergency    Admitting Provider   Stefan Carbajal DO    Attending Provider   Luc Rowland DO    Department, Room/Bed   [facility], [address]/       Discharge Date       Discharge Disposition       Discharge Destination                                 Attending Provider: Luc Rowland DO    Allergies: No Known Allergies    Isolation: None   Infection: None   Code Status: CPR    Ht: 167.6 cm (65.98\")   Wt: 85.7 kg (188 lb 14.4 oz)    Admission Cmt: None   Principal Problem: Diverticulitis [K57.92]                   Active Insurance as of 6/16/2024       Primary Coverage       Payor Plan Insurance Group Employer/Plan Group    [employer] EM[employer] [id number]       Payor Plan Address Payor Plan Phone Number Payor Plan Fax Number Effective Dates    [address] [phone]  11/1/2019 - None Entered    [place] [address]         Subscriber Name Subscriber Birth Date Member ID       SAMSON CARBAJAL 1975 [id number]                     Emergency Contacts        (Rel.) Home Phone Work Phone Mobile Phone    Roxanne Carbajal (Mother) [phone] -- --    CLARA BELLAMY (Spouse) [phone] -- --                 History & Physical        Stefan Carbajal DO at 06/16/24 [facility]              [facility] HOSPITALIST HISTORY AND PHYSICAL    Patient Identification:  Name:  Samson Carbajal  Age:  49 y.o.  Sex:  " male  :  1975  MRN:  [id number]   Admit Date: 2024   Visit Number:  [id number]  Room number:  104/04  Primary Care Physician:  Rosalina Pandya APRN     Subjective     Chief complaint:    Chief Complaint   Patient presents with    Abdominal Pain    Rectal Pain       History of presenting illness:   Patient is a 49-year-old male with history significant for nonischemic cardiomyopathy (2018), type 2 diabetes mellitus and hypertension who presented to the ER with complaints of abdominal pain, fevers and poor oral intake.  Patient was seen in the ER few days ago and diagnosed with uncomplicated diverticulitis.  He was discharged with appropriate Augmentin therapy.  He says since being home he has had decreased oral intake, increasing abdominal pain Tmax of 102 earlier this morning.  He states today his abdominal pain became significantly worsened which prompted his presentation today to the ER.  He states he has been compliant with the outpatient antibiotics he was prescribed earlier this week but symptoms seem to worsen.  He says his pain currently is better and this is the first time he has been able to get relief today after receiving multiple doses of IV opiates.    In the ER, labs noted BUN/creatinine of 29/2, CRP of 44, WBC of 11 K and a CT abdomen pelvis with contrast that noted sigmoid diverticulitis with contained perforation.  No obvious drainable abscess was noted at this time.  He received several doses of IV opiates, cefepime and Flagyl.  General surgery was consulted from the ER who recommended conservative treatment for now and close monitoring.    Of note, he does take Mounjaro.    ---------------------------------------------------------------------------------------------------------------------   Review of Systems   Constitutional:  Positive for chills and fever. Negative for fatigue.   HENT:  Negative for congestion, sinus pain and sore throat.    Respiratory:  Negative for cough, chest  tightness, shortness of breath and wheezing.    Cardiovascular:  Negative for chest pain, palpitations and leg swelling.   Gastrointestinal:  Positive for abdominal pain. Negative for constipation, diarrhea, nausea and vomiting.   Genitourinary:  Negative for dysuria, frequency, hematuria and urgency.   Musculoskeletal:  Negative for arthralgias and myalgias.   Neurological:  Negative for dizziness, numbness and headaches.   Psychiatric/Behavioral:  Negative for confusion.      ---------------------------------------------------------------------------------------------------------------------   Past Medical History:   Diagnosis Date    Asthma     childhood    Cardiomyopathy     Diabetes mellitus     Heart murmur     as child    Hyperlipidemia     Hypertension     Palpitation     Sleep apnea     C pap use     Past Surgical History:   Procedure Laterality Date    APPENDECTOMY      HAND SURGERY      KNEE ARTHROSCOPY      LUMBAR FUSION      VASECTOMY      WISDOM TOOTH EXTRACTION       Family History   Problem Relation Age of Onset    Heart attack Maternal Grandfather     Heart failure Maternal Grandfather     Heart disease Maternal Grandfather     Arthritis Mother     Diabetes Father     Hearing loss Father     Asthma Maternal Grandmother      Social History     Socioeconomic History    Marital status:    Tobacco Use    Smoking status: Never     Passive exposure: Current    Smokeless tobacco: Current     Types: Snuff   Vaping Use    Vaping status: Never Used   Substance and Sexual Activity    Alcohol use: Not Currently    Drug use: No    Sexual activity: Yes     Partners: Female     Birth control/protection: Surgical, Same-sex partner     ---------------------------------------------------------------------------------------------------------------------   Allergies:  Patient has no known  allergies.  ---------------------------------------------------------------------------------------------------------------------   Medications below are reported home medications pulling from within the system; at this time, these medications have not been reconciled unless otherwise specified and are in the verification process for further verifcation as current home medications.    Prior to Admission Medications       Prescriptions Last Dose Informant Patient Reported? Taking?    amoxicillin-clavulanate (AUGMENTIN) 875-125 MG per tablet   No No    Take 1 tablet by mouth 2 (Two) Times a Day for 10 days.    Calcium-Magnesium-Zinc (OMERO-MAG-ZINC PO)   Yes No    Take  by mouth 3 (Three) Times a Day.    Cyanocobalamin (VITAMIN B-12 IJ)   Yes No    Inject  as directed.    dexlansoprazole (DEXILANT) 60 MG capsule  Pharmacy Yes No    Take 1 capsule by mouth Daily.    doxycycline (MONODOX) 100 MG capsule   No No    Take 1 capsule by mouth 2 (Two) Times a Day for 10 days.    fenofibrate (TRICOR) 145 MG tablet   Yes No    Take 1 tablet by mouth Daily.    furosemide (LASIX) 20 MG tablet   Yes No    Take 20 mg by mouth Daily.    Patient not taking:  Reported on 3/19/2024    hydroCHLOROthiazide (HYDRODIURIL) 12.5 MG tablet   Yes No    Take 12.5 mg by mouth Daily.    Patient not taking:  Reported on 3/19/2024    HYDROcodone-acetaminophen (NORCO) 5-325 MG per tablet   No No    Take 1 tablet by mouth Every 6 (Six) Hours As Needed for Severe Pain.    HYDROcodone-acetaminophen (NORCO) 7.5-325 MG per tablet  Self Yes No    Take 1 tablet by mouth Daily As Needed for Moderate Pain.    meloxicam (MOBIC) 15 MG tablet   Yes No    Take 1 tablet by mouth Daily.    methylPREDNISolone (MEDROL) 4 MG dose pack   No No    Take as directed on package instructions.    metoprolol succinate XL (TOPROL-XL) 100 MG 24 hr tablet   No No    Take 1 tablet by mouth Daily. for blood pressure    metoprolol succinate XL (TOPROL-XL) 50 MG 24 hr tablet   Yes No     Take 1 tablet by mouth Daily.    multivitamin (MULTI-VITAMIN PO)   Yes No    Take  by mouth Daily.    ondansetron ODT (ZOFRAN-ODT) 4 MG disintegrating tablet   No No    Place 1 tablet on the tongue Every 8 (Eight) Hours As Needed for Vomiting.    potassium chloride 10 MEQ CR tablet   Yes No    Take 10 mEq by mouth 2 (Two) Times a Day.    Patient not taking:  Reported on 3/19/2024    rosuvastatin (CRESTOR) 10 MG tablet   No No    Take 1 tablet by mouth Daily.    sildenafil (REVATIO) 20 MG tablet   Yes No    Take 1 tablet by mouth.    Testosterone Cypionate (DEPOTESTOTERONE CYPIONATE) 200 MG/ML injection   Yes No    inject 1 cc by intramuscular route every  2 weeks    Tirzepatide (Mounjaro) 5 MG/0.5ML solution pen-injector   Yes No    Inject  under the skin into the appropriate area as directed.    Tirzepatide (Mounjaro) 5 MG/0.5ML solution pen-injector   No No    Inject 5MG by subcutaneous route  once every week.    topiramate (TOPAMAX) 100 MG tablet   Yes No    Take 1 tablet by mouth 2 (Two) Times a Day.    valsartan (DIOVAN) 40 MG tablet   No No    Take 1 tablet by mouth Daily.    Patient not taking:  Reported on 3/19/2024    vitamin D (ERGOCALCIFEROL) 1.25 MG (14198 UT) capsule capsule   Yes No    TAKE ONE CAPSULE BY MOUTH ONCE EVERY WEEK FOR VITAMIN D DEFICIENCY    Patient not taking:  Reported on 3/19/2024          Objective     Vital Signs:  Temp:  [99.4 °F (37.4 °C)] 99.4 °F (37.4 °C)  Heart Rate:  [] 104  Resp:  [14] 14  BP: (110-147)/(37-88) 147/88    Mean Arterial Pressure (Non-Invasive) for the past 24 hrs (Last 3 readings):   Noninvasive MAP (mmHg)   06/16/24 1545 104   06/16/24 1530 91   06/16/24 1515 95     SpO2:  [97 %-100 %] 100 %  on   ;   Device (Oxygen Therapy): room air  Body mass index is 30.67 kg/m².    Wt Readings from Last 3 Encounters:   06/16/24 86.2 kg (190 lb)   06/14/24 86.2 kg (190 lb)   03/19/24 84.8 kg (187 lb)       ---------------------------------------------------------------------------------------------------------------------   Physical Exam:  Constitutional: Middle-age male, awake, alert, nontoxic, well-developed and well-nourished.  No respiratory distress.      HENT:  Head: Normocephalic and atraumatic.  Mouth:  Moist mucous membranes.    Eyes:  Conjunctivae and EOM are normal.  No scleral icterus.  Neck:  Neck supple.  No JVD present.    Cardiovascular: Tachycardic, regular rhythm and normal heart sounds with no murmur.  Pulmonary/Chest:  No respiratory distress, no wheezes, no crackles, with normal breath sounds and good air movement.  Abdominal: Mildly distended, generalized tenderness to palpation, no guarding or rebound, he does not have an acute abdomen  Musculoskeletal:  No tenderness, and no deformity.  No red or swollen joints anywhere.    Neurological:  Alert and oriented to person, place, and time.  No cranial nerve deficit.  No tongue deviation.  No facial droop.  No slurred speech.   Skin:  Skin is warm and dry.  No rash noted.  No pallor.   Peripheral vascular:  No edema and pulses on all 4 extremities.    ---------------------------------------------------------------------------------------------------------------------  EKG: Ordered  ECG 12 Lead Pre-Op / Pre-Procedure    (Results Pending)       Telemetry: Reviewed    I have personally looked at both the EKG and the telemetry strips.    Last echocardiogram:  Results for orders placed during the hospital encounter of 09/05/19    Adult Transthoracic Echo Complete With Contrast if Necessary Per Protocol    Interpretation Summary  · Normal left ventricular cavity size and wall thickness noted. There is left ventricular global hypokinesis noted.  · Left ventricular systolic function is mildly decreased  · Estimated EF appears to be in the range of 46 - 50%  · The aortic valve is structurally normal. No aortic valve regurgitation is present. No aortic valve  "stenosis is present.  · The mitral valve is normal in structure. No mitral valve regurgitation is present. No significant mitral valve stenosis is present.  · The tricuspid valve is normal. No evidence of tricuspid valve stenosis is present. Mild tricuspid valve regurgitation is present. Estimated right ventricular systolic pressure from tricuspid regurgitation is normal (<35 mmHg).  · There is no evidence of pericardial effusion.  · No previous studies available for comparison    --------------------------------------------------------------------------------------------------------------------  Labs:  Results from last 7 days   Lab Units 06/16/24  1615 06/16/24  1344 06/14/24  1313 06/14/24  1217   PROCALCITONIN ng/mL  --   --  0.25  --    LACTATE mmol/L 2.0  --  0.7  --    SED RATE mm/hr  --  82*  --  22*   CRP mg/dL  --  44.82*  --  10.71*   WBC 10*3/mm3  --  11.05*  --  12.75*   HEMOGLOBIN g/dL  --  10.7*  --  11.8*   HEMATOCRIT %  --  33.9*  --  35.8*   MCV fL  --  91.6  --  89.3   MCHC g/dL  --  31.6  --  33.0   PLATELETS 10*3/mm3  --  241  --  275         Results from last 7 days   Lab Units 06/16/24  1344 06/14/24  1217   SODIUM mmol/L 137 138   POTASSIUM mmol/L 4.1 4.1   CHLORIDE mmol/L 103 104   CO2 mmol/L 22.6 20.3*   BUN mg/dL 29* 34*   CREATININE mg/dL 2.09* 1.85*   CALCIUM mg/dL 10.1 9.8   GLUCOSE mg/dL 108* 120*   ALBUMIN g/dL 3.7 4.5   BILIRUBIN mg/dL 0.3 0.3   ALK PHOS U/L 50 43   AST (SGOT) U/L 14 20   ALT (SGPT) U/L 13 16   Estimated Creatinine Clearance: 44.5 mL/min (A) (by C-G formula based on SCr of 2.09 mg/dL (H)).    No results found for: \"AMMONIA\"          No results found for: \"HGBA1C\", \"POCGLU\"  Lab Results   Component Value Date    TSH 4.790 (H) 09/06/2019    FREET4 1.12 09/06/2019     No results found for: \"PREGTESTUR\", \"PREGSERUM\", \"HCG\", \"HCGQUANT\"  Pain Management Panel           No data to display              Brief Urine Lab Results  (Last result in the past 365 days)        Color  " " Clarity   Blood   Leuk Est   Nitrite   Protein   CREAT   Urine HCG        06/16/24 1442 Dark Yellow   Cloudy   Negative   Negative   Negative   30 mg/dL (1+)                 Blood Culture   Date Value Ref Range Status   06/14/2024 No growth at 2 days  Preliminary   06/14/2024 No growth at 2 days  Preliminary     No results found for: \"URINECX\"  No results found for: \"WOUNDCX\"  No results found for: \"STOOLCX\"    I have personally looked at the labs and they are summarized above.  ----------------------------------------------------------------------------------------------------------------------  Detailed radiology reports for the last 24 hours:    Imaging Results (Last 24 Hours)       Procedure Component Value Units Date/Time    CT Abdomen Pelvis With Contrast [[id number]] Collected: 06/16/24 1453     Updated: 06/16/24 1457    Narrative:      INDICATION: Abdominal pain. Rectal pain     COMPARISON: CT from 6/14/2024.     TECHNIQUE: Axial CT images of the abdomen and pelvis were obtained  following IV contrast administration. Coronal and sagittal reformations  were reviewed.     FINDINGS:  Visualized lung bases appear unremarkable.     The liver, gallbladder, spleen, pancreas and adrenal glands appear  unremarkable. No hydronephrosis. Punctate left renal calculus. Small  bilateral renal cysts. Fat-containing umbilical hernia.     Moderate inflammation surrounding the sigmoid colon with wall thickening  and adjacent inflammatory fat stranding. Small to moderate amount of  fluid adjacent to the sigmoid colon. No rim-enhancing drainable abscess  at this time. A few small foci of air likely related to contained  perforation. Small bowel dilation.     Moderate urinary bladder wall thickening. Small fat-containing bilateral  inguinal hernias     Degenerative changes in the spine. No acute osseous abnormality evident.       Impression:      1.  Sigmoid diverticulitis. Moderate inflammation surrounding the  sigmoid colon " with wall thickening and adjacent inflammatory fat  stranding. Small to moderate amount of fluid adjacent to the sigmoid  colon. No rim-enhancing drainable abscess at this time. A few small foci  of air likely related to contained perforation.  2.  Small bowel dilation could relate to ileus from above inflammation  or obstruction. Follow-up recommended.  3.  Moderate urinary bladder wall thickening likely from above  inflammation.        This report was finalized on 6/16/2024 2:55 PM by Alex Pallas, DO.             Final impressions for the last 30 days of radiology reports:    CT Abdomen Pelvis With Contrast    Result Date: 6/16/2024  1.  Sigmoid diverticulitis. Moderate inflammation surrounding the sigmoid colon with wall thickening and adjacent inflammatory fat stranding. Small to moderate amount of fluid adjacent to the sigmoid colon. No rim-enhancing drainable abscess at this time. A few small foci of air likely related to contained perforation. 2.  Small bowel dilation could relate to ileus from above inflammation or obstruction. Follow-up recommended. 3.  Moderate urinary bladder wall thickening likely from above inflammation.   This report was finalized on 6/16/2024 2:55 PM by Alex Pallas, DO.      US Scrotum & Testicles    Result Date: 6/14/2024    The right epididymis is enlarged and shows hypervascularity suggestive of epididymitis.   This report was finalized on 6/14/2024 1:14 PM by Dr. Colby Marley MD.      CT Abdomen Pelvis Without Contrast    Result Date: 6/14/2024  1.  Possibly minimal stranding around sigmoid colon that could represent noncomplicated acute diverticulitis. 2.  Bladder wall thickening which may be due to the decompressed state of the bladder or due to cystitis. 3.  Small umbilical hernia containing only fat.   This report was finalized on 6/14/2024 1:02 PM by Dr. Colby Marley MD.     I have personally looked at the radiology images and read the final radiology report.    Assessment  & Plan      Patient is a 49-year-old male with history significant for nonischemic cardiomyopathy (2018), type 2 diabetes mellitus and hypertension who presented to the ER with complaints of abdominal pain, fevers and poor oral intake.    #Sepsis d/t sigmoid diverticulitis with contained perforation  #Acute intractable abdominal pain  #Possible ileus versus early SBO  --Patient presented w/ 3-day history of worsening abdominal pain, fevers and chills.  He was seen a few days ago in the ER and diagnosed with uncomplicated diverticulitis, discharged with Augmentin which she was compliant with.  Symptoms progressed resulting in representation today.  --Question the role of Mounjaro in this acute presentation  --Admission labs showed WBC 11 K, CRP 44, lactic 2, ESR 82  --Blood cultures x 2  --CT abdomen pelvis 6/14 without contrast noted uncomplicated diverticulitis  --CT A/P with contrast today showed sigmoid diverticulitis with contained perforation, no rim-enhancing drainable abscess, possible SBO vs ileus-->clinically do not suspect obs-no nausea/no active emesis  --Okay for clear liquid diet  --As needed antiemetics, as needed Dilaudid/morphine for breakthrough, p.o. Roxicodone and scheduled Tylenol  --Start LR to 100 cc an hour  --Start Zosyn 3.375 every 6 hours for empiric intra-abdominal coverage  --Stat repeat CT abdomen pelvis with any worsening abdominal pain  --General surgery consulted, appreciate recommendations  --Admit to [facility]    #Nonischemic cardiomyopathy  #Essential hypertension  --patient denies any CP/pressure/tightness-has followed outpatient with cardiology-last appointment noted plans to repeat echo, order Holter and consider stress test but patient had not had these completed  --last echo noted EF 45-50%  --Repeat echo ordered for follow-up  --hold ace/arb d/t renal function  --No role for inpatient stress test at this time as he is asymptomatic  --resume BB, statin, hold lasix  --follows w/  cardiology outpatient    #CKD stage 3a  --last labs noted Cr 1.7 &1.8 earlier this year and in 2023, Cr 2 on admit  --CK ordered  --CT a/p w/out evidence of obstructive uropathy  --Continue IV fluids, repeat in a.m.    #Type 2 diabetes mellitus, SSI, adjust as needed  #Hyperlipidemia, statin  #RANJEET, cpap HS    Checklist:  Antibiotics: zosyn  Steroids: none  DVT ppx: lovenox  GI ppx: ppi  Diet: clears  Code: CPR, full  Risk/dispo: Patient is a high risk due to acute sigmoid diverticulitis with contained perforation.  Anticipate greater than 2 midnight stay.  Disposition expected Home when medically stable.    Stefan Kendall DO  [facility]  06/16/24  16:56 EDT      Electronically signed by Stefan Kendall DO at 06/16/24 1709          Emergency Department Notes        Ana Trevizo RN at 06/16/24 1727          Pt transported to [address] [place] [address] by tech. Pt A&Ox4, VSS.    Electronically signed by Ana Trevizo RN at 06/16/24 1729       Vishnu Hurtado PA-C at 06/16/24 1316          Subjective   History of Present Illness  49-year-old male that presents to the emergency department chief complaint abdominal pain.  Patient states he has had generalized abdominal pain it has been worsening over the last 4 to 5 days.  Was seen here several days ago and diagnosed with diverticulitis.  Has been taking oral Augmentin as prescribed.  Denies any fever, chills, body aches.  Has had associated nausea, vomiting, diarrhea.    History provided by:  Patient   used: No    Abdominal Pain  Pain location:  Generalized  Pain quality: cramping, fullness, gnawing and sharp    Pain quality: not aching and not bloating    Pain radiates to:  Does not radiate  Pain severity:  Moderate  Onset quality:  Gradual  Duration:  3 days  Timing:  Intermittent  Progression:  Worsening  Chronicity:  New  Context: not awakening from sleep, not diet changes, not eating, not previous surgeries, not  recent illness, not recent sexual activity, not retching, not sick contacts and not suspicious food intake    Relieved by:  Nothing  Worsened by:  Nothing  Ineffective treatments:  None tried  Associated symptoms: nausea    Associated symptoms: no anorexia, no chest pain, no chills, no cough, no diarrhea, no fatigue, no fever, no melena, no shortness of breath, no sore throat and no vaginal bleeding    Risk factors: no alcohol abuse, no aspirin use, not elderly, has not had multiple surgeries and no NSAID use        Review of Systems   Constitutional:  Negative for chills, fatigue and fever.   HENT: Negative.  Negative for drooling, ear discharge, ear pain, facial swelling, hearing loss and sore throat.    Eyes: Negative.  Negative for photophobia, redness and itching.   Respiratory: Negative.  Negative for cough, choking, chest tightness and shortness of breath.    Cardiovascular:  Negative for chest pain.   Gastrointestinal:  Positive for abdominal distention, abdominal pain and nausea. Negative for anorexia, diarrhea and melena.   Genitourinary:  Negative for enuresis, flank pain, frequency, penile discharge and vaginal bleeding.   Musculoskeletal: Negative.  Negative for back pain, gait problem, joint swelling and myalgias.   Hematological: Negative.  Negative for adenopathy. Does not bruise/bleed easily.   Psychiatric/Behavioral: Negative.  Negative for confusion, decreased concentration, dysphoric mood and hallucinations. The patient is not nervous/anxious and is not hyperactive.    All other systems reviewed and are negative.      Past Medical History:   Diagnosis Date    Asthma     childhood    Cardiomyopathy     Diabetes mellitus     Heart murmur     as child    Hyperlipidemia     Hypertension     Palpitation     Sleep apnea     C pap use       No Known Allergies    Past Surgical History:   Procedure Laterality Date    APPENDECTOMY      HAND SURGERY      KNEE ARTHROSCOPY      LUMBAR FUSION      VASECTOMY       WISDOM TOOTH EXTRACTION         Family History   Problem Relation Age of Onset    Heart attack Maternal Grandfather     Heart failure Maternal Grandfather     Heart disease Maternal Grandfather     Arthritis Mother     Diabetes Father     Hearing loss Father     Asthma Maternal Grandmother        Social History     Socioeconomic History    Marital status:    Tobacco Use    Smoking status: Never     Passive exposure: Current    Smokeless tobacco: Current     Types: Snuff   Vaping Use    Vaping status: Never Used   Substance and Sexual Activity    Alcohol use: Not Currently    Drug use: No    Sexual activity: Yes     Partners: Female     Birth control/protection: Surgical           Objective   Physical Exam  Vitals and nursing note reviewed.   Constitutional:       General: He is not in acute distress.     Appearance: He is well-developed and normal weight. He is not ill-appearing, toxic-appearing or diaphoretic.   HENT:      Head: Normocephalic and atraumatic.      Mouth/Throat:      Mouth: Mucous membranes are moist.      Pharynx: Oropharynx is clear. No pharyngeal swelling or oropharyngeal exudate.   Eyes:      General: No scleral icterus.     Extraocular Movements: Extraocular movements intact.      Pupils: Pupils are equal, round, and reactive to light.   Cardiovascular:      Rate and Rhythm: Normal rate and regular rhythm.      Heart sounds: Normal heart sounds. No murmur heard.     No friction rub. No gallop.   Pulmonary:      Effort: Pulmonary effort is normal. No respiratory distress.      Breath sounds: Normal breath sounds. No stridor. No wheezing, rhonchi or rales.   Abdominal:      General: Abdomen is flat. There is no distension or abdominal bruit. There are no signs of injury.      Palpations: Abdomen is rigid. There is no shifting dullness, fluid wave, hepatomegaly, splenomegaly or mass.      Tenderness: There is abdominal tenderness. There is guarding and rebound.      Hernia: No hernia is  present. There is no hernia in the umbilical area, ventral area, left inguinal area, right femoral area or right inguinal area.   Skin:     General: Skin is warm and dry.      Coloration: Skin is not cyanotic, jaundiced, mottled or pale.      Findings: No erythema.   Neurological:      General: No focal deficit present.      Mental Status: He is alert and oriented to person, place, and time.      Cranial Nerves: No cranial nerve deficit.      Motor: No weakness.   Psychiatric:         Mood and Affect: Mood normal. Mood is not anxious or depressed.         Behavior: Behavior normal.         Procedures          ED Course  ED Course as of 06/16/24 1832   Sun Jun 16, 2024   5820 IMPRESSION:  1.  Sigmoid diverticulitis. Moderate inflammation surrounding the  sigmoid colon with wall thickening and adjacent inflammatory fat  stranding. Small to moderate amount of fluid adjacent to the sigmoid  colon. No rim-enhancing drainable abscess at this time. A few small foci  of air likely related to contained perforation.  2.  Small bowel dilation could relate to ileus from above inflammation  or obstruction. Follow-up recommended.  3.  Moderate urinary bladder wall thickening likely from above  inflammation.   [BH]   1503 Discussed care with Dr. Haque will consult patient.  Start IV cefepime as well as Flagyl. [BH]   1538 Discussed care with Dr. Kendall.  Patient will be admitted for further evaluation. [BH]   1717 EKG notes sinus tachycardia 110 bpm.  No acute ST elevation.  QTc 422.  Electronically signed by Michi Ferrara DO, 06/16/24, 5:17 PM EDT.   [SF]      ED Course User Index  [BH] Vishnu Hurtado PA-C  [SF] Michi Ferrara DO                                             Medical Decision Making  Problems Addressed:  Diverticulitis of colon with perforation: complicated acute illness or injury    Amount and/or Complexity of Data Reviewed  Labs: ordered.  Radiology: ordered.  ECG/medicine tests:  ordered.    Risk  OTC drugs.  Prescription drug management.  Decision regarding hospitalization.        Final diagnoses:   Diverticulitis of colon with perforation       ED Disposition  ED Disposition       ED Disposition   Decision to Admit    Condition   --    Comment   Level of Care: Med/Surg [1]   Diagnosis: Diverticulitis [413448]   Certification: I Certify That Inpatient Hospital Services Are Medically Necessary For Greater Than 2 Midnights                 No follow-up provider specified.       Medication List        ASK your doctor about these medications      cyanocobalamin 1000 MCG/ML injection  Ask about: Which instructions should I use?     furosemide 20 MG tablet  Commonly known as: LASIX  Ask about: Which instructions should I use?     HYDROcodone-acetaminophen 7.5-325 MG per tablet  Commonly known as: NORCO  Ask about: Which instructions should I use?     Tirzepatide 7.5 MG/0.5ML solution pen-injector pen  Commonly known as: MOUNJARO  Ask about: Which instructions should I use?                 Vishnu Hurtado PA-C  06/16/24 1539       Vishnu Hurtado PA-C  06/16/24 1832      Electronically signed by Vishnu Hurtado PA-C at 06/16/24 1832       Facility-Administered Medications as of 6/17/2024   Medication Dose Route Frequency Provider Last Rate Last Admin    acetaminophen (TYLENOL) tablet 1,000 mg  1,000 mg Oral TID Stefan Kendall DO   1,000 mg at 06/16/24 2010    acetaminophen (TYLENOL) tablet 650 mg  650 mg Oral Q4H PRN Stefan Kendall DO   650 mg at 06/16/24 1845    anastrozole (ARIMIDEX) tablet 1 mg  1 mg Oral Daily Luc Rowland DO        sennosides-docusate (PERICOLACE) 8.6-50 MG per tablet 2 tablet  2 tablet Oral BID PRN Stefan Kendlal DO        And    polyethylene glycol (MIRALAX) packet 17 g  17 g Oral Daily PRN Stefan Kendall DO        And    bisacodyl (DULCOLAX) EC tablet 5 mg  5 mg Oral Daily PRN Stefan Kendall  DO        And    bisacodyl (DULCOLAX) suppository 10 mg  10 mg Rectal Daily PRN Stefan Kendall DO        [COMPLETED] cefepime 2000 mg IVPB in 100 mL NS (VTB)  2,000 mg Intravenous Once Vishnu Hurtado PA-C   Stopped at 06/16/24 1718    cetirizine (zyrTEC) tablet 10 mg  10 mg Oral Daily Luc Rowland DO        cyanocobalamin injection 1,000 mcg  1,000 mcg Intramuscular Q30 Days Luc Rowland DO        Enoxaparin Sodium (LOVENOX) syringe 40 mg  40 mg Subcutaneous Nightly Stefan Kendall DO   40 mg at 06/16/24 2010    folic acid (FOLVITE) tablet 1 mg  1 mg Oral Daily Luc Rowland DO        [Held by provider] furosemide (LASIX) tablet 20 mg  20 mg Oral Daily Luc Rowland DO        [COMPLETED] HYDROmorphone (DILAUDID) injection 1 mg  1 mg Intravenous Once Michi Ferrara DO   1 mg at 06/16/24 1509    [COMPLETED] HYDROmorphone (DILAUDID) injection 1 mg  1 mg Intravenous Once Michi Ferrara DO   1 mg at 06/16/24 1553    HYDROmorphone (DILAUDID) injection 1 mg  1 mg Intravenous Q2H PRN Stefan Kendall DO   1 mg at 06/17/24 0649    [COMPLETED] iopamidol (ISOVUE-300) 61 % injection 100 mL  100 mL Intravenous Once in imaging Michi Ferrara DO   85 mL at 06/16/24 1435    lactated ringers infusion  100 mL/hr Intravenous Continuous Stefan Kendall  mL/hr at 06/16/24 1752 100 mL/hr at 06/16/24 1752    metoprolol tartrate (LOPRESSOR) tablet 50 mg  50 mg Oral BID Luc Rowland DO        morphine injection 2 mg  2 mg Intravenous Q3H PRN Stefan Kendall DO   2 mg at 06/16/24 2156    [COMPLETED] morphine injection 4 mg  4 mg Intravenous Once Michi Ferrara DO   4 mg at 06/16/24 1351    [COMPLETED] ondansetron (ZOFRAN) injection 4 mg  4 mg Intravenous Once Vishnu Hurtado PA-C   4 mg at 06/16/24 1508    ondansetron ODT (ZOFRAN-ODT) disintegrating tablet 4 mg  4 mg Translingual Q8H PRN Luc Rowland, DO         oxyCODONE (ROXICODONE) immediate release tablet 5 mg  5 mg Oral Q4H PRN Stefan Kendall DO   5 mg at 06/17/24 0448    pantoprazole (PROTONIX) injection 40 mg  40 mg Intravenous Q AM Stefan Kendall DO   40 mg at 06/17/24 0649    piperacillin-tazobactam (ZOSYN) IVPB 3.375 g IVPB in 100 mL NS (VTB)  3.375 g Intravenous Q6H Stefan Kendall DO   3.375 g at 06/17/24 0649    prochlorperazine (COMPAZINE) injection 10 mg  10 mg Intravenous Q6H PRN Stefan Kendall DO        rosuvastatin (CRESTOR) tablet 10 mg  10 mg Oral Daily Stefan Kendall DO   10 mg at 06/16/24 1838    [COMPLETED] sodium chloride 0.9 % bolus 1,000 mL  1,000 mL Intravenous Once Vishnu Hurtado PA-C   Stopped at 06/16/24 1420    [COMPLETED] sodium chloride 0.9 % bolus 1,000 mL  1,000 mL Intravenous Once Vishnu Hurtado PA-C   Stopped at 06/16/24 1625    sodium chloride 0.9 % flush 10 mL  10 mL Intravenous PRN Stefan Kendall DO        sodium chloride 0.9 % flush 10 mL  10 mL Intravenous PRN Stefan Kendall DO        sodium chloride 0.9 % flush 10 mL  10 mL Intravenous Q12H Stefan Kendall DO   10 mL at 06/16/24 2011    sodium chloride 0.9 % flush 10 mL  10 mL Intravenous PRN Stefan Kendall DO        sodium chloride 0.9 % infusion 40 mL  40 mL Intravenous PRN Stefan Kendall DO        [START ON 6/21/2024] Testosterone Cypionate (DEPOTESTOTERONE CYPIONATE) injection 100 mg  100 mg Intramuscular Q7 Days Luc Rowland DO        topiramate (TOPAMAX) tablet 100 mg  100 mg Oral BID Luc Rowland DO         Orders (all)        Start     Ordered    06/21/24 0000  Testosterone Cypionate (DEPOTESTOTERONE CYPIONATE) injection 100 mg  Every 7 Days         06/17/24 0816    06/17/24 0915  topiramate (TOPAMAX) tablet 100 mg  2 Times Daily         06/17/24 0816    06/17/24 0915  anastrozole (ARIMIDEX) tablet 1 mg  Daily          06/17/24 0816    06/17/24 0915  metoprolol tartrate (LOPRESSOR) tablet 50 mg  2 Times Daily         06/17/24 0816    06/17/24 0915  cyanocobalamin injection 1,000 mcg  Every 30 Days         06/17/24 0816    06/17/24 0915  folic acid (FOLVITE) tablet 1 mg  Daily         06/17/24 0816    06/17/24 0915  cetirizine (zyrTEC) tablet 10 mg  Daily         06/17/24 0816    06/17/24 0915  [Held by provider]  furosemide (LASIX) tablet 20 mg  Daily        (On hold since today at 0816 until manually unheld; held by Luc Rowland DOHold Reason: Other (Comment Required))    06/17/24 0816    06/17/24 0814  ondansetron ODT (ZOFRAN-ODT) disintegrating tablet 4 mg  Every 8 Hours PRN         06/17/24 0816    06/17/24 0802  Inpatient General Surgery Consult  Once        Specialty:  General Surgery  Provider:  Marie Haque MD    06/17/24 0802    06/17/24 0600  CBC & Differential  Daily       06/16/24 1542    06/17/24 0600  Comprehensive Metabolic Panel  Daily       06/16/24 1542    06/17/24 0600  Protime-INR  Morning Draw         06/16/24 1542    06/17/24 0600  pantoprazole (PROTONIX) injection 40 mg  Every Early Morning         06/16/24 1734    06/17/24 0600  CBC Auto Differential  PROCEDURE ONCE         06/16/24 2202 06/16/24 2100  sodium chloride 0.9 % flush 10 mL  Every 12 Hours Scheduled         06/16/24 1734 06/16/24 2100  Enoxaparin Sodium (LOVENOX) syringe 40 mg  Nightly         06/16/24 1734 06/16/24 2100  acetaminophen (TYLENOL) tablet 1,000 mg  3 times daily         06/16/24 1734 06/16/24 2000  Vital Signs  Every 4 Hours       06/16/24 1734 06/16/24 1912  PT Consult: Eval & Treat Functional Mobility Below Baseline  Once         06/16/24 1911 06/16/24 1912  OT Consult: Eval & Treat ADL Performance Below Baseline  Once         06/16/24 1911 06/16/24 1900  piperacillin-tazobactam (ZOSYN) IVPB 3.375 g IVPB in 100 mL NS (VTB)  Every 6 Hours,   Status:  Discontinued         06/16/24 0166     06/16/24 1900  rosuvastatin (CRESTOR) tablet 10 mg  Daily         06/16/24 1734    06/16/24 1900  piperacillin-tazobactam (ZOSYN) IVPB 3.375 g IVPB in 100 mL NS (VTB)  Every 6 Hours         06/16/24 1740    06/16/24 1830  lactated ringers infusion  Continuous         06/16/24 1734    06/16/24 1806  Initiate & Follow Hypercapnic Monitoring Guideline for Opioid Administration via EtCO2 and / or SpO2  Continuous        Comments: Follow Hypercapnic Monitoring Guideline As Outlined in Process Instructions (Open Order Report to View Full Instructions)    06/16/24 1805    06/16/24 1806  Opioid Administration - Document EtCO2 and / or SpO2 With Each Set of Vitals & Any Change in Patient Status  Per Order Details        Comments: With Each Set of Vitals & Any Change in Patient Status    06/16/24 1805    06/16/24 1806  Opioid Administration - Notify Provider Hypercapnic Monitoring  Continuous        Comments: Open Order Report to View Parameters Requiring Provider Notification    06/16/24 1805    06/16/24 1806  Opioid Administration - Continuous Pulse Oximetry (SpO2)  Continuous         06/16/24 1805    06/16/24 1800  Oral Care  2 Times Daily       06/16/24 1734    06/16/24 1800  Incentive Spirometry  Every 4 Hours While Awake       06/16/24 1734    06/16/24 1747  POC Glucose Once  PROCEDURE ONCE        Comments: Complete no more than 45 minutes prior to patient eating      06/16/24 1740    06/16/24 1739  HYDROmorphone (DILAUDID) injection 1 mg  Every 2 Hours PRN         06/16/24 1740    06/16/24 1735  Intake & Output  Every Shift       06/16/24 1734    06/16/24 1735  Weigh Patient  Once         06/16/24 1734    06/16/24 1735  Insert Peripheral IV  Once         06/16/24 1734    06/16/24 1735  Saline Lock & Maintain IV Access  Continuous         06/16/24 1734    06/16/24 1735  Diet: Liquid; Clear Liquid; Fluid Consistency: Thin (IDDSI 0)  Diet Effective Now         06/16/24 1734    06/16/24 1735  Adult Transthoracic Echo  "Complete W/ Cont if Necessary Per Protocol  Once         06/16/24 1734    06/16/24 1734  acetaminophen (TYLENOL) tablet 650 mg  Every 4 Hours PRN         06/16/24 1734    06/16/24 1734  sennosides-docusate (PERICOLACE) 8.6-50 MG per tablet 2 tablet  2 Times Daily PRN        Placed in \"And\" Linked Group    06/16/24 1734    06/16/24 1734  polyethylene glycol (MIRALAX) packet 17 g  Daily PRN        Placed in \"And\" Linked Group    06/16/24 1734    06/16/24 1734  bisacodyl (DULCOLAX) EC tablet 5 mg  Daily PRN        Placed in \"And\" Linked Group    06/16/24 1734    06/16/24 1734  bisacodyl (DULCOLAX) suppository 10 mg  Daily PRN        Placed in \"And\" Linked Group    06/16/24 1734    06/16/24 1734  prochlorperazine (COMPAZINE) injection 10 mg  Every 6 Hours PRN         06/16/24 1734    06/16/24 1734  oxyCODONE (ROXICODONE) immediate release tablet 5 mg  Every 4 Hours PRN         06/16/24 1734    06/16/24 1734  morphine injection 2 mg  Every 3 Hours PRN         06/16/24 1734    06/16/24 1734  sodium chloride 0.9 % flush 10 mL  As Needed         06/16/24 1734    06/16/24 1734  sodium chloride 0.9 % infusion 40 mL  As Needed         06/16/24 1734    06/16/24 1708  CK  Once         06/16/24 1707    06/16/24 1707  Hemoglobin A1c  Once         06/16/24 1707    06/16/24 1707  Lipid Panel  Once         06/16/24 1707    06/16/24 1707  TSH  Once         06/16/24 1707    06/16/24 1656  ECG 12 Lead Pre-Op / Pre-Procedure  Once         06/16/24 1655    06/16/24 1553  HYDROmorphone (DILAUDID) injection 1 mg  Once         06/16/24 1537    06/16/24 1540  Inpatient Admission  Once         06/16/24 1543    06/16/24 1540  Code Status and Medical Interventions:  Continuous         06/16/24 1543    06/16/24 1522  HYDROmorphone (DILAUDID) injection 1 mg  Once         06/16/24 1506    06/16/24 1519  metroNIDAZOLE (FLAGYL) IVPB 500 mg  Once,   Status:  Discontinued         06/16/24 1503    06/16/24 1519  cefepime 2000 mg IVPB in 100 mL NS (VTB)  " "Once         06/16/24 1503    06/16/24 1513  ondansetron (ZOFRAN) injection 4 mg  Once         06/16/24 1457    06/16/24 1504  Lactic Acid, Plasma  Once         06/16/24 1503    06/16/24 1504  Blood Culture - Blood, Arm, Right  Once        Placed in \"And\" Linked Group    06/16/24 1503    06/16/24 1504  Blood Culture - Blood, Hand, Left  Once        Placed in \"And\" Linked Group    06/16/24 1503    06/16/24 1451  iopamidol (ISOVUE-300) 61 % injection 100 mL  Once in Imaging         06/16/24 1435    06/16/24 1447  Urinalysis, Microscopic Only - Urine, Clean Catch  Once         06/16/24 1446    06/16/24 1435  sodium chloride 0.9 % bolus 1,000 mL  Once         06/16/24 1419    06/16/24 1357  morphine injection 4 mg  Once         06/16/24 1341    06/16/24 1333  sodium chloride 0.9 % bolus 1,000 mL  Once         06/16/24 1317    06/16/24 1318  Insert Peripheral IV  Once         06/16/24 1317    06/16/24 1318  Banks Draw  Once         06/16/24 1317    06/16/24 1318  CBC & Differential  Once         06/16/24 1317    06/16/24 1318  Comprehensive Metabolic Panel  Once         06/16/24 1317    06/16/24 1318  Lipase  Once         06/16/24 1317    06/16/24 1318  Urinalysis With Microscopic If Indicated (No Culture) - Urine, Clean Catch  STAT         06/16/24 1317    06/16/24 1318  Insert Peripheral IV  Once        Placed in \"And\" Linked Group    06/16/24 1317    06/16/24 1318  CT Abdomen Pelvis With Contrast  1 Time Imaging         06/16/24 1317    06/16/24 1318  Green Top (Gel)  PROCEDURE ONCE         06/16/24 1317    06/16/24 1318  Lavender Top  PROCEDURE ONCE         06/16/24 1317    06/16/24 1318  Gold Top - SST  PROCEDURE ONCE         06/16/24 1317    06/16/24 1318  Light Blue Top  PROCEDURE ONCE         06/16/24 1317    06/16/24 1318  CBC Auto Differential  PROCEDURE ONCE         06/16/24 1317    06/16/24 1318  C-reactive Protein  STAT         06/16/24 1317    06/16/24 1318  Sedimentation Rate  STAT         06/16/24 1317 " "   06/16/24 1317  sodium chloride 0.9 % flush 10 mL  As Needed        Placed in \"And\" Linked Group    06/16/24 1317    06/16/24 1317  sodium chloride 0.9 % flush 10 mL  As Needed         06/16/24 1317    06/16/24 0000  Telemetry Scan  Once         06/16/24 0000    06/16/24 0000  Telemetry Scan  Once         06/16/24 0000    Unscheduled  Up With Assistance  As Needed       06/16/24 1734    Unscheduled  Patient May Use Home CPAP / BIPAP For Sleep or As Needed  As Needed       06/16/24 1734    Signed and Held  HYDROmorphone (DILAUDID) injection 1 mg  Every 2 Hours PRN,   Status:  Canceled         Signed and Held    Signed and Held  lactated ringers bolus 1,000 mL  Once,   Status:  Canceled         Signed and Held    --  anastrozole (ARIMIDEX) 1 MG tablet  Daily         06/16/24 1612    --  vitamin D (ERGOCALCIFEROL) 1.25 MG (53390 UT) capsule capsule  Weekly,   Status:  Discontinued         06/16/24 1614    --  metoprolol tartrate (LOPRESSOR) 50 MG tablet  2 Times Daily         06/16/24 1616    --  Tirzepatide (MOUNJARO) 5 MG/0.5ML solution pen-injector pen  Weekly,   Status:  Discontinued         06/16/24 1757    --  levocetirizine (XYZAL) 5 MG tablet  Every Evening         06/16/24 1801    --  cyanocobalamin 1000 MCG/ML injection  Every 30 Days         06/16/24 1801    --  doxycycline (VIBRAMYCIN) 100 MG capsule  2 Times Daily,   Status:  Discontinued         06/16/24 1801    --  furosemide (LASIX) 20 MG tablet  Daily         06/16/24 1803    --  Tirzepatide (MOUNJARO) 7.5 MG/0.5ML solution pen-injector pen  Weekly         06/16/24 1807    --  folic acid (FOLVITE) 1 MG tablet  Daily         06/16/24 1808    --  glucosamine-chondroitin 500-400 MG capsule capsule  2 Times Daily With Meals         06/16/24 1808                  Physician Progress Notes (all)    No notes of this type exist for this encounter.       Consult Notes (all)    No notes of this type exist for this encounter.       "

## 2024-06-17 NOTE — PLAN OF CARE
Goal Outcome Evaluation:     Patient has not rested well this shift. PRN medication provided, see MAR. No visible indicators of acute distress noted. Plan of care ongoing.

## 2024-06-17 NOTE — PLAN OF CARE
Goal Outcome Evaluation:    Pt is resting in bed with no s/s of distress noted. VSS. IV access maintained. Fluids infusing per order. Pt NPO at midnight for procedure in the AM, consent obtained by MD Haque. Will continue with plan of care.

## 2024-06-17 NOTE — CONSULTS
Consulting physician:  Dr. Haque    Referring physician: hospitalist    Date of consultation: 06/17/24     Chief complaint diverticulitis which failed oral antibiotics    Subjective     Patient is a 49 y.o. male who presented to the emergency room 48 hours after being seen for diverticulitis and discharged home on Augmentin.  He presented with worsening pain.  CT demonstrated extensive sigmoid diverticulitis with possible microperforation.  This morning patient had a temperature over 102 despite antibiotics.  Repeat CT was performed with oral contrast which demonstrated a fluid collection at the rectosigmoid junction which was not amenable to radiology drainage.  Patient states that this is his first episode of diverticulitis.  He has had prior colonoscopy which demonstrated polyps.  He currently is tolerating clear liquids without nausea or vomiting      Review of Systems  Review of Systems - General ROS: negative for - weight loss.  Positive for fever  Psychological ROS: negative for - behavioral disorder  Ophthalmic ROS: negative for - dry eyes  ENT ROS: negative for - vertigo or vocal changes  Hematological and Lymphatic ROS: negative for - swollen lymph nodes, DVT, PE.   Respiratory ROS: negative for - sputum changes or stridor.  Cardiovascular ROS: negative for - irregular heartbeat or murmur  Gastrointestinal ROS: negative for - blood in stools or change in stools  Genitourinary ROS: negative for - hematuria or incontinence  Musculoskeletal ROS: negative for - gait disturbance      History  Past Medical History:   Diagnosis Date    Asthma     childhood    Cardiomyopathy     Diabetes mellitus     Heart murmur     as child    Hyperlipidemia     Hypertension     Palpitation     Sleep apnea     C pap use     Past Surgical History:   Procedure Laterality Date    APPENDECTOMY      HAND SURGERY      KNEE ARTHROSCOPY      LUMBAR FUSION      VASECTOMY      WISDOM TOOTH EXTRACTION       Family History   Problem  Relation Age of Onset    Heart attack Maternal Grandfather     Heart failure Maternal Grandfather     Heart disease Maternal Grandfather     Arthritis Mother     Diabetes Father     Hearing loss Father     Asthma Maternal Grandmother      Social History     Tobacco Use    Smoking status: Never     Passive exposure: Current    Smokeless tobacco: Current     Types: Snuff   Vaping Use    Vaping status: Never Used   Substance Use Topics    Alcohol use: Not Currently    Drug use: No     Medications Prior to Admission   Medication Sig Dispense Refill Last Dose    amoxicillin-clavulanate (AUGMENTIN) 875-125 MG per tablet Take 1 tablet by mouth 2 (Two) Times a Day for 10 days. 20 tablet 0 6/16/2024 at 0900    anastrozole (ARIMIDEX) 1 MG tablet Take 1 tablet by mouth Daily.   6/16/2024 at 0900    Calcium-Magnesium-Zinc (OMERO-MAG-ZINC PO) Take 1 tablet by mouth 2 (Two) Times a Day.   6/15/2024    dexlansoprazole (DEXILANT) 60 MG capsule Take 1 capsule by mouth Daily.   6/16/2024 at 0900    doxycycline (MONODOX) 100 MG capsule Take 1 capsule by mouth 2 (Two) Times a Day for 10 days. 20 capsule 0 6/16/2024    fenofibrate (TRICOR) 145 MG tablet Take 1 tablet by mouth Daily.   6/16/2024 at 0900    folic acid (FOLVITE) 1 MG tablet Take 1 tablet by mouth Daily.   6/16/2024 at 0900    furosemide (LASIX) 20 MG tablet Take 1 tablet by mouth Daily.   Past Month    glucosamine-chondroitin 500-400 MG capsule capsule Take 1 capsule by mouth 2 (Two) Times a Day With Meals.   6/16/2024 at 0900    HYDROcodone-acetaminophen (NORCO) 7.5-325 MG per tablet Take 1 tablet by mouth Every 8 (Eight) Hours As Needed for Moderate Pain.   6/15/2024    levocetirizine (XYZAL) 5 MG tablet Take 1 tablet by mouth Every Evening.   6/15/2024    meloxicam (MOBIC) 15 MG tablet Take 1 tablet by mouth Daily.   6/16/2024 at 0900    metoprolol tartrate (LOPRESSOR) 50 MG tablet Take 1 tablet by mouth 2 (Two) Times a Day.   6/16/2024 at 0900    ondansetron ODT  (ZOFRAN-ODT) 4 MG disintegrating tablet Place 1 tablet on the tongue Every 8 (Eight) Hours As Needed for Vomiting. 30 tablet 0 6/15/2024    Testosterone Cypionate (DEPOTESTOTERONE CYPIONATE) 200 MG/ML injection Inject 0.5 mL into the appropriate muscle as directed by prescriber Every 7 (Seven) Days.   6/14/2024    Tirzepatide (MOUNJARO) 7.5 MG/0.5ML solution pen-injector pen Inject 0.5 mL under the skin into the appropriate area as directed 1 (One) Time Per Week.   6/14/2024    topiramate (TOPAMAX) 100 MG tablet Take 1 tablet by mouth 2 (Two) Times a Day.   6/16/2024 at 0900    cyanocobalamin 1000 MCG/ML injection Inject 1 mL into the appropriate muscle as directed by prescriber Every 30 (Thirty) Days.   More than a month     Allergies:  Patient has no known allergies.    Objective     Vital Signs  Temp:  [97.8 °F (36.6 °C)-99.4 °F (37.4 °C)] 98.4 °F (36.9 °C)  Heart Rate:  [] 97  Resp:  [14-16] 16  BP: (107-147)/(37-88) 127/75    Physical Exam:  General:  This is a WD WN male in no acute distress  Vital signs: Stable, afebrile  HEENT exam:  WNL. Sclerae are anicteric.  EOMI  Neck:  Supple, FROM.  No JVD.  Trachea midline  Lungs:  Respiratory effort normal. Auscultation: Clear, without wheezes, rhonchi, rales  Heart:  Regular rate and rhythm, without murmur, gallop, rub.  No pedal edema  Abdomen: Bowel sounds are present.  Abdomen softly distended.  Left lower quadrant tenderness with guarding  Musculoskeletal:  Muscle strength/tone is normal.    Psych:  Alert, oriented x 3.  Mood and affect are appropriate  Skin:  Warm with good turgor.  Without rash or lesion  Extremities:  Examination of the extremities revealed no cyanosis, clubbing or edema.    Results Review:   Results from last 7 days   Lab Units 06/16/24  1344   CK TOTAL U/L 79     Results from last 7 days   Lab Units 06/17/24  0054 06/16/24  1615 06/16/24  1344 06/14/24  1313 06/14/24  1217   CRP mg/dL  --   --  44.82*  --  10.71*   LACTATE mmol/L  --  " 2.0  --  0.7  --    WBC 10*3/mm3 10.77  --  11.05*  --  12.75*   HEMOGLOBIN g/dL 10.4*  --  10.7*  --  11.8*   HEMATOCRIT % 32.6*  --  33.9*  --  35.8*   PLATELETS 10*3/mm3 258  --  241  --  275   INR  1.19*  --   --   --   --          Results from last 7 days   Lab Units 06/17/24  0054 06/16/24  1344 06/14/24  1217   SODIUM mmol/L 136 137 138   POTASSIUM mmol/L 3.5 4.1 4.1   CHLORIDE mmol/L 103 103 104   CO2 mmol/L 21.4* 22.6 20.3*   BUN mg/dL 21* 29* 34*   CREATININE mg/dL 1.73* 2.09* 1.85*   CALCIUM mg/dL 10.1 10.1 9.8   GLUCOSE mg/dL 99 108* 120*   ALBUMIN g/dL 3.6 3.7 4.5   BILIRUBIN mg/dL 0.3 0.3 0.3   ALK PHOS U/L 42 50 43   AST (SGOT) U/L 15 14 20   ALT (SGPT) U/L 13 13 16   Estimated Creatinine Clearance: 53.6 mL/min (A) (by C-G formula based on SCr of 1.73 mg/dL (H)).  No results found for: \"AMMONIA\"  Results from last 7 days   Lab Units 06/16/24  1344   CHOLESTEROL mg/dL 131   TRIGLYCERIDES mg/dL 224*   HDL CHOL mg/dL 21*   LDL CHOL mg/dL 73     Blood Culture   Date Value Ref Range Status   06/14/2024 No growth at 2 days  Preliminary   06/14/2024 No growth at 2 days  Preliminary     No results found for: \"URINECX\"  No results found for: \"WOUNDCX\"  No results found for: \"STOOLCX\"    Imaging:  Imaging Results (Last 24 Hours)       Procedure Component Value Units Date/Time    CT Abdomen Pelvis With Contrast [[id number]] Collected: 06/16/24 1453     Updated: 06/16/24 1457    Narrative:      INDICATION: Abdominal pain. Rectal pain     COMPARISON: CT from 6/14/2024.     TECHNIQUE: Axial CT images of the abdomen and pelvis were obtained  following IV contrast administration. Coronal and sagittal reformations  were reviewed.     FINDINGS:  Visualized lung bases appear unremarkable.     The liver, gallbladder, spleen, pancreas and adrenal glands appear  unremarkable. No hydronephrosis. Punctate left renal calculus. Small  bilateral renal cysts. Fat-containing umbilical hernia.     Moderate inflammation surrounding " the sigmoid colon with wall thickening  and adjacent inflammatory fat stranding. Small to moderate amount of  fluid adjacent to the sigmoid colon. No rim-enhancing drainable abscess  at this time. A few small foci of air likely related to contained  perforation. Small bowel dilation.     Moderate urinary bladder wall thickening. Small fat-containing bilateral  inguinal hernias     Degenerative changes in the spine. No acute osseous abnormality evident.       Impression:      1.  Sigmoid diverticulitis. Moderate inflammation surrounding the  sigmoid colon with wall thickening and adjacent inflammatory fat  stranding. Small to moderate amount of fluid adjacent to the sigmoid  colon. No rim-enhancing drainable abscess at this time. A few small foci  of air likely related to contained perforation.  2.  Small bowel dilation could relate to ileus from above inflammation  or obstruction. Follow-up recommended.  3.  Moderate urinary bladder wall thickening likely from above  inflammation.        This report was finalized on 6/16/2024 2:55 PM by Alex Pallas, DO.               Sigmoid diverticulitis with luminal fluid collection which appears to represent a developing abscess.      Impression:    Diverticulitis with localized perforation and developing abscess       Plan:  Laparoscopic drainage of abscess      Discussion:  The fluid collection is not amenable to percutaneous IR drainage.  Even if it were the small size of the catheter notoriously fails to completely drain the collection.  Patient is aware that laparoscopic drainage will not necessarily avoid the need for colon resection.  Marie Haque MD  06/17/24  08:40 EDT    Time: Time spent:    Please note that portions of this note were completed with a voice recognition program.

## 2024-06-17 NOTE — SIGNIFICANT NOTE
06/17/24 1032   OTHER   Discipline physical therapist   Rehab Time/Intention   Session Not Performed unable to evaluate, medical status change

## 2024-06-17 NOTE — PROGRESS NOTES
"Patient Identification:  Name:  Samson Kendall  Age:  49 y.o.  Sex:  male  :  1975  MRN:  [id number]  Visit Number:  [id number]  Primary Care Provider:  Rosalina Pandya APRN    Length of stay:  1    Subjective/Interval History/Consultants/Procedures     Chief Complaint:   Chief Complaint   Patient presents with    Abdominal Pain    Rectal Pain       Subjective/Interval History:    49 y.o. male who was admitted on 2024 with sepsis 2/2 diverticulitis with microperforation     PMH is significant for nonischemic cardiomyopathy with reduced EF, HTN, CKD IIIa, T2DM, HLD, RANJEET  For complete admission information, please see history and physical.     Consultations:  General surgery  PT OT    Procedures/Scans:  CT abdomen and pelvis with contrast   CT abdomen and pelvis with oral contrast- pend    Today, the patient was seen and examined on 3S. Sitting up in bed, appeared uncomfortable but no acute distress. He reported abdominal pain the same if not some worse overnight. Continues to have \"liquid\" bowel movements which are non-bloody. Abdomen appears distended per visitor at the bedside but no reported vomiting. Patient drinking oral contrast during exam with repeat CT ordered for later this AM.     Room location at the time of evaluation was 320p.    ----------------------------------------------------------------------------------------------------------------------  Current Hospital Meds:  acetaminophen, 1,000 mg, Oral, TID  anastrozole, 1 mg, Oral, Daily  cetirizine, 10 mg, Oral, Daily  cyanocobalamin, 1,000 mcg, Intramuscular, Q30 Days  enoxaparin, 40 mg, Subcutaneous, Nightly  folic acid, 1 mg, Oral, Daily  [Held by provider] furosemide, 20 mg, Oral, Daily  metoprolol tartrate, 50 mg, Oral, BID  pantoprazole, 40 mg, Intravenous, Q AM  piperacillin-tazobactam, 3.375 g, Intravenous, Q8H  rosuvastatin, 10 mg, Oral, Daily  sodium chloride, 10 mL, Intravenous, Q12H  [START ON 2024] Testosterone Cypionate, " 100 mg, Intramuscular, Q7 Days  topiramate, 100 mg, Oral, BID      lactated ringers, 100 mL/hr, Last Rate: 100 mL/hr (06/16/24 1752)      ----------------------------------------------------------------------------------------------------------------------      Objective     Vital Signs:  Temp:  [97.8 °F (36.6 °C)-102.8 °F (39.3 °C)] 98.8 °F (37.1 °C)  Heart Rate:  [] 79  Resp:  [14-16] 16  BP: (107-147)/(37-88) 114/69      06/16/24  1312 06/16/24  1744 06/17/24  0500   Weight: 86.2 kg (190 lb) 86.2 kg (190 lb) 85.7 kg (188 lb 14.4 oz)     Body mass index is 30.5 kg/m².    Intake/Output Summary (Last 24 hours) at 6/17/2024 1130  Last data filed at 6/17/2024 0905  Gross per 24 hour   Intake 1991.69 ml   Output --   Net 1991.69 ml     I/O this shift:  In: 240 [P.O.:240]  Out: -   Diet: Liquid; Clear Liquid; Fluid Consistency: Thin (IDDSI 0)  ----------------------------------------------------------------------------------------------------------------------    Physical Exam  Vitals and nursing note reviewed.   Constitutional:       General: He is not in acute distress.     Appearance: He is ill-appearing.   HENT:      Head: Normocephalic and atraumatic.   Eyes:      Extraocular Movements: Extraocular movements intact.      Conjunctiva/sclera: Conjunctivae normal.   Cardiovascular:      Rate and Rhythm: Normal rate and regular rhythm.   Pulmonary:      Effort: Pulmonary effort is normal.      Breath sounds: Normal breath sounds.   Abdominal:      General: There is distension.      Tenderness: There is abdominal tenderness.   Musculoskeletal:      Right lower leg: No edema.      Left lower leg: No edema.   Skin:     General: Skin is warm and dry.   Neurological:      Mental Status: He is alert. Mental status is at baseline.   Psychiatric:         Mood and Affect: Mood normal.         Behavior: Behavior normal.               "  ----------------------------------------------------------------------------------------------------------------------  Tele:      ----------------------------------------------------------------------------------------------------------------------  Results from last 7 days   Lab Units 06/16/24  1344   CK TOTAL U/L 79     Results from last 7 days   Lab Units 06/17/24  1014 06/17/24 0054 06/16/24  1615 06/16/24 1344 06/14/24  1313 06/14/24  1217   CRP mg/dL  --   --   --  44.82*  --  10.71*   LACTATE mmol/L 0.9  --  2.0  --  0.7  --    WBC 10*3/mm3  --  10.77  --  11.05*  --  12.75*   HEMOGLOBIN g/dL  --  10.4*  --  10.7*  --  11.8*   HEMATOCRIT %  --  32.6*  --  33.9*  --  35.8*   MCV fL  --  91.8  --  91.6  --  89.3   MCHC g/dL  --  31.9  --  31.6  --  33.0   PLATELETS 10*3/mm3  --  258  --  241  --  275   INR   --  1.19*  --   --   --   --          Results from last 7 days   Lab Units 06/17/24 0054 06/16/24 1344 06/14/24  1217   SODIUM mmol/L 136 137 138   POTASSIUM mmol/L 3.5 4.1 4.1   CHLORIDE mmol/L 103 103 104   CO2 mmol/L 21.4* 22.6 20.3*   BUN mg/dL 21* 29* 34*   CREATININE mg/dL 1.73* 2.09* 1.85*   CALCIUM mg/dL 10.1 10.1 9.8   GLUCOSE mg/dL 99 108* 120*   ALBUMIN g/dL 3.6 3.7 4.5   BILIRUBIN mg/dL 0.3 0.3 0.3   ALK PHOS U/L 42 50 43   AST (SGOT) U/L 15 14 20   ALT (SGPT) U/L 13 13 16   Estimated Creatinine Clearance: 53.6 mL/min (A) (by C-G formula based on SCr of 1.73 mg/dL (H)).  No results found for: \"AMMONIA\"  Results from last 7 days   Lab Units 06/16/24  1344   CHOLESTEROL mg/dL 131   TRIGLYCERIDES mg/dL 224*   HDL CHOL mg/dL 21*   LDL CHOL mg/dL 73     Blood Culture   Date Value Ref Range Status   06/14/2024 No growth at 2 days  Preliminary   06/14/2024 No growth at 2 days  Preliminary     No results found for: \"URINECX\"  No results found for: \"WOUNDCX\"  No results found for: " "\"STOOLCX\"  ----------------------------------------------------------------------------------------------------------------------  Imaging Results (Last 24 Hours)       Procedure Component Value Units Date/Time    CT Abdomen Pelvis With Contrast [[id number]] Collected: 06/16/24 1453     Updated: 06/16/24 1457    Narrative:      INDICATION: Abdominal pain. Rectal pain     COMPARISON: CT from 6/14/2024.     TECHNIQUE: Axial CT images of the abdomen and pelvis were obtained  following IV contrast administration. Coronal and sagittal reformations  were reviewed.     FINDINGS:  Visualized lung bases appear unremarkable.     The liver, gallbladder, spleen, pancreas and adrenal glands appear  unremarkable. No hydronephrosis. Punctate left renal calculus. Small  bilateral renal cysts. Fat-containing umbilical hernia.     Moderate inflammation surrounding the sigmoid colon with wall thickening  and adjacent inflammatory fat stranding. Small to moderate amount of  fluid adjacent to the sigmoid colon. No rim-enhancing drainable abscess  at this time. A few small foci of air likely related to contained  perforation. Small bowel dilation.     Moderate urinary bladder wall thickening. Small fat-containing bilateral  inguinal hernias     Degenerative changes in the spine. No acute osseous abnormality evident.       Impression:      1.  Sigmoid diverticulitis. Moderate inflammation surrounding the  sigmoid colon with wall thickening and adjacent inflammatory fat  stranding. Small to moderate amount of fluid adjacent to the sigmoid  colon. No rim-enhancing drainable abscess at this time. A few small foci  of air likely related to contained perforation.  2.  Small bowel dilation could relate to ileus from above inflammation  or obstruction. Follow-up recommended.  3.  Moderate urinary bladder wall thickening likely from above  inflammation.        This report was finalized on 6/16/2024 2:55 PM by Alex Pallas, DO.         "     ----------------------------------------------------------------------------------------------------------------------   I have reviewed the above laboratory values for 06/17/24    Assessment/Plan     Active Hospital Problems    Diagnosis  POA    **Diverticulitis [K57.92]  Yes         ASSESSMENT/PLAN:    Acute diverticulitis with contained perforation  Patient with initial ED visit secondary to abdominal pain, fever and chills and discharged home on Augmentin for uncomplicated diverticulitis.  Returned secondary to worsening abdominal pain despite compliance with antibiotics.  CT on admission showed sigmoid diverticulitis with likely contained perforation-without drainable abscess.  Also noted concern for SBO versus ileus though patient without nausea or vomiting on exam.  White blood cell count was 11 K with CRP 44 and lactate 2.  General surgery consulted and following.  Repeat CT with oral contrast is ordered and pending today.  Continue CLD for now  As needed antiemetics, pain control regimen  Zosyn has been continued empirically  Continue LR at 100 mL/h   Labs overall stable with WBC count downtrending and hemoglobin 10.4.  Follow-up blood culture results  Repeat labs in a.m.    Ischemic cardiomyopathy with mildly reduced EF  Hypertension  Most recent TTE showed EF 45 to 50%.  Updated TTE is ordered and pending.  Metoprolol, Lasix continued  Monitor clinical volume status closely    CKD stage IIIa  Creatinine was slightly greater than 2 on arrival with baseline 1.7-1.8.  CK normal.  CT A/P did not show any evidence of obstruction.  Creatinine improved on a.m. labs to 1.7.   Continue IVF's as above    Hx T2DM  Hyperlipidemia  RANJEET  Other home meds continued as indicated  A1c 6.2.  Patient is not on diabetes regimen as an outpatient following significant weight loss.  Can initiate SSI if indicated.  Glucose trend currently well-controlled.  -----------  -DVT prophylaxis: Lovenox  -Disposition plans/anticipated  needs: Pending course and further workup        The patient is high risk due to the following diagnoses/reasons:  acute diverticulitis         Jose Kelly PA-C  06/17/24  11:30 EDT

## 2024-06-18 ENCOUNTER — APPOINTMENT (OUTPATIENT)
Dept: GENERAL RADIOLOGY | Facility: HOSPITAL | Age: 49
DRG: 853 | End: 2024-06-18
Payer: COMMERCIAL

## 2024-06-18 ENCOUNTER — ANESTHESIA EVENT (OUTPATIENT)
Dept: PERIOP | Facility: HOSPITAL | Age: 49
End: 2024-06-18
Payer: COMMERCIAL

## 2024-06-18 ENCOUNTER — APPOINTMENT (OUTPATIENT)
Dept: CARDIOLOGY | Facility: HOSPITAL | Age: 49
DRG: 853 | End: 2024-06-18
Payer: COMMERCIAL

## 2024-06-18 ENCOUNTER — ANESTHESIA (OUTPATIENT)
Dept: PERIOP | Facility: HOSPITAL | Age: 49
End: 2024-06-18
Payer: COMMERCIAL

## 2024-06-18 LAB
ALBUMIN SERPL-MCNC: 3 G/DL (ref 3.5–5.2)
ALBUMIN/GLOB SERPL: 0.9 G/DL
ALP SERPL-CCNC: 35 U/L (ref 39–117)
ALT SERPL W P-5'-P-CCNC: 11 U/L (ref 1–41)
ANION GAP SERPL CALCULATED.3IONS-SCNC: 10.7 MMOL/L (ref 5–15)
AST SERPL-CCNC: 15 U/L (ref 1–40)
BASOPHILS # BLD AUTO: 0.03 10*3/MM3 (ref 0–0.2)
BASOPHILS NFR BLD AUTO: 0.4 % (ref 0–1.5)
BH CV ECHO MEAS - ACS: 1.9 CM
BH CV ECHO MEAS - AO MAX PG: 10.2 MMHG
BH CV ECHO MEAS - AO MEAN PG: 6 MMHG
BH CV ECHO MEAS - AO ROOT DIAM: 2.9 CM
BH CV ECHO MEAS - AO V2 MAX: 160 CM/SEC
BH CV ECHO MEAS - AO V2 VTI: 34.2 CM
BH CV ECHO MEAS - EDV(CUBED): 101.8 ML
BH CV ECHO MEAS - EDV(MOD-SP4): 67.2 ML
BH CV ECHO MEAS - EF(MOD-SP4): 58.6 %
BH CV ECHO MEAS - ESV(CUBED): 21.7 ML
BH CV ECHO MEAS - ESV(MOD-SP4): 27.8 ML
BH CV ECHO MEAS - FS: 40.3 %
BH CV ECHO MEAS - IVS/LVPW: 0.81 CM
BH CV ECHO MEAS - IVSD: 0.69 CM
BH CV ECHO MEAS - LA DIMENSION: 4.5 CM
BH CV ECHO MEAS - LAT PEAK E' VEL: 11.3 CM/SEC
BH CV ECHO MEAS - LV DIASTOLIC VOL/BSA (35-75): 34.9 CM2
BH CV ECHO MEAS - LV MASS(C)D: 114.5 GRAMS
BH CV ECHO MEAS - LV SYSTOLIC VOL/BSA (12-30): 14.4 CM2
BH CV ECHO MEAS - LVIDD: 4.7 CM
BH CV ECHO MEAS - LVIDS: 2.8 CM
BH CV ECHO MEAS - LVOT AREA: 2.8 CM2
BH CV ECHO MEAS - LVOT DIAM: 1.9 CM
BH CV ECHO MEAS - LVPWD: 0.85 CM
BH CV ECHO MEAS - MED PEAK E' VEL: 6.9 CM/SEC
BH CV ECHO MEAS - MV A MAX VEL: 115 CM/SEC
BH CV ECHO MEAS - MV E MAX VEL: 115 CM/SEC
BH CV ECHO MEAS - MV E/A: 1
BH CV ECHO MEAS - PA ACC TIME: 0.09 SEC
BH CV ECHO MEAS - RAP SYSTOLE: 10 MMHG
BH CV ECHO MEAS - RVSP: 44.1 MMHG
BH CV ECHO MEAS - SV(MOD-SP4): 39.4 ML
BH CV ECHO MEAS - SVI(MOD-SP4): 20.4 ML/M2
BH CV ECHO MEAS - TAPSE (>1.6): 2.25 CM
BH CV ECHO MEAS - TR MAX PG: 34.1 MMHG
BH CV ECHO MEAS - TR MAX VEL: 292 CM/SEC
BH CV ECHO MEASUREMENTS AVERAGE E/E' RATIO: 12.64
BILIRUB SERPL-MCNC: 0.2 MG/DL (ref 0–1.2)
BUN SERPL-MCNC: 11 MG/DL (ref 6–20)
BUN/CREAT SERPL: 8.2 (ref 7–25)
CALCIUM SPEC-SCNC: 8.9 MG/DL (ref 8.6–10.5)
CHLORIDE SERPL-SCNC: 106 MMOL/L (ref 98–107)
CO2 SERPL-SCNC: 20.3 MMOL/L (ref 22–29)
CREAT SERPL-MCNC: 1.34 MG/DL (ref 0.76–1.27)
DEPRECATED RDW RBC AUTO: 51.3 FL (ref 37–54)
EGFRCR SERPLBLD CKD-EPI 2021: 65.3 ML/MIN/1.73
EOSINOPHIL # BLD AUTO: 0.4 10*3/MM3 (ref 0–0.4)
EOSINOPHIL NFR BLD AUTO: 5.2 % (ref 0.3–6.2)
ERYTHROCYTE [DISTWIDTH] IN BLOOD BY AUTOMATED COUNT: 14.7 % (ref 12.3–15.4)
GLOBULIN UR ELPH-MCNC: 3.4 GM/DL
GLUCOSE BLDC GLUCOMTR-MCNC: 80 MG/DL (ref 70–130)
GLUCOSE SERPL-MCNC: 93 MG/DL (ref 65–99)
HCT VFR BLD AUTO: 29.5 % (ref 37.5–51)
HGB BLD-MCNC: 9 G/DL (ref 13–17.7)
IMM GRANULOCYTES # BLD AUTO: 0.04 10*3/MM3 (ref 0–0.05)
IMM GRANULOCYTES NFR BLD AUTO: 0.5 % (ref 0–0.5)
LEFT ATRIUM VOLUME INDEX: 18.4 ML/M2
LYMPHOCYTES # BLD AUTO: 1.57 10*3/MM3 (ref 0.7–3.1)
LYMPHOCYTES NFR BLD AUTO: 20.3 % (ref 19.6–45.3)
MCH RBC QN AUTO: 28.8 PG (ref 26.6–33)
MCHC RBC AUTO-ENTMCNC: 30.5 G/DL (ref 31.5–35.7)
MCV RBC AUTO: 94.2 FL (ref 79–97)
MONOCYTES # BLD AUTO: 0.72 10*3/MM3 (ref 0.1–0.9)
MONOCYTES NFR BLD AUTO: 9.3 % (ref 5–12)
NEUTROPHILS NFR BLD AUTO: 4.96 10*3/MM3 (ref 1.7–7)
NEUTROPHILS NFR BLD AUTO: 64.3 % (ref 42.7–76)
NRBC BLD AUTO-RTO: 0 /100 WBC (ref 0–0.2)
PLATELET # BLD AUTO: 250 10*3/MM3 (ref 140–450)
PMV BLD AUTO: 11.3 FL (ref 6–12)
POTASSIUM SERPL-SCNC: 3.7 MMOL/L (ref 3.5–5.2)
PROT SERPL-MCNC: 6.4 G/DL (ref 6–8.5)
RBC # BLD AUTO: 3.13 10*6/MM3 (ref 4.14–5.8)
SODIUM SERPL-SCNC: 137 MMOL/L (ref 136–145)
WBC NRBC COR # BLD AUTO: 7.72 10*3/MM3 (ref 3.4–10.8)

## 2024-06-18 PROCEDURE — 87205 SMEAR GRAM STAIN: CPT | Performed by: SURGERY

## 2024-06-18 PROCEDURE — 25010000002 PIPERACILLIN SOD-TAZOBACTAM PER 1 G: Performed by: SURGERY

## 2024-06-18 PROCEDURE — 87186 SC STD MICRODIL/AGAR DIL: CPT | Performed by: SURGERY

## 2024-06-18 PROCEDURE — 99232 SBSQ HOSP IP/OBS MODERATE 35: CPT

## 2024-06-18 PROCEDURE — 25010000002 PROPOFOL 200 MG/20ML EMULSION: Performed by: NURSE ANESTHETIST, CERTIFIED REGISTERED

## 2024-06-18 PROCEDURE — 85025 COMPLETE CBC W/AUTO DIFF WBC: CPT | Performed by: STUDENT IN AN ORGANIZED HEALTH CARE EDUCATION/TRAINING PROGRAM

## 2024-06-18 PROCEDURE — 82948 REAGENT STRIP/BLOOD GLUCOSE: CPT

## 2024-06-18 PROCEDURE — 25810000003 LACTATED RINGERS PER 1000 ML: Performed by: STUDENT IN AN ORGANIZED HEALTH CARE EDUCATION/TRAINING PROGRAM

## 2024-06-18 PROCEDURE — 25010000002 SUCCINYLCHOLINE PER 20 MG: Performed by: NURSE ANESTHETIST, CERTIFIED REGISTERED

## 2024-06-18 PROCEDURE — 25010000002 ROPIVACAINE PER 1 MG: Performed by: ANESTHESIOLOGY

## 2024-06-18 PROCEDURE — 25810000003 LACTATED RINGERS PER 1000 ML: Performed by: SURGERY

## 2024-06-18 PROCEDURE — 25810000003 LACTATED RINGERS PER 1000 ML: Performed by: ANESTHESIOLOGY

## 2024-06-18 PROCEDURE — 25010000002 FENTANYL CITRATE (PF) 50 MCG/ML SOLUTION: Performed by: NURSE ANESTHETIST, CERTIFIED REGISTERED

## 2024-06-18 PROCEDURE — 25010000002 ENOXAPARIN PER 10 MG: Performed by: SURGERY

## 2024-06-18 PROCEDURE — 25010000002 MIDAZOLAM PER 1 MG: Performed by: NURSE ANESTHETIST, CERTIFIED REGISTERED

## 2024-06-18 PROCEDURE — 49322 LAPAROSCOPY ASPIRATION: CPT | Performed by: SURGERY

## 2024-06-18 PROCEDURE — 25010000002 KETOROLAC TROMETHAMINE PER 15 MG: Performed by: NURSE ANESTHETIST, CERTIFIED REGISTERED

## 2024-06-18 PROCEDURE — 25010000002 HYDROMORPHONE 1 MG/ML SOLUTION: Performed by: SURGERY

## 2024-06-18 PROCEDURE — 87070 CULTURE OTHR SPECIMN AEROBIC: CPT | Performed by: SURGERY

## 2024-06-18 PROCEDURE — 0W9J40Z DRAINAGE OF PELVIC CAVITY WITH DRAINAGE DEVICE, PERCUTANEOUS ENDOSCOPIC APPROACH: ICD-10-PCS | Performed by: SURGERY

## 2024-06-18 PROCEDURE — 25010000002 PIPERACILLIN SOD-TAZOBACTAM PER 1 G

## 2024-06-18 PROCEDURE — 97165 OT EVAL LOW COMPLEX 30 MIN: CPT

## 2024-06-18 PROCEDURE — 25010000002 BUPRENORPHINE PER 0.1 MG: Performed by: ANESTHESIOLOGY

## 2024-06-18 PROCEDURE — 97161 PT EVAL LOW COMPLEX 20 MIN: CPT

## 2024-06-18 PROCEDURE — 25010000002 MORPHINE PER 10 MG: Performed by: SURGERY

## 2024-06-18 PROCEDURE — 25010000002 ONDANSETRON PER 1 MG: Performed by: NURSE ANESTHETIST, CERTIFIED REGISTERED

## 2024-06-18 PROCEDURE — 93306 TTE W/DOPPLER COMPLETE: CPT

## 2024-06-18 PROCEDURE — 87077 CULTURE AEROBIC IDENTIFY: CPT | Performed by: SURGERY

## 2024-06-18 PROCEDURE — 25010000002 GLYCOPYRROLATE 0.4 MG/2ML SOLUTION: Performed by: NURSE ANESTHETIST, CERTIFIED REGISTERED

## 2024-06-18 PROCEDURE — 25010000002 HYDROMORPHONE 1 MG/ML SOLUTION: Performed by: STUDENT IN AN ORGANIZED HEALTH CARE EDUCATION/TRAINING PROGRAM

## 2024-06-18 PROCEDURE — 25010000002 DEXAMETHASONE PER 1 MG: Performed by: ANESTHESIOLOGY

## 2024-06-18 PROCEDURE — 87181 SC STD AGAR DILUTION PER AGT: CPT | Performed by: SURGERY

## 2024-06-18 PROCEDURE — 36415 COLL VENOUS BLD VENIPUNCTURE: CPT | Performed by: STUDENT IN AN ORGANIZED HEALTH CARE EDUCATION/TRAINING PROGRAM

## 2024-06-18 PROCEDURE — 80053 COMPREHEN METABOLIC PANEL: CPT | Performed by: STUDENT IN AN ORGANIZED HEALTH CARE EDUCATION/TRAINING PROGRAM

## 2024-06-18 PROCEDURE — 25010000002 NEOSTIGMINE 10 MG/10ML SOLUTION: Performed by: NURSE ANESTHETIST, CERTIFIED REGISTERED

## 2024-06-18 PROCEDURE — 93306 TTE W/DOPPLER COMPLETE: CPT | Performed by: SPECIALIST

## 2024-06-18 RX ORDER — VECURONIUM BROMIDE 1 MG/ML
INJECTION, POWDER, LYOPHILIZED, FOR SOLUTION INTRAVENOUS AS NEEDED
Status: DISCONTINUED | OUTPATIENT
Start: 2024-06-18 | End: 2024-06-18 | Stop reason: SURG

## 2024-06-18 RX ORDER — MEPERIDINE HYDROCHLORIDE 25 MG/ML
12.5 INJECTION INTRAMUSCULAR; INTRAVENOUS; SUBCUTANEOUS
Status: DISCONTINUED | OUTPATIENT
Start: 2024-06-18 | End: 2024-06-18 | Stop reason: HOSPADM

## 2024-06-18 RX ORDER — SODIUM CHLORIDE 0.9 % (FLUSH) 0.9 %
10 SYRINGE (ML) INJECTION EVERY 12 HOURS SCHEDULED
Status: DISCONTINUED | OUTPATIENT
Start: 2024-06-18 | End: 2024-06-18 | Stop reason: HOSPADM

## 2024-06-18 RX ORDER — SODIUM CHLORIDE, SODIUM LACTATE, POTASSIUM CHLORIDE, CALCIUM CHLORIDE 600; 310; 30; 20 MG/100ML; MG/100ML; MG/100ML; MG/100ML
125 INJECTION, SOLUTION INTRAVENOUS ONCE
Status: COMPLETED | OUTPATIENT
Start: 2024-06-18 | End: 2024-06-18

## 2024-06-18 RX ORDER — LIDOCAINE HYDROCHLORIDE 20 MG/ML
INJECTION, SOLUTION EPIDURAL; INFILTRATION; INTRACAUDAL; PERINEURAL AS NEEDED
Status: DISCONTINUED | OUTPATIENT
Start: 2024-06-18 | End: 2024-06-18 | Stop reason: SURG

## 2024-06-18 RX ORDER — GLYCOPYRROLATE 0.2 MG/ML
INJECTION INTRAMUSCULAR; INTRAVENOUS AS NEEDED
Status: DISCONTINUED | OUTPATIENT
Start: 2024-06-18 | End: 2024-06-18 | Stop reason: SURG

## 2024-06-18 RX ORDER — DOCUSATE SODIUM 100 MG/1
100 CAPSULE, LIQUID FILLED ORAL 2 TIMES DAILY
Status: DISCONTINUED | OUTPATIENT
Start: 2024-06-18 | End: 2024-06-21

## 2024-06-18 RX ORDER — FAMOTIDINE 10 MG/ML
INJECTION, SOLUTION INTRAVENOUS AS NEEDED
Status: DISCONTINUED | OUTPATIENT
Start: 2024-06-18 | End: 2024-06-18 | Stop reason: SURG

## 2024-06-18 RX ORDER — MIDAZOLAM HYDROCHLORIDE 1 MG/ML
1 INJECTION INTRAMUSCULAR; INTRAVENOUS
Status: DISCONTINUED | OUTPATIENT
Start: 2024-06-18 | End: 2024-06-18 | Stop reason: HOSPADM

## 2024-06-18 RX ORDER — KETOROLAC TROMETHAMINE 30 MG/ML
INJECTION, SOLUTION INTRAMUSCULAR; INTRAVENOUS AS NEEDED
Status: DISCONTINUED | OUTPATIENT
Start: 2024-06-18 | End: 2024-06-18 | Stop reason: SURG

## 2024-06-18 RX ORDER — SUCCINYLCHOLINE CHLORIDE 20 MG/ML
INJECTION INTRAMUSCULAR; INTRAVENOUS AS NEEDED
Status: DISCONTINUED | OUTPATIENT
Start: 2024-06-18 | End: 2024-06-18 | Stop reason: SURG

## 2024-06-18 RX ORDER — ONDANSETRON 2 MG/ML
4 INJECTION INTRAMUSCULAR; INTRAVENOUS AS NEEDED
Status: DISCONTINUED | OUTPATIENT
Start: 2024-06-18 | End: 2024-06-18 | Stop reason: HOSPADM

## 2024-06-18 RX ORDER — OXYCODONE HYDROCHLORIDE AND ACETAMINOPHEN 5; 325 MG/1; MG/1
1 TABLET ORAL ONCE AS NEEDED
Status: DISCONTINUED | OUTPATIENT
Start: 2024-06-18 | End: 2024-06-18 | Stop reason: HOSPADM

## 2024-06-18 RX ORDER — FENTANYL CITRATE 50 UG/ML
50 INJECTION, SOLUTION INTRAMUSCULAR; INTRAVENOUS
Status: DISCONTINUED | OUTPATIENT
Start: 2024-06-18 | End: 2024-06-18 | Stop reason: HOSPADM

## 2024-06-18 RX ORDER — ROPIVACAINE HYDROCHLORIDE 5 MG/ML
INJECTION, SOLUTION EPIDURAL; INFILTRATION; PERINEURAL
Status: COMPLETED | OUTPATIENT
Start: 2024-06-18 | End: 2024-06-18

## 2024-06-18 RX ORDER — BUPRENORPHINE HYDROCHLORIDE 0.32 MG/ML
INJECTION INTRAMUSCULAR; INTRAVENOUS
Status: COMPLETED | OUTPATIENT
Start: 2024-06-18 | End: 2024-06-18

## 2024-06-18 RX ORDER — ONDANSETRON 2 MG/ML
INJECTION INTRAMUSCULAR; INTRAVENOUS AS NEEDED
Status: DISCONTINUED | OUTPATIENT
Start: 2024-06-18 | End: 2024-06-18 | Stop reason: SURG

## 2024-06-18 RX ORDER — MIDAZOLAM HYDROCHLORIDE 1 MG/ML
INJECTION INTRAMUSCULAR; INTRAVENOUS AS NEEDED
Status: DISCONTINUED | OUTPATIENT
Start: 2024-06-18 | End: 2024-06-18 | Stop reason: SURG

## 2024-06-18 RX ORDER — POLYETHYLENE GLYCOL 3350 17 G/17G
17 POWDER, FOR SOLUTION ORAL 2 TIMES DAILY
Status: DISCONTINUED | OUTPATIENT
Start: 2024-06-18 | End: 2024-06-21

## 2024-06-18 RX ORDER — MAGNESIUM HYDROXIDE 1200 MG/15ML
LIQUID ORAL AS NEEDED
Status: DISCONTINUED | OUTPATIENT
Start: 2024-06-18 | End: 2024-06-18 | Stop reason: HOSPADM

## 2024-06-18 RX ORDER — SODIUM CHLORIDE 0.9 % (FLUSH) 0.9 %
10 SYRINGE (ML) INJECTION AS NEEDED
Status: DISCONTINUED | OUTPATIENT
Start: 2024-06-18 | End: 2024-06-18 | Stop reason: HOSPADM

## 2024-06-18 RX ORDER — FENTANYL CITRATE 50 UG/ML
INJECTION, SOLUTION INTRAMUSCULAR; INTRAVENOUS AS NEEDED
Status: DISCONTINUED | OUTPATIENT
Start: 2024-06-18 | End: 2024-06-18 | Stop reason: SURG

## 2024-06-18 RX ORDER — DEXAMETHASONE SODIUM PHOSPHATE 4 MG/ML
INJECTION, SOLUTION INTRA-ARTICULAR; INTRALESIONAL; INTRAMUSCULAR; INTRAVENOUS; SOFT TISSUE
Status: COMPLETED | OUTPATIENT
Start: 2024-06-18 | End: 2024-06-18

## 2024-06-18 RX ORDER — SODIUM CHLORIDE, SODIUM LACTATE, POTASSIUM CHLORIDE, CALCIUM CHLORIDE 600; 310; 30; 20 MG/100ML; MG/100ML; MG/100ML; MG/100ML
100 INJECTION, SOLUTION INTRAVENOUS ONCE AS NEEDED
Status: DISCONTINUED | OUTPATIENT
Start: 2024-06-18 | End: 2024-06-18 | Stop reason: HOSPADM

## 2024-06-18 RX ORDER — NEOSTIGMINE METHYLSULFATE 1 MG/ML
INJECTION, SOLUTION INTRAVENOUS AS NEEDED
Status: DISCONTINUED | OUTPATIENT
Start: 2024-06-18 | End: 2024-06-18 | Stop reason: SURG

## 2024-06-18 RX ORDER — SODIUM CHLORIDE 9 MG/ML
40 INJECTION, SOLUTION INTRAVENOUS AS NEEDED
Status: DISCONTINUED | OUTPATIENT
Start: 2024-06-18 | End: 2024-06-18 | Stop reason: HOSPADM

## 2024-06-18 RX ORDER — IPRATROPIUM BROMIDE AND ALBUTEROL SULFATE 2.5; .5 MG/3ML; MG/3ML
3 SOLUTION RESPIRATORY (INHALATION) ONCE AS NEEDED
Status: DISCONTINUED | OUTPATIENT
Start: 2024-06-18 | End: 2024-06-18 | Stop reason: HOSPADM

## 2024-06-18 RX ORDER — PROPOFOL 10 MG/ML
INJECTION, EMULSION INTRAVENOUS AS NEEDED
Status: DISCONTINUED | OUTPATIENT
Start: 2024-06-18 | End: 2024-06-18 | Stop reason: SURG

## 2024-06-18 RX ADMIN — PROPOFOL 200 MG: 10 INJECTION, EMULSION INTRAVENOUS at 12:11

## 2024-06-18 RX ADMIN — HYDROMORPHONE HYDROCHLORIDE 1 MG: 1 INJECTION, SOLUTION INTRAMUSCULAR; INTRAVENOUS; SUBCUTANEOUS at 00:29

## 2024-06-18 RX ADMIN — Medication 10 ML: at 21:17

## 2024-06-18 RX ADMIN — FENTANYL CITRATE 100 MCG: 50 INJECTION INTRAMUSCULAR; INTRAVENOUS at 12:11

## 2024-06-18 RX ADMIN — ENOXAPARIN SODIUM 40 MG: 40 INJECTION SUBCUTANEOUS at 21:16

## 2024-06-18 RX ADMIN — PANTOPRAZOLE SODIUM 40 MG: 40 INJECTION, POWDER, FOR SOLUTION INTRAVENOUS at 05:15

## 2024-06-18 RX ADMIN — FAMOTIDINE 20 MG: 10 INJECTION, SOLUTION INTRAVENOUS at 12:07

## 2024-06-18 RX ADMIN — Medication 1 MG: at 14:35

## 2024-06-18 RX ADMIN — DEXAMETHASONE SODIUM PHOSPHATE 8 MG: 4 INJECTION, SOLUTION INTRA-ARTICULAR; INTRALESIONAL; INTRAMUSCULAR; INTRAVENOUS; SOFT TISSUE at 12:41

## 2024-06-18 RX ADMIN — ONDANSETRON 4 MG: 2 INJECTION INTRAMUSCULAR; INTRAVENOUS at 12:11

## 2024-06-18 RX ADMIN — MIDAZOLAM HYDROCHLORIDE 2 MG: 1 INJECTION, SOLUTION INTRAMUSCULAR; INTRAVENOUS at 12:06

## 2024-06-18 RX ADMIN — ROSUVASTATIN CALCIUM 10 MG: 10 TABLET, FILM COATED ORAL at 14:35

## 2024-06-18 RX ADMIN — GLYCOPYRROLATE 0.6 MG: 0.2 INJECTION INTRAMUSCULAR; INTRAVENOUS at 12:36

## 2024-06-18 RX ADMIN — PIPERACILLIN AND TAZOBACTAM 3.38 G: 3; .375 INJECTION, POWDER, FOR SOLUTION INTRAVENOUS at 14:35

## 2024-06-18 RX ADMIN — OXYCODONE HYDROCHLORIDE 5 MG: 5 TABLET ORAL at 06:38

## 2024-06-18 RX ADMIN — SODIUM CHLORIDE, POTASSIUM CHLORIDE, SODIUM LACTATE AND CALCIUM CHLORIDE: 600; 310; 30; 20 INJECTION, SOLUTION INTRAVENOUS at 12:06

## 2024-06-18 RX ADMIN — NEOSTIGMINE METHYLSULFATE 3 MG: 0.5 INJECTION INTRAVENOUS at 12:36

## 2024-06-18 RX ADMIN — GLYCOPYRROLATE 0.2 MG: 0.2 INJECTION INTRAMUSCULAR; INTRAVENOUS at 12:21

## 2024-06-18 RX ADMIN — POLYETHYLENE GLYCOL (3350) 17 G: 17 POWDER, FOR SOLUTION ORAL at 21:13

## 2024-06-18 RX ADMIN — HYDROMORPHONE HYDROCHLORIDE 1 MG: 1 INJECTION, SOLUTION INTRAMUSCULAR; INTRAVENOUS; SUBCUTANEOUS at 02:35

## 2024-06-18 RX ADMIN — HYDROMORPHONE HYDROCHLORIDE 1 MG: 1 INJECTION, SOLUTION INTRAMUSCULAR; INTRAVENOUS; SUBCUTANEOUS at 15:54

## 2024-06-18 RX ADMIN — MIDAZOLAM HYDROCHLORIDE 2 MG: 1 INJECTION, SOLUTION INTRAMUSCULAR; INTRAVENOUS at 12:51

## 2024-06-18 RX ADMIN — CETIRIZINE HYDROCHLORIDE 10 MG: 10 TABLET, FILM COATED ORAL at 14:35

## 2024-06-18 RX ADMIN — HYDROMORPHONE HYDROCHLORIDE 1 MG: 1 INJECTION, SOLUTION INTRAMUSCULAR; INTRAVENOUS; SUBCUTANEOUS at 08:50

## 2024-06-18 RX ADMIN — VECURONIUM BROMIDE 3 MG: 10 INJECTION, POWDER, LYOPHILIZED, FOR SOLUTION INTRAVENOUS at 12:21

## 2024-06-18 RX ADMIN — ANASTROZOLE 1 MG: 1 TABLET, COATED ORAL at 14:36

## 2024-06-18 RX ADMIN — SODIUM CHLORIDE, POTASSIUM CHLORIDE, SODIUM LACTATE AND CALCIUM CHLORIDE 100 ML/HR: 600; 310; 30; 20 INJECTION, SOLUTION INTRAVENOUS at 00:26

## 2024-06-18 RX ADMIN — BUPRENORPHINE HYDROCHLORIDE 0.3 MG: 0.32 INJECTION INTRAMUSCULAR; INTRAVENOUS at 12:41

## 2024-06-18 RX ADMIN — SUCCINYLCHOLINE CHLORIDE 120 MG: 20 INJECTION, SOLUTION INTRAMUSCULAR; INTRAVENOUS at 12:11

## 2024-06-18 RX ADMIN — METOPROLOL TARTRATE 50 MG: 50 TABLET, FILM COATED ORAL at 21:13

## 2024-06-18 RX ADMIN — PIPERACILLIN AND TAZOBACTAM 3.38 G: 3; .375 INJECTION, POWDER, FOR SOLUTION INTRAVENOUS at 06:33

## 2024-06-18 RX ADMIN — MORPHINE SULFATE 2 MG: 2 INJECTION, SOLUTION INTRAMUSCULAR; INTRAVENOUS at 18:19

## 2024-06-18 RX ADMIN — MORPHINE SULFATE 2 MG: 2 INJECTION, SOLUTION INTRAMUSCULAR; INTRAVENOUS at 14:34

## 2024-06-18 RX ADMIN — Medication 10 ML: at 14:36

## 2024-06-18 RX ADMIN — ROPIVACAINE HYDROCHLORIDE 240 MG: 5 INJECTION, SOLUTION EPIDURAL; INFILTRATION; PERINEURAL at 12:41

## 2024-06-18 RX ADMIN — TOPIRAMATE 100 MG: 100 TABLET, FILM COATED ORAL at 21:17

## 2024-06-18 RX ADMIN — ACETAMINOPHEN 1000 MG: 500 TABLET ORAL at 21:17

## 2024-06-18 RX ADMIN — FENTANYL CITRATE 50 MCG: 50 INJECTION, SOLUTION INTRAMUSCULAR; INTRAVENOUS at 13:10

## 2024-06-18 RX ADMIN — SODIUM CHLORIDE, POTASSIUM CHLORIDE, SODIUM LACTATE AND CALCIUM CHLORIDE 100 ML/HR: 600; 310; 30; 20 INJECTION, SOLUTION INTRAVENOUS at 21:27

## 2024-06-18 RX ADMIN — DOCUSATE SODIUM 100 MG: 100 CAPSULE, LIQUID FILLED ORAL at 21:13

## 2024-06-18 RX ADMIN — PIPERACILLIN AND TAZOBACTAM 3.38 G: 3; .375 INJECTION, POWDER, FOR SOLUTION INTRAVENOUS at 23:12

## 2024-06-18 RX ADMIN — MORPHINE SULFATE 2 MG: 2 INJECTION, SOLUTION INTRAMUSCULAR; INTRAVENOUS at 23:09

## 2024-06-18 RX ADMIN — HYDROMORPHONE HYDROCHLORIDE 1 MG: 1 INJECTION, SOLUTION INTRAMUSCULAR; INTRAVENOUS; SUBCUTANEOUS at 21:10

## 2024-06-18 RX ADMIN — ACETAMINOPHEN 1000 MG: 500 TABLET ORAL at 14:35

## 2024-06-18 RX ADMIN — HYDROMORPHONE HYDROCHLORIDE 1 MG: 1 INJECTION, SOLUTION INTRAMUSCULAR; INTRAVENOUS; SUBCUTANEOUS at 05:15

## 2024-06-18 RX ADMIN — LIDOCAINE HYDROCHLORIDE 40 MG: 20 INJECTION, SOLUTION EPIDURAL; INFILTRATION; INTRACAUDAL; PERINEURAL at 12:11

## 2024-06-18 RX ADMIN — KETOROLAC TROMETHAMINE 30 MG: 30 INJECTION, SOLUTION INTRAMUSCULAR; INTRAVENOUS at 12:31

## 2024-06-18 NOTE — PAYOR COMM NOTE
"CONTACT:  PAGE WHITT RN  UTILIZATION MANAGEMENT DEPT.   [facility]   [address] [address] [facility], [address]   PHONE:  [phone]   FAX: [phone]         CONTINUED STAY CLINICALS FOR REVIEW    REF # [id number]          Samson Carbajal (49 y.o. Male)       Date of Birth   1975    Social Security Number       Address   [address] [address]    Home Phone   [phone]    MRN   [id number]       Orthodoxy   [facility]    Marital Status                               Admission Date   6/16/24    Admission Type   Emergency    Admitting Provider   Stefan Carbajal DO    Attending Provider   Luc Rowland DO    Department, Room/Bed   [address], [address]/       Discharge Date       Discharge Disposition       Discharge Destination                                 Attending Provider: Luc Rowland DO    Allergies: No Known Allergies    Isolation: None   Infection: None   Code Status: CPR    Ht: 167.6 cm (65.98\")   Wt: 85 kg (187 lb 8 oz)    Admission Cmt: None   Principal Problem: Diverticulitis [K57.92]                   Active Insurance as of 6/16/2024       Primary Coverage       Payor Plan Insurance Group Employer/Plan Group    [facility] [employer] [facility] [employer] [id number]       Payor Plan Address Payor Plan Phone Number Payor Plan Fax Number Effective Dates    [address] [phone]  11/1/2019 - None Entered    [place] [address]         Subscriber Name Subscriber Birth Date Member ID       SAMSON CARBAJAL 1975 [id number]                     Emergency Contacts        (Rel.) Home Phone Work Phone Mobile Phone    Roxanne Carbajal (Mother) [phone] -- --    CLARA BELLAMY (Spouse) [phone] -- --              Orders (last 48 hrs)        Start     Ordered    06/21/24 0000  Testosterone Cypionate (DEPOTESTOTERONE CYPIONATE) injection 100 mg  Every 7 Days         06/17/24 0816    06/18/24 0600  " "CBC Auto Differential  PROCEDURE ONCE         06/17/24 2202    06/18/24 0001  NPO Diet NPO Type: Strict NPO, Sips with Meds  Diet Effective Midnight         06/17/24 1437    06/17/24 1500  piperacillin-tazobactam (ZOSYN) IVPB 3.375 g IVPB in 100 mL NS (VTB)  Every 8 Hours         06/17/24 1104    06/17/24 1436  Case Request  Once         06/17/24 1437    06/17/24 1436  Place Sequential Compression Device  Once         06/17/24 1437    06/17/24 1436  Maintain Sequential Compression Device  Continuous         06/17/24 1437    06/17/24 0948  CT Abdomen Pelvis Without Contrast  1 Time Imaging         06/17/24 0943    06/17/24 0943  Blood Culture - Blood, Hand, Left  Once        Placed in \"And\" Linked Group    06/17/24 0942    06/17/24 0943  Blood Culture - Blood, Hand, Left  Once        Comments: From a different site than #1.     Placed in \"And\" Linked Group    06/17/24 0942    06/17/24 0943  Lactic Acid, Plasma  Once         06/17/24 0942    06/17/24 0943  CT Abdomen Pelvis With Contrast  1 Time Imaging,   Status:  Canceled         06/17/24 0943    06/17/24 0915  topiramate (TOPAMAX) tablet 100 mg  2 Times Daily         06/17/24 0816    06/17/24 0915  anastrozole (ARIMIDEX) tablet 1 mg  Daily         06/17/24 0816    06/17/24 0915  metoprolol tartrate (LOPRESSOR) tablet 50 mg  2 Times Daily         06/17/24 0816    06/17/24 0915  cyanocobalamin injection 1,000 mcg  Every 30 Days         06/17/24 0816    06/17/24 0915  folic acid (FOLVITE) tablet 1 mg  Daily         06/17/24 0816    06/17/24 0915  cetirizine (zyrTEC) tablet 10 mg  Daily         06/17/24 0816    06/17/24 0915  [Held by provider]  furosemide (LASIX) tablet 20 mg  Daily        (On hold since yesterday at 0816 until manually unheld; held by Luc Rowland DOHold Reason: Other (Comment Required))    06/17/24 0816    06/17/24 0814  ondansetron ODT (ZOFRAN-ODT) disintegrating tablet 4 mg  Every 8 Hours PRN         06/17/24 0816    06/17/24 0802  " Inpatient General Surgery Consult  Once        Specialty:  General Surgery  Provider:  Marie Haque MD    06/17/24 0802    06/17/24 0600  CBC & Differential  Daily       06/16/24 1542    06/17/24 0600  Comprehensive Metabolic Panel  Daily       06/16/24 1542    06/17/24 0600  Protime-INR  Morning Draw         06/16/24 1542    06/17/24 0600  pantoprazole (PROTONIX) injection 40 mg  Every Early Morning         06/16/24 1734    06/17/24 0600  CBC Auto Differential  PROCEDURE ONCE         06/16/24 2202 06/16/24 2100  sodium chloride 0.9 % flush 10 mL  Every 12 Hours Scheduled         06/16/24 1734    06/16/24 2100  Enoxaparin Sodium (LOVENOX) syringe 40 mg  Nightly         06/16/24 1734 06/16/24 2100  acetaminophen (TYLENOL) tablet 1,000 mg  3 times daily         06/16/24 1734    06/16/24 2000  Vital Signs  Every 4 Hours       06/16/24 1734 06/16/24 1912  PT Consult: Eval & Treat Functional Mobility Below Baseline  Once         06/16/24 1911    06/16/24 1912  OT Consult: Eval & Treat ADL Performance Below Baseline  Once         06/16/24 1911    06/16/24 1900  piperacillin-tazobactam (ZOSYN) IVPB 3.375 g IVPB in 100 mL NS (VTB)  Every 6 Hours,   Status:  Discontinued         06/16/24 1734    06/16/24 1900  rosuvastatin (CRESTOR) tablet 10 mg  Daily         06/16/24 1734    06/16/24 1900  piperacillin-tazobactam (ZOSYN) IVPB 3.375 g IVPB in 100 mL NS (VTB)  Every 6 Hours,   Status:  Discontinued         06/16/24 1740    06/16/24 1830  lactated ringers infusion  Continuous         06/16/24 1734    06/16/24 1806  Initiate & Follow Hypercapnic Monitoring Guideline for Opioid Administration via EtCO2 and / or SpO2  Continuous        Comments: Follow Hypercapnic Monitoring Guideline As Outlined in Process Instructions (Open Order Report to View Full Instructions)    06/16/24 1805    06/16/24 1806  Opioid Administration - Document EtCO2 and / or SpO2 With Each Set of Vitals & Any Change in Patient Status  Per  "Order Details        Comments: With Each Set of Vitals & Any Change in Patient Status    06/16/24 1805    06/16/24 1806  Opioid Administration - Notify Provider Hypercapnic Monitoring  Continuous        Comments: Open Order Report to View Parameters Requiring Provider Notification    06/16/24 1805    06/16/24 1806  Opioid Administration - Continuous Pulse Oximetry (SpO2)  Continuous         06/16/24 1805    06/16/24 1800  Oral Care  2 Times Daily       06/16/24 1734    06/16/24 1800  Incentive Spirometry  Every 4 Hours While Awake       06/16/24 1734    06/16/24 1747  POC Glucose Once  PROCEDURE ONCE        Comments: Complete no more than 45 minutes prior to patient eating      06/16/24 1740    06/16/24 1739  HYDROmorphone (DILAUDID) injection 1 mg  Every 2 Hours PRN         06/16/24 1740    06/16/24 1735  Intake & Output  Every Shift       06/16/24 1734    06/16/24 1735  Weigh Patient  Once         06/16/24 1734    06/16/24 1735  Insert Peripheral IV  Once         06/16/24 1734    06/16/24 1735  Saline Lock & Maintain IV Access  Continuous         06/16/24 1734    06/16/24 1735  Diet: Liquid; Clear Liquid; Fluid Consistency: Thin (IDDSI 0)  Diet Effective Now,   Status:  Canceled         06/16/24 1734    06/16/24 1735  Adult Transthoracic Echo Complete W/ Cont if Necessary Per Protocol  Once         06/16/24 1734    06/16/24 1734  acetaminophen (TYLENOL) tablet 650 mg  Every 4 Hours PRN         06/16/24 1734    06/16/24 1734  sennosides-docusate (PERICOLACE) 8.6-50 MG per tablet 2 tablet  2 Times Daily PRN        Placed in \"And\" Linked Group    06/16/24 1734    06/16/24 1734  polyethylene glycol (MIRALAX) packet 17 g  Daily PRN        Placed in \"And\" Linked Group    06/16/24 1734    06/16/24 1734  bisacodyl (DULCOLAX) EC tablet 5 mg  Daily PRN        Placed in \"And\" Linked Group    06/16/24 1734    06/16/24 1734  bisacodyl (DULCOLAX) suppository 10 mg  Daily PRN        Placed in \"And\" Linked Group    06/16/24 1734 " "   06/16/24 1734  prochlorperazine (COMPAZINE) injection 10 mg  Every 6 Hours PRN         06/16/24 1734    06/16/24 1734  oxyCODONE (ROXICODONE) immediate release tablet 5 mg  Every 4 Hours PRN         06/16/24 1734    06/16/24 1734  morphine injection 2 mg  Every 3 Hours PRN         06/16/24 1734    06/16/24 1734  sodium chloride 0.9 % flush 10 mL  As Needed         06/16/24 1734    06/16/24 1734  sodium chloride 0.9 % infusion 40 mL  As Needed         06/16/24 1734    06/16/24 1708  CK  Once         06/16/24 1707    06/16/24 1707  Hemoglobin A1c  Once         06/16/24 1707    06/16/24 1707  Lipid Panel  Once         06/16/24 1707    06/16/24 1707  TSH  Once         06/16/24 1707    06/16/24 1656  ECG 12 Lead Pre-Op / Pre-Procedure  Once         06/16/24 1655    06/16/24 1553  HYDROmorphone (DILAUDID) injection 1 mg  Once         06/16/24 1537    06/16/24 1540  Inpatient Admission  Once         06/16/24 1543    06/16/24 1540  Code Status and Medical Interventions:  Continuous         06/16/24 1543    06/16/24 1522  HYDROmorphone (DILAUDID) injection 1 mg  Once         06/16/24 1506    06/16/24 1519  metroNIDAZOLE (FLAGYL) IVPB 500 mg  Once,   Status:  Discontinued         06/16/24 1503    06/16/24 1519  cefepime 2000 mg IVPB in 100 mL NS (VTB)  Once         06/16/24 1503    06/16/24 1513  ondansetron (ZOFRAN) injection 4 mg  Once         06/16/24 1457    06/16/24 1504  Lactic Acid, Plasma  Once         06/16/24 1503    06/16/24 1504  Blood Culture - Blood, Arm, Right  Once        Placed in \"And\" Linked Group    06/16/24 1503    06/16/24 1504  Blood Culture - Blood, Hand, Left  Once        Placed in \"And\" Linked Group    06/16/24 1503    06/16/24 1451  iopamidol (ISOVUE-300) 61 % injection 100 mL  Once in Imaging         06/16/24 1435    06/16/24 1447  Urinalysis, Microscopic Only - Urine, Clean Catch  Once         06/16/24 1446    06/16/24 1435  sodium chloride 0.9 % bolus 1,000 mL  Once         06/16/24 1419    " "06/16/24 1357  morphine injection 4 mg  Once         06/16/24 1341    06/16/24 1333  sodium chloride 0.9 % bolus 1,000 mL  Once         06/16/24 1317    06/16/24 1318  Insert Peripheral IV  Once         06/16/24 1317    06/16/24 1318  [place] Draw  Once         06/16/24 1317    06/16/24 1318  CBC & Differential  Once         06/16/24 1317    06/16/24 1318  Comprehensive Metabolic Panel  Once         06/16/24 1317    06/16/24 1318  Lipase  Once         06/16/24 1317    06/16/24 1318  Urinalysis With Microscopic If Indicated (No Culture) - Urine, Clean Catch  STAT         06/16/24 1317    06/16/24 1318  Insert Peripheral IV  Once        Placed in \"And\" Linked Group    06/16/24 1317    06/16/24 1318  CT Abdomen Pelvis With Contrast  1 Time Imaging         06/16/24 1317    06/16/24 1318  Green Top (Gel)  PROCEDURE ONCE         06/16/24 1317    06/16/24 1318  Lavender Top  PROCEDURE ONCE         06/16/24 1317    06/16/24 1318  Gold Top - SST  PROCEDURE ONCE         06/16/24 1317    06/16/24 1318  Light Blue Top  PROCEDURE ONCE         06/16/24 1317    06/16/24 1318  CBC Auto Differential  PROCEDURE ONCE         06/16/24 1317    06/16/24 1318  C-reactive Protein  STAT         06/16/24 1317    06/16/24 1318  Sedimentation Rate  STAT         06/16/24 1317    06/16/24 1317  sodium chloride 0.9 % flush 10 mL  As Needed        Placed in \"And\" Linked Group    06/16/24 1317    06/16/24 1317  sodium chloride 0.9 % flush 10 mL  As Needed         06/16/24 1317    Unscheduled  Up With Assistance  As Needed       06/16/24 1734    Unscheduled  Patient May Use Home CPAP / BIPAP For Sleep or As Needed  As Needed       06/16/24 1734    Signed and Held  HYDROmorphone (DILAUDID) injection 1 mg  Every 2 Hours PRN,   Status:  Canceled         Signed and Held    Signed and Held  lactated ringers bolus 1,000 mL  Once,   Status:  Canceled         Signed and Held    --  anastrozole (ARIMIDEX) 1 MG tablet  Daily         06/16/24 7972    --  " vitamin D (ERGOCALCIFEROL) 1.25 MG (31193 UT) capsule capsule  Weekly,   Status:  Discontinued         24 1614    --  metoprolol tartrate (LOPRESSOR) 50 MG tablet  2 Times Daily         24 161    --  Tirzepatide (MOUNJARO) 5 MG/0.5ML solution pen-injector pen  Weekly,   Status:  Discontinued         24 175    --  levocetirizine (XYZAL) 5 MG tablet  Every Evening         24    --  cyanocobalamin 1000 MCG/ML injection  Every 30 Days         24    --  doxycycline (VIBRAMYCIN) 100 MG capsule  2 Times Daily,   Status:  Discontinued         24    --  furosemide (LASIX) 20 MG tablet  Daily         24    --  Tirzepatide (MOUNJARO) 7.5 MG/0.5ML solution pen-injector pen  Weekly         24    --  folic acid (FOLVITE) 1 MG tablet  Daily         24    --  glucosamine-chondroitin 500-400 MG capsule capsule  2 Times Daily With Meals         24                     Physician Progress Notes (last 48 hours)        Jose Kelly PA-C at 24 1130       Attestation signed by Luc Rowland DO at 24 1224    I have reviewed this documentation and agree. Pt seen and examined with ANNA Heard. Awake and alert with his wife present at bedside. Continues to report abdominal pain and loose stools. Febrile this AM. Repeat lactate normal. Repeat CT abd/pelvis ordered per surgery. Cont broad spectrum IV antibiotics empirically. Surgery input appreciated.                   Patient Identification:  Name:  Samson Kendall  Age:  49 y.o.  Sex:  male  :  1975  MRN:  [id number]  Visit Number:  [id number]  Primary Care Provider:  Rosalina Pandya APRN    Length of stay:  1    Subjective/Interval History/Consultants/Procedures     Chief Complaint:   Chief Complaint   Patient presents with    Abdominal Pain    Rectal Pain       Subjective/Interval History:    49 y.o. male who was admitted on 2024 with sepsis 2/2  "diverticulitis with microperforation     PMH is significant for nonischemic cardiomyopathy with reduced EF, HTN, CKD IIIa, T2DM, HLD, RANJEET  For complete admission information, please see history and physical.     Consultations:  General surgery  PT OT    Procedures/Scans:  CT abdomen and pelvis with contrast   CT abdomen and pelvis with oral contrast- pend    Today, the patient was seen and examined on 3S. Sitting up in bed, appeared uncomfortable but no acute distress. He reported abdominal pain the same if not some worse overnight. Continues to have \"liquid\" bowel movements which are non-bloody. Abdomen appears distended per visitor at the bedside but no reported vomiting. Patient drinking oral contrast during exam with repeat CT ordered for later this AM.     Room location at the time of evaluation was 320p.    ----------------------------------------------------------------------------------------------------------------------  Current Hospital Meds:  acetaminophen, 1,000 mg, Oral, TID  anastrozole, 1 mg, Oral, Daily  cetirizine, 10 mg, Oral, Daily  cyanocobalamin, 1,000 mcg, Intramuscular, Q30 Days  enoxaparin, 40 mg, Subcutaneous, Nightly  folic acid, 1 mg, Oral, Daily  [Held by provider] furosemide, 20 mg, Oral, Daily  metoprolol tartrate, 50 mg, Oral, BID  pantoprazole, 40 mg, Intravenous, Q AM  piperacillin-tazobactam, 3.375 g, Intravenous, Q8H  rosuvastatin, 10 mg, Oral, Daily  sodium chloride, 10 mL, Intravenous, Q12H  [START ON 6/21/2024] Testosterone Cypionate, 100 mg, Intramuscular, Q7 Days  topiramate, 100 mg, Oral, BID      lactated ringers, 100 mL/hr, Last Rate: 100 mL/hr (06/16/24 6758)      ----------------------------------------------------------------------------------------------------------------------      Objective     Vital Signs:  Temp:  [97.8 °F (36.6 °C)-102.8 °F (39.3 °C)] 98.8 °F (37.1 °C)  Heart Rate:  [] 79  Resp:  [14-16] 16  BP: (107-147)/(37-88) 114/69      06/16/24  1312 " 06/16/24  1744 06/17/24  0500   Weight: 86.2 kg (190 lb) 86.2 kg (190 lb) 85.7 kg (188 lb 14.4 oz)     Body mass index is 30.5 kg/m².    Intake/Output Summary (Last 24 hours) at 6/17/2024 1130  Last data filed at 6/17/2024 0905  Gross per 24 hour   Intake 1991.69 ml   Output --   Net 1991.69 ml     I/O this shift:  In: 240 [P.O.:240]  Out: -   Diet: Liquid; Clear Liquid; Fluid Consistency: Thin (IDDSI 0)  ----------------------------------------------------------------------------------------------------------------------    Physical Exam  Vitals and nursing note reviewed.   Constitutional:       General: He is not in acute distress.     Appearance: He is ill-appearing.   HENT:      Head: Normocephalic and atraumatic.   Eyes:      Extraocular Movements: Extraocular movements intact.      Conjunctiva/sclera: Conjunctivae normal.   Cardiovascular:      Rate and Rhythm: Normal rate and regular rhythm.   Pulmonary:      Effort: Pulmonary effort is normal.      Breath sounds: Normal breath sounds.   Abdominal:      General: There is distension.      Tenderness: There is abdominal tenderness.   Musculoskeletal:      Right lower leg: No edema.      Left lower leg: No edema.   Skin:     General: Skin is warm and dry.   Neurological:      Mental Status: He is alert. Mental status is at baseline.   Psychiatric:         Mood and Affect: Mood normal.         Behavior: Behavior normal.                ----------------------------------------------------------------------------------------------------------------------  Tele:      ----------------------------------------------------------------------------------------------------------------------  Results from last 7 days   Lab Units 06/16/24  1344   CK TOTAL U/L 79     Results from last 7 days   Lab Units 06/17/24  1014 06/17/24  0054 06/16/24  1615 06/16/24  1344 06/14/24  1313 06/14/24  1217   CRP mg/dL  --   --   --  44.82*  --  10.71*   LACTATE mmol/L 0.9  --  2.0  --  0.7   "--    WBC 10*3/mm3  --  10.77  --  11.05*  --  12.75*   HEMOGLOBIN g/dL  --  10.4*  --  10.7*  --  11.8*   HEMATOCRIT %  --  32.6*  --  33.9*  --  35.8*   MCV fL  --  91.8  --  91.6  --  89.3   MCHC g/dL  --  31.9  --  31.6  --  33.0   PLATELETS 10*3/mm3  --  258  --  241  --  275   INR   --  1.19*  --   --   --   --          Results from last 7 days   Lab Units 06/17/24  0054 06/16/24  1344 06/14/24  1217   SODIUM mmol/L 136 137 138   POTASSIUM mmol/L 3.5 4.1 4.1   CHLORIDE mmol/L 103 103 104   CO2 mmol/L 21.4* 22.6 20.3*   BUN mg/dL 21* 29* 34*   CREATININE mg/dL 1.73* 2.09* 1.85*   CALCIUM mg/dL 10.1 10.1 9.8   GLUCOSE mg/dL 99 108* 120*   ALBUMIN g/dL 3.6 3.7 4.5   BILIRUBIN mg/dL 0.3 0.3 0.3   ALK PHOS U/L 42 50 43   AST (SGOT) U/L 15 14 20   ALT (SGPT) U/L 13 13 16   Estimated Creatinine Clearance: 53.6 mL/min (A) (by C-G formula based on SCr of 1.73 mg/dL (H)).  No results found for: \"AMMONIA\"  Results from last 7 days   Lab Units 06/16/24  1344   CHOLESTEROL mg/dL 131   TRIGLYCERIDES mg/dL 224*   HDL CHOL mg/dL 21*   LDL CHOL mg/dL 73     Blood Culture   Date Value Ref Range Status   06/14/2024 No growth at 2 days  Preliminary   06/14/2024 No growth at 2 days  Preliminary     No results found for: \"URINECX\"  No results found for: \"WOUNDCX\"  No results found for: \"STOOLCX\"  ----------------------------------------------------------------------------------------------------------------------  Imaging Results (Last 24 Hours)       Procedure Component Value Units Date/Time    CT Abdomen Pelvis With Contrast [[id number]] Collected: 06/16/24 1453     Updated: 06/16/24 0240    Narrative:      INDICATION: Abdominal pain. Rectal pain     COMPARISON: CT from 6/14/2024.     TECHNIQUE: Axial CT images of the abdomen and pelvis were obtained  following IV contrast administration. Coronal and sagittal reformations  were reviewed.     FINDINGS:  Visualized lung bases appear unremarkable.     The liver, gallbladder, spleen, " pancreas and adrenal glands appear  unremarkable. No hydronephrosis. Punctate left renal calculus. Small  bilateral renal cysts. Fat-containing umbilical hernia.     Moderate inflammation surrounding the sigmoid colon with wall thickening  and adjacent inflammatory fat stranding. Small to moderate amount of  fluid adjacent to the sigmoid colon. No rim-enhancing drainable abscess  at this time. A few small foci of air likely related to contained  perforation. Small bowel dilation.     Moderate urinary bladder wall thickening. Small fat-containing bilateral  inguinal hernias     Degenerative changes in the spine. No acute osseous abnormality evident.       Impression:      1.  Sigmoid diverticulitis. Moderate inflammation surrounding the  sigmoid colon with wall thickening and adjacent inflammatory fat  stranding. Small to moderate amount of fluid adjacent to the sigmoid  colon. No rim-enhancing drainable abscess at this time. A few small foci  of air likely related to contained perforation.  2.  Small bowel dilation could relate to ileus from above inflammation  or obstruction. Follow-up recommended.  3.  Moderate urinary bladder wall thickening likely from above  inflammation.        This report was finalized on 6/16/2024 2:55 PM by Alex Pallas, DO.             ----------------------------------------------------------------------------------------------------------------------   I have reviewed the above laboratory values for 06/17/24    Assessment/Plan     Active Hospital Problems    Diagnosis  POA    **Diverticulitis [K57.92]  Yes         ASSESSMENT/PLAN:    Acute diverticulitis with contained perforation  Patient with initial ED visit secondary to abdominal pain, fever and chills and discharged home on Augmentin for uncomplicated diverticulitis.  Returned secondary to worsening abdominal pain despite compliance with antibiotics.  CT on admission showed sigmoid diverticulitis with likely contained  perforation-without drainable abscess.  Also noted concern for SBO versus ileus though patient without nausea or vomiting on exam.  White blood cell count was 11 K with CRP 44 and lactate 2.  General surgery consulted and following.  Repeat CT with oral contrast is ordered and pending today.  Continue CLD for now  As needed antiemetics, pain control regimen  Zosyn has been continued empirically  Continue LR at 100 mL/h   Labs overall stable with WBC count downtrending and hemoglobin 10.4.  Follow-up blood culture results  Repeat labs in a.m.    Ischemic cardiomyopathy with mildly reduced EF  Hypertension  Most recent TTE showed EF 45 to 50%.  Updated TTE is ordered and pending.  Metoprolol, Lasix continued  Monitor clinical volume status closely    CKD stage IIIa  Creatinine was slightly greater than 2 on arrival with baseline 1.7-1.8.  CK normal.  CT A/P did not show any evidence of obstruction.  Creatinine improved on a.m. labs to 1.7.   Continue IVF's as above    Hx T2DM  Hyperlipidemia  RANJEET  Other home meds continued as indicated  A1c 6.2.  Patient is not on diabetes regimen as an outpatient following significant weight loss.  Can initiate SSI if indicated.  Glucose trend currently well-controlled.  -----------  -DVT prophylaxis: Lovenox  -Disposition plans/anticipated needs: Pending course and further workup        The patient is high risk due to the following diagnoses/reasons:  acute diverticulitis         Jose Kelly PA-C  06/17/24  11:30 EDT     Electronically signed by Luc Rowland DO at 06/17/24 1224          Consult Notes (last 48 hours)        Marie Haque MD at 06/17/24 0878          Consulting physician:  Dr. Haque    Referring physician: hospitalist    Date of consultation: 06/17/24     Chief complaint diverticulitis which failed oral antibiotics    Subjective     Patient is a 49 y.o. male who presented to the emergency room 48 hours after being seen for diverticulitis and  discharged home on Augmentin.  He presented with worsening pain.  CT demonstrated extensive sigmoid diverticulitis with possible microperforation.  This morning patient had a temperature over 102 despite antibiotics.  Repeat CT was performed with oral contrast which demonstrated a fluid collection at the rectosigmoid junction which was not amenable to radiology drainage.  Patient states that this is his first episode of diverticulitis.  He has had prior colonoscopy which demonstrated polyps.  He currently is tolerating clear liquids without nausea or vomiting      Review of Systems  Review of Systems - General ROS: negative for - weight loss.  Positive for fever  Psychological ROS: negative for - behavioral disorder  Ophthalmic ROS: negative for - dry eyes  ENT ROS: negative for - vertigo or vocal changes  Hematological and Lymphatic ROS: negative for - swollen lymph nodes, DVT, PE.   Respiratory ROS: negative for - sputum changes or stridor.  Cardiovascular ROS: negative for - irregular heartbeat or murmur  Gastrointestinal ROS: negative for - blood in stools or change in stools  Genitourinary ROS: negative for - hematuria or incontinence  Musculoskeletal ROS: negative for - gait disturbance      History  Past Medical History:   Diagnosis Date    Asthma     childhood    Cardiomyopathy     Diabetes mellitus     Heart murmur     as child    Hyperlipidemia     Hypertension     Palpitation     Sleep apnea     C pap use     Past Surgical History:   Procedure Laterality Date    APPENDECTOMY      HAND SURGERY      KNEE ARTHROSCOPY      LUMBAR FUSION      VASECTOMY      WISDOM TOOTH EXTRACTION       Family History   Problem Relation Age of Onset    Heart attack Maternal Grandfather     Heart failure Maternal Grandfather     Heart disease Maternal Grandfather     Arthritis Mother     Diabetes Father     Hearing loss Father     Asthma Maternal Grandmother      Social History     Tobacco Use    Smoking status: Never      Passive exposure: Current    Smokeless tobacco: Current     Types: Snuff   Vaping Use    Vaping status: Never Used   Substance Use Topics    Alcohol use: Not Currently    Drug use: No     Medications Prior to Admission   Medication Sig Dispense Refill Last Dose    amoxicillin-clavulanate (AUGMENTIN) 875-125 MG per tablet Take 1 tablet by mouth 2 (Two) Times a Day for 10 days. 20 tablet 0 6/16/2024 at 0900    anastrozole (ARIMIDEX) 1 MG tablet Take 1 tablet by mouth Daily.   6/16/2024 at 0900    Calcium-Magnesium-Zinc (OMERO-MAG-ZINC PO) Take 1 tablet by mouth 2 (Two) Times a Day.   6/15/2024    dexlansoprazole (DEXILANT) 60 MG capsule Take 1 capsule by mouth Daily.   6/16/2024 at 0900    doxycycline (MONODOX) 100 MG capsule Take 1 capsule by mouth 2 (Two) Times a Day for 10 days. 20 capsule 0 6/16/2024    fenofibrate (TRICOR) 145 MG tablet Take 1 tablet by mouth Daily.   6/16/2024 at 0900    folic acid (FOLVITE) 1 MG tablet Take 1 tablet by mouth Daily.   6/16/2024 at 0900    furosemide (LASIX) 20 MG tablet Take 1 tablet by mouth Daily.   Past Month    glucosamine-chondroitin 500-400 MG capsule capsule Take 1 capsule by mouth 2 (Two) Times a Day With Meals.   6/16/2024 at 0900    HYDROcodone-acetaminophen (NORCO) 7.5-325 MG per tablet Take 1 tablet by mouth Every 8 (Eight) Hours As Needed for Moderate Pain.   6/15/2024    levocetirizine (XYZAL) 5 MG tablet Take 1 tablet by mouth Every Evening.   6/15/2024    meloxicam (MOBIC) 15 MG tablet Take 1 tablet by mouth Daily.   6/16/2024 at 0900    metoprolol tartrate (LOPRESSOR) 50 MG tablet Take 1 tablet by mouth 2 (Two) Times a Day.   6/16/2024 at 0900    ondansetron ODT (ZOFRAN-ODT) 4 MG disintegrating tablet Place 1 tablet on the tongue Every 8 (Eight) Hours As Needed for Vomiting. 30 tablet 0 6/15/2024    Testosterone Cypionate (DEPOTESTOTERONE CYPIONATE) 200 MG/ML injection Inject 0.5 mL into the appropriate muscle as directed by prescriber Every 7 (Seven) Days.    6/14/2024    Tirzepatide (MOUNJARO) 7.5 MG/0.5ML solution pen-injector pen Inject 0.5 mL under the skin into the appropriate area as directed 1 (One) Time Per Week.   6/14/2024    topiramate (TOPAMAX) 100 MG tablet Take 1 tablet by mouth 2 (Two) Times a Day.   6/16/2024 at 0900    cyanocobalamin 1000 MCG/ML injection Inject 1 mL into the appropriate muscle as directed by prescriber Every 30 (Thirty) Days.   More than a month     Allergies:  Patient has no known allergies.    Objective     Vital Signs  Temp:  [97.8 °F (36.6 °C)-99.4 °F (37.4 °C)] 98.4 °F (36.9 °C)  Heart Rate:  [] 97  Resp:  [14-16] 16  BP: (107-147)/(37-88) 127/75    Physical Exam:  General:  This is a WD WN male in no acute distress  Vital signs: Stable, afebrile  HEENT exam:  WNL. Sclerae are anicteric.  EOMI  Neck:  Supple, FROM.  No JVD.  Trachea midline  Lungs:  Respiratory effort normal. Auscultation: Clear, without wheezes, rhonchi, rales  Heart:  Regular rate and rhythm, without murmur, gallop, rub.  No pedal edema  Abdomen: Bowel sounds are present.  Abdomen softly distended.  Left lower quadrant tenderness with guarding  Musculoskeletal:  Muscle strength/tone is normal.    Psych:  Alert, oriented x 3.  Mood and affect are appropriate  Skin:  Warm with good turgor.  Without rash or lesion  Extremities:  Examination of the extremities revealed no cyanosis, clubbing or edema.    Results Review:   Results from last 7 days   Lab Units 06/16/24  1344   CK TOTAL U/L 79     Results from last 7 days   Lab Units 06/17/24  0054 06/16/24  1615 06/16/24  1344 06/14/24  1313 06/14/24  1217   CRP mg/dL  --   --  44.82*  --  10.71*   LACTATE mmol/L  --  2.0  --  0.7  --    WBC 10*3/mm3 10.77  --  11.05*  --  12.75*   HEMOGLOBIN g/dL 10.4*  --  10.7*  --  11.8*   HEMATOCRIT % 32.6*  --  33.9*  --  35.8*   PLATELETS 10*3/mm3 258  --  241  --  275   INR  1.19*  --   --   --   --          Results from last 7 days   Lab Units 06/17/24  0054  "06/16/24  1344 06/14/24  1217   SODIUM mmol/L 136 137 138   POTASSIUM mmol/L 3.5 4.1 4.1   CHLORIDE mmol/L 103 103 104   CO2 mmol/L 21.4* 22.6 20.3*   BUN mg/dL 21* 29* 34*   CREATININE mg/dL 1.73* 2.09* 1.85*   CALCIUM mg/dL 10.1 10.1 9.8   GLUCOSE mg/dL 99 108* 120*   ALBUMIN g/dL 3.6 3.7 4.5   BILIRUBIN mg/dL 0.3 0.3 0.3   ALK PHOS U/L 42 50 43   AST (SGOT) U/L 15 14 20   ALT (SGPT) U/L 13 13 16   Estimated Creatinine Clearance: 53.6 mL/min (A) (by C-G formula based on SCr of 1.73 mg/dL (H)).  No results found for: \"AMMONIA\"  Results from last 7 days   Lab Units 06/16/24  1344   CHOLESTEROL mg/dL 131   TRIGLYCERIDES mg/dL 224*   HDL CHOL mg/dL 21*   LDL CHOL mg/dL 73     Blood Culture   Date Value Ref Range Status   06/14/2024 No growth at 2 days  Preliminary   06/14/2024 No growth at 2 days  Preliminary     No results found for: \"URINECX\"  No results found for: \"WOUNDCX\"  No results found for: \"STOOLCX\"    Imaging:  Imaging Results (Last 24 Hours)       Procedure Component Value Units Date/Time    CT Abdomen Pelvis With Contrast [[id number]] Collected: 06/16/24 1453     Updated: 06/16/24 1457    Narrative:      INDICATION: Abdominal pain. Rectal pain     COMPARISON: CT from 6/14/2024.     TECHNIQUE: Axial CT images of the abdomen and pelvis were obtained  following IV contrast administration. Coronal and sagittal reformations  were reviewed.     FINDINGS:  Visualized lung bases appear unremarkable.     The liver, gallbladder, spleen, pancreas and adrenal glands appear  unremarkable. No hydronephrosis. Punctate left renal calculus. Small  bilateral renal cysts. Fat-containing umbilical hernia.     Moderate inflammation surrounding the sigmoid colon with wall thickening  and adjacent inflammatory fat stranding. Small to moderate amount of  fluid adjacent to the sigmoid colon. No rim-enhancing drainable abscess  at this time. A few small foci of air likely related to contained  perforation. Small bowel dilation.   "   Moderate urinary bladder wall thickening. Small fat-containing bilateral  inguinal hernias     Degenerative changes in the spine. No acute osseous abnormality evident.       Impression:      1.  Sigmoid diverticulitis. Moderate inflammation surrounding the  sigmoid colon with wall thickening and adjacent inflammatory fat  stranding. Small to moderate amount of fluid adjacent to the sigmoid  colon. No rim-enhancing drainable abscess at this time. A few small foci  of air likely related to contained perforation.  2.  Small bowel dilation could relate to ileus from above inflammation  or obstruction. Follow-up recommended.  3.  Moderate urinary bladder wall thickening likely from above  inflammation.        This report was finalized on 6/16/2024 2:55 PM by Alex Pallas, DO.               Sigmoid diverticulitis with luminal fluid collection which appears to represent a developing abscess.      Impression:    Diverticulitis with localized perforation and developing abscess       Plan:  Laparoscopic drainage of abscess      Discussion:  The fluid collection is not amenable to percutaneous IR drainage.  Even if it were the small size of the catheter notoriously fails to completely drain the collection.  Patient is aware that laparoscopic drainage will not necessarily avoid the need for colon resection.  Marie Haque MD  06/17/24  08:40 EDT    Time: Time spent:    Please note that portions of this note were completed with a voice recognition program.    Electronically signed by Marie Haque MD at 06/17/24 2022

## 2024-06-18 NOTE — THERAPY EVALUATION
Acute Care - Occupational Therapy Initial Evaluation  [facility]     Patient Name: Samson Kendall  : 1975  MRN: [id number]  Today's Date: 2024  Onset of Illness/Injury or Date of Surgery: 24     Referring Physician: Dr. Kendall    Admit Date: 2024       ICD-10-CM ICD-9-CM   1. Diverticulitis of colon with perforation  K57.20 562.11     Patient Active Problem List   Diagnosis    Skin lesion of right ear    Cough    Syncope    GERD with esophagitis    Palpitations    Chest pain    Fatigue    Cardiomyopathy    Ureteral calculus, right    Ganglion cyst    Umbilical hernia without obstruction and without gangrene    Diverticulitis    Diverticulitis of colon with perforation     Past Medical History:   Diagnosis Date    Asthma     childhood    Cardiomyopathy     DDD (degenerative disc disease),     Diabetes mellitus     Diverticulitis of colon with perforation 2024    Heart murmur     as child    Hyperlipidemia     Hypertension     Palpitation     PTSD (post-traumatic stress disorder)     Sleep apnea     C pap use    Umbilical hernia      Past Surgical History:   Procedure Laterality Date    APPENDECTOMY      CERVICAL FUSION      C5-6    COLONOSCOPY      ENDOSCOPY      HAND SURGERY      KNEE ARTHROSCOPY      VASECTOMY      WISDOM TOOTH EXTRACTION           OT ASSESSMENT FLOWSHEET (Last 12 Hours)       OT Evaluation and Treatment       Row Name 24 1401                   OT Time and Intention    Subjective Information pain;complains of;weakness;fatigue  -LM        Document Type evaluation  -LM        Mode of Treatment occupational therapy  -LM        Patient Effort good  -LM           General Information    Patient Profile Reviewed yes  -LM        Prior Level of Function independent:;ADL's;transfer;all household mobility  -LM        Equipment Currently Used at Home none  -LM        Existing Precautions/Restrictions no known precautions/restrictions  -LM           Living Environment     Current Living Arrangements home  -LM        People in Home spouse  -LM           Cognition    Affect/Mental Status (Cognition) WNL  -LM        Orientation Status (Cognition) oriented x 4  -LM           Range of Motion (ROM)    Range of Motion ROM is WNL  -LM           Strength (Manual Muscle Testing)    Strength (Manual Muscle Testing) strength is WNL  -LM           Activities of Daily Living    BADL Assessment/Intervention bathing;upper body dressing;lower body dressing;grooming;feeding;toileting  -LM           Bathing Assessment/Intervention    [place] Level (Bathing) modified independence  -LM           Upper Body Dressing Assessment/Training    [place] Level (Upper Body Dressing) modified independence  -LM           Lower Body Dressing Assessment/Training    [place] Level (Lower Body Dressing) modified independence  -LM           Grooming Assessment/Training    [place] Level (Grooming) independent  -LM           Self-Feeding Assessment/Training    [place] Level (Feeding) independent  -LM           Toileting Assessment/Training    [place] Level (Toileting) modified independence  -LM           Motor Skills    Motor Skills functional endurance;coordination  -LM        Coordination WFL  -LM        Functional Endurance F+  -LM           Wound 06/18/24 1221 abdomen Incision    Wound - Properties Group Placement Date: 06/18/24  - Placement Time: 1221 -MC Location: abdomen  -MC Primary Wound Type: Incision  -MC, x2     Retired Wound - Properties Group Placement Date: 06/18/24  - Placement Time: 1221  -MC Location: abdomen  -MC Primary Wound Type: Incision  -MC, x2     Retired Wound - Properties Group Date first assessed: 06/18/24  - Time first assessed: 1221 -MC Location: abdomen  -MC Primary Wound Type: Incision  -MC, x2        Plan of Care Review    Plan of Care Reviewed With patient  -LM           Positioning and Restraints    Post Treatment Position bed  -LM        In Bed call  light within reach;encouraged to call for assist  -LM           Therapy Assessment/Plan (OT)    Criteria for Skilled Therapeutic Interventions Met (OT) no;does not meet criteria for skilled intervention  currently pain is only barrier during BADL and fxl mobility, patient is modified independent at this time  -LM        Therapy Frequency (OT) evaluation only  -LM           Therapy Plan Review/Discharge Plan (OT)    Anticipated Discharge Disposition (OT) home  -LM                  User Key  (r) = Recorded By, (t) = Taken By, (c) = Cosigned By      Initials Name Effective Dates    [facility]      Pat Michaels OT 06/16/21 -                            OT Recommendation and Plan  Therapy Frequency (OT): evaluation only  Plan of Care Review  Plan of Care Reviewed With: patient  Plan of Care Reviewed With: patient        Time Calculation:     Therapy Charges for Today       Code Description Service Date Service Provider Modifiers Qty    [id number]  OT EVAL LOW COMPLEXITY 4 6/18/2024 Pat Michaels, OT GO 1                 Pat Michaels OT  6/18/2024

## 2024-06-18 NOTE — PROGRESS NOTES
Patient Identification:  Name:  Samson Kendall  Age:  49 y.o.  Sex:  male  :  1975  MRN:  [id number]  Visit Number:  [id number]  Primary Care Provider:  Rosalina Pandya APRN    Length of stay:  2    Subjective/Interval History/Consultants/Procedures     Chief Complaint:   Chief Complaint   Patient presents with    Abdominal Pain    Rectal Pain       Subjective/Interval History:    49 y.o. male who was admitted on 2024 with diverticulitis with microperforation     PMH is significant for nonischemic cardiomyopathy with reduced EF, HTN, CKD IIIa, T2DM, HLD, RANJEET   For complete admission information, please see history and physical.     Consultations:  General surgery  PT OT    Procedures/Scans:  CT abdomen and pelvis with contrast   CT abdomen and pelvis with oral contrast  Laparoscopic drainage of pelvic abscess planned for 24    Today, the patient was seen and examined with multiple family members at bedside. He was ill and uncomfortable appearing. Reports continued abdominal pain and watery stools which are nonbloody. He was hopeful that surgery planned for later today would provide him some relief. He denied any new complaint.      Room location at the time of evaluation was \[facility] [place] [facility]\"".    ----------------------------------------------------------------------------------------------------------------------  Current Hospital Meds:  acetaminophen, 1,000 mg, Oral, TID  anastrozole, 1 mg, Oral, Daily  cetirizine, 10 mg, Oral, Daily  cyanocobalamin, 1,000 mcg, Intramuscular, Q30 Days  docusate sodium, 100 mg, Oral, BID  enoxaparin, 40 mg, Subcutaneous, Nightly  folic acid, 1 mg, Oral, Daily  metoprolol tartrate, 50 mg, Oral, BID  pantoprazole, 40 mg, Intravenous, Q AM  piperacillin-tazobactam, 3.375 g, Intravenous, Q8H  polyethylene glycol, 17 g, Oral, BID  rosuvastatin, 10 mg, Oral, Daily  sodium chloride, 10 mL, Intravenous, Q12H  [START ON 2024] Testosterone Cypionate, 100 mg, Intramuscular, Q7  Days  topiramate, 100 mg, Oral, BID      lactated ringers, 100 mL/hr, Last Rate: 100 mL/hr (06/18/24 0026)      ----------------------------------------------------------------------------------------------------------------------      Objective     Vital Signs:  Temp:  [97.6 °F (36.4 °C)-99 °F (37.2 °C)] 97.6 °F (36.4 °C)  Heart Rate:  [] 93  Resp:  [15-20] 18  BP: (106-153)/(60-89) 126/69      06/16/24  1744 06/17/24  0500 06/18/24  0500   Weight: 86.2 kg (190 lb) 85.7 kg (188 lb 14.4 oz) 85 kg (187 lb 8 oz)     Body mass index is 30.28 kg/m².    Intake/Output Summary (Last 24 hours) at 6/18/2024 1647  Last data filed at 6/18/2024 1458  Gross per 24 hour   Intake 2191.36 ml   Output 580 ml   Net 1611.36 ml     I/O this shift:  In: 690 [P.O.:240; I.V.:450]  Out: 580 [Urine:550; Drains:30]  Diet: Liquid; Clear Liquid; Fluid Consistency: Thin (IDDSI 0)  ----------------------------------------------------------------------------------------------------------------------    Physical Exam  Vitals and nursing note reviewed.   Constitutional:       General: He is not in acute distress.     Appearance: He is ill-appearing.   HENT:      Head: Normocephalic and atraumatic.   Eyes:      Extraocular Movements: Extraocular movements intact.   Cardiovascular:      Rate and Rhythm: Normal rate and regular rhythm.   Pulmonary:      Effort: Pulmonary effort is normal.      Breath sounds: Normal breath sounds.   Abdominal:      General: There is distension.      Tenderness: There is abdominal tenderness.   Musculoskeletal:      Right lower leg: No edema.      Left lower leg: No edema.   Skin:     General: Skin is warm and dry.   Neurological:      Mental Status: He is alert. Mental status is at baseline.   Psychiatric:         Mood and Affect: Mood normal.                ----------------------------------------------------------------------------------------------------------------------  Tele:   "    ----------------------------------------------------------------------------------------------------------------------  Results from last 7 days   Lab Units 06/16/24  1344   CK TOTAL U/L 79     Results from last 7 days   Lab Units 06/18/24  0108 06/17/24  1014 06/17/24  0054 06/16/24  1615 06/16/24  1344 06/14/24  1313 06/14/24  1217   CRP mg/dL  --   --   --   --  44.82*  --  10.71*   LACTATE mmol/L  --  0.9  --  2.0  --  0.7  --    WBC 10*3/mm3 7.72  --  10.77  --  11.05*  --  12.75*   HEMOGLOBIN g/dL 9.0*  --  10.4*  --  10.7*  --  11.8*   HEMATOCRIT % 29.5*  --  32.6*  --  33.9*  --  35.8*   MCV fL 94.2  --  91.8  --  91.6  --  89.3   MCHC g/dL 30.5*  --  31.9  --  31.6  --  33.0   PLATELETS 10*3/mm3 250  --  258  --  241  --  275   INR   --   --  1.19*  --   --   --   --          Results from last 7 days   Lab Units 06/18/24  0108 06/17/24  0054 06/16/24  1344   SODIUM mmol/L 137 136 137   POTASSIUM mmol/L 3.7 3.5 4.1   CHLORIDE mmol/L 106 103 103   CO2 mmol/L 20.3* 21.4* 22.6   BUN mg/dL 11 21* 29*   CREATININE mg/dL 1.34* 1.73* 2.09*   CALCIUM mg/dL 8.9 10.1 10.1   GLUCOSE mg/dL 93 99 108*   ALBUMIN g/dL 3.0* 3.6 3.7   BILIRUBIN mg/dL 0.2 0.3 0.3   ALK PHOS U/L 35* 42 50   AST (SGOT) U/L 15 15 14   ALT (SGPT) U/L 11 13 13   Estimated Creatinine Clearance: 68.9 mL/min (A) (by C-G formula based on SCr of 1.34 mg/dL (H)).  No results found for: \"AMMONIA\"  Results from last 7 days   Lab Units 06/16/24  1344   CHOLESTEROL mg/dL 131   TRIGLYCERIDES mg/dL 224*   HDL CHOL mg/dL 21*   LDL CHOL mg/dL 73     Blood Culture   Date Value Ref Range Status   06/17/2024 No growth at 24 hours  Preliminary   06/17/2024 No growth at 24 hours  Preliminary   06/16/2024 No growth at 2 days  Preliminary   06/16/2024 No growth at 2 days  Preliminary   06/14/2024 No growth at 4 days  Preliminary   06/14/2024 No growth at 4 days  Preliminary     No results found for: \"URINECX\"  No results found for: \"WOUNDCX\"  No results found for: " "\"STOOLCX\"  ----------------------------------------------------------------------------------------------------------------------  Imaging Results (Last 24 Hours)       Procedure Component Value Units Date/Time    FL yana endo (surgery) [[id number]] Resulted: 06/18/24 1403     Updated: 06/18/24 1403    Narrative:      This procedure was auto-finalized with no dictation required.          ----------------------------------------------------------------------------------------------------------------------   I have reviewed the above laboratory values for 06/18/24    Assessment/Plan     Active Hospital Problems    Diagnosis  POA    **Diverticulitis [K57.92]  Yes    Diverticulitis of colon with perforation [K57.20]  Yes         ASSESSMENT/PLAN:    Acute diverticulitis with perforation and abscess  Patient with initial ED visit secondary to abdominal pain, fever and chills and discharged home on Augmentin for uncomplicated diverticulitis.  Returned secondary to worsening abdominal pain despite compliance with antibiotics.  CT on admission showed sigmoid diverticulitis with likely contained perforation-without drainable abscess.  Also noted concern for SBO versus ileus though patient without nausea or vomiting on exam.  White blood cell count was 11 K with CRP 44 and lactate 2.  General surgery consulted and following.  Repeat CT with oral contrast showed concern for developing abscess. Patient was scheduled for laparoscopic drainage of abscess today with Dr. Haque. Op note pending.  Continue CLD for now  As needed antiemetics, pain control regimen  Zosyn has been continued empirically  Continue LR at 100 mL/h   WBC count down to 7.7. Repeat CBC in the AM  Blood cultures negative thus far     Non-Ischemic cardiomyopathy with mildly reduced EF  Hypertension  Most recent TTE showed EF 45 to 50%.  Updated TTE showed normal LVEF, grade one diastolic dysfunction.   Metoprolol, Lasix continued  Monitor clinical volume " status closely. Currently stable.     CKD stage IIIa  Creatinine was slightly greater than 2 on arrival with baseline 1.7-1.8.  CK normal.  CT A/P did not show any evidence of obstruction.  Creatinine further improved today to 1.3  Continue IVF's as above     Hx T2DM  Hyperlipidemia  RANJEET  Other home meds continued as indicated  A1c 6.2.  Patient is not on diabetes regimen as an outpatient following significant weight loss.  Can initiate SSI if indicated.  Glucose trend currently well-controlled.    -----------  -DVT prophylaxis: Lovenox   -Disposition plans/anticipated needs: Pending clinical course         The patient is high risk due to the following diagnoses/reasons:  diverticulitis with perforation and abscess         Jose Kelly PA-C  06/18/24  16:47 EDT

## 2024-06-18 NOTE — PLAN OF CARE
Goal Outcome Evaluation:   Pt resting in bed with family at bedside. Pt had FATEMEH Drain placed in LLQ from OR. VSS. No acute s/s of acute distress noted or verbalized.

## 2024-06-18 NOTE — ANESTHESIA PREPROCEDURE EVALUATION
Anesthesia Evaluation     Patient summary reviewed and Nursing notes reviewed   no history of anesthetic complications:   NPO Solid Status: > 8 hours  NPO Liquid Status: > 6 hours           Airway   Mallampati: II  TM distance: >3 FB  Neck ROM: full  Dental    (+) poor dentition        Pulmonary     breath sounds clear to auscultation  (+) asthma,sleep apnea (will be getting inspire in a few months)  Cardiovascular   Exercise tolerance: good (4-7 METS)    ECG reviewed  Patient on routine beta blocker and Beta blocker given within 24 hours of surgery  Rate: normal    (+) hypertension, valvular problems/murmurs, dysrhythmias Tachycardia, hyperlipidemia      Neuro/Psych  (+) syncope, psychiatric history PTSD  GI/Hepatic/Renal/Endo    (+) obesity, diabetes mellitus    Musculoskeletal (-) negative ROS    Abdominal   (+) obese    Abdomen: soft.   Substance History - negative use     OB/GYN          Other - negative ROS       ROS/Med Hx Other: ·   Normal left ventricular cavity size and wall thickness noted. There is left ventricular global hypokinesis noted.  · Left ventricular systolic function is mildly decreased  · Estimated EF appears to be in the range of 46 - 50%  · The aortic valve is structurally normal. No aortic valve regurgitation is present. No aortic valve stenosis is present.  · The mitral valve is normal in structure. No mitral valve regurgitation is present. No significant mitral valve stenosis is present.  · The tricuspid valve is normal. No evidence of tricuspid valve stenosis is present. Mild tricuspid valve regurgitation is present. Estimated right ventricular systolic pressure from tricuspid regurgitation is normal (<35 mmHg).  · There is no evidence of pericardial effusion.  · No previous studies available for comparison       Sinus tachycardia  Moderate voltage criteria for LVH, may be normal variant  Inferior infarct , age undetermined  Abnormal ECG  When compared with ECG of 06-SEP-2019  10:08,  Inferior infarct is now present                Anesthesia Plan    ASA 3     general     intravenous induction     Anesthetic plan, risks, benefits, and alternatives have been provided, discussed and informed consent has been obtained with: patient.  Pre-procedure education provided  Use of blood products discussed with  Consented to blood products.    Plan discussed with CRNA.    CODE STATUS:    Code Status (Patient has no pulse and is not breathing): CPR (Attempt to Resuscitate)  Medical Interventions (Patient has pulse or is breathing): Full Support

## 2024-06-18 NOTE — ANESTHESIA PROCEDURE NOTES
"Peripheral Block      Patient reassessed immediately prior to procedure    Patient location during procedure: OR  Start time: 6/18/2024 12:40 PM  Stop time: 6/18/2024 12:41 PM  Reason for block: at surgeon's request and post-op pain management  Performed by  CRNA/CAA: Shaniqua Stuart CRNA  Preanesthetic Checklist  Completed: patient identified, IV checked, site marked, risks and benefits discussed, surgical consent, monitors and equipment checked, pre-op evaluation and timeout performed  Prep:  Pt Position: supine  Sterile barriers:cap, gloves, sterile barriers and mask  Prep: ChloraPrep  Patient monitoring: blood pressure monitoring, continuous pulse oximetry and EKG  Procedure    Nursing cardiac assessment comments yes: Sedation, GA, Spinal,Epidural   Performed under: general  Guidance:ultrasound guided    ULTRASOUND INTERPRETATION.  Using ultrasound guidance a 20 G (20g 4\" Stimuplex) gauge needle was placed in close proximity to the nerve, at which point, under ultrasound guidance anesthetic was injected in the area of the nerve and spread of the anesthesia was seen on ultrasound in close proximity thereto.  There were no abnormalities seen on ultrasound; a digital image was taken; and the patient tolerated the procedure with no complications. Images:still images obtained, printed/placed on chart    Laterality:Bilateral  Block Type:TAP  Injection Technique:single-shot  Needle Type:short-bevel  Needle Gauge:20 G  Resistance on Injection: none    Medications Used: ropivacaine (NAROPIN) injection 0.5 % - Peripheral Nerve   240 mg - 6/18/2024 12:41:00 PM  dexamethasone (DECADRON) injection - Injection   8 mg - 6/18/2024 12:41:00 PM  buprenorphine (BUPRENEX) injection - Injection   0.3 mg - 6/18/2024 12:41:00 PM      Medications  Preservative Free Saline:10ml  Comment:Block Injection:  Total volume divided equally between Right and Left block      Post Assessment  Injection Assessment: negative aspiration for heme, " incremental injection and no paresthesia on injection  Patient Tolerance:comfortable throughout block  Complications:no  Additional Notes  The pt was in the supine position under general anesthesia    Under Ultrasound guidance, a Rosas 4inch 360 degree needle was advanced with Normal Saline hydro dissection of tissue.  The Rectus and Transversus Abdominus muscles where visualized.  The needle tip was placed between the Transversus Abdominus and rectus abdominus, local anesthetic spread was visualized in the Transversus Abdominus Plane. Injection was made incrementally with aspiration every 5 mls.  There was no  intravascular injection,  injection pressure was normal, there was no neural injection, and the procedure was completed without difficulty. The same procedure was completed for left and right sided subcostal tap blocks. Thank You.      Performed by: Shaniqua Stuart CRNA

## 2024-06-18 NOTE — ANESTHESIA PROCEDURE NOTES
Airway  Urgency: elective    Date/Time: 6/18/2024 12:12 PM  Airway not difficult    General Information and Staff    Patient location during procedure: OR  CRNA/CAA: Shaniqua Stuart CRNA    Indications and Patient Condition  Indications for airway management: airway protection    Preoxygenated: yes  MILS maintained throughout  Mask difficulty assessment: 0 - not attempted    Final Airway Details  Final airway type: endotracheal airway      Successful airway: ETT  Cuffed: yes   Successful intubation technique: direct laryngoscopy and RSI  Facilitating devices/methods: intubating stylet and cricoid pressure  Endotracheal tube insertion site: oral  Blade: Perkins  Blade size: 3  ETT size (mm): 7.5  Cormack-Lehane Classification: grade I - full view of glottis  Placement verified by: chest auscultation   Measured from: lips  ETT/EBT  to lips (cm): 22  Number of attempts at approach: 1  Assessment: lips, teeth, and gum same as pre-op and atraumatic intubation

## 2024-06-18 NOTE — THERAPY EVALUATION
Acute Care - Physical Therapy Initial Evaluation  [facility]     Patient Name: Samson Kendall  : 1975  MRN: [id number]  Today's Date: 2024   Onset of Illness/Injury or Date of Surgery: 24  Visit Dx:     ICD-10-CM ICD-9-CM   1. Diverticulitis of colon with perforation  K57.20 562.11     Patient Active Problem List   Diagnosis    Skin lesion of right ear    Cough    Syncope    GERD with esophagitis    Palpitations    Chest pain    Fatigue    Cardiomyopathy    Ureteral calculus, right    Ganglion cyst    Umbilical hernia without obstruction and without gangrene    Diverticulitis    Diverticulitis of colon with perforation     Past Medical History:   Diagnosis Date    Asthma     childhood    Cardiomyopathy     Diabetes mellitus     Diverticulitis of colon with perforation 2024    Heart murmur     as child    Hyperlipidemia     Hypertension     Palpitation     Sleep apnea     C pap use     Past Surgical History:   Procedure Laterality Date    APPENDECTOMY      HAND SURGERY      KNEE ARTHROSCOPY      LUMBAR FUSION      VASECTOMY      WISDOM TOOTH EXTRACTION       PT Assessment (Last 12 Hours)       PT Evaluation and Treatment       Row Name 24 1104          Physical Therapy Time and Intention    Subjective Information complains of;pain  -AG     Document Type evaluation  -AG     Mode of Treatment physical therapy  -AG     Patient Effort good  -AG     Symptoms Noted During/After Treatment none  -AG       Row Name 24 1104          General Information    Patient Profile Reviewed yes  -AG     Onset of Illness/Injury or Date of Surgery 24  -AG     Referring Physician Dr. Kendall  -AG     Patient Observations agree to therapy;cooperative;alert  -AG     Patient/Family/Caregiver Comments/Observations pt. supine on room air; spouse at bedside. Pt. agreeable to brief evaluation.  -AG     Prior Level of Function independent:;community mobility  no use of AE or assistive device  -AG      Equipment Currently Used at Home none  -AG     Pertinent History of Current Functional Problem pt. admitted with diverticulitis  -AG     Existing Precautions/Restrictions no known precautions/restrictions  -AG       Row Name 06/18/24 1104          Previous Level of Function/Home Environm    Previous Level of Function, Premorbid independent with all self care and community mobility  -AG       Row Name 06/18/24 1104          Living Environment    Current Living Arrangements home  -AG     People in Home spouse  -AG     Primary Care Provided by self  -AG       Row Name 06/18/24 1104          Home Use of Assistive/Adaptive Equipment    Equipment Currently Used at Home none  -AG       Row Name 06/18/24 1104          Cognition    Affect/Mental Status (Cognition) WFL  -AG     Orientation Status (Cognition) oriented x 4  -AG       Row Name 06/18/24 1104          Range of Motion (ROM)    Range of Motion ROM is WFL;bilateral lower extremities  -AG       Row Name 06/18/24 1104          Strength (Manual Muscle Testing)    Strength (Manual Muscle Testing) strength is WFL;bilateral lower extremities  -AG       Row Name 06/18/24 1104          Sensory    Hearing Status WFL  -AG       Row Name 06/18/24 1104          Bed Mobility    Bed Mobility bed mobility (all) activities  -AG     All Activities, [place] (Bed Mobility) modified independence  -AG       Row Name 06/18/24 1104          Gait/Stairs (Locomotion)    [place] Level (Gait) modified independence  -AG     Assistive Device (Gait) --  none  -AG     Patient was able to Ambulate yes  -AG     Distance in Feet (Gait) 40  -AG     Pattern (Gait) step-through  -AG     Deviations/Abnormal Patterns (Gait) gait speed decreased  -AG       Row Name 06/18/24 1104          Balance    Balance Assessment sitting static balance;sitting dynamic balance;sit to stand dynamic balance;standing static balance;standing dynamic balance  -AG     Static Sitting Balance independent  -AG      Position, Sitting Balance unsupported;sitting edge of bed  -AG     Static Standing Balance modified independence  -AG     Dynamic Standing Balance modified independence  -AG       Row Name 06/18/24 1104          Coping    Observed Emotional State calm;cooperative  -AG     Verbalized Emotional State acceptance  -AG     Trust Relationship/Rapport care explained;choices provided;thoughts/feelings acknowledged  -AG     Family/Support Persons spouse  -AG     Involvement in Care at bedside;attentive to patient  -AG     Family/Support System Care support provided;self-care encouraged  -AG       Row Name 06/18/24 1104          Plan of Care Review    Plan of Care Reviewed With patient  -AG     Outcome Evaluation PT evaluation complete.  Pt. demonstrates modified independent functional mobility skills; no use of assistive device.  Pt. ambulates as needed; slow, cautious gait d/t feeling poorly.  No further skilled PT necessary at this time.  -AG       Row Name 06/18/24 1104          Vital Signs    O2 Delivery Intra Treatment room air  -AG       Row Name 06/18/24 1104          Positioning and Restraints    Pre-Treatment Position in bed  -AG     Post Treatment Position bed  -AG     In Bed supine;call light within reach;encouraged to call for assist;with family/caregiver;side rails up x3  -AG       Row Name 06/18/24 1104          Therapy Assessment/Plan (PT)    Criteria for Skilled Interventions Met (PT) no;no problems identified which require skilled intervention  -AG     Therapy Frequency (PT) evaluation only  -       Row Name 06/18/24 1104          Therapy Plan Review/Discharge Plan (PT)    Therapy Plan Review (PT) evaluation/treatment results reviewed;participants included;patient  -AG               User Key  (r) = Recorded By, (t) = Taken By, (c) = Cosigned By      Initials Name Provider Type    AG Patti Lakhani, PT Physical Therapist                    Physical Therapy Education       Title: PT OT SLP Therapies (Done)        Topic: Physical Therapy (Done)       Point: Mobility training (Done)       Learning Progress Summary             Patient Acceptance, E,D, VU by  at 6/18/2024 1103                         Point: Home exercise program (Done)       Learning Progress Summary             Patient Acceptance, E,D, VU by  at 6/18/2024 1103                         Point: Body mechanics (Done)       Learning Progress Summary             Patient Acceptance, E,D, VU by  at 6/18/2024 1103                         Point: Precautions (Done)       Learning Progress Summary             Patient Acceptance, E,D, VU by  at 6/18/2024 1103                                         User Key       Initials Effective Dates Name Provider Type Discipline     06/16/21 -  Patti Lakhani, PT Physical Therapist PT                  PT Recommendation and Plan  Anticipated Discharge Disposition (PT): home with assist  Therapy Frequency (PT): evaluation only  Plan of Care Reviewed With: patient  Outcome Evaluation: PT evaluation complete.  Pt. demonstrates modified independent functional mobility skills; no use of assistive device.  Pt. ambulates as needed; slow, cautious gait d/t feeling poorly.  No further skilled PT necessary at this time.       Time Calculation:    PT Charges       Row Name 06/18/24 1103             Time Calculation    PT Received On 06/18/24  -                User Key  (r) = Recorded By, (t) = Taken By, (c) = Cosigned By      Initials Name Provider Type     Patti Lakhani, PT Physical Therapist                  Therapy Charges for Today       Code Description Service Date Service Provider Modifiers Qty    93849726430 HC PT EVAL LOW COMPLEXITY 4 6/18/2024 Patti Lakhani, PT GP 1            PT G-Codes  AM-PAC 6 Clicks Score (PT): 20    Patti Lakhani PT  6/18/2024

## 2024-06-18 NOTE — PLAN OF CARE
Goal Outcome Evaluation:     Patient resting in bed. Patient complained of pain, PRN meds provided, see MAR. Pt NPO. No visible indicators of acute distress noted. Plan of care ongoing.

## 2024-06-19 LAB
ALBUMIN SERPL-MCNC: 3 G/DL (ref 3.5–5.2)
ALBUMIN/GLOB SERPL: 0.9 G/DL
ALP SERPL-CCNC: 36 U/L (ref 39–117)
ALT SERPL W P-5'-P-CCNC: 9 U/L (ref 1–41)
ANION GAP SERPL CALCULATED.3IONS-SCNC: 13.9 MMOL/L (ref 5–15)
AST SERPL-CCNC: 15 U/L (ref 1–40)
BACTERIA SPEC AEROBE CULT: NORMAL
BACTERIA SPEC AEROBE CULT: NORMAL
BASOPHILS # BLD AUTO: 0.01 10*3/MM3 (ref 0–0.2)
BASOPHILS NFR BLD AUTO: 0.1 % (ref 0–1.5)
BILIRUB SERPL-MCNC: 0.2 MG/DL (ref 0–1.2)
BUN SERPL-MCNC: 9 MG/DL (ref 6–20)
BUN/CREAT SERPL: 7.8 (ref 7–25)
CALCIUM SPEC-SCNC: 9.1 MG/DL (ref 8.6–10.5)
CHLORIDE SERPL-SCNC: 107 MMOL/L (ref 98–107)
CO2 SERPL-SCNC: 17.1 MMOL/L (ref 22–29)
CREAT SERPL-MCNC: 1.16 MG/DL (ref 0.76–1.27)
CRP SERPL-MCNC: 18.39 MG/DL (ref 0–0.5)
DEPRECATED RDW RBC AUTO: 54.5 FL (ref 37–54)
EGFRCR SERPLBLD CKD-EPI 2021: 77.7 ML/MIN/1.73
EOSINOPHIL # BLD AUTO: 0 10*3/MM3 (ref 0–0.4)
EOSINOPHIL NFR BLD AUTO: 0 % (ref 0.3–6.2)
ERYTHROCYTE [DISTWIDTH] IN BLOOD BY AUTOMATED COUNT: 14.9 % (ref 12.3–15.4)
GLOBULIN UR ELPH-MCNC: 3.3 GM/DL
GLUCOSE BLDC GLUCOMTR-MCNC: 164 MG/DL (ref 70–130)
GLUCOSE BLDC GLUCOMTR-MCNC: 78 MG/DL (ref 70–130)
GLUCOSE SERPL-MCNC: 138 MG/DL (ref 65–99)
HCT VFR BLD AUTO: 30.8 % (ref 37.5–51)
HGB BLD-MCNC: 9.2 G/DL (ref 13–17.7)
IMM GRANULOCYTES # BLD AUTO: 0.03 10*3/MM3 (ref 0–0.05)
IMM GRANULOCYTES NFR BLD AUTO: 0.4 % (ref 0–0.5)
LYMPHOCYTES # BLD AUTO: 0.89 10*3/MM3 (ref 0.7–3.1)
LYMPHOCYTES NFR BLD AUTO: 12.5 % (ref 19.6–45.3)
MCH RBC QN AUTO: 29.9 PG (ref 26.6–33)
MCHC RBC AUTO-ENTMCNC: 29.9 G/DL (ref 31.5–35.7)
MCV RBC AUTO: 100 FL (ref 79–97)
MONOCYTES # BLD AUTO: 0.31 10*3/MM3 (ref 0.1–0.9)
MONOCYTES NFR BLD AUTO: 4.4 % (ref 5–12)
NEUTROPHILS NFR BLD AUTO: 5.87 10*3/MM3 (ref 1.7–7)
NEUTROPHILS NFR BLD AUTO: 82.6 % (ref 42.7–76)
NRBC BLD AUTO-RTO: 0 /100 WBC (ref 0–0.2)
PLATELET # BLD AUTO: 276 10*3/MM3 (ref 140–450)
PMV BLD AUTO: 11.2 FL (ref 6–12)
POTASSIUM SERPL-SCNC: 4.1 MMOL/L (ref 3.5–5.2)
PROT SERPL-MCNC: 6.3 G/DL (ref 6–8.5)
QT INTERVAL: 312 MS
QTC INTERVAL: 422 MS
RBC # BLD AUTO: 3.08 10*6/MM3 (ref 4.14–5.8)
SODIUM SERPL-SCNC: 138 MMOL/L (ref 136–145)
WBC NRBC COR # BLD AUTO: 7.11 10*3/MM3 (ref 3.4–10.8)

## 2024-06-19 PROCEDURE — 25010000002 HYDROMORPHONE 1 MG/ML SOLUTION: Performed by: SURGERY

## 2024-06-19 PROCEDURE — 99232 SBSQ HOSP IP/OBS MODERATE 35: CPT

## 2024-06-19 PROCEDURE — 25810000003 LACTATED RINGERS PER 1000 ML: Performed by: SURGERY

## 2024-06-19 PROCEDURE — 80053 COMPREHEN METABOLIC PANEL: CPT | Performed by: SURGERY

## 2024-06-19 PROCEDURE — 94761 N-INVAS EAR/PLS OXIMETRY MLT: CPT

## 2024-06-19 PROCEDURE — 99024 POSTOP FOLLOW-UP VISIT: CPT | Performed by: SURGERY

## 2024-06-19 PROCEDURE — 85025 COMPLETE CBC W/AUTO DIFF WBC: CPT

## 2024-06-19 PROCEDURE — 86140 C-REACTIVE PROTEIN: CPT | Performed by: SURGERY

## 2024-06-19 PROCEDURE — 25010000002 ENOXAPARIN PER 10 MG: Performed by: SURGERY

## 2024-06-19 PROCEDURE — 36415 COLL VENOUS BLD VENIPUNCTURE: CPT | Performed by: SURGERY

## 2024-06-19 PROCEDURE — 82948 REAGENT STRIP/BLOOD GLUCOSE: CPT

## 2024-06-19 PROCEDURE — 25010000002 PIPERACILLIN SOD-TAZOBACTAM PER 1 G: Performed by: SURGERY

## 2024-06-19 PROCEDURE — 25010000002 MORPHINE PER 10 MG: Performed by: SURGERY

## 2024-06-19 RX ADMIN — TOPIRAMATE 100 MG: 100 TABLET, FILM COATED ORAL at 20:35

## 2024-06-19 RX ADMIN — ENOXAPARIN SODIUM 40 MG: 40 INJECTION SUBCUTANEOUS at 20:35

## 2024-06-19 RX ADMIN — DOCUSATE SODIUM 100 MG: 100 CAPSULE, LIQUID FILLED ORAL at 20:36

## 2024-06-19 RX ADMIN — HYDROMORPHONE HYDROCHLORIDE 1 MG: 1 INJECTION, SOLUTION INTRAMUSCULAR; INTRAVENOUS; SUBCUTANEOUS at 07:56

## 2024-06-19 RX ADMIN — PIPERACILLIN AND TAZOBACTAM 3.38 G: 3; .375 INJECTION, POWDER, FOR SOLUTION INTRAVENOUS at 23:32

## 2024-06-19 RX ADMIN — PANTOPRAZOLE SODIUM 40 MG: 40 INJECTION, POWDER, FOR SOLUTION INTRAVENOUS at 05:48

## 2024-06-19 RX ADMIN — OXYCODONE HYDROCHLORIDE 5 MG: 5 TABLET ORAL at 14:11

## 2024-06-19 RX ADMIN — MORPHINE SULFATE 2 MG: 2 INJECTION, SOLUTION INTRAMUSCULAR; INTRAVENOUS at 12:50

## 2024-06-19 RX ADMIN — ACETAMINOPHEN 1000 MG: 500 TABLET ORAL at 20:35

## 2024-06-19 RX ADMIN — OXYCODONE HYDROCHLORIDE 5 MG: 5 TABLET ORAL at 05:48

## 2024-06-19 RX ADMIN — Medication 10 ML: at 09:20

## 2024-06-19 RX ADMIN — PIPERACILLIN AND TAZOBACTAM 3.38 G: 3; .375 INJECTION, POWDER, FOR SOLUTION INTRAVENOUS at 14:57

## 2024-06-19 RX ADMIN — OXYCODONE HYDROCHLORIDE 5 MG: 5 TABLET ORAL at 23:31

## 2024-06-19 RX ADMIN — METOPROLOL TARTRATE 50 MG: 50 TABLET, FILM COATED ORAL at 09:19

## 2024-06-19 RX ADMIN — MORPHINE SULFATE 2 MG: 2 INJECTION, SOLUTION INTRAMUSCULAR; INTRAVENOUS at 09:18

## 2024-06-19 RX ADMIN — SODIUM CHLORIDE, POTASSIUM CHLORIDE, SODIUM LACTATE AND CALCIUM CHLORIDE 100 ML/HR: 600; 310; 30; 20 INJECTION, SOLUTION INTRAVENOUS at 16:37

## 2024-06-19 RX ADMIN — POLYETHYLENE GLYCOL (3350) 17 G: 17 POWDER, FOR SOLUTION ORAL at 09:20

## 2024-06-19 RX ADMIN — ACETAMINOPHEN 1000 MG: 500 TABLET ORAL at 09:19

## 2024-06-19 RX ADMIN — HYDROMORPHONE HYDROCHLORIDE 1 MG: 1 INJECTION, SOLUTION INTRAMUSCULAR; INTRAVENOUS; SUBCUTANEOUS at 15:39

## 2024-06-19 RX ADMIN — HYDROMORPHONE HYDROCHLORIDE 1 MG: 1 INJECTION, SOLUTION INTRAMUSCULAR; INTRAVENOUS; SUBCUTANEOUS at 11:07

## 2024-06-19 RX ADMIN — POLYETHYLENE GLYCOL (3350) 17 G: 17 POWDER, FOR SOLUTION ORAL at 20:35

## 2024-06-19 RX ADMIN — ROSUVASTATIN CALCIUM 10 MG: 10 TABLET, FILM COATED ORAL at 09:19

## 2024-06-19 RX ADMIN — DOCUSATE SODIUM 100 MG: 100 CAPSULE, LIQUID FILLED ORAL at 09:19

## 2024-06-19 RX ADMIN — HYDROMORPHONE HYDROCHLORIDE 1 MG: 1 INJECTION, SOLUTION INTRAMUSCULAR; INTRAVENOUS; SUBCUTANEOUS at 01:30

## 2024-06-19 RX ADMIN — PIPERACILLIN AND TAZOBACTAM 3.38 G: 3; .375 INJECTION, POWDER, FOR SOLUTION INTRAVENOUS at 06:58

## 2024-06-19 RX ADMIN — Medication 1 MG: at 09:19

## 2024-06-19 RX ADMIN — HYDROMORPHONE HYDROCHLORIDE 1 MG: 1 INJECTION, SOLUTION INTRAMUSCULAR; INTRAVENOUS; SUBCUTANEOUS at 20:42

## 2024-06-19 RX ADMIN — METOPROLOL TARTRATE 50 MG: 50 TABLET, FILM COATED ORAL at 20:36

## 2024-06-19 RX ADMIN — MORPHINE SULFATE 2 MG: 2 INJECTION, SOLUTION INTRAMUSCULAR; INTRAVENOUS at 19:33

## 2024-06-19 RX ADMIN — TOPIRAMATE 100 MG: 100 TABLET, FILM COATED ORAL at 09:19

## 2024-06-19 RX ADMIN — HYDROMORPHONE HYDROCHLORIDE 1 MG: 1 INJECTION, SOLUTION INTRAMUSCULAR; INTRAVENOUS; SUBCUTANEOUS at 18:32

## 2024-06-19 RX ADMIN — HYDROMORPHONE HYDROCHLORIDE 1 MG: 1 INJECTION, SOLUTION INTRAMUSCULAR; INTRAVENOUS; SUBCUTANEOUS at 22:33

## 2024-06-19 RX ADMIN — CETIRIZINE HYDROCHLORIDE 10 MG: 10 TABLET, FILM COATED ORAL at 09:19

## 2024-06-19 RX ADMIN — SODIUM CHLORIDE, POTASSIUM CHLORIDE, SODIUM LACTATE AND CALCIUM CHLORIDE 100 ML/HR: 600; 310; 30; 20 INJECTION, SOLUTION INTRAVENOUS at 06:02

## 2024-06-19 RX ADMIN — ANASTROZOLE 1 MG: 1 TABLET, COATED ORAL at 09:20

## 2024-06-19 RX ADMIN — Medication 10 ML: at 20:36

## 2024-06-19 NOTE — PLAN OF CARE
Goal Outcome Evaluation:    Pt is resting in bed with no s/s of distress noted. VSS. IV access maintained. Fluids infusing per order. FATEMEH drain stripped this shift per order. Will continue with plan of care.

## 2024-06-19 NOTE — PROGRESS NOTES
Patient Identification:  Name:  Samson Kendall  Age:  49 y.o.  Sex:  male  :  1975  MRN:  [id number]  Visit Number:  [id number]  Primary Care Provider:  Rosalina Pandya APRN    Length of stay:  3    Subjective/Interval History/Consultants/Procedures     Chief Complaint:   Chief Complaint   Patient presents with    Abdominal Pain    Rectal Pain       Subjective/Interval History:    49 y.o. male who was admitted on 2024 with  diverticulitis with microperforation  and abscess     PMH is significant for nonischemic cardiomyopathy with reduced EF, HTN, CKD IIIa, T2DM, HLD, RANJEET   For complete admission information, please see history and physical.     Consultations:  General surgery  PT OT    Procedures/Scans:  CT abdomen and pelvis with contrast   CT abdomen and pelvis with oral contrast  Laparoscopic drainage of pelvic abscess, 24- Dr. Haque     Today, the patient was seen and examined on 3S sitting up in bed in no distress. He reports still having occasional sharp abdominal pains but overall pain improved post operatively. Continues to tolerate liquid diet though with some subjective worsening abdominal distention today. No vomiting. Has not passed gas or had BM post operatively yet. No other acute complaints noted.      Room location at the time of evaluation was 320p.    ----------------------------------------------------------------------------------------------------------------------  Current Hospital Meds:  acetaminophen, 1,000 mg, Oral, TID  anastrozole, 1 mg, Oral, Daily  cetirizine, 10 mg, Oral, Daily  cyanocobalamin, 1,000 mcg, Intramuscular, Q30 Days  docusate sodium, 100 mg, Oral, BID  enoxaparin, 40 mg, Subcutaneous, Nightly  folic acid, 1 mg, Oral, Daily  metoprolol tartrate, 50 mg, Oral, BID  pantoprazole, 40 mg, Intravenous, Q AM  piperacillin-tazobactam, 3.375 g, Intravenous, Q8H  polyethylene glycol, 17 g, Oral, BID  rosuvastatin, 10 mg, Oral, Daily  sodium chloride, 10 mL,  Intravenous, Q12H  [START ON 6/21/2024] Testosterone Cypionate, 100 mg, Intramuscular, Q7 Days  topiramate, 100 mg, Oral, BID      lactated ringers, 100 mL/hr, Last Rate: 100 mL/hr (06/19/24 0602)      ----------------------------------------------------------------------------------------------------------------------      Objective     Vital Signs:  Temp:  [97.6 °F (36.4 °C)-98.5 °F (36.9 °C)] 97.9 °F (36.6 °C)  Heart Rate:  [] 50  Resp:  [15-20] 18  BP: (105-146)/(57-89) 125/66      06/17/24  0500 06/18/24  0500 06/19/24  0500   Weight: 85.7 kg (188 lb 14.4 oz) 85 kg (187 lb 8 oz) 85.8 kg (189 lb 3.2 oz)     Body mass index is 30.55 kg/m².    Intake/Output Summary (Last 24 hours) at 6/19/2024 1148  Last data filed at 6/19/2024 1032  Gross per 24 hour   Intake 3463 ml   Output 4520 ml   Net -1057 ml     I/O this shift:  In: 480 [P.O.:480]  Out: 800 [Urine:800]  Diet: Liquid; Full Liquid; Fluid Consistency: Thin (IDDSI 0)  ----------------------------------------------------------------------------------------------------------------------    Physical Exam  Vitals and nursing note reviewed.   Constitutional:       General: He is not in acute distress.     Appearance: He is ill-appearing.   HENT:      Head: Normocephalic and atraumatic.   Eyes:      Extraocular Movements: Extraocular movements intact.   Cardiovascular:      Rate and Rhythm: Regular rhythm. Bradycardia present.   Pulmonary:      Effort: Pulmonary effort is normal.      Breath sounds: Normal breath sounds.   Abdominal:      General: There is distension.      Tenderness: There is abdominal tenderness.      Comments: FATEMEH drain in place LLQ with serosanguinous drainage appreciated    Musculoskeletal:      Right lower leg: No edema.      Left lower leg: No edema.   Skin:     General: Skin is warm and dry.   Neurological:      Mental Status: He is alert. Mental status is at baseline.   Psychiatric:         Mood and Affect: Mood normal.          "Behavior: Behavior normal.                ----------------------------------------------------------------------------------------------------------------------  Tele:      ----------------------------------------------------------------------------------------------------------------------  Results from last 7 days   Lab Units 06/16/24  1344   CK TOTAL U/L 79     Results from last 7 days   Lab Units 06/19/24  0013 06/18/24  0108 06/17/24  1014 06/17/24  0054 06/16/24  1615 06/16/24  1344 06/14/24  1313 06/14/24  1217   CRP mg/dL 18.39*  --   --   --   --  44.82*  --  10.71*   LACTATE mmol/L  --   --  0.9  --  2.0  --  0.7  --    WBC 10*3/mm3 7.11 7.72  --  10.77  --  11.05*  --  12.75*   HEMOGLOBIN g/dL 9.2* 9.0*  --  10.4*  --  10.7*  --  11.8*   HEMATOCRIT % 30.8* 29.5*  --  32.6*  --  33.9*  --  35.8*   MCV fL 100.0* 94.2  --  91.8  --  91.6  --  89.3   MCHC g/dL 29.9* 30.5*  --  31.9  --  31.6  --  33.0   PLATELETS 10*3/mm3 276 250  --  258  --  241  --  275   INR   --   --   --  1.19*  --   --   --   --          Results from last 7 days   Lab Units 06/19/24  0013 06/18/24  0108 06/17/24  0054   SODIUM mmol/L 138 137 136   POTASSIUM mmol/L 4.1 3.7 3.5   CHLORIDE mmol/L 107 106 103   CO2 mmol/L 17.1* 20.3* 21.4*   BUN mg/dL 9 11 21*   CREATININE mg/dL 1.16 1.34* 1.73*   CALCIUM mg/dL 9.1 8.9 10.1   GLUCOSE mg/dL 138* 93 99   ALBUMIN g/dL 3.0* 3.0* 3.6   BILIRUBIN mg/dL 0.2 0.2 0.3   ALK PHOS U/L 36* 35* 42   AST (SGOT) U/L 15 15 15   ALT (SGPT) U/L 9 11 13   Estimated Creatinine Clearance: 80 mL/min (by C-G formula based on SCr of 1.16 mg/dL).  No results found for: \"AMMONIA\"  Results from last 7 days   Lab Units 06/16/24  1344   CHOLESTEROL mg/dL 131   TRIGLYCERIDES mg/dL 224*   HDL CHOL mg/dL 21*   LDL CHOL mg/dL 73     Blood Culture   Date Value Ref Range Status   06/17/2024 No growth at 24 hours  Preliminary   06/17/2024 No growth at 2 days  Preliminary   06/16/2024 No growth at 2 days  Preliminary " "  06/16/2024 No growth at 2 days  Preliminary   06/14/2024 No growth at 4 days  Preliminary   06/14/2024 No growth at 4 days  Preliminary     No results found for: \"URINECX\"  No results found for: \"WOUNDCX\"  No results found for: \"STOOLCX\"  ----------------------------------------------------------------------------------------------------------------------  Imaging Results (Last 24 Hours)       Procedure Component Value Units Date/Time    DELISA millan endo (surgery) [[id number]] Resulted: 06/18/24 1403     Updated: 06/18/24 1403    Narrative:      This procedure was auto-finalized with no dictation required.          ----------------------------------------------------------------------------------------------------------------------   I have reviewed the above laboratory values for 06/19/24    Assessment/Plan     Active Hospital Problems    Diagnosis  POA    **Diverticulitis [K57.92]  Yes    Diverticulitis of colon with perforation [K57.20]  Yes         ASSESSMENT/PLAN:    Acute diverticulitis with perforation and pelvic abscess  Patient with initial ED visit secondary to abdominal pain, fever and chills and discharged home on Augmentin for uncomplicated diverticulitis.  Returned secondary to worsening abdominal pain despite compliance with antibiotics.  CT on admission showed sigmoid diverticulitis with likely contained perforation-without drainable abscess at that time.  Also noted concern for SBO versus ileus though patient without nausea or vomiting on exam.  White blood cell count was 11 K with CRP 44 and lactate 2.  General surgery consulted and following.  Repeat CT with oral contrast showed concern for developing abscess. Patient underwent laparoscopic drainage of abscess with FATEMEH drain placement 6/19 with Dr. Haque.  Advanced to full liquids per surgery today  As needed antiemetics, pain control regimen  Continue zosyn  Continue LR at 100 mL/h   WBC count stable WNL. Hgb stable. CRP downtrend to 18.39. " Continue to trend labs  Blood cultures negative thus far     Non-Ischemic cardiomyopathy  Hypertension  Most recent TTE showed EF 45 to 50%.  Updated TTE showed normal LVEF, grade one diastolic dysfunction.   Metoprolol, Lasix continued  Monitor clinical volume status closely. Currently stable.     CKD stage IIIa  Creatinine was slightly greater than 2 on arrival with baseline 1.7-1.8.  CK normal.  CT A/P did not show any evidence of obstruction.  Creatinine further improved today to 1.16  Continue IVF's as above     Hx T2DM  Hyperlipidemia  RANJEET  Other home meds continued as indicated  A1c 6.2.  Patient is not on diabetes regimen as an outpatient following significant weight loss.  Can initiate SSI if indicated.  Glucose trend currently well-controlled.    -----------  -DVT prophylaxis: Lovenox   -Disposition plans/anticipated needs: Pending further clinical course        The patient is high risk due to the following diagnoses/reasons:   diverticulitis with perforation and pelvic abscess         Jsoe Kelly PA-C  06/19/24  11:48 EDT

## 2024-06-19 NOTE — OP NOTE
Laparoscopic drainage of pelvic abscess    Pre-op: Pelvic abscess secondary to diverticulitis    Post-op:  Same    Surgeon:  Marie Haque M.D., F.A.C.S.    Assistant: Maday    Anesthesia:  general      Indications: Patient with diverticulitis with fluid collection not drainable by interventional radiology with persistent temp and pain       Procedure Details   After obtaining informed consent and receiving preoperative antibiotics and with venous compression boots in place, the patient was taken to the operating room and placed under anesthesia.  The abdomen was prepped and draped in a sterile fashion.  An incision was made just above the umbilicus and the Veress needle was inserted.  Pneumoperitoneum was obtained to 15 mmHg and the 5 mm trocar was placed.  Camera was inserted, confirming position within the abdomen.  A second 5 mm trocar was then placed in the left lateral position.  Patient was placed in Trendelenburg.  Patient had marked inflammation of the sigmoid colon which was adherent to the bladder as well as several loops of small bowel which were adherent to the bladder.  These were gently peeled off exposing the pelvic fluid collection.  This was sent for culture.  The area was then irrigated and a 15 round FATEMEH was placed within the cavity and brought out the left lateral incision.  This was sutured with 0 silk.  Umbilical incision was closed with 4-0 Monocryl.  Dressings were placed.  Patient tolerated the procedure well and was sent to recovery room in stable condition    Findings:  Purulent pelvic fluid collection         Estimated Blood Loss:  Minimal    Blood Administered: none           Drains: 15 round FATEMEH           Specimens:   ID Type Source Tests Collected by Time   1 :  Peritoneal Fluid Abdomen, Lower BODY FLUID CULTURE Marie Haque MD 6/18/2024 1224        Grafts and Implants: No       Complications:  none           Disposition: PACU - hemodynamically stable.           Condition:  stable

## 2024-06-19 NOTE — PROGRESS NOTES
"Patient Name:  Samson Kendall  YOB: 1975  [id number]    Surgery Progress Note    Date of visit: 6/19/2024    Subjective   Subjective: Postop day 1    Patient sitting comfortably in bed on rounds.  States his pain has been improving.  No nausea or vomiting, tolerating liquids.    FATEMEH drain serosanguineous.  Significant pain when stripping FATEMEH drain         Objective     Objective:     /68 (BP Location: Left arm, Patient Position: Lying)   Pulse 68   Temp 98 °F (36.7 °C) (Oral)   Resp 18   Ht 167.6 cm (65.98\")   Wt 85.8 kg (189 lb 3.2 oz)   SpO2 98%   BMI 30.55 kg/m²     Intake/Output Summary (Last 24 hours) at 6/19/2024 1012  Last data filed at 6/19/2024 0919  Gross per 24 hour   Intake 2983 ml   Output 4520 ml   Net -1537 ml       Constitution: No acute distress  L:  Symmetric chest expansion. No respiratory distress  Abd:   soft, nondistended, appropriately tender to palpation.  Incisions are covered with Band-Aids.  FATEMEH drain serosanguineous  Ext:  No cyanosis, clubbing, edema    Recent labs that are back at this time have been reviewed.     White blood cell count normal.  Hemoglobin stable  Intra-op culture pending       Assessment & Plan     Assessment/ Plan:    49-year-old class I obese male nicotine user who is status post laparoscopic drainage of pelvic abscess 6/18 secondary to acute complicated diverticulitis with pelvic abscess.    FATEMEH drain to bulb suction.  Orders placed for stripping drain  Advance diet to full liquid diet  Continue antibiotics        This document has been electronically signed by Preet Fabian MD   June 19, 2024 10:12 EDT    [facility]        "

## 2024-06-19 NOTE — CASE MANAGEMENT/SOCIAL WORK
Discharge Planning Assessment  [facility]     Patient Name: Samson Kendall  MRN: [id number]  Today's Date: 6/19/2024    Admit Date: 6/16/2024    Plan: CM spoke with Pt at bedside. Pt lives at home and plans to return home at discharge family to provide transportation. Pt has [employer] insurance denies any trouble with coverage. Pt is independent with adl's and does not use any dme, home health or home o2. CM will follow.   Discharge Needs Assessment    No documentation.                  Discharge Plan       Row Name 06/19/24 1559       Plan    Plan CM spoke with Pt at bedside. Pt lives at home and plans to return home at discharge family to provide transportation. Pt has [employer] insurance denies any trouble with coverage. Pt is independent with adl's and does not use any dme, home health or home o2. CM will follow.                                    Vannesa Rand RN

## 2024-06-20 LAB
ALBUMIN SERPL-MCNC: 2.9 G/DL (ref 3.5–5.2)
ALBUMIN/GLOB SERPL: 1 G/DL
ALP SERPL-CCNC: 34 U/L (ref 39–117)
ALT SERPL W P-5'-P-CCNC: 10 U/L (ref 1–41)
ANION GAP SERPL CALCULATED.3IONS-SCNC: 12.4 MMOL/L (ref 5–15)
AST SERPL-CCNC: 14 U/L (ref 1–40)
BASOPHILS # BLD AUTO: 0.04 10*3/MM3 (ref 0–0.2)
BASOPHILS NFR BLD AUTO: 0.4 % (ref 0–1.5)
BILIRUB SERPL-MCNC: <0.2 MG/DL (ref 0–1.2)
BUN SERPL-MCNC: 8 MG/DL (ref 6–20)
BUN/CREAT SERPL: 7.4 (ref 7–25)
CALCIUM SPEC-SCNC: 8.5 MG/DL (ref 8.6–10.5)
CHLORIDE SERPL-SCNC: 109 MMOL/L (ref 98–107)
CO2 SERPL-SCNC: 19.6 MMOL/L (ref 22–29)
CREAT SERPL-MCNC: 1.08 MG/DL (ref 0.76–1.27)
CRP SERPL-MCNC: 8.03 MG/DL (ref 0–0.5)
DEPRECATED RDW RBC AUTO: 51.9 FL (ref 37–54)
EGFRCR SERPLBLD CKD-EPI 2021: 84.6 ML/MIN/1.73
EOSINOPHIL # BLD AUTO: 0.11 10*3/MM3 (ref 0–0.4)
EOSINOPHIL NFR BLD AUTO: 1.2 % (ref 0.3–6.2)
ERYTHROCYTE [DISTWIDTH] IN BLOOD BY AUTOMATED COUNT: 14.9 % (ref 12.3–15.4)
GLOBULIN UR ELPH-MCNC: 2.8 GM/DL
GLUCOSE SERPL-MCNC: 105 MG/DL (ref 65–99)
HCT VFR BLD AUTO: 29.2 % (ref 37.5–51)
HGB BLD-MCNC: 9 G/DL (ref 13–17.7)
IMM GRANULOCYTES # BLD AUTO: 0.11 10*3/MM3 (ref 0–0.05)
IMM GRANULOCYTES NFR BLD AUTO: 1.2 % (ref 0–0.5)
LYMPHOCYTES # BLD AUTO: 2.89 10*3/MM3 (ref 0.7–3.1)
LYMPHOCYTES NFR BLD AUTO: 30.6 % (ref 19.6–45.3)
MAGNESIUM SERPL-MCNC: 1.6 MG/DL (ref 1.6–2.6)
MCH RBC QN AUTO: 29.2 PG (ref 26.6–33)
MCHC RBC AUTO-ENTMCNC: 30.8 G/DL (ref 31.5–35.7)
MCV RBC AUTO: 94.8 FL (ref 79–97)
MONOCYTES # BLD AUTO: 0.69 10*3/MM3 (ref 0.1–0.9)
MONOCYTES NFR BLD AUTO: 7.3 % (ref 5–12)
NEUTROPHILS NFR BLD AUTO: 5.6 10*3/MM3 (ref 1.7–7)
NEUTROPHILS NFR BLD AUTO: 59.3 % (ref 42.7–76)
NRBC BLD AUTO-RTO: 0 /100 WBC (ref 0–0.2)
PLATELET # BLD AUTO: 293 10*3/MM3 (ref 140–450)
PMV BLD AUTO: 11.3 FL (ref 6–12)
POTASSIUM SERPL-SCNC: 3.2 MMOL/L (ref 3.5–5.2)
PROT SERPL-MCNC: 5.7 G/DL (ref 6–8.5)
RBC # BLD AUTO: 3.08 10*6/MM3 (ref 4.14–5.8)
SODIUM SERPL-SCNC: 141 MMOL/L (ref 136–145)
WBC NRBC COR # BLD AUTO: 9.44 10*3/MM3 (ref 3.4–10.8)

## 2024-06-20 PROCEDURE — 83735 ASSAY OF MAGNESIUM: CPT | Performed by: INTERNAL MEDICINE

## 2024-06-20 PROCEDURE — 80053 COMPREHEN METABOLIC PANEL: CPT | Performed by: SURGERY

## 2024-06-20 PROCEDURE — 25010000002 HYDROMORPHONE 1 MG/ML SOLUTION: Performed by: SURGERY

## 2024-06-20 PROCEDURE — 25810000003 LACTATED RINGERS PER 1000 ML: Performed by: SURGERY

## 2024-06-20 PROCEDURE — 86140 C-REACTIVE PROTEIN: CPT | Performed by: SURGERY

## 2024-06-20 PROCEDURE — 25010000002 ENOXAPARIN PER 10 MG: Performed by: SURGERY

## 2024-06-20 PROCEDURE — 25010000002 TESTOSTERONE CYPIONATE 200 MG/ML SOLUTION: Performed by: SURGERY

## 2024-06-20 PROCEDURE — 25010000002 PIPERACILLIN SOD-TAZOBACTAM PER 1 G: Performed by: SURGERY

## 2024-06-20 PROCEDURE — 85025 COMPLETE CBC W/AUTO DIFF WBC: CPT | Performed by: INTERNAL MEDICINE

## 2024-06-20 PROCEDURE — 36415 COLL VENOUS BLD VENIPUNCTURE: CPT | Performed by: SURGERY

## 2024-06-20 PROCEDURE — 25010000002 MORPHINE PER 10 MG: Performed by: SURGERY

## 2024-06-20 PROCEDURE — 25010000002 KETOROLAC TROMETHAMINE PER 15 MG: Performed by: SURGERY

## 2024-06-20 PROCEDURE — 99232 SBSQ HOSP IP/OBS MODERATE 35: CPT

## 2024-06-20 RX ORDER — KETOROLAC TROMETHAMINE 30 MG/ML
15 INJECTION, SOLUTION INTRAMUSCULAR; INTRAVENOUS EVERY 6 HOURS
Qty: 20 ML | Refills: 0 | Status: DISCONTINUED | OUTPATIENT
Start: 2024-06-20 | End: 2024-06-24 | Stop reason: HOSPADM

## 2024-06-20 RX ORDER — ACETAMINOPHEN 500 MG
1000 TABLET ORAL EVERY 6 HOURS
Status: DISCONTINUED | OUTPATIENT
Start: 2024-06-20 | End: 2024-06-24 | Stop reason: HOSPADM

## 2024-06-20 RX ORDER — METHOCARBAMOL 750 MG/1
750 TABLET, FILM COATED ORAL 4 TIMES DAILY
Status: DISCONTINUED | OUTPATIENT
Start: 2024-06-20 | End: 2024-06-24 | Stop reason: HOSPADM

## 2024-06-20 RX ADMIN — HYDROMORPHONE HYDROCHLORIDE 1 MG: 1 INJECTION, SOLUTION INTRAMUSCULAR; INTRAVENOUS; SUBCUTANEOUS at 23:43

## 2024-06-20 RX ADMIN — METHOCARBAMOL 750 MG: 750 TABLET, FILM COATED ORAL at 21:07

## 2024-06-20 RX ADMIN — HYDROMORPHONE HYDROCHLORIDE 1 MG: 1 INJECTION, SOLUTION INTRAMUSCULAR; INTRAVENOUS; SUBCUTANEOUS at 19:25

## 2024-06-20 RX ADMIN — ROSUVASTATIN CALCIUM 10 MG: 10 TABLET, FILM COATED ORAL at 08:12

## 2024-06-20 RX ADMIN — PIPERACILLIN AND TAZOBACTAM 3.38 G: 3; .375 INJECTION, POWDER, FOR SOLUTION INTRAVENOUS at 23:43

## 2024-06-20 RX ADMIN — HYDROMORPHONE HYDROCHLORIDE 1 MG: 1 INJECTION, SOLUTION INTRAMUSCULAR; INTRAVENOUS; SUBCUTANEOUS at 21:29

## 2024-06-20 RX ADMIN — TOPIRAMATE 100 MG: 100 TABLET, FILM COATED ORAL at 21:07

## 2024-06-20 RX ADMIN — ACETAMINOPHEN 1000 MG: 500 TABLET ORAL at 14:52

## 2024-06-20 RX ADMIN — Medication 10 ML: at 21:13

## 2024-06-20 RX ADMIN — ANASTROZOLE 1 MG: 1 TABLET, COATED ORAL at 08:13

## 2024-06-20 RX ADMIN — HYDROMORPHONE HYDROCHLORIDE 1 MG: 1 INJECTION, SOLUTION INTRAMUSCULAR; INTRAVENOUS; SUBCUTANEOUS at 01:07

## 2024-06-20 RX ADMIN — METHOCARBAMOL 750 MG: 750 TABLET, FILM COATED ORAL at 17:31

## 2024-06-20 RX ADMIN — Medication 10 ML: at 09:34

## 2024-06-20 RX ADMIN — HYDROMORPHONE HYDROCHLORIDE 1 MG: 1 INJECTION, SOLUTION INTRAMUSCULAR; INTRAVENOUS; SUBCUTANEOUS at 11:43

## 2024-06-20 RX ADMIN — MORPHINE SULFATE 2 MG: 2 INJECTION, SOLUTION INTRAMUSCULAR; INTRAVENOUS at 12:43

## 2024-06-20 RX ADMIN — ENOXAPARIN SODIUM 40 MG: 40 INJECTION SUBCUTANEOUS at 21:07

## 2024-06-20 RX ADMIN — TESTOSTERONE CYPIONATE 100 MG: 200 INJECTION, SOLUTION INTRAMUSCULAR at 23:42

## 2024-06-20 RX ADMIN — MORPHINE SULFATE 2 MG: 2 INJECTION, SOLUTION INTRAMUSCULAR; INTRAVENOUS at 03:18

## 2024-06-20 RX ADMIN — PANTOPRAZOLE SODIUM 40 MG: 40 INJECTION, POWDER, FOR SOLUTION INTRAVENOUS at 05:43

## 2024-06-20 RX ADMIN — METOPROLOL TARTRATE 50 MG: 50 TABLET, FILM COATED ORAL at 08:12

## 2024-06-20 RX ADMIN — HYDROMORPHONE HYDROCHLORIDE 1 MG: 1 INJECTION, SOLUTION INTRAMUSCULAR; INTRAVENOUS; SUBCUTANEOUS at 16:19

## 2024-06-20 RX ADMIN — PIPERACILLIN AND TAZOBACTAM 3.38 G: 3; .375 INJECTION, POWDER, FOR SOLUTION INTRAVENOUS at 06:37

## 2024-06-20 RX ADMIN — METHOCARBAMOL 750 MG: 750 TABLET, FILM COATED ORAL at 12:43

## 2024-06-20 RX ADMIN — METOPROLOL TARTRATE 50 MG: 50 TABLET, FILM COATED ORAL at 21:07

## 2024-06-20 RX ADMIN — SODIUM CHLORIDE, POTASSIUM CHLORIDE, SODIUM LACTATE AND CALCIUM CHLORIDE 100 ML/HR: 600; 310; 30; 20 INJECTION, SOLUTION INTRAVENOUS at 03:22

## 2024-06-20 RX ADMIN — PIPERACILLIN AND TAZOBACTAM 3.38 G: 3; .375 INJECTION, POWDER, FOR SOLUTION INTRAVENOUS at 14:52

## 2024-06-20 RX ADMIN — Medication 1 MG: at 08:12

## 2024-06-20 RX ADMIN — HYDROMORPHONE HYDROCHLORIDE 1 MG: 1 INJECTION, SOLUTION INTRAMUSCULAR; INTRAVENOUS; SUBCUTANEOUS at 08:11

## 2024-06-20 RX ADMIN — HYDROMORPHONE HYDROCHLORIDE 1 MG: 1 INJECTION, SOLUTION INTRAMUSCULAR; INTRAVENOUS; SUBCUTANEOUS at 05:43

## 2024-06-20 RX ADMIN — KETOROLAC TROMETHAMINE 15 MG: 30 INJECTION, SOLUTION INTRAMUSCULAR; INTRAVENOUS at 23:43

## 2024-06-20 RX ADMIN — ACETAMINOPHEN 1000 MG: 500 TABLET ORAL at 08:12

## 2024-06-20 RX ADMIN — CETIRIZINE HYDROCHLORIDE 10 MG: 10 TABLET, FILM COATED ORAL at 08:12

## 2024-06-20 RX ADMIN — KETOROLAC TROMETHAMINE 15 MG: 30 INJECTION, SOLUTION INTRAMUSCULAR; INTRAVENOUS at 11:44

## 2024-06-20 RX ADMIN — ACETAMINOPHEN 1000 MG: 500 TABLET ORAL at 21:13

## 2024-06-20 RX ADMIN — DOCUSATE SODIUM 100 MG: 100 CAPSULE, LIQUID FILLED ORAL at 21:07

## 2024-06-20 RX ADMIN — OXYCODONE HYDROCHLORIDE 5 MG: 5 TABLET ORAL at 14:52

## 2024-06-20 RX ADMIN — KETOROLAC TROMETHAMINE 15 MG: 30 INJECTION, SOLUTION INTRAMUSCULAR; INTRAVENOUS at 17:39

## 2024-06-20 RX ADMIN — TOPIRAMATE 100 MG: 100 TABLET, FILM COATED ORAL at 08:12

## 2024-06-20 NOTE — PLAN OF CARE
Goal Outcome Evaluation:  Plan of Care Reviewed With: patient           Outcome Evaluation: Patient is resting quietly in room with family at bedside this shift. Patient has been in pain this shift and has required PRN pain medications. Pt ambulating in room and to restroom.  FATEMEH drain stripped this shift per order. Drainage has slowed down per patient. Patient did finally have first BM this shift since surgery, which he reported to be loose x2. Plan of care ongoing.

## 2024-06-20 NOTE — PROGRESS NOTES
"Patient Identification:  Name:  Samson Kendall  Age:  49 y.o.  Sex:  male  :  1975  MRN:  [id number]  Visit Number:  [id number]  Primary Care Provider:  Rosalina Pandya APRN    Length of stay:  4    Subjective/Interval History/Consultants/Procedures     Chief Complaint:   Chief Complaint   Patient presents with    Abdominal Pain    Rectal Pain       Subjective/Interval History:    49 y.o. male who was admitted on 2024 with diverticulitis with microperforation  and abscess     PMH is significant for nonischemic cardiomyopathy with reduced EF, HTN, CKD IIIa, T2DM, HLD, RANJEET   For complete admission information, please see history and physical.     Consultations:  General surgery  PT OT    Procedures/Scans:  CT abdomen and pelvis with contrast   CT abdomen and pelvis with oral contrast  Laparoscopic drainage of pelvic abscess, 24- Dr. Haque     Today, the patient was seen and examined with spouse at bedside. He was sitting up on the bedside in no distress but did appear to be uncomfortable. He stated \"I've been better.\" Continues to have some abdominal pain, still tolerating diet. No reported nausea or vomiting. Had a bowel movement overnight      Room location at the time of evaluation was 320p.    ----------------------------------------------------------------------------------------------------------------------  Current Hospital Meds:  acetaminophen, 1,000 mg, Oral, Q6H  anastrozole, 1 mg, Oral, Daily  cetirizine, 10 mg, Oral, Daily  cyanocobalamin, 1,000 mcg, Intramuscular, Q30 Days  docusate sodium, 100 mg, Oral, BID  enoxaparin, 40 mg, Subcutaneous, Nightly  folic acid, 1 mg, Oral, Daily  ketorolac, 15 mg, Intravenous, Q6H  methocarbamol, 750 mg, Oral, 4x Daily  metoprolol tartrate, 50 mg, Oral, BID  pantoprazole, 40 mg, Intravenous, Q AM  piperacillin-tazobactam, 3.375 g, Intravenous, Q8H  polyethylene glycol, 17 g, Oral, BID  rosuvastatin, 10 mg, Oral, Daily  sodium chloride, 10 mL, " Intravenous, Q12H  [START ON 6/21/2024] Testosterone Cypionate, 100 mg, Intramuscular, Q7 Days  topiramate, 100 mg, Oral, BID         ----------------------------------------------------------------------------------------------------------------------      Objective     Vital Signs:  Temp:  [97.7 °F (36.5 °C)-98.5 °F (36.9 °C)] 97.7 °F (36.5 °C)  Heart Rate:  [53-63] 60  Resp:  [18] 18  BP: (117-156)/(68-83) 146/82      06/18/24  0500 06/19/24  0500 06/20/24  0555   Weight: 85 kg (187 lb 8 oz) 85.8 kg (189 lb 3.2 oz) 84.4 kg (186 lb 1.1 oz)     Body mass index is 30.05 kg/m².    Intake/Output Summary (Last 24 hours) at 6/20/2024 1344  Last data filed at 6/20/2024 1300  Gross per 24 hour   Intake 1810.61 ml   Output 3860 ml   Net -2049.39 ml     I/O this shift:  In: 720 [P.O.:720]  Out: 500 [Urine:500]  Diet: Regular/House; Fluid Consistency: Thin (IDDSI 0)  ----------------------------------------------------------------------------------------------------------------------    Physical Exam  Vitals and nursing note reviewed.   Constitutional:       General: He is not in acute distress.     Appearance: He is ill-appearing.   HENT:      Head: Normocephalic and atraumatic.   Eyes:      Extraocular Movements: Extraocular movements intact.   Cardiovascular:      Rate and Rhythm: Normal rate and regular rhythm.   Pulmonary:      Effort: Pulmonary effort is normal.      Breath sounds: Normal breath sounds.   Abdominal:      Tenderness: There is abdominal tenderness.      Comments: FATEMEH drain from LLQ with serosanguinous drainage noted   Musculoskeletal:      Right lower leg: No edema.      Left lower leg: No edema.   Skin:     General: Skin is warm and dry.   Neurological:      Mental Status: He is alert. Mental status is at baseline.   Psychiatric:         Mood and Affect: Mood normal.               "  ----------------------------------------------------------------------------------------------------------------------  Tele:      ----------------------------------------------------------------------------------------------------------------------  Results from last 7 days   Lab Units 06/16/24  1344   CK TOTAL U/L 79     Results from last 7 days   Lab Units 06/20/24  0244 06/19/24  0013 06/18/24  0108 06/17/24  1014 06/17/24  0054 06/16/24  1615 06/16/24  1344 06/14/24  1313   CRP mg/dL 8.03* 18.39*  --   --   --   --  44.82*  --    LACTATE mmol/L  --   --   --  0.9  --  2.0  --  0.7   WBC 10*3/mm3 9.44 7.11 7.72  --  10.77  --  11.05*  --    HEMOGLOBIN g/dL 9.0* 9.2* 9.0*  --  10.4*  --  10.7*  --    HEMATOCRIT % 29.2* 30.8* 29.5*  --  32.6*  --  33.9*  --    MCV fL 94.8 100.0* 94.2  --  91.8  --  91.6  --    MCHC g/dL 30.8* 29.9* 30.5*  --  31.9  --  31.6  --    PLATELETS 10*3/mm3 293 276 250  --  258  --  241  --    INR   --   --   --   --  1.19*  --   --   --          Results from last 7 days   Lab Units 06/20/24  0244 06/19/24  0013 06/18/24  0108   SODIUM mmol/L 141 138 137   POTASSIUM mmol/L 3.2* 4.1 3.7   MAGNESIUM mg/dL 1.6  --   --    CHLORIDE mmol/L 109* 107 106   CO2 mmol/L 19.6* 17.1* 20.3*   BUN mg/dL 8 9 11   CREATININE mg/dL 1.08 1.16 1.34*   CALCIUM mg/dL 8.5* 9.1 8.9   GLUCOSE mg/dL 105* 138* 93   ALBUMIN g/dL 2.9* 3.0* 3.0*   BILIRUBIN mg/dL <0.2 0.2 0.2   ALK PHOS U/L 34* 36* 35*   AST (SGOT) U/L 14 15 15   ALT (SGPT) U/L 10 9 11   Estimated Creatinine Clearance: 85.2 mL/min (by C-G formula based on SCr of 1.08 mg/dL).  No results found for: \"AMMONIA\"  Results from last 7 days   Lab Units 06/16/24  1344   CHOLESTEROL mg/dL 131   TRIGLYCERIDES mg/dL 224*   HDL CHOL mg/dL 21*   LDL CHOL mg/dL 73     Blood Culture   Date Value Ref Range Status   06/17/2024 No growth at 2 days  Preliminary   06/17/2024 No growth at 3 days  Preliminary   06/16/2024 No growth at 3 days  Preliminary   06/16/2024 No " "growth at 3 days  Preliminary   06/14/2024 No growth at 5 days  Final   06/14/2024 No growth at 5 days  Final     No results found for: \"URINECX\"  No results found for: \"WOUNDCX\"  No results found for: \"STOOLCX\"  ----------------------------------------------------------------------------------------------------------------------  Imaging Results (Last 24 Hours)       ** No results found for the last 24 hours. **          ----------------------------------------------------------------------------------------------------------------------   I have reviewed the above laboratory values for 06/20/24    Assessment/Plan     Active Hospital Problems    Diagnosis  POA    **Diverticulitis [K57.92]  Yes    Diverticulitis of colon with perforation [K57.20]  Yes         ASSESSMENT/PLAN:    Acute diverticulitis with perforation and pelvic abscess  Patient with initial ED visit secondary to abdominal pain, fever and chills and discharged home on Augmentin for uncomplicated diverticulitis.  Returned secondary to worsening abdominal pain despite compliance with antibiotics.  CT on admission showed sigmoid diverticulitis with likely contained perforation-without drainable abscess at that time.  Also noted concern for SBO versus ileus though patient without nausea or vomiting on exam.  White blood cell count was 11 K with CRP 44 and lactate 2.  General surgery consulted and following.  Repeat CT with oral contrast showed concern for developing abscess. Patient underwent laparoscopic drainage of abscess with FATEMEH drain placement 6/19 with Dr. Haque.  Intraoperative cultures with preliminary growth gram negative bacilli and gram pos cocci.  Advanced to regular house diet per surgery today. Further IVF held  Continue as needed antiemetics, pain control regimen  Continue zosyn  WBC count stable WNL. Hgb stable. CRP downtrend to 8.03. Continue to trend labs  Blood cultures negative thus far     Non-Ischemic " cardiomyopathy  Hypertension  Most recent TTE showed EF 45 to 50%.  Updated TTE showed normal LVEF, grade one diastolic dysfunction.   Metoprolol, Lasix continued  Monitor clinical volume status closely. Currently stable.     CKD stage IIIa  Creatinine was slightly greater than 2 on arrival with baseline 1.7-1.8.  CK normal.  CT A/P did not show any evidence of obstruction.  Creatinine further improved- stable     Hx T2DM  Hyperlipidemia  RANJEET  Other home meds continued as indicated  A1c 6.2.  Patient is not on diabetes regimen as an outpatient following significant weight loss.  Can initiate SSI if indicated.  Glucose trend currently well-controlled.     -----------  -DVT prophylaxis: Lovenox   -Disposition plans/anticipated needs: Pending course, anticipate return home once clinically stable.        The patient is high risk due to the following diagnoses/reasons:  diverticulitis with perf and abscess         Jose Kelly PA-C  06/20/24  13:44 EDT

## 2024-06-20 NOTE — PLAN OF CARE
Goal Outcome Evaluation:      Pt has been resting this shift. No s/s of acute distress noted at this time. Plan of care ongoing.

## 2024-06-21 LAB
ALBUMIN SERPL-MCNC: 3.2 G/DL (ref 3.5–5.2)
ALBUMIN/GLOB SERPL: 1.4 G/DL
ALP SERPL-CCNC: 36 U/L (ref 39–117)
ALT SERPL W P-5'-P-CCNC: 11 U/L (ref 1–41)
ANION GAP SERPL CALCULATED.3IONS-SCNC: 13.3 MMOL/L (ref 5–15)
AST SERPL-CCNC: 16 U/L (ref 1–40)
BACTERIA SPEC AEROBE CULT: NORMAL
BACTERIA SPEC AEROBE CULT: NORMAL
BASOPHILS # BLD AUTO: 0.05 10*3/MM3 (ref 0–0.2)
BASOPHILS NFR BLD AUTO: 0.5 % (ref 0–1.5)
BILIRUB SERPL-MCNC: <0.2 MG/DL (ref 0–1.2)
BUN SERPL-MCNC: 8 MG/DL (ref 6–20)
BUN/CREAT SERPL: 7.5 (ref 7–25)
CALCIUM SPEC-SCNC: 8.6 MG/DL (ref 8.6–10.5)
CHLORIDE SERPL-SCNC: 112 MMOL/L (ref 98–107)
CO2 SERPL-SCNC: 17.7 MMOL/L (ref 22–29)
CREAT SERPL-MCNC: 1.06 MG/DL (ref 0.76–1.27)
CRP SERPL-MCNC: 4.52 MG/DL (ref 0–0.5)
DEPRECATED RDW RBC AUTO: 49.6 FL (ref 37–54)
EGFRCR SERPLBLD CKD-EPI 2021: 86.6 ML/MIN/1.73
EOSINOPHIL # BLD AUTO: 0.33 10*3/MM3 (ref 0–0.4)
EOSINOPHIL NFR BLD AUTO: 3.4 % (ref 0.3–6.2)
ERYTHROCYTE [DISTWIDTH] IN BLOOD BY AUTOMATED COUNT: 14.7 % (ref 12.3–15.4)
GLOBULIN UR ELPH-MCNC: 2.3 GM/DL
GLUCOSE SERPL-MCNC: 99 MG/DL (ref 65–99)
HCT VFR BLD AUTO: 30.2 % (ref 37.5–51)
HGB BLD-MCNC: 9.5 G/DL (ref 13–17.7)
IMM GRANULOCYTES # BLD AUTO: 0.14 10*3/MM3 (ref 0–0.05)
IMM GRANULOCYTES NFR BLD AUTO: 1.5 % (ref 0–0.5)
LYMPHOCYTES # BLD AUTO: 2.49 10*3/MM3 (ref 0.7–3.1)
LYMPHOCYTES NFR BLD AUTO: 25.9 % (ref 19.6–45.3)
MAGNESIUM SERPL-MCNC: 2 MG/DL (ref 1.6–2.6)
MCH RBC QN AUTO: 28.6 PG (ref 26.6–33)
MCHC RBC AUTO-ENTMCNC: 31.5 G/DL (ref 31.5–35.7)
MCV RBC AUTO: 91 FL (ref 79–97)
MONOCYTES # BLD AUTO: 0.75 10*3/MM3 (ref 0.1–0.9)
MONOCYTES NFR BLD AUTO: 7.8 % (ref 5–12)
NEUTROPHILS NFR BLD AUTO: 5.87 10*3/MM3 (ref 1.7–7)
NEUTROPHILS NFR BLD AUTO: 60.9 % (ref 42.7–76)
NRBC BLD AUTO-RTO: 0 /100 WBC (ref 0–0.2)
PLATELET # BLD AUTO: 335 10*3/MM3 (ref 140–450)
PMV BLD AUTO: 10.7 FL (ref 6–12)
POTASSIUM SERPL-SCNC: 3.6 MMOL/L (ref 3.5–5.2)
POTASSIUM SERPL-SCNC: 3.9 MMOL/L (ref 3.5–5.2)
PROT SERPL-MCNC: 5.5 G/DL (ref 6–8.5)
RBC # BLD AUTO: 3.32 10*6/MM3 (ref 4.14–5.8)
SODIUM SERPL-SCNC: 143 MMOL/L (ref 136–145)
WBC NRBC COR # BLD AUTO: 9.63 10*3/MM3 (ref 3.4–10.8)

## 2024-06-21 PROCEDURE — 25010000002 MICAFUNGIN SODIUM 100 MG RECONSTITUTED SOLUTION 1 EACH VIAL: Performed by: NURSE PRACTITIONER

## 2024-06-21 PROCEDURE — 84132 ASSAY OF SERUM POTASSIUM: CPT | Performed by: INTERNAL MEDICINE

## 2024-06-21 PROCEDURE — 99232 SBSQ HOSP IP/OBS MODERATE 35: CPT

## 2024-06-21 PROCEDURE — 25010000002 ENOXAPARIN PER 10 MG: Performed by: SURGERY

## 2024-06-21 PROCEDURE — 85025 COMPLETE CBC W/AUTO DIFF WBC: CPT | Performed by: INTERNAL MEDICINE

## 2024-06-21 PROCEDURE — 25010000002 HYDROMORPHONE 1 MG/ML SOLUTION: Performed by: SURGERY

## 2024-06-21 PROCEDURE — 99222 1ST HOSP IP/OBS MODERATE 55: CPT | Performed by: NURSE PRACTITIONER

## 2024-06-21 PROCEDURE — 25010000002 TIGECYCLINE PER 1 MG: Performed by: NURSE PRACTITIONER

## 2024-06-21 PROCEDURE — 25010000002 HYDROMORPHONE PER 4 MG: Performed by: SURGERY

## 2024-06-21 PROCEDURE — 80053 COMPREHEN METABOLIC PANEL: CPT | Performed by: SURGERY

## 2024-06-21 PROCEDURE — 25010000002 PIPERACILLIN SOD-TAZOBACTAM PER 1 G: Performed by: SURGERY

## 2024-06-21 PROCEDURE — 25010000002 KETOROLAC TROMETHAMINE PER 15 MG: Performed by: SURGERY

## 2024-06-21 PROCEDURE — 83735 ASSAY OF MAGNESIUM: CPT | Performed by: INTERNAL MEDICINE

## 2024-06-21 PROCEDURE — 86140 C-REACTIVE PROTEIN: CPT | Performed by: SURGERY

## 2024-06-21 RX ORDER — OXYCODONE HYDROCHLORIDE 10 MG/1
10 TABLET ORAL EVERY 4 HOURS PRN
Status: DISCONTINUED | OUTPATIENT
Start: 2024-06-21 | End: 2024-06-24 | Stop reason: HOSPADM

## 2024-06-21 RX ORDER — POTASSIUM CHLORIDE 20 MEQ/1
40 TABLET, EXTENDED RELEASE ORAL EVERY 4 HOURS
Status: COMPLETED | OUTPATIENT
Start: 2024-06-21 | End: 2024-06-21

## 2024-06-21 RX ORDER — HYDROMORPHONE HYDROCHLORIDE 1 MG/ML
0.5 INJECTION, SOLUTION INTRAMUSCULAR; INTRAVENOUS; SUBCUTANEOUS
Status: DISCONTINUED | OUTPATIENT
Start: 2024-06-21 | End: 2024-06-22

## 2024-06-21 RX ORDER — POLYETHYLENE GLYCOL 3350 17 G/17G
17 POWDER, FOR SOLUTION ORAL DAILY
Status: DISCONTINUED | OUTPATIENT
Start: 2024-06-22 | End: 2024-06-24 | Stop reason: HOSPADM

## 2024-06-21 RX ADMIN — ROSUVASTATIN CALCIUM 10 MG: 10 TABLET, FILM COATED ORAL at 08:59

## 2024-06-21 RX ADMIN — SODIUM CHLORIDE 100 MG: 9 INJECTION, SOLUTION INTRAVENOUS at 15:31

## 2024-06-21 RX ADMIN — KETOROLAC TROMETHAMINE 15 MG: 30 INJECTION, SOLUTION INTRAMUSCULAR; INTRAVENOUS at 17:07

## 2024-06-21 RX ADMIN — MICAFUNGIN SODIUM 100 MG: 100 INJECTION, POWDER, LYOPHILIZED, FOR SOLUTION INTRAVENOUS at 17:08

## 2024-06-21 RX ADMIN — METHOCARBAMOL 750 MG: 750 TABLET, FILM COATED ORAL at 09:49

## 2024-06-21 RX ADMIN — TOPIRAMATE 100 MG: 100 TABLET, FILM COATED ORAL at 20:13

## 2024-06-21 RX ADMIN — OXYCODONE HYDROCHLORIDE 10 MG: 10 TABLET ORAL at 14:03

## 2024-06-21 RX ADMIN — PIPERACILLIN AND TAZOBACTAM 3.38 G: 3; .375 INJECTION, POWDER, FOR SOLUTION INTRAVENOUS at 06:25

## 2024-06-21 RX ADMIN — HYDROMORPHONE HYDROCHLORIDE 0.5 MG: 1 INJECTION, SOLUTION INTRAMUSCULAR; INTRAVENOUS; SUBCUTANEOUS at 23:57

## 2024-06-21 RX ADMIN — PANTOPRAZOLE SODIUM 40 MG: 40 INJECTION, POWDER, FOR SOLUTION INTRAVENOUS at 06:01

## 2024-06-21 RX ADMIN — Medication 1 MG: at 09:00

## 2024-06-21 RX ADMIN — METHOCARBAMOL 750 MG: 750 TABLET, FILM COATED ORAL at 17:08

## 2024-06-21 RX ADMIN — HYDROMORPHONE HYDROCHLORIDE 1 MG: 1 INJECTION, SOLUTION INTRAMUSCULAR; INTRAVENOUS; SUBCUTANEOUS at 02:06

## 2024-06-21 RX ADMIN — TOPIRAMATE 100 MG: 100 TABLET, FILM COATED ORAL at 08:58

## 2024-06-21 RX ADMIN — HYDROMORPHONE HYDROCHLORIDE 1 MG: 1 INJECTION, SOLUTION INTRAMUSCULAR; INTRAVENOUS; SUBCUTANEOUS at 06:29

## 2024-06-21 RX ADMIN — METOPROLOL TARTRATE 50 MG: 50 TABLET, FILM COATED ORAL at 08:59

## 2024-06-21 RX ADMIN — METOPROLOL TARTRATE 50 MG: 50 TABLET, FILM COATED ORAL at 20:13

## 2024-06-21 RX ADMIN — METHOCARBAMOL 750 MG: 750 TABLET, FILM COATED ORAL at 20:13

## 2024-06-21 RX ADMIN — OXYCODONE HYDROCHLORIDE 10 MG: 10 TABLET ORAL at 08:57

## 2024-06-21 RX ADMIN — OXYCODONE HYDROCHLORIDE 10 MG: 10 TABLET ORAL at 19:26

## 2024-06-21 RX ADMIN — HYDROMORPHONE HYDROCHLORIDE 1 MG: 1 INJECTION, SOLUTION INTRAMUSCULAR; INTRAVENOUS; SUBCUTANEOUS at 04:11

## 2024-06-21 RX ADMIN — ANASTROZOLE 1 MG: 1 TABLET, COATED ORAL at 09:01

## 2024-06-21 RX ADMIN — Medication 10 ML: at 20:13

## 2024-06-21 RX ADMIN — HYDROMORPHONE HYDROCHLORIDE 0.5 MG: 1 INJECTION, SOLUTION INTRAMUSCULAR; INTRAVENOUS; SUBCUTANEOUS at 20:13

## 2024-06-21 RX ADMIN — KETOROLAC TROMETHAMINE 15 MG: 30 INJECTION, SOLUTION INTRAMUSCULAR; INTRAVENOUS at 06:01

## 2024-06-21 RX ADMIN — Medication 10 ML: at 09:01

## 2024-06-21 RX ADMIN — CETIRIZINE HYDROCHLORIDE 10 MG: 10 TABLET, FILM COATED ORAL at 09:00

## 2024-06-21 RX ADMIN — ACETAMINOPHEN 1000 MG: 500 TABLET ORAL at 15:31

## 2024-06-21 RX ADMIN — METHOCARBAMOL 750 MG: 750 TABLET, FILM COATED ORAL at 12:02

## 2024-06-21 RX ADMIN — HYDROMORPHONE HYDROCHLORIDE 0.5 MG: 1 INJECTION, SOLUTION INTRAMUSCULAR; INTRAVENOUS; SUBCUTANEOUS at 10:26

## 2024-06-21 RX ADMIN — POTASSIUM CHLORIDE 40 MEQ: 1500 TABLET, EXTENDED RELEASE ORAL at 06:02

## 2024-06-21 RX ADMIN — KETOROLAC TROMETHAMINE 15 MG: 30 INJECTION, SOLUTION INTRAMUSCULAR; INTRAVENOUS at 12:02

## 2024-06-21 RX ADMIN — HYDROMORPHONE HYDROCHLORIDE 0.5 MG: 1 INJECTION, SOLUTION INTRAMUSCULAR; INTRAVENOUS; SUBCUTANEOUS at 15:59

## 2024-06-21 RX ADMIN — ENOXAPARIN SODIUM 40 MG: 40 INJECTION SUBCUTANEOUS at 20:13

## 2024-06-21 RX ADMIN — POTASSIUM CHLORIDE 40 MEQ: 1500 TABLET, EXTENDED RELEASE ORAL at 08:58

## 2024-06-21 RX ADMIN — ACETAMINOPHEN 1000 MG: 500 TABLET ORAL at 09:49

## 2024-06-21 RX ADMIN — KETOROLAC TROMETHAMINE 15 MG: 30 INJECTION, SOLUTION INTRAMUSCULAR; INTRAVENOUS at 23:35

## 2024-06-21 RX ADMIN — ACETAMINOPHEN 1000 MG: 500 TABLET ORAL at 02:09

## 2024-06-21 NOTE — CONSULTS
INFECTIOUS DISEASE CONSULTATION REPORT        Patient Identification:  Name:  Samson Kendall  Age:  49 y.o.  Sex:  male  :  1975  MRN:  [id number]   Visit Number:  [id number]  Primary Care Physician:  Rosalina Pandya APRN        Referring Provider: Dr. Rowland    Reason for consult: Polymicrobial intraoperative abdominal culture Ronel       LOS: 5 days        Subjective       Subjective     History of present illness:      Thank you Dr. Rodney for allowing us to participate in the care of your patient.  As you well know, Mr. Samson Kendall is a 49 y.o. male with past medical history significant for diabetes, hypertension, hyperlipidemia, sleep apnea with CPAP use, who [facility] for renal pain, fever, decreased intake.  Patient was originally seen in the ED few days prior and diagnosed with uncomplicated diverticulitis and discharged home with Augmentin therapy however patient began to have persistent high-grade fevers prompting his return to the ER.  WBC normal at 9.63.  CRP 4.52.  CT of the abdomen pelvis from 1924 reports an air-fluid collection extending from the rectosigmoid junction into the left iliac fossa compatible with localized contained perforation may represent early evolving abscess, sigmoid diverticulitis changes again are noted with the degree of inflammation that appears slightly improved, incomplete distention the urinary bladder, maximal thickness of the gas/fluid collection in the lower pelvis is at the level of the rectosigmoid junction and is 2.64 cm with more superior air-containing component at approximately 2.65 cm along the left common iliac vasculature.  Patient underwent laparoscopic drainage of pelvic abscess secondary to diverticulitis on 2024.  Body fluid culture from 2024 preliminary reports growth of Candida glabrata, gram-negative bacilli and Enterococcus faecium, VRE.    Patient sitting up on  side of bed today.  Continues to have significant rectal pain however it has improved some since surgery.  Patient has FATEMEH drain in place with serosanguineous drainage.  Afebrile, no reported diarrhea.  Wife is at bedside.      Infectious Disease consultation was requested for antimicrobial management.      ---------------------------------------------------------------------------------------------------------------------     Review Of Systems:    Constitutional: no fever, chills and night sweats. No appetite change or unexpected weight change. No fatigue.  Eyes: no eye drainage, itching or redness.  HEENT: no mouth sores, dysphagia or nose bleed.  Respiratory: no for shortness of breath, cough or production of sputum.  Cardiovascular: no chest pain, no palpitations, no orthopnea.  Gastrointestinal: no nausea, vomiting or diarrhea. No abdominal pain, hematemesis or rectal bleeding.  Genitourinary: no dysuria or polyuria.  Positive perirectal pain.  Hematologic/lymphatic: no lymph node abnormalities, no easy bruising or easy bleeding.  Musculoskeletal: no muscle or joint pain.  Skin: No rash and no itching.  Neurological: no loss of consciousness, no seizure, no headache.  Behavioral/Psych: no depression or suicidal ideation.  Endocrine: no hot flashes.  Immunologic: negative.    ---------------------------------------------------------------------------------------------------------------------     Past Medical History    Past Medical History:   Diagnosis Date    Asthma     childhood    Cardiomyopathy     DDD (degenerative disc disease),     Diabetes mellitus     Diverticulitis of colon with perforation 06/16/2024    Heart murmur     as child    Hyperlipidemia     Hypertension     Palpitation     PTSD (post-traumatic stress disorder)     Sleep apnea     C pap use    Umbilical hernia        Past Surgical History    Past Surgical History:   Procedure Laterality Date    APPENDECTOMY      CERVICAL FUSION      C5-6     COLONOSCOPY      DIAGNOSTIC LAPAROSCOPY N/A 6/18/2024    Procedure: laparoscopic drainage of pelvic abscess;  Surgeon: Marie Haque MD;  Location: [facility];  Service: General;  Laterality: N/A;    ENDOSCOPY      HAND SURGERY      KNEE ARTHROSCOPY      VASECTOMY      WISDOM TOOTH EXTRACTION         Family History    Family History   Problem Relation Age of Onset    Heart attack Maternal Grandfather     Heart failure Maternal Grandfather     Heart disease Maternal Grandfather     Arthritis Mother     Diabetes Father     Hearing loss Father     Asthma Maternal Grandmother        Social History    Social History     Tobacco Use    Smoking status: Never     Passive exposure: Current    Smokeless tobacco: Current     Types: Snuff   Vaping Use    Vaping status: Never Used   Substance Use Topics    Alcohol use: Not Currently    Drug use: No       Allergies    Patient has no known allergies.  ---------------------------------------------------------------------------------------------------------------------     Home Medications:    Prior to Admission Medications       Prescriptions Last Dose Informant Patient Reported? Taking?    amoxicillin-clavulanate (AUGMENTIN) 875-125 MG per tablet 6/16/2024 Spouse/Significant Other No Yes    Take 1 tablet by mouth 2 (Two) Times a Day for 10 days.    anastrozole (ARIMIDEX) 1 MG tablet 6/16/2024 Spouse/Significant Other Yes Yes    Take 1 tablet by mouth Daily.    Calcium-Magnesium-Zinc (OMERO-MAG-ZINC PO) 6/15/2024 Spouse/Significant Other Yes Yes    Take 1 tablet by mouth 2 (Two) Times a Day.    dexlansoprazole (DEXILANT) 60 MG capsule 6/16/2024 Spouse/Significant Other Yes Yes    Take 1 capsule by mouth Daily.    doxycycline (MONODOX) 100 MG capsule 6/16/2024  No Yes    Take 1 capsule by mouth 2 (Two) Times a Day for 10 days.    fenofibrate (TRICOR) 145 MG tablet 6/16/2024 Spouse/Significant Other Yes Yes    Take 1 tablet by mouth Daily.    folic acid (FOLVITE) 1 MG tablet  6/16/2024 Spouse/Significant Other Yes Yes    Take 1 tablet by mouth Daily.    furosemide (LASIX) 20 MG tablet Past Month Spouse/Significant Other Yes Yes    Take 1 tablet by mouth Daily.    glucosamine-chondroitin 500-400 MG capsule capsule 6/16/2024 Spouse/Significant Other Yes Yes    Take 1 capsule by mouth 2 (Two) Times a Day With Meals.    HYDROcodone-acetaminophen (NORCO) 7.5-325 MG per tablet 6/15/2024 Spouse/Significant Other Yes Yes    Take 1 tablet by mouth Every 8 (Eight) Hours As Needed for Moderate Pain.    levocetirizine (XYZAL) 5 MG tablet 6/15/2024 Spouse/Significant Other Yes Yes    Take 1 tablet by mouth Every Evening.    meloxicam (MOBIC) 15 MG tablet 6/16/2024 Spouse/Significant Other Yes Yes    Take 1 tablet by mouth Daily.    metoprolol tartrate (LOPRESSOR) 50 MG tablet 6/16/2024 Spouse/Significant Other Yes Yes    Take 1 tablet by mouth 2 (Two) Times a Day.    ondansetron ODT (ZOFRAN-ODT) 4 MG disintegrating tablet 6/15/2024 Spouse/Significant Other No Yes    Place 1 tablet on the tongue Every 8 (Eight) Hours As Needed for Vomiting.    Testosterone Cypionate (DEPOTESTOTERONE CYPIONATE) 200 MG/ML injection 6/14/2024 Spouse/Significant Other Yes Yes    Inject 0.5 mL into the appropriate muscle as directed by prescriber Every 7 (Seven) Days.    Tirzepatide (MOUNJARO) 7.5 MG/0.5ML solution pen-injector pen 6/14/2024 Spouse/Significant Other Yes Yes    Inject 0.5 mL under the skin into the appropriate area as directed 1 (One) Time Per Week.    topiramate (TOPAMAX) 100 MG tablet 6/16/2024 Spouse/Significant Other Yes Yes    Take 1 tablet by mouth 2 (Two) Times a Day.    cyanocobalamin 1000 MCG/ML injection More than a month Spouse/Significant Other Yes No    Inject 1 mL into the appropriate muscle as directed by prescriber Every 30 (Thirty) Days.          ---------------------------------------------------------------------------------------------------------------------    Objective       Objective      Hospital Scheduled Meds:  acetaminophen, 1,000 mg, Oral, Q6H  anastrozole, 1 mg, Oral, Daily  cetirizine, 10 mg, Oral, Daily  cyanocobalamin, 1,000 mcg, Intramuscular, Q30 Days  enoxaparin, 40 mg, Subcutaneous, Nightly  folic acid, 1 mg, Oral, Daily  ketorolac, 15 mg, Intravenous, Q6H  methocarbamol, 750 mg, Oral, 4x Daily  metoprolol tartrate, 50 mg, Oral, BID  micafungin (MYCAMINE) IV, 100 mg, Intravenous, Q24H  pantoprazole, 40 mg, Intravenous, Q AM  [START ON 6/22/2024] polyethylene glycol, 17 g, Oral, Daily  rosuvastatin, 10 mg, Oral, Daily  sodium chloride, 10 mL, Intravenous, Q12H  Testosterone Cypionate, 100 mg, Intramuscular, Q7 Days  tigecycline, 100 mg, Intravenous, Once   Followed by  [START ON 6/22/2024] tigecycline, 50 mg, Intravenous, Q12H  topiramate, 100 mg, Oral, BID         ---------------------------------------------------------------------------------------------------------------------   Vital Signs:  Temp:  [97.4 °F (36.3 °C)-98.5 °F (36.9 °C)] 97.9 °F (36.6 °C)  Heart Rate:  [56-75] 56  Resp:  [16-18] 18  BP: (119-150)/(64-83) 125/70  Mean Arterial Pressure (Non-Invasive) for the past 24 hrs (Last 3 readings):   Noninvasive MAP (mmHg)   06/20/24 1820 92   06/20/24 1435 102     SpO2 Percentage    06/20/24 2259 06/21/24 0325 06/21/24 0700   SpO2: 97% 98% 99%     SpO2:  [95 %-99 %] 99 %  on   ;   Device (Oxygen Therapy): room air    Body mass index is 30.03 kg/m².  Wt Readings from Last 3 Encounters:   06/20/24 84.4 kg (186 lb 1.1 oz)   06/14/24 86.2 kg (190 lb)   03/19/24 84.8 kg (187 lb)     ---------------------------------------------------------------------------------------------------------------------     Physical Exam:    Constitutional:  Well-developed and well-nourished.  No respiratory distress.  On room air.  Wife at bedside.     HENT:  Head: Normocephalic and atraumatic.  Mouth:  Moist mucous membranes.    Eyes:  Conjunctivae and EOM are normal.  No scleral icterus.  Neck:   Neck supple.  No JVD present.    Cardiovascular:  Normal rate, regular rhythm and normal heart sounds with no murmur. No edema.  Pulmonary/Chest:  No respiratory distress, no wheezes, no crackles, with normal breath sounds and good air movement.  Abdominal:  Soft.  Bowel sounds are normal.  No distension and no tenderness.   Musculoskeletal:  No edema, no tenderness, and no deformity.  No swelling or redness of joints.  Neurological:  Alert and oriented to person, place, and time.  No facial droop.  No slurred speech.   Skin:  Skin is warm and dry.  No rash noted.  No pallor.  Perirectal incision with FATEMEH drain in place, serosanguineous drainage.  Psychiatric:  Normal mood and affect.  Behavior is normal.    ---------------------------------------------------------------------------------------------------------------------    Results from last 7 days   Lab Units 06/16/24  1344   CK TOTAL U/L 79       Results from last 7 days   Lab Units 06/16/24  1344   CHOLESTEROL mg/dL 131   TRIGLYCERIDES mg/dL 224*   HDL CHOL mg/dL 21*   LDL CHOL mg/dL 73       Results from last 7 days   Lab Units 06/21/24  0237 06/20/24  0244 06/19/24  0013 06/18/24  0108 06/17/24  1014 06/17/24  0054 06/16/24  1615   CRP mg/dL 4.52* 8.03* 18.39*  --   --   --   --    LACTATE mmol/L  --   --   --   --  0.9  --  2.0   WBC 10*3/mm3 9.63 9.44 7.11   < >  --  10.77  --    HEMOGLOBIN g/dL 9.5* 9.0* 9.2*   < >  --  10.4*  --    HEMATOCRIT % 30.2* 29.2* 30.8*   < >  --  32.6*  --    MCV fL 91.0 94.8 100.0*   < >  --  91.8  --    MCHC g/dL 31.5 30.8* 29.9*   < >  --  31.9  --    PLATELETS 10*3/mm3 335 293 276   < >  --  258  --    INR   --   --   --   --   --  1.19*  --     < > = values in this interval not displayed.     Results from last 7 days   Lab Units 06/21/24  1245 06/21/24  0237 06/20/24  0244 06/19/24  0013   SODIUM mmol/L  --  143 141 138   POTASSIUM mmol/L 3.9 3.6 3.2* 4.1   MAGNESIUM mg/dL  --  2.0 1.6  --    CHLORIDE mmol/L  --  112* 109*  "107   CO2 mmol/L  --  17.7* 19.6* 17.1*   BUN mg/dL  --  8 8 9   CREATININE mg/dL  --  1.06 1.08 1.16   CALCIUM mg/dL  --  8.6 8.5* 9.1   GLUCOSE mg/dL  --  99 105* 138*   ALBUMIN g/dL  --  3.2* 2.9* 3.0*   BILIRUBIN mg/dL  --  <0.2 <0.2 0.2   ALK PHOS U/L  --  36* 34* 36*   AST (SGOT) U/L  --  16 14 15   ALT (SGPT) U/L  --  11 10 9   Estimated Creatinine Clearance: 86.8 mL/min (by C-G formula based on SCr of 1.06 mg/dL).  No results found for: \"AMMONIA\"    Glucose   Date/Time Value Ref Range Status   06/19/2024 1624 78 70 - 130 mg/dL Final   06/19/2024 0128 164 (H) 70 - 130 mg/dL Final     Lab Results   Component Value Date    HGBA1C 6.20 (H) 06/16/2024     Lab Results   Component Value Date    TSH 1.860 06/16/2024    FREET4 1.12 09/06/2019       Blood Culture   Date Value Ref Range Status   06/17/2024 No growth at 3 days  Preliminary   06/17/2024 No growth at 4 days  Preliminary   06/16/2024 No growth at 4 days  Preliminary   06/16/2024 No growth at 4 days  Preliminary     No results found for: \"URINECX\"  No results found for: \"WOUNDCX\"  No results found for: \"STOOLCX\"  No results found for: \"RESPCX\"  Pain Management Panel           No data to display              I have personally reviewed the above laboratory results.   ---------------------------------------------------------------------------------------------------------------------  Imaging Results (Last 7 Days)       Procedure Component Value Units Date/Time    FL yana endo (surgery) [[id number]] Resulted: 06/18/24 1403     Updated: 06/18/24 1403    Narrative:      This procedure was auto-finalized with no dictation required.    CT Abdomen Pelvis Without Contrast [[id number]] Collected: 06/17/24 1325     Updated: 06/17/24 1331    Narrative:      EXAM:    CT Abdomen and Pelvis Without Intravenous Contrast     EXAM DATE:    6/17/2024 1:10 PM     CLINICAL HISTORY:    diverticulitis with temp 102; K57.20-Diverticulitis of large intestine  with perforation and " abscess without bleeding     TECHNIQUE:    Axial computed tomography images of the abdomen and pelvis without  intravenous contrast.  Sagittal and coronal reformatted images were  created and reviewed.  This CT exam was performed using one or more of  the following dose reduction techniques:  automated exposure control,  adjustment of the mA and/or kV according to patient size, and/or use of  iterative reconstruction technique.     COMPARISON:    6/16/2024     FINDINGS:    Lung bases:  Lung bases are clear.  No consolidation.      ABDOMEN:    Liver:  Fatty infiltration of liver.    Gallbladder and bile ducts:  Unremarkable as visualized.  No calcified  stones.  No ductal dilation.    Pancreas:  Unremarkable as visualized.  No ductal dilation.    Spleen:  Unremarkable as visualized.  No splenomegaly.    Adrenals:  Unremarkable as visualized.  No mass.    Kidneys and ureters:  Unremarkable as visualized.  No obstructing  stones.  No hydronephrosis.    Stomach and bowel:  Air-fluid collection is again noted extending from  the rectosigmoid junction in the left iliac fossa compatible with  localized contained perforation. May represent early evolving abscess  although no thick walled features identified and no focal areas yet  amenable to percutaneous drainage.  Sigmoid diverticulitis changes are  again noted with the degree of inflammation that appears slightly  improved.  Maximal thickness of the gas/fluid collection in the lower  pelvis is at the level of the rectosigmoid junction and is 2.64 cm with  the more superior air-containing component approximately 2.65 cm and  lies along the left common iliac vasculature.  No obstruction.      PELVIS:    Appendix:  No findings to suggest acute appendicitis.    Bladder:  Incomplete distention of urinary bladder likely accounts for  wall thickening. Secondary cystitis not excluded.    Reproductive:  Unremarkable as visualized.      ABDOMEN and PELVIS:    Intraperitoneal  space:  Unremarkable as visualized.  No significant  contrast is identified within the gas-fluid collection in the lower  pelvis.    Bones/joints:  See above.    Soft tissues:  Fat only containing umbilical hernia, stable.    Vasculature:  Unremarkable as visualized.  No abdominal aortic  aneurysm.    Lymph nodes:  Unremarkable as visualized.  No enlarged lymph nodes.       Impression:      1.  Air-fluid collection is again noted extending from the rectosigmoid  junction into the left iliac fossa compatible with localized contained  perforation and may represent early evolving abscess although no thick  walled features identified and no focal areas that are yet amenable to  percutaneous drainage.  2.  No significant contrast is identified within the gas-fluid  collection in the lower pelvis.  3.  Sigmoid diverticulitis changes are again noted with the degree of  inflammation that appears slightly improved.  4.  Incomplete distention of urinary bladder likely accounts for wall  thickening. Secondary cystitis not excluded.  5.  Fat only containing umbilical hernia, stable.  6.  Maximal thickness of the gas/fluid collection in the lower pelvis is  at the level of the rectosigmoid junction and is 2.64 cm with the more  superior air-containing component approximately 2.65 cm and lies along  the left common iliac vasculature.  7.  Fatty infiltration of liver.        This report was finalized on 6/17/2024 1:29 PM by Dr. Robert Ma MD.       CT Abdomen Pelvis With Contrast [[id number]] Collected: 06/16/24 1453     Updated: 06/16/24 1457    Narrative:      INDICATION: Abdominal pain. Rectal pain     COMPARISON: CT from 6/14/2024.     TECHNIQUE: Axial CT images of the abdomen and pelvis were obtained  following IV contrast administration. Coronal and sagittal reformations  were reviewed.     FINDINGS:  Visualized lung bases appear unremarkable.     The liver, gallbladder, spleen, pancreas and adrenal glands  appear  unremarkable. No hydronephrosis. Punctate left renal calculus. Small  bilateral renal cysts. Fat-containing umbilical hernia.     Moderate inflammation surrounding the sigmoid colon with wall thickening  and adjacent inflammatory fat stranding. Small to moderate amount of  fluid adjacent to the sigmoid colon. No rim-enhancing drainable abscess  at this time. A few small foci of air likely related to contained  perforation. Small bowel dilation.     Moderate urinary bladder wall thickening. Small fat-containing bilateral  inguinal hernias     Degenerative changes in the spine. No acute osseous abnormality evident.       Impression:      1.  Sigmoid diverticulitis. Moderate inflammation surrounding the  sigmoid colon with wall thickening and adjacent inflammatory fat  stranding. Small to moderate amount of fluid adjacent to the sigmoid  colon. No rim-enhancing drainable abscess at this time. A few small foci  of air likely related to contained perforation.  2.  Small bowel dilation could relate to ileus from above inflammation  or obstruction. Follow-up recommended.  3.  Moderate urinary bladder wall thickening likely from above  inflammation.        This report was finalized on 6/16/2024 2:55 PM by Alex Pallas, DO.             I have personally reviewed the above radiology results.   ---------------------------------------------------------------------------------------------------------------------      Pertinent Infectious Disease Results          Assessment & Plan      Assessment      Pelvic abscess secondary to diverticulitis        Plan        Patient presented to [facility] on 6/16/2024 for renal pain, fever, decreased intake.  Patient was originally seen in the ED few days prior and diagnosed with uncomplicated diverticulitis and discharged home with Augmentin therapy however patient began to have persistent high-grade fevers prompting his return to the ER.  WBC normal  at 9.63.  CRP 4.52.  CT of the abdomen pelvis from 6/6/1924 reports an air-fluid collection extending from the rectosigmoid junction into the left iliac fossa compatible with localized contained perforation may represent early evolving abscess, sigmoid diverticulitis changes again are noted with the degree of inflammation that appears slightly improved, incomplete distention the urinary bladder, maximal thickness of the gas/fluid collection in the lower pelvis is at the level of the rectosigmoid junction and is 2.64 cm with more superior air-containing component at approximately 2.65 cm along the left common iliac vasculature.  Patient underwent laparoscopic drainage of pelvic abscess secondary to diverticulitis on 6/18/2024.  Body fluid culture from 6/18/2024 preliminary reports growth of Candida glabrata, gram-negative bacilli and Enterococcus faecium, VRE.    Patient sitting up on side of bed today.  Continues to have significant rectal pain however it has improved some since surgery.  Patient has FATEMEH drain in place with serosanguineous drainage.  Afebrile, no reported diarrhea.  Wife is at bedside.    Based on culture preliminary results tigecycline 100 mg IV x 1 followed by 50 mg IV every 12 hours and micafungin 100 mg IV every 24 hours was initiated.  Piperacillin/tazobactam was discontinued.  Will await finalization of culture results before finalizing antibiotic therapy plan.  Patient require outpatient infectious disease follow-up.      ANTIMICROBIAL THERAPY    amoxicillin-clavulanate - 875-125 MG  doxycycline - 100 MG  micafungin (MYCAMINE) IVPB  tigecycline (TYGACIL) 50 mg IVPB in 100 mL NS (VTB)  tigecycline (TYGACIL) IVPB       Again, thank you Dr. Rowland for allowing us to participate in the care of your patient and please feel free to call for any questions you may have.        Code Status:     Code Status and Medical Interventions:   Ordered at: 06/16/24 1543     Code Status (Patient has no pulse and  is not breathing):    CPR (Attempt to Resuscitate)     Medical Interventions (Patient has pulse or is breathing):    Full Support         JOSE J Mcmahon  06/21/24  14:27 EDT

## 2024-06-21 NOTE — PLAN OF CARE
Goal Outcome Evaluation:  Plan of Care Reviewed With: patient        Progress: no change  Outcome Evaluation: Patient has been resting in bed throughout the night. family remains at bedside. pt had c/o pain, see mar. pt amulated in room this shift. will continue with plan of care.

## 2024-06-21 NOTE — PLAN OF CARE
Goal Outcome Evaluation:           Progress: no change  Outcome Evaluation: Patient is sitting in bed at this time. VSS on RA. PRN pain medication given per order. Pt ambulated in room this shift. FATEMEH drain stripped and emptied per order. Will continue POC.

## 2024-06-21 NOTE — CASE MANAGEMENT/SOCIAL WORK
Continued Stay Note  ALISSA Fisher     Patient Name: Samson Kendall  MRN: [id number]  Today's Date: 6/21/2024    Admit Date: 6/16/2024    Plan: Discharge plan remains unchanged. Pt lives at home and plans to return home at discharge family to provide transporation at discharge. Pt. has [employer] insurance. Patient is independent with all adls at home. He does not have HH or DME and denies the need for any at this time. CM will continue to follow.   Discharge Plan       Row Name 06/21/24 1359       Plan    Plan Discharge plan remains unchanged. Pt lives at home and plans to return home at discharge family to provide transporation at discharge. Pt. has [employer] insurance. Patient is independent with all adls at home. He does not have HH or DME and denies the need for any at this time. CM will continue to follow.    Patient/Family in Agreement with Plan yes                Expected Discharge Date Expected Discharge Time    Jun 22, 2024               Petty Castaneda RN

## 2024-06-21 NOTE — PROGRESS NOTES
Patient Identification:  Name:  Samson Kendall  Age:  49 y.o.  Sex:  male  :  1975  MRN:  [id number]  Visit Number:  [id number]  Primary Care Provider:  Rosalina Pandya APRN    Length of stay:  5    Subjective/Interval History/Consultants/Procedures     Chief Complaint:   Chief Complaint   Patient presents with    Abdominal Pain    Rectal Pain       Subjective/Interval History:    49 y.o. male who was admitted on 2024 with diverticulitis with perforation     PMH is significant for nonischemic cardiomyopathy with reduced EF, HTN, CKD IIIa, T2DM, HLD, RANJEET   For complete admission information, please see history and physical.     Consultations:  General surgery  PT OT    Procedures/Scans:  CT abdomen and pelvis with contrast   CT abdomen and pelvis with oral contrast  Laparoscopic drainage of pelvic abscess, 24- Dr. Haque     Today, the patient was seen and examined with spouse, nursing staff at bedside. He was sitting up in bed in no acute distress. He reports over all feeling about the same today. Pain slightly improved from previous exam. He does report spasms of bladder and rectum associated with movement on occasion. He is tolerating diet well. Continues to have Bms.      Room location at the time of evaluation was [facility].    ----------------------------------------------------------------------------------------------------------------------  Current Hospital Meds:  acetaminophen, 1,000 mg, Oral, Q6H  anastrozole, 1 mg, Oral, Daily  cetirizine, 10 mg, Oral, Daily  cyanocobalamin, 1,000 mcg, Intramuscular, Q30 Days  enoxaparin, 40 mg, Subcutaneous, Nightly  folic acid, 1 mg, Oral, Daily  ketorolac, 15 mg, Intravenous, Q6H  methocarbamol, 750 mg, Oral, 4x Daily  metoprolol tartrate, 50 mg, Oral, BID  pantoprazole, 40 mg, Intravenous, Q AM  piperacillin-tazobactam, 3.375 g, Intravenous, Q8H  [START ON 2024] polyethylene glycol, 17 g, Oral, Daily  rosuvastatin, 10 mg, Oral, Daily  sodium  chloride, 10 mL, Intravenous, Q12H  Testosterone Cypionate, 100 mg, Intramuscular, Q7 Days  topiramate, 100 mg, Oral, BID         ----------------------------------------------------------------------------------------------------------------------      Objective     Vital Signs:  Temp:  [97.4 °F (36.3 °C)-98.5 °F (36.9 °C)] 98.4 °F (36.9 °C)  Heart Rate:  [60-75] 65  Resp:  [16-18] 18  BP: (119-156)/(64-83) 150/73      06/18/24  0500 06/19/24  0500 06/20/24  0555   Weight: 85 kg (187 lb 8 oz) 85.8 kg (189 lb 3.2 oz) 84.4 kg (186 lb 1.1 oz)     Body mass index is 30.03 kg/m².    Intake/Output Summary (Last 24 hours) at 6/21/2024 0912  Last data filed at 6/21/2024 0625  Gross per 24 hour   Intake 610 ml   Output 230 ml   Net 380 ml     No intake/output data recorded.  Diet: Regular/House; Fluid Consistency: Thin (IDDSI 0)  ----------------------------------------------------------------------------------------------------------------------    Physical Exam  Vitals and nursing note reviewed.   Constitutional:       General: He is not in acute distress.  HENT:      Head: Normocephalic and atraumatic.   Eyes:      Extraocular Movements: Extraocular movements intact.   Cardiovascular:      Rate and Rhythm: Bradycardia present.   Pulmonary:      Effort: Pulmonary effort is normal. No respiratory distress.   Abdominal:      Palpations: Abdomen is soft.      Tenderness: There is abdominal tenderness.      Comments: FATEMEH drain in place LLQ, bloody drainage noted   Musculoskeletal:      Right lower leg: No edema.      Left lower leg: No edema.   Skin:     General: Skin is warm and dry.   Neurological:      Mental Status: He is alert. Mental status is at baseline.   Psychiatric:         Mood and Affect: Mood normal.         Behavior: Behavior normal.            ----------------------------------------------------------------------------------------------------------------------  Tele:   "    ----------------------------------------------------------------------------------------------------------------------  Results from last 7 days   Lab Units 06/16/24  1344   CK TOTAL U/L 79     Results from last 7 days   Lab Units 06/21/24  0237 06/20/24  0244 06/19/24  0013 06/18/24  0108 06/17/24  1014 06/17/24  0054 06/16/24  1615 06/16/24  1344 06/14/24  1313   CRP mg/dL 4.52* 8.03* 18.39*  --   --   --   --    < >  --    LACTATE mmol/L  --   --   --   --  0.9  --  2.0  --  0.7   WBC 10*3/mm3 9.63 9.44 7.11   < >  --  10.77  --    < >  --    HEMOGLOBIN g/dL 9.5* 9.0* 9.2*   < >  --  10.4*  --    < >  --    HEMATOCRIT % 30.2* 29.2* 30.8*   < >  --  32.6*  --    < >  --    MCV fL 91.0 94.8 100.0*   < >  --  91.8  --    < >  --    MCHC g/dL 31.5 30.8* 29.9*   < >  --  31.9  --    < >  --    PLATELETS 10*3/mm3 335 293 276   < >  --  258  --    < >  --    INR   --   --   --   --   --  1.19*  --   --   --     < > = values in this interval not displayed.         Results from last 7 days   Lab Units 06/21/24 0237 06/20/24 0244 06/19/24  0013   SODIUM mmol/L 143 141 138   POTASSIUM mmol/L 3.6 3.2* 4.1   MAGNESIUM mg/dL  --  1.6  --    CHLORIDE mmol/L 112* 109* 107   CO2 mmol/L 17.7* 19.6* 17.1*   BUN mg/dL 8 8 9   CREATININE mg/dL 1.06 1.08 1.16   CALCIUM mg/dL 8.6 8.5* 9.1   GLUCOSE mg/dL 99 105* 138*   ALBUMIN g/dL 3.2* 2.9* 3.0*   BILIRUBIN mg/dL <0.2 <0.2 0.2   ALK PHOS U/L 36* 34* 36*   AST (SGOT) U/L 16 14 15   ALT (SGPT) U/L 11 10 9   Estimated Creatinine Clearance: 86.8 mL/min (by C-G formula based on SCr of 1.06 mg/dL).  No results found for: \"AMMONIA\"  Results from last 7 days   Lab Units 06/16/24  1344   CHOLESTEROL mg/dL 131   TRIGLYCERIDES mg/dL 224*   HDL CHOL mg/dL 21*   LDL CHOL mg/dL 73     Blood Culture   Date Value Ref Range Status   06/17/2024 No growth at 3 days  Preliminary   06/17/2024 No growth at 3 days  Preliminary   06/16/2024 No growth at 4 days  Preliminary   06/16/2024 No growth at 4 " "days  Preliminary   06/14/2024 No growth at 5 days  Final   06/14/2024 No growth at 5 days  Final     No results found for: \"URINECX\"  No results found for: \"WOUNDCX\"  No results found for: \"STOOLCX\"  ----------------------------------------------------------------------------------------------------------------------  Imaging Results (Last 24 Hours)       ** No results found for the last 24 hours. **          ----------------------------------------------------------------------------------------------------------------------   I have reviewed the above laboratory values for 06/21/24    Assessment/Plan     Active Hospital Problems    Diagnosis  POA    **Diverticulitis [K57.92]  Yes    Diverticulitis of colon with perforation [K57.20]  Yes         ASSESSMENT/PLAN:    Acute diverticulitis with perforation and pelvic abscess  Patient with initial ED visit secondary to abdominal pain, fever and chills and discharged home on Augmentin for uncomplicated diverticulitis.  Returned secondary to worsening abdominal pain despite compliance with antibiotics.  CT on admission showed sigmoid diverticulitis with likely contained perforation-without drainable abscess at that time.  Also noted concern for SBO versus ileus though patient without nausea or vomiting on exam.  White blood cell count was 11 K with CRP 44 and lactate 2.  General surgery consulted and following.  Repeat CT with oral contrast showed concern for developing abscess. Patient underwent laparoscopic drainage of abscess with FATEMEH drain placement 6/18 with Dr. Haque.  Intraoperative cultures with preliminary growth gram negative bacilli and gram pos cocci.  Advanced to regular house diet per surgery. Further IVF held  Continue as needed antiemetics, pain control regimen adjusted per surgery this AM  Continue zosyn  WBC count stable WNL. Hgb stable. CRP continues to improve, 4.5 today. Continue to trend labs  Blood cultures negative thus far     Non-Ischemic " cardiomyopathy  Hypertension  Most recent TTE prior to admission showed EF 45 to 50%.  Updated TTE showed normal LVEF 56-60%, grade one diastolic dysfunction.   Metoprolol, Lasix continued  Monitor clinical volume status closely. Currently stable.     CKD stage IIIa  Creatinine was slightly greater than 2 on arrival with baseline 1.7-1.8.  CK normal.  CT A/P did not show any evidence of obstruction.  Creatinine improved with fluids- stable     Hx T2DM  Hyperlipidemia  RANJEET  Other home meds continued as indicated  A1c 6.2.  Patient is not on diabetes regimen as an outpatient following significant weight loss.  Can initiate SSI if indicated.  Glucose trend currently well-controlled.    -----------  -DVT prophylaxis: Lovenox   -Disposition plans/anticipated needs: Pending course and further surgery recommendations          The patient is high risk due to the following diagnoses/reasons:  Diverticulitis w/ perforation and abscess         Jose Kelly PA-C  06/21/24  09:12 EDT

## 2024-06-21 NOTE — PROGRESS NOTES
"Patient Name:  Samson Kendall  YOB: 1975  [id number]    Surgery Progress Note    Date of visit: 6/21/2024    Subjective   Subjective: Postop day 3    Patient is tolerating a regular diet.  He is still having some liquid bowel movements.  No nausea or vomiting.  Pain is improving but the patient has been using Dilaudid very frequently and not his oxycodone.  We had discussion regarding the ability be discharged and needing to be off of IV pain medication         Objective     Objective:     /70 (BP Location: Left arm, Patient Position: Lying)   Pulse 56   Temp 97.9 °F (36.6 °C) (Oral)   Resp 18   Ht 167.6 cm (66\")   Wt 84.4 kg (186 lb 1.1 oz)   SpO2 99%   BMI 30.03 kg/m²     Intake/Output Summary (Last 24 hours) at 6/21/2024 1202  Last data filed at 6/21/2024 0858  Gross per 24 hour   Intake 730 ml   Output 230 ml   Net 500 ml       Constitution: No acute distress  L:  Symmetric chest expansion. No respiratory distress  Abd:   soft, nondistended, appropriately tender to palpation.  FATEMEH drain is serosanguineous  Ext:  No cyanosis, clubbing, edema    Recent labs that are back at this time have been reviewed.     Body fluid culture with yeast, gram-negative bacilli and gram-positive cocci    White blood cell count stable  CRP continues to downtrend       Assessment & Plan     Assessment/ Plan:    49-year-old class I obese male nicotine user who is status post laparoscopic drainage of pelvic abscess 6/18 secondary to acute complicated diverticulitis with pelvic abscess.    Awaiting culture finalization to transition to oral antibiotic  Patient will need to be off of IV pain medication prior to discharge.        This document has been electronically signed by Preet Fabian MD   June 21, 2024 12:02 T    [facility] Surgery        "

## 2024-06-22 LAB
ALBUMIN SERPL-MCNC: 3.1 G/DL (ref 3.5–5.2)
ALBUMIN/GLOB SERPL: 1 G/DL
ALP SERPL-CCNC: 37 U/L (ref 39–117)
ALT SERPL W P-5'-P-CCNC: 14 U/L (ref 1–41)
ANION GAP SERPL CALCULATED.3IONS-SCNC: 12 MMOL/L (ref 5–15)
AST SERPL-CCNC: 21 U/L (ref 1–40)
BACTERIA SPEC AEROBE CULT: NORMAL
BACTERIA SPEC AEROBE CULT: NORMAL
BASOPHILS # BLD AUTO: 0.06 10*3/MM3 (ref 0–0.2)
BASOPHILS NFR BLD AUTO: 0.6 % (ref 0–1.5)
BILIRUB SERPL-MCNC: <0.2 MG/DL (ref 0–1.2)
BUN SERPL-MCNC: 11 MG/DL (ref 6–20)
BUN/CREAT SERPL: 10.2 (ref 7–25)
CALCIUM SPEC-SCNC: 8.7 MG/DL (ref 8.6–10.5)
CHLORIDE SERPL-SCNC: 111 MMOL/L (ref 98–107)
CO2 SERPL-SCNC: 17 MMOL/L (ref 22–29)
CREAT SERPL-MCNC: 1.08 MG/DL (ref 0.76–1.27)
CRP SERPL-MCNC: 3.4 MG/DL (ref 0–0.5)
DEPRECATED RDW RBC AUTO: 51.2 FL (ref 37–54)
EGFRCR SERPLBLD CKD-EPI 2021: 84.6 ML/MIN/1.73
EOSINOPHIL # BLD AUTO: 0.54 10*3/MM3 (ref 0–0.4)
EOSINOPHIL NFR BLD AUTO: 5 % (ref 0.3–6.2)
ERYTHROCYTE [DISTWIDTH] IN BLOOD BY AUTOMATED COUNT: 14.6 % (ref 12.3–15.4)
GLOBULIN UR ELPH-MCNC: 3.1 GM/DL
GLUCOSE SERPL-MCNC: 150 MG/DL (ref 65–99)
HCT VFR BLD AUTO: 31 % (ref 37.5–51)
HGB BLD-MCNC: 9.5 G/DL (ref 13–17.7)
IMM GRANULOCYTES # BLD AUTO: 0.14 10*3/MM3 (ref 0–0.05)
IMM GRANULOCYTES NFR BLD AUTO: 1.3 % (ref 0–0.5)
LYMPHOCYTES # BLD AUTO: 2.68 10*3/MM3 (ref 0.7–3.1)
LYMPHOCYTES NFR BLD AUTO: 24.7 % (ref 19.6–45.3)
MCH RBC QN AUTO: 29.1 PG (ref 26.6–33)
MCHC RBC AUTO-ENTMCNC: 30.6 G/DL (ref 31.5–35.7)
MCV RBC AUTO: 94.8 FL (ref 79–97)
MONOCYTES # BLD AUTO: 0.47 10*3/MM3 (ref 0.1–0.9)
MONOCYTES NFR BLD AUTO: 4.3 % (ref 5–12)
NEUTROPHILS NFR BLD AUTO: 6.97 10*3/MM3 (ref 1.7–7)
NEUTROPHILS NFR BLD AUTO: 64.1 % (ref 42.7–76)
NRBC BLD AUTO-RTO: 0 /100 WBC (ref 0–0.2)
PLATELET # BLD AUTO: 346 10*3/MM3 (ref 140–450)
PMV BLD AUTO: 10.6 FL (ref 6–12)
POTASSIUM SERPL-SCNC: 3.7 MMOL/L (ref 3.5–5.2)
PROT SERPL-MCNC: 6.2 G/DL (ref 6–8.5)
RBC # BLD AUTO: 3.27 10*6/MM3 (ref 4.14–5.8)
SODIUM SERPL-SCNC: 140 MMOL/L (ref 136–145)
WBC NRBC COR # BLD AUTO: 10.86 10*3/MM3 (ref 3.4–10.8)

## 2024-06-22 PROCEDURE — 25010000002 HYDROMORPHONE PER 4 MG: Performed by: SURGERY

## 2024-06-22 PROCEDURE — 25010000002 KETOROLAC TROMETHAMINE PER 15 MG: Performed by: SURGERY

## 2024-06-22 PROCEDURE — 25010000002 ENOXAPARIN PER 10 MG: Performed by: SURGERY

## 2024-06-22 PROCEDURE — 99232 SBSQ HOSP IP/OBS MODERATE 35: CPT | Performed by: NURSE PRACTITIONER

## 2024-06-22 PROCEDURE — 36415 COLL VENOUS BLD VENIPUNCTURE: CPT | Performed by: SURGERY

## 2024-06-22 PROCEDURE — 25010000002 MICAFUNGIN SODIUM 100 MG RECONSTITUTED SOLUTION 1 EACH VIAL: Performed by: NURSE PRACTITIONER

## 2024-06-22 PROCEDURE — 25010000002 TIGECYCLINE PER 1 MG: Performed by: NURSE PRACTITIONER

## 2024-06-22 PROCEDURE — 85025 COMPLETE CBC W/AUTO DIFF WBC: CPT | Performed by: INTERNAL MEDICINE

## 2024-06-22 PROCEDURE — 99232 SBSQ HOSP IP/OBS MODERATE 35: CPT | Performed by: INTERNAL MEDICINE

## 2024-06-22 PROCEDURE — 80053 COMPREHEN METABOLIC PANEL: CPT | Performed by: SURGERY

## 2024-06-22 PROCEDURE — 86140 C-REACTIVE PROTEIN: CPT | Performed by: SURGERY

## 2024-06-22 RX ORDER — HYDROMORPHONE HYDROCHLORIDE 1 MG/ML
0.5 INJECTION, SOLUTION INTRAMUSCULAR; INTRAVENOUS; SUBCUTANEOUS EVERY 4 HOURS PRN
Status: DISCONTINUED | OUTPATIENT
Start: 2024-06-22 | End: 2024-06-24 | Stop reason: HOSPADM

## 2024-06-22 RX ADMIN — KETOROLAC TROMETHAMINE 15 MG: 30 INJECTION, SOLUTION INTRAMUSCULAR; INTRAVENOUS at 12:26

## 2024-06-22 RX ADMIN — KETOROLAC TROMETHAMINE 15 MG: 30 INJECTION, SOLUTION INTRAMUSCULAR; INTRAVENOUS at 06:29

## 2024-06-22 RX ADMIN — Medication 10 ML: at 09:21

## 2024-06-22 RX ADMIN — TOPIRAMATE 100 MG: 100 TABLET, FILM COATED ORAL at 09:18

## 2024-06-22 RX ADMIN — ACETAMINOPHEN 1000 MG: 500 TABLET ORAL at 03:30

## 2024-06-22 RX ADMIN — ACETAMINOPHEN 1000 MG: 500 TABLET ORAL at 21:27

## 2024-06-22 RX ADMIN — OXYCODONE HYDROCHLORIDE 10 MG: 10 TABLET ORAL at 19:17

## 2024-06-22 RX ADMIN — ACETAMINOPHEN 1000 MG: 500 TABLET ORAL at 14:57

## 2024-06-22 RX ADMIN — METHOCARBAMOL 750 MG: 750 TABLET, FILM COATED ORAL at 17:36

## 2024-06-22 RX ADMIN — METOPROLOL TARTRATE 50 MG: 50 TABLET, FILM COATED ORAL at 21:28

## 2024-06-22 RX ADMIN — Medication 1 MG: at 09:17

## 2024-06-22 RX ADMIN — OXYCODONE HYDROCHLORIDE 10 MG: 10 TABLET ORAL at 13:22

## 2024-06-22 RX ADMIN — METOPROLOL TARTRATE 50 MG: 50 TABLET, FILM COATED ORAL at 09:18

## 2024-06-22 RX ADMIN — HYDROMORPHONE HYDROCHLORIDE 0.5 MG: 1 INJECTION, SOLUTION INTRAMUSCULAR; INTRAVENOUS; SUBCUTANEOUS at 04:40

## 2024-06-22 RX ADMIN — HYDROMORPHONE HYDROCHLORIDE 0.5 MG: 1 INJECTION, SOLUTION INTRAMUSCULAR; INTRAVENOUS; SUBCUTANEOUS at 09:16

## 2024-06-22 RX ADMIN — METHOCARBAMOL 750 MG: 750 TABLET, FILM COATED ORAL at 12:26

## 2024-06-22 RX ADMIN — KETOROLAC TROMETHAMINE 15 MG: 30 INJECTION, SOLUTION INTRAMUSCULAR; INTRAVENOUS at 17:36

## 2024-06-22 RX ADMIN — OXYCODONE HYDROCHLORIDE 10 MG: 10 TABLET ORAL at 03:36

## 2024-06-22 RX ADMIN — Medication 10 ML: at 21:29

## 2024-06-22 RX ADMIN — CETIRIZINE HYDROCHLORIDE 10 MG: 10 TABLET, FILM COATED ORAL at 09:18

## 2024-06-22 RX ADMIN — PANTOPRAZOLE SODIUM 40 MG: 40 INJECTION, POWDER, FOR SOLUTION INTRAVENOUS at 06:29

## 2024-06-22 RX ADMIN — HYDROMORPHONE HYDROCHLORIDE 0.5 MG: 1 INJECTION, SOLUTION INTRAMUSCULAR; INTRAVENOUS; SUBCUTANEOUS at 21:50

## 2024-06-22 RX ADMIN — ANASTROZOLE 1 MG: 1 TABLET, COATED ORAL at 09:18

## 2024-06-22 RX ADMIN — OXYCODONE HYDROCHLORIDE 10 MG: 10 TABLET ORAL at 07:21

## 2024-06-22 RX ADMIN — ACETAMINOPHEN 1000 MG: 500 TABLET ORAL at 09:17

## 2024-06-22 RX ADMIN — ROSUVASTATIN CALCIUM 10 MG: 10 TABLET, FILM COATED ORAL at 09:17

## 2024-06-22 RX ADMIN — HYDROMORPHONE HYDROCHLORIDE 0.5 MG: 1 INJECTION, SOLUTION INTRAMUSCULAR; INTRAVENOUS; SUBCUTANEOUS at 14:57

## 2024-06-22 RX ADMIN — SODIUM CHLORIDE 50 MG: 9 INJECTION, SOLUTION INTRAVENOUS at 03:30

## 2024-06-22 RX ADMIN — METHOCARBAMOL 750 MG: 750 TABLET, FILM COATED ORAL at 21:27

## 2024-06-22 RX ADMIN — MICAFUNGIN SODIUM 100 MG: 100 INJECTION, POWDER, LYOPHILIZED, FOR SOLUTION INTRAVENOUS at 16:11

## 2024-06-22 RX ADMIN — METHOCARBAMOL 750 MG: 750 TABLET, FILM COATED ORAL at 07:22

## 2024-06-22 RX ADMIN — SODIUM CHLORIDE 50 MG: 9 INJECTION, SOLUTION INTRAVENOUS at 14:57

## 2024-06-22 RX ADMIN — TOPIRAMATE 100 MG: 100 TABLET, FILM COATED ORAL at 21:27

## 2024-06-22 RX ADMIN — ENOXAPARIN SODIUM 40 MG: 40 INJECTION SUBCUTANEOUS at 21:26

## 2024-06-22 NOTE — PLAN OF CARE
Goal Outcome Evaluation:           Progress: no change  Outcome Evaluation: Patient is sitting in bed at this time. PRN pain medication given per order. VSS on RA. FATEMEH drain stripped/emptied per order this shift. Pt did ambulate in room and in hallway this shift. Will continue POC.

## 2024-06-22 NOTE — PROGRESS NOTES
"Patient Name:  Samson Kendall  YOB: 1975  [id number]    Surgery Progress Note    Date of visit: 6/22/2024    Subjective   Subjective: Postop day 4    Patient reports having a \"bladder spasm\" overnight but that resolved after several hours.  He has been tolerating a regular diet.  He had a bowel movement this morning.  He continues to have pain         Objective     Objective:     /84 (BP Location: Left arm, Patient Position: Sitting)   Pulse 66   Temp 98.2 °F (36.8 °C) (Oral)   Resp 16   Ht 167.6 cm (66\")   Wt 84.4 kg (186 lb 1.1 oz)   SpO2 99%   BMI 30.03 kg/m²     Intake/Output Summary (Last 24 hours) at 6/22/2024 1139  Last data filed at 6/22/2024 0900  Gross per 24 hour   Intake 640 ml   Output 25 ml   Net 615 ml       Constitution: No acute distress  L:         Symmetric chest expansion. No respiratory distress  Abd:     soft, nondistended, appropriately tender to palpation.  FATEMEH drain is serosanguineous  Ext:     No cyanosis, clubbing, edema    Recent labs that are back at this time have been reviewed.   CRP continues to decrease  White blood cell count slightly increased    Body fluid culture with VRE, ESBL, Candida and another gram-negative         Assessment & Plan     Assessment/ Plan:    49-year-old class I obese male nicotine user who is status post laparoscopic drainage of pelvic abscess 6/18 secondary to acute complicated diverticulitis with pelvic abscess.    I spaced out the patient's IV Dilaudid doses to every 4 hour  Cultures with VRE and ESBL complicate the patient's antibiotics.        This document has been electronically signed by Preet Fabian MD   June 22, 2024 11:39 EDT    [facility] Surgery        "

## 2024-06-22 NOTE — PLAN OF CARE
Goal Outcome Evaluation:  Plan of Care Reviewed With: patient        Progress: no change  Outcome Evaluation: Patient has been resting in bed throughout the night. VSS on RA. prn medications given for pain. pt ambulated in room and around the halls this shift. will continue with plan of care.

## 2024-06-22 NOTE — PROGRESS NOTES
PROGRESS NOTE         Patient Identification:  Name:  Samson Kendall  Age:  49 y.o.  Sex:  male  :  1975  MRN:  [id number]  Visit Number:  [id number]  Primary Care Provider:  Rosalina Pandya APRN         LOS: 6 days       ----------------------------------------------------------------------------------------------------------------------  Subjective       Chief Complaints:    Abdominal Pain and Rectal Pain        Interval History:      Patient sitting up on side of bed.  Wife at bedside.  Peritoneal body fluid culture updated report growth of ESBL E. coli, Candida glabrata, Enterococcus faecium VRE and gram-negative bacilli.  Currently on room air with no apparent distress.  WBC 10.86.  CRP improved at 3.40.    Review of Systems:    Constitutional: no fever, chills and night sweats.  Generalized fatigue.  Eyes: no eye drainage, itching or redness.  HEENT: no mouth sores, dysphagia or nose bleed.  Respiratory: no for shortness of breath, cough or production of sputum.  Cardiovascular: no chest pain, no palpitations, no orthopnea.  Gastrointestinal: no nausea, vomiting or diarrhea. No abdominal pain, hematemesis or rectal bleeding.  Genitourinary: no dysuria or polyuria.  Hematologic/lymphatic: no lymph node abnormalities, no easy bruising or easy bleeding.  Musculoskeletal: no muscle or joint pain.  Skin: No rash and no itching.  Neurological: no loss of consciousness, no seizure, no headache.  Behavioral/Psych: no depression or suicidal ideation.  Endocrine: no hot flashes.  Immunologic: negative.    ----------------------------------------------------------------------------------------------------------------------      Objective       [facility] Meds:  acetaminophen, 1,000 mg, Oral, Q6H  anastrozole, 1 mg, Oral, Daily  cetirizine, 10 mg, Oral, Daily  cyanocobalamin, 1,000 mcg, Intramuscular, Q30 Days  enoxaparin, 40 mg, Subcutaneous, Nightly  folic acid, 1 mg, Oral, Daily  ketorolac, 15  mg, Intravenous, Q6H  methocarbamol, 750 mg, Oral, 4x Daily  metoprolol tartrate, 50 mg, Oral, BID  micafungin (MYCAMINE) IV, 100 mg, Intravenous, Q24H  pantoprazole, 40 mg, Intravenous, Q AM  polyethylene glycol, 17 g, Oral, Daily  rosuvastatin, 10 mg, Oral, Daily  sodium chloride, 10 mL, Intravenous, Q12H  Testosterone Cypionate, 100 mg, Intramuscular, Q7 Days  tigecycline, 50 mg, Intravenous, Q12H  topiramate, 100 mg, Oral, BID         ----------------------------------------------------------------------------------------------------------------------    Vital Signs:  Temp:  [97.7 °F (36.5 °C)-98.7 °F (37.1 °C)] 98.2 °F (36.8 °C)  Heart Rate:  [52-83] 66  Resp:  [16-18] 16  BP: (107-148)/(58-84) 144/84  Mean Arterial Pressure (Non-Invasive) for the past 24 hrs (Last 3 readings):   Noninvasive MAP (mmHg)   06/22/24 0900 104     SpO2 Percentage    06/21/24 0700 06/21/24 1900 06/22/24 0700   SpO2: 99% 98% 99%     SpO2:  [98 %-99 %] 99 %  on   ;   Device (Oxygen Therapy): room air    Body mass index is 30.03 kg/m².  Wt Readings from Last 3 Encounters:   06/20/24 84.4 kg (186 lb 1.1 oz)   06/14/24 86.2 kg (190 lb)   03/19/24 84.8 kg (187 lb)        Intake/Output Summary (Last 24 hours) at 6/22/2024 1227  Last data filed at 6/22/2024 0900  Gross per 24 hour   Intake 440 ml   Output 25 ml   Net 415 ml     Diet: Regular/House; Fluid Consistency: Thin (IDDSI 0)  ----------------------------------------------------------------------------------------------------------------------      Physical Exam:    Constitutional:  Well-developed and well-nourished.  No respiratory distress.  Sitting up On side of bed.  On room air.  Wife at bedside.    HENT:  Head: Normocephalic and atraumatic.  Mouth:  Moist mucous membranes.    Eyes:  Conjunctivae and EOM are normal.  No scleral icterus.  Neck:  Neck supple.  No JVD present.    Cardiovascular:  Normal rate, regular rhythm and normal heart sounds with no murmur. No  edema.  Pulmonary/Chest:  No respiratory distress, no wheezes, no crackles, with normal breath sounds and good air movement.  Abdominal:  Soft.  Bowel sounds are normal.  No distension and no tenderness.   Musculoskeletal:  No edema, no tenderness, and no deformity.  No swelling or redness of joints.  Neurological:  Alert and oriented to person, place, and time.  No facial droop.  No slurred speech.   Skin:  Skin is warm and dry.  No rash noted.  No pallor.  Perirectal incision with FATEMEH drain in place, serosanguineous drainage.   Psychiatric:  Normal mood and affect.  Behavior is normal.        ----------------------------------------------------------------------------------------------------------------------  Results from last 7 days   Lab Units 06/16/24  1344   CK TOTAL U/L 79       Results from last 7 days   Lab Units 06/16/24  1344   CHOLESTEROL mg/dL 131   TRIGLYCERIDES mg/dL 224*   HDL CHOL mg/dL 21*   LDL CHOL mg/dL 73       Results from last 7 days   Lab Units 06/22/24  0053 06/21/24  0237 06/20/24  0244 06/18/24  0108 06/17/24  1014 06/17/24  0054 06/16/24  1615   CRP mg/dL 3.40* 4.52* 8.03*   < >  --   --   --    LACTATE mmol/L  --   --   --   --  0.9  --  2.0   WBC 10*3/mm3 10.86* 9.63 9.44   < >  --  10.77  --    HEMOGLOBIN g/dL 9.5* 9.5* 9.0*   < >  --  10.4*  --    HEMATOCRIT % 31.0* 30.2* 29.2*   < >  --  32.6*  --    MCV fL 94.8 91.0 94.8   < >  --  91.8  --    MCHC g/dL 30.6* 31.5 30.8*   < >  --  31.9  --    PLATELETS 10*3/mm3 346 335 293   < >  --  258  --    INR   --   --   --   --   --  1.19*  --     < > = values in this interval not displayed.     Results from last 7 days   Lab Units 06/22/24  0053 06/21/24  1245 06/21/24  0237 06/20/24  0244   SODIUM mmol/L 140  --  143 141   POTASSIUM mmol/L 3.7 3.9 3.6 3.2*   MAGNESIUM mg/dL  --   --  2.0 1.6   CHLORIDE mmol/L 111*  --  112* 109*   CO2 mmol/L 17.0*  --  17.7* 19.6*   BUN mg/dL 11  --  8 8   CREATININE mg/dL 1.08  --  1.06 1.08   CALCIUM  "mg/dL 8.7  --  8.6 8.5*   GLUCOSE mg/dL 150*  --  99 105*   ALBUMIN g/dL 3.1*  --  3.2* 2.9*   BILIRUBIN mg/dL <0.2  --  <0.2 <0.2   ALK PHOS U/L 37*  --  36* 34*   AST (SGOT) U/L 21  --  16 14   ALT (SGPT) U/L 14  --  11 10   Estimated Creatinine Clearance: 85.2 mL/min (by C-G formula based on SCr of 1.08 mg/dL).  No results found for: \"AMMONIA\"    Glucose   Date/Time Value Ref Range Status   06/19/2024 1624 78 70 - 130 mg/dL Final     Lab Results   Component Value Date    HGBA1C 6.20 (H) 06/16/2024     Lab Results   Component Value Date    TSH 1.860 06/16/2024    FREET4 1.12 09/06/2019       Blood Culture   Date Value Ref Range Status   06/17/2024 No growth at 4 days  Preliminary   06/17/2024 No growth at 5 days  Final   06/16/2024 No growth at 5 days  Final   06/16/2024 No growth at 5 days  Final     No results found for: \"URINECX\"  No results found for: \"WOUNDCX\"  No results found for: \"STOOLCX\"  No results found for: \"RESPCX\"  Pain Management Panel           No data to display                  ----------------------------------------------------------------------------------------------------------------------  Imaging Results (Last 24 Hours)       ** No results found for the last 24 hours. **            ----------------------------------------------------------------------------------------------------------------------    Pertinent Infectious Disease Results                Assessment/Plan       Assessment         Pelvic abscess secondary to diverticulitis     Plan      Patient sitting up on side of bed.  Wife at bedside.  Peritoneal body fluid culture updated report growth of ESBL E. coli, Candida glabrata, Enterococcus faecium VRE and gram-negative bacilli.  Currently on room air with no apparent distress.  WBC 10.86.  CRP improved at 3.40.    Case discussed with microbiology and ESBL E. coli is susceptible to tigecycline as well.  For now we will continue tigecycline 50 mg IV every 12 hours and micafungin " 100 mg IV every 24 hours.  Will await finalization of culture results will follow her finalizing antibiotic therapy plan.  Patient require outpatient infectious disease follow-up.      ANTIMICROBIAL THERAPY    amoxicillin-clavulanate - 875-125 MG  doxycycline - 100 MG  micafungin (MYCAMINE) IVPB  tigecycline (TYGACIL) 50 mg IVPB in 100 mL NS (VTB)     Code Status:   Code Status and Medical Interventions:   Ordered at: 06/16/24 1543     Code Status (Patient has no pulse and is not breathing):    CPR (Attempt to Resuscitate)     Medical Interventions (Patient has pulse or is breathing):    Full Support       Marjorie Grigsby, JOSE J  06/22/24  12:27 EDT

## 2024-06-22 NOTE — PROGRESS NOTES
[facility] HOSPITALIST PROGRESS NOTE    Subjective     History:   Samson Kendall is a 49 y.o. male admitted on 6/16/2024 secondary to Diverticulitis     Procedures:   6/18/24: Laparoscopic drainage of pelvic abscess     CC: Follow up complicated diverticulitis with pelvic abscess    Patient seen and examined with ANNA Parish. Awake and alert with his wife present at bedside. Continues to report abdominal pain but appears overall improved. Tolerating PO intake with no reported vomiting. (+) BM. No acute events overnight per RN.     History taken from: patient, chart, and RN.      Objective     Vital Signs  Temp:  [97.7 °F (36.5 °C)-98.7 °F (37.1 °C)] 98.7 °F (37.1 °C)  Heart Rate:  [52-83] 57  Resp:  [16-20] 20  BP: (121-148)/(66-84) 124/74    Intake/Output Summary (Last 24 hours) at 6/22/2024 1529  Last data filed at 6/22/2024 0900  Gross per 24 hour   Intake 440 ml   Output 25 ml   Net 415 ml         Physical Exam:  General:    Awake, alert, in no acute distress   Heart:      Normal S1 and S2. Regular rate and rhythm. No significant murmur, rubs or gallops appreciated.   Lungs:     Respirations regular, even and unlabored. Lungs clear to auscultation B/L. No wheezes, rales or rhonchi.   Abdomen:   Soft. Generalized TTP, worse in LLQ. No guarding, rebound tenderness or  organomegaly noted. Bowel sounds present x 4. (+) FATEMEH drain   Extremities:  No clubbing, cyanosis or edema noted. Moves UE and LE equally B/L.     Results Review:    Results from last 7 days   Lab Units 06/22/24  0053 06/21/24  0237 06/20/24  0244 06/19/24  0013 06/18/24  0108 06/17/24  0054 06/16/24  1344   WBC 10*3/mm3 10.86* 9.63 9.44 7.11 7.72 10.77 11.05*   HEMOGLOBIN g/dL 9.5* 9.5* 9.0* 9.2* 9.0* 10.4* 10.7*   PLATELETS 10*3/mm3 346 335 293 276 250 258 241     Results from last 7 days   Lab Units 06/22/24  0053 06/21/24  1245 06/21/24  0237 06/20/24  0244 06/19/24  0013 06/18/24  0108 06/17/24  0054 06/16/24  1344   SODIUM mmol/L  140  --  143 141 138 137 136 137   POTASSIUM mmol/L 3.7 3.9 3.6 3.2* 4.1 3.7 3.5 4.1   CHLORIDE mmol/L 111*  --  112* 109* 107 106 103 103   CO2 mmol/L 17.0*  --  17.7* 19.6* 17.1* 20.3* 21.4* 22.6   BUN mg/dL 11  --  8 8 9 11 21* 29*   CREATININE mg/dL 1.08  --  1.06 1.08 1.16 1.34* 1.73* 2.09*   CALCIUM mg/dL 8.7  --  8.6 8.5* 9.1 8.9 10.1 10.1   GLUCOSE mg/dL 150*  --  99 105* 138* 93 99 108*     Results from last 7 days   Lab Units 06/22/24  0053 06/21/24  0237 06/20/24  0244 06/19/24  0013 06/18/24  0108 06/17/24  0054 06/16/24  1344   BILIRUBIN mg/dL <0.2 <0.2 <0.2 0.2 0.2 0.3 0.3   ALK PHOS U/L 37* 36* 34* 36* 35* 42 50   AST (SGOT) U/L 21 16 14 15 15 15 14   ALT (SGPT) U/L 14 11 10 9 11 13 13     Results from last 7 days   Lab Units 06/21/24  0237 06/20/24  0244   MAGNESIUM mg/dL 2.0 1.6     Results from last 7 days   Lab Units 06/17/24  0054   INR  1.19*     Results from last 7 days   Lab Units 06/16/24  1344   CK TOTAL U/L 79       Imaging Results (Last 24 Hours)       ** No results found for the last 24 hours. **              Medications:  acetaminophen, 1,000 mg, Oral, Q6H  anastrozole, 1 mg, Oral, Daily  cetirizine, 10 mg, Oral, Daily  cyanocobalamin, 1,000 mcg, Intramuscular, Q30 Days  enoxaparin, 40 mg, Subcutaneous, Nightly  folic acid, 1 mg, Oral, Daily  ketorolac, 15 mg, Intravenous, Q6H  methocarbamol, 750 mg, Oral, 4x Daily  metoprolol tartrate, 50 mg, Oral, BID  micafungin (MYCAMINE) IV, 100 mg, Intravenous, Q24H  pantoprazole, 40 mg, Intravenous, Q AM  polyethylene glycol, 17 g, Oral, Daily  rosuvastatin, 10 mg, Oral, Daily  sodium chloride, 10 mL, Intravenous, Q12H  Testosterone Cypionate, 100 mg, Intramuscular, Q7 Days  tigecycline, 50 mg, Intravenous, Q12H  topiramate, 100 mg, Oral, BID               Assessment & Plan   Acute complicated diverticulitis with pelvic abscess: S/P laparoscopic drainage of pelvic abscess with culture revealing Candida glabrata, ESBL E coli, VRE and GNB. Currently  on Tygacil and Micafungin per ID recs. Diet as tolerated. Taper pain medication regimen. Encourage ambulation. ID and surgery input appreciated.     JOSSELIN: Likely prerenal. No evidence of obstruction on imaging. Cr improved and stable today. Monitor UOP and repeat labs in the AM.     Hypokalemia: K+ improved with supplementation and stable today. Mg previously <2 but improved. Cont to monitor.     Essential HTN: BP stable. Cont to monitor.     Hx of nonischemic cardiomyopathy: Echo on 6/18/24 revealed an EF of 56-60% (46-50% in 9/19), grade I diastolic dysfunction and mild pulmonary HTN. Appears compensated. Cont to monitor volume status.     HLD: Cont statin.     DVT PPX: SQ Lovenox     Disposition Pending clinical course with finalization of cultures and antimicrobials.     Luc Rowland DO  06/22/24  15:29 EDT

## 2024-06-23 LAB
ALBUMIN SERPL-MCNC: 3.4 G/DL (ref 3.5–5.2)
ALBUMIN/GLOB SERPL: 1 G/DL
ALP SERPL-CCNC: 46 U/L (ref 39–117)
ALT SERPL W P-5'-P-CCNC: 15 U/L (ref 1–41)
ANION GAP SERPL CALCULATED.3IONS-SCNC: 11.1 MMOL/L (ref 5–15)
AST SERPL-CCNC: 16 U/L (ref 1–40)
BACTERIA FLD CULT: ABNORMAL
BASOPHILS # BLD AUTO: 0.06 10*3/MM3 (ref 0–0.2)
BASOPHILS NFR BLD AUTO: 0.5 % (ref 0–1.5)
BILIRUB SERPL-MCNC: <0.2 MG/DL (ref 0–1.2)
BUN SERPL-MCNC: 20 MG/DL (ref 6–20)
BUN/CREAT SERPL: 18.7 (ref 7–25)
CALCIUM SPEC-SCNC: 9 MG/DL (ref 8.6–10.5)
CHLORIDE SERPL-SCNC: 111 MMOL/L (ref 98–107)
CO2 SERPL-SCNC: 18.9 MMOL/L (ref 22–29)
CREAT SERPL-MCNC: 1.07 MG/DL (ref 0.76–1.27)
CRP SERPL-MCNC: 3.11 MG/DL (ref 0–0.5)
DEPRECATED RDW RBC AUTO: 47.4 FL (ref 37–54)
EGFRCR SERPLBLD CKD-EPI 2021: 85.6 ML/MIN/1.73
EOSINOPHIL # BLD AUTO: 0.57 10*3/MM3 (ref 0–0.4)
EOSINOPHIL NFR BLD AUTO: 5.2 % (ref 0.3–6.2)
ERYTHROCYTE [DISTWIDTH] IN BLOOD BY AUTOMATED COUNT: 14.4 % (ref 12.3–15.4)
GLOBULIN UR ELPH-MCNC: 3.5 GM/DL
GLUCOSE SERPL-MCNC: 128 MG/DL (ref 65–99)
GRAM STN SPEC: ABNORMAL
GRAM STN SPEC: ABNORMAL
HCT VFR BLD AUTO: 31.2 % (ref 37.5–51)
HGB BLD-MCNC: 10 G/DL (ref 13–17.7)
IMM GRANULOCYTES # BLD AUTO: 0.13 10*3/MM3 (ref 0–0.05)
IMM GRANULOCYTES NFR BLD AUTO: 1.2 % (ref 0–0.5)
LYMPHOCYTES # BLD AUTO: 2.77 10*3/MM3 (ref 0.7–3.1)
LYMPHOCYTES NFR BLD AUTO: 25.1 % (ref 19.6–45.3)
MCH RBC QN AUTO: 29.1 PG (ref 26.6–33)
MCHC RBC AUTO-ENTMCNC: 32.1 G/DL (ref 31.5–35.7)
MCV RBC AUTO: 90.7 FL (ref 79–97)
MONOCYTES # BLD AUTO: 0.48 10*3/MM3 (ref 0.1–0.9)
MONOCYTES NFR BLD AUTO: 4.4 % (ref 5–12)
NEUTROPHILS NFR BLD AUTO: 63.6 % (ref 42.7–76)
NEUTROPHILS NFR BLD AUTO: 7.02 10*3/MM3 (ref 1.7–7)
NRBC BLD AUTO-RTO: 0 /100 WBC (ref 0–0.2)
PLATELET # BLD AUTO: 415 10*3/MM3 (ref 140–450)
PMV BLD AUTO: 10.2 FL (ref 6–12)
POTASSIUM SERPL-SCNC: 3.5 MMOL/L (ref 3.5–5.2)
POTASSIUM SERPL-SCNC: 4.5 MMOL/L (ref 3.5–5.2)
PROT SERPL-MCNC: 6.9 G/DL (ref 6–8.5)
RBC # BLD AUTO: 3.44 10*6/MM3 (ref 4.14–5.8)
SODIUM SERPL-SCNC: 141 MMOL/L (ref 136–145)
WBC NRBC COR # BLD AUTO: 11.03 10*3/MM3 (ref 3.4–10.8)

## 2024-06-23 PROCEDURE — 25010000002 ENOXAPARIN PER 10 MG: Performed by: SURGERY

## 2024-06-23 PROCEDURE — 86140 C-REACTIVE PROTEIN: CPT | Performed by: SURGERY

## 2024-06-23 PROCEDURE — 84132 ASSAY OF SERUM POTASSIUM: CPT | Performed by: INTERNAL MEDICINE

## 2024-06-23 PROCEDURE — 25010000002 KETOROLAC TROMETHAMINE PER 15 MG: Performed by: SURGERY

## 2024-06-23 PROCEDURE — 36415 COLL VENOUS BLD VENIPUNCTURE: CPT | Performed by: SURGERY

## 2024-06-23 PROCEDURE — 25010000002 TIGECYCLINE PER 1 MG: Performed by: NURSE PRACTITIONER

## 2024-06-23 PROCEDURE — 80053 COMPREHEN METABOLIC PANEL: CPT | Performed by: SURGERY

## 2024-06-23 PROCEDURE — 99232 SBSQ HOSP IP/OBS MODERATE 35: CPT | Performed by: NURSE PRACTITIONER

## 2024-06-23 PROCEDURE — 25010000002 MICAFUNGIN SODIUM 100 MG RECONSTITUTED SOLUTION 1 EACH VIAL: Performed by: NURSE PRACTITIONER

## 2024-06-23 PROCEDURE — 85025 COMPLETE CBC W/AUTO DIFF WBC: CPT | Performed by: INTERNAL MEDICINE

## 2024-06-23 PROCEDURE — 25010000002 HYDROMORPHONE PER 4 MG: Performed by: SURGERY

## 2024-06-23 PROCEDURE — 99232 SBSQ HOSP IP/OBS MODERATE 35: CPT | Performed by: INTERNAL MEDICINE

## 2024-06-23 RX ORDER — POTASSIUM CHLORIDE 20 MEQ/1
40 TABLET, EXTENDED RELEASE ORAL EVERY 4 HOURS
Status: COMPLETED | OUTPATIENT
Start: 2024-06-23 | End: 2024-06-23

## 2024-06-23 RX ADMIN — TOPIRAMATE 100 MG: 100 TABLET, FILM COATED ORAL at 08:42

## 2024-06-23 RX ADMIN — CETIRIZINE HYDROCHLORIDE 10 MG: 10 TABLET, FILM COATED ORAL at 08:41

## 2024-06-23 RX ADMIN — KETOROLAC TROMETHAMINE 15 MG: 30 INJECTION, SOLUTION INTRAMUSCULAR; INTRAVENOUS at 18:06

## 2024-06-23 RX ADMIN — METHOCARBAMOL 750 MG: 750 TABLET, FILM COATED ORAL at 18:06

## 2024-06-23 RX ADMIN — TOPIRAMATE 100 MG: 100 TABLET, FILM COATED ORAL at 21:35

## 2024-06-23 RX ADMIN — SODIUM CHLORIDE 50 MG: 9 INJECTION, SOLUTION INTRAVENOUS at 14:48

## 2024-06-23 RX ADMIN — OXYCODONE HYDROCHLORIDE 10 MG: 10 TABLET ORAL at 15:32

## 2024-06-23 RX ADMIN — PANTOPRAZOLE SODIUM 40 MG: 40 INJECTION, POWDER, FOR SOLUTION INTRAVENOUS at 05:41

## 2024-06-23 RX ADMIN — HYDROMORPHONE HYDROCHLORIDE 0.5 MG: 1 INJECTION, SOLUTION INTRAMUSCULAR; INTRAVENOUS; SUBCUTANEOUS at 13:36

## 2024-06-23 RX ADMIN — METHOCARBAMOL 750 MG: 750 TABLET, FILM COATED ORAL at 21:34

## 2024-06-23 RX ADMIN — ENOXAPARIN SODIUM 40 MG: 40 INJECTION SUBCUTANEOUS at 21:34

## 2024-06-23 RX ADMIN — Medication 10 ML: at 08:43

## 2024-06-23 RX ADMIN — ACETAMINOPHEN 1000 MG: 500 TABLET ORAL at 03:46

## 2024-06-23 RX ADMIN — ANASTROZOLE 1 MG: 1 TABLET, COATED ORAL at 08:43

## 2024-06-23 RX ADMIN — KETOROLAC TROMETHAMINE 15 MG: 30 INJECTION, SOLUTION INTRAMUSCULAR; INTRAVENOUS at 00:06

## 2024-06-23 RX ADMIN — ROSUVASTATIN CALCIUM 10 MG: 10 TABLET, FILM COATED ORAL at 08:42

## 2024-06-23 RX ADMIN — METHOCARBAMOL 750 MG: 750 TABLET, FILM COATED ORAL at 11:46

## 2024-06-23 RX ADMIN — ACETAMINOPHEN 1000 MG: 500 TABLET ORAL at 14:48

## 2024-06-23 RX ADMIN — KETOROLAC TROMETHAMINE 15 MG: 30 INJECTION, SOLUTION INTRAMUSCULAR; INTRAVENOUS at 11:46

## 2024-06-23 RX ADMIN — KETOROLAC TROMETHAMINE 15 MG: 30 INJECTION, SOLUTION INTRAMUSCULAR; INTRAVENOUS at 05:41

## 2024-06-23 RX ADMIN — POTASSIUM CHLORIDE 40 MEQ: 1500 TABLET, EXTENDED RELEASE ORAL at 04:28

## 2024-06-23 RX ADMIN — METOPROLOL TARTRATE 50 MG: 50 TABLET, FILM COATED ORAL at 08:41

## 2024-06-23 RX ADMIN — POLYETHYLENE GLYCOL (3350) 17 G: 17 POWDER, FOR SOLUTION ORAL at 08:43

## 2024-06-23 RX ADMIN — POTASSIUM CHLORIDE 40 MEQ: 1500 TABLET, EXTENDED RELEASE ORAL at 08:41

## 2024-06-23 RX ADMIN — Medication 10 ML: at 21:35

## 2024-06-23 RX ADMIN — HYDROMORPHONE HYDROCHLORIDE 0.5 MG: 1 INJECTION, SOLUTION INTRAMUSCULAR; INTRAVENOUS; SUBCUTANEOUS at 21:58

## 2024-06-23 RX ADMIN — METOPROLOL TARTRATE 50 MG: 50 TABLET, FILM COATED ORAL at 21:34

## 2024-06-23 RX ADMIN — Medication 1 MG: at 08:42

## 2024-06-23 RX ADMIN — SODIUM CHLORIDE 50 MG: 9 INJECTION, SOLUTION INTRAVENOUS at 03:12

## 2024-06-23 RX ADMIN — HYDROMORPHONE HYDROCHLORIDE 0.5 MG: 1 INJECTION, SOLUTION INTRAMUSCULAR; INTRAVENOUS; SUBCUTANEOUS at 03:11

## 2024-06-23 RX ADMIN — ACETAMINOPHEN 1000 MG: 500 TABLET ORAL at 21:35

## 2024-06-23 RX ADMIN — OXYCODONE HYDROCHLORIDE 10 MG: 10 TABLET ORAL at 19:42

## 2024-06-23 RX ADMIN — METHOCARBAMOL 750 MG: 750 TABLET, FILM COATED ORAL at 08:42

## 2024-06-23 RX ADMIN — MICAFUNGIN SODIUM 100 MG: 100 INJECTION, POWDER, LYOPHILIZED, FOR SOLUTION INTRAVENOUS at 15:53

## 2024-06-23 NOTE — PROGRESS NOTES
PROGRESS NOTE         Patient Identification:  Name:  Samson Kendall  Age:  49 y.o.  Sex:  male  :  1975  MRN:  [id number]  Visit Number:  [id number]  Primary Care Provider:  Rosalina Pandya APRN         LOS: 7 days       ----------------------------------------------------------------------------------------------------------------------  Subjective       Chief Complaints:    Abdominal Pain and Rectal Pain        Interval History:      Patient resting comfortably in bed.  Wife at bedside.  Currently on room air with no apparent distress.  Reports improved pain.  Afebrile, denies any diarrhea.  WBC slightly elevated 11.03.  CRP improving at 3.11.  Body fluid culture from 2024 finalized as Candida glabrata, E. coli ESBL, Enterococcus faecium VRE, Klebsiella pneumoniae.     Review of Systems:    Constitutional: no fever, chills and night sweats.  Generalized fatigue.  Eyes: no eye drainage, itching or redness.  HEENT: no mouth sores, dysphagia or nose bleed.  Respiratory: no for shortness of breath, cough or production of sputum.  Cardiovascular: no chest pain, no palpitations, no orthopnea.  Gastrointestinal: no nausea, vomiting or diarrhea. No abdominal pain, hematemesis or rectal bleeding.  Genitourinary: no dysuria or polyuria.  Hematologic/lymphatic: no lymph node abnormalities, no easy bruising or easy bleeding.  Musculoskeletal: no muscle or joint pain.  Skin: No rash and no itching.  Neurological: no loss of consciousness, no seizure, no headache.  Behavioral/Psych: no depression or suicidal ideation.  Endocrine: no hot flashes.  Immunologic: negative.    ----------------------------------------------------------------------------------------------------------------------      Objective       [facility] Meds:  acetaminophen, 1,000 mg, Oral, Q6H  anastrozole, 1 mg, Oral, Daily  cetirizine, 10 mg, Oral, Daily  cyanocobalamin, 1,000 mcg, Intramuscular, Q30 Days  enoxaparin, 40 mg,  Subcutaneous, Nightly  folic acid, 1 mg, Oral, Daily  ketorolac, 15 mg, Intravenous, Q6H  methocarbamol, 750 mg, Oral, 4x Daily  metoprolol tartrate, 50 mg, Oral, BID  micafungin (MYCAMINE) IV, 100 mg, Intravenous, Q24H  pantoprazole, 40 mg, Intravenous, Q AM  polyethylene glycol, 17 g, Oral, Daily  rosuvastatin, 10 mg, Oral, Daily  sodium chloride, 10 mL, Intravenous, Q12H  Testosterone Cypionate, 100 mg, Intramuscular, Q7 Days  tigecycline, 50 mg, Intravenous, Q12H  topiramate, 100 mg, Oral, BID         ----------------------------------------------------------------------------------------------------------------------    Vital Signs:  Temp:  [97.8 °F (36.6 °C)-98.9 °F (37.2 °C)] 98.4 °F (36.9 °C)  Heart Rate:  [57-67] 58  Resp:  [16-20] 16  BP: (124-163)/(68-81) 138/68  Mean Arterial Pressure (Non-Invasive) for the past 24 hrs (Last 3 readings):   Noninvasive MAP (mmHg)   06/22/24 1300 101     SpO2 Percentage    06/22/24 1300 06/23/24 0700 06/23/24 1100   SpO2: 99% 99% 99%     SpO2:  [99 %] 99 %  on   ;   Device (Oxygen Therapy): room air    Body mass index is 30.03 kg/m².  Wt Readings from Last 3 Encounters:   06/20/24 84.4 kg (186 lb 1.1 oz)   06/14/24 86.2 kg (190 lb)   03/19/24 84.8 kg (187 lb)        Intake/Output Summary (Last 24 hours) at 6/23/2024 1218  Last data filed at 6/23/2024 0500  Gross per 24 hour   Intake 590 ml   Output 20 ml   Net 570 ml     Diet: Regular/House; Fluid Consistency: Thin (IDDSI 0)  ----------------------------------------------------------------------------------------------------------------------      Physical Exam:    Constitutional:  Well-developed and well-nourished.  No respiratory distress.  Resting comfortably in bed.  Wife at bedside.  HENT:  Head: Normocephalic and atraumatic.  Mouth:  Moist mucous membranes.    Eyes:  Conjunctivae and EOM are normal.  No scleral icterus.  Neck:  Neck supple.  No JVD present.    Cardiovascular:  Normal rate, regular rhythm and normal  heart sounds with no murmur. No edema.  Pulmonary/Chest:  No respiratory distress, no wheezes, no crackles, with normal breath sounds and good air movement.  Abdominal:  Soft.  Bowel sounds are normal.  No distension and no tenderness.   Musculoskeletal:  No edema, no tenderness, and no deformity.  No swelling or redness of joints.  Neurological:  Alert and oriented to person, place, and time.  No facial droop.  No slurred speech.   Skin:  Skin is warm and dry.  No rash noted.  No pallor.  Perirectal incision with FATEMEH drain in place, serosanguineous drainage.   Psychiatric:  Normal mood and affect.  Behavior is normal.        ----------------------------------------------------------------------------------------------------------------------  Results from last 7 days   Lab Units 06/16/24  1344   CK TOTAL U/L 79       Results from last 7 days   Lab Units 06/16/24  1344   CHOLESTEROL mg/dL 131   TRIGLYCERIDES mg/dL 224*   HDL CHOL mg/dL 21*   LDL CHOL mg/dL 73       Results from last 7 days   Lab Units 06/23/24  0248 06/22/24  0053 06/21/24  0237 06/18/24  0108 06/17/24  1014 06/17/24  0054 06/16/24  1615   CRP mg/dL 3.11* 3.40* 4.52*   < >  --   --   --    LACTATE mmol/L  --   --   --   --  0.9  --  2.0   WBC 10*3/mm3 11.03* 10.86* 9.63   < >  --  10.77  --    HEMOGLOBIN g/dL 10.0* 9.5* 9.5*   < >  --  10.4*  --    HEMATOCRIT % 31.2* 31.0* 30.2*   < >  --  32.6*  --    MCV fL 90.7 94.8 91.0   < >  --  91.8  --    MCHC g/dL 32.1 30.6* 31.5   < >  --  31.9  --    PLATELETS 10*3/mm3 415 346 335   < >  --  258  --    INR   --   --   --   --   --  1.19*  --     < > = values in this interval not displayed.     Results from last 7 days   Lab Units 06/23/24  0248 06/22/24  0053 06/21/24  1245 06/21/24  0237 06/20/24  0244   SODIUM mmol/L 141 140  --  143 141   POTASSIUM mmol/L 3.5 3.7 3.9 3.6 3.2*   MAGNESIUM mg/dL  --   --   --  2.0 1.6   CHLORIDE mmol/L 111* 111*  --  112* 109*   CO2 mmol/L 18.9* 17.0*  --  17.7* 19.6*  "  BUN mg/dL 20 11  --  8 8   CREATININE mg/dL 1.07 1.08  --  1.06 1.08   CALCIUM mg/dL 9.0 8.7  --  8.6 8.5*   GLUCOSE mg/dL 128* 150*  --  99 105*   ALBUMIN g/dL 3.4* 3.1*  --  3.2* 2.9*   BILIRUBIN mg/dL <0.2 <0.2  --  <0.2 <0.2   ALK PHOS U/L 46 37*  --  36* 34*   AST (SGOT) U/L 16 21  --  16 14   ALT (SGPT) U/L 15 14  --  11 10   Estimated Creatinine Clearance: 86 mL/min (by C-G formula based on SCr of 1.07 mg/dL).  No results found for: \"AMMONIA\"    No results found for: \"HGBA1C\", \"POCGLU\"    Lab Results   Component Value Date    HGBA1C 6.20 (H) 06/16/2024     Lab Results   Component Value Date    TSH 1.860 06/16/2024    FREET4 1.12 09/06/2019       Blood Culture   Date Value Ref Range Status   06/17/2024 No growth at 4 days  Preliminary   06/17/2024 No growth at 5 days  Final   06/16/2024 No growth at 5 days  Final   06/16/2024 No growth at 5 days  Final     No results found for: \"URINECX\"  No results found for: \"WOUNDCX\"  No results found for: \"STOOLCX\"  No results found for: \"RESPCX\"  Pain Management Panel           No data to display                  ----------------------------------------------------------------------------------------------------------------------  Imaging Results (Last 24 Hours)       ** No results found for the last 24 hours. **            ----------------------------------------------------------------------------------------------------------------------    Pertinent Infectious Disease Results                Assessment/Plan       Assessment         Pelvic abscess secondary to diverticulitis     Plan        Patient resting comfortably in bed.  Wife at bedside.  Currently on room air with no apparent distress.  Reports improved pain.  Afebrile, denies any diarrhea.  WBC slightly elevated 11.03.  CRP improving at 3.11.  Body fluid culture from 6/18/2024 finalized as Candida glabrata, E. coli ESBL, Enterococcus faecium VRE, Klebsiella pneumoniae.     Case discussed with microbiology and " ESBL E. coli, Enterococcus faecium VRE and Klebsiella pneumonia are all susceptible to tigecycline.  For now we will continue tigecycline 50 mg IV every 12 hours and micafungin 100 mg IV every 24 hours. Recommend to continue antibiotic therapy through 7/2/2024.  An alternative oral option would be fluconazole 400 mg p.o. daily, linezolid 600 mg p.o. twice daily and Bactrim DS 1 tab p.o. twice daily.  At this option was also presented to the patient and patient's spouse.  At this time they wish to evaluate the cost of IV antibiotic therapy as patient develops GI upset easily and is worried he would not be able to tolerate multiple antimicrobial agents by mouth.   Patient will require outpatient infectious disease follow-up.      ANTIMICROBIAL THERAPY    amoxicillin-clavulanate - 875-125 MG  doxycycline - 100 MG  micafungin (MYCAMINE) IVPB  tigecycline (TYGACIL) 50 mg IVPB in 100 mL NS (VTB)     Code Status:   Code Status and Medical Interventions:   Ordered at: 06/16/24 1543     Code Status (Patient has no pulse and is not breathing):    CPR (Attempt to Resuscitate)     Medical Interventions (Patient has pulse or is breathing):    Full Support       JOSE J Mcmahon  06/23/24  12:18 EDT

## 2024-06-23 NOTE — PROGRESS NOTES
[facility] HOSPITALIST PROGRESS NOTE    Subjective     History:   Samson Kendall is a 49 y.o. male admitted on 6/16/2024 secondary to Diverticulitis     Procedures:   6/18/24: Laparoscopic drainage of pelvic abscess     CC: Follow up complicated diverticulitis with pelvic abscess    Patient seen and examined with Ed, RN. Sleeping upon my arrival but awakens to stimuli. His wife is present at bedside. States he did not sleep well last night 2/2 pain. Tolerating PO intake with no reported vomiting. (+) BM. No acute events overnight per RN.     History taken from: patient, chart, and RN.      Objective     Vital Signs  Temp:  [97.8 °F (36.6 °C)-98.9 °F (37.2 °C)] 98.4 °F (36.9 °C)  Heart Rate:  [58-67] 58  Resp:  [16-20] 16  BP: (138-163)/(68-81) 138/68    Intake/Output Summary (Last 24 hours) at 6/23/2024 1432  Last data filed at 6/23/2024 0500  Gross per 24 hour   Intake 230 ml   Output 20 ml   Net 210 ml         Physical Exam: Unchanged from previous.   General:    Awake, alert, in no acute distress   Heart:      Normal S1 and S2. Regular rate and rhythm. No significant murmur, rubs or gallops appreciated.   Lungs:     Respirations regular, even and unlabored. Lungs clear to auscultation B/L. No wheezes, rales or rhonchi.   Abdomen:   Soft. Generalized TTP, worse in LLQ. No guarding, rebound tenderness or  organomegaly noted. Bowel sounds present x 4. (+) FATEMEH drain   Extremities:  No clubbing, cyanosis or edema noted. Moves UE and LE equally B/L.     Results Review:    Results from last 7 days   Lab Units 06/23/24  0248 06/22/24  0053 06/21/24  0237 06/20/24  0244 06/19/24  0013 06/18/24  0108 06/17/24  0054   WBC 10*3/mm3 11.03* 10.86* 9.63 9.44 7.11 7.72 10.77   HEMOGLOBIN g/dL 10.0* 9.5* 9.5* 9.0* 9.2* 9.0* 10.4*   PLATELETS 10*3/mm3 415 346 335 293 000 196 258     Results from last 7 days   Lab Units 06/23/24  1230 06/23/24  0248 06/22/24  0053 06/21/24  1245 06/21/24  0237 06/20/24  0244  06/19/24  0013 06/18/24  0108 06/17/24  0054   SODIUM mmol/L  --  141 140  --  143 141 138 137 136   POTASSIUM mmol/L 4.5 3.5 3.7 3.9 3.6 3.2* 4.1 3.7 3.5   CHLORIDE mmol/L  --  111* 111*  --  112* 109* 107 106 103   CO2 mmol/L  --  18.9* 17.0*  --  17.7* 19.6* 17.1* 20.3* 21.4*   BUN mg/dL  --  20 11  --  8 8 9 11 21*   CREATININE mg/dL  --  1.07 1.08  --  1.06 1.08 1.16 1.34* 1.73*   CALCIUM mg/dL  --  9.0 8.7  --  8.6 8.5* 9.1 8.9 10.1   GLUCOSE mg/dL  --  128* 150*  --  99 105* 138* 93 99     Results from last 7 days   Lab Units 06/23/24  0248 06/22/24  0053 06/21/24  0237 06/20/24  0244 06/19/24  0013 06/18/24  0108 06/17/24  0054   BILIRUBIN mg/dL <0.2 <0.2 <0.2 <0.2 0.2 0.2 0.3   ALK PHOS U/L 46 37* 36* 34* 36* 35* 42   AST (SGOT) U/L 16 21 16 14 15 15 15   ALT (SGPT) U/L 15 14 11 10 9 11 13     Results from last 7 days   Lab Units 06/21/24  0237 06/20/24  0244   MAGNESIUM mg/dL 2.0 1.6     Results from last 7 days   Lab Units 06/17/24  0054   INR  1.19*             Imaging Results (Last 24 Hours)       ** No results found for the last 24 hours. **              Medications:  acetaminophen, 1,000 mg, Oral, Q6H  anastrozole, 1 mg, Oral, Daily  cetirizine, 10 mg, Oral, Daily  cyanocobalamin, 1,000 mcg, Intramuscular, Q30 Days  enoxaparin, 40 mg, Subcutaneous, Nightly  folic acid, 1 mg, Oral, Daily  ketorolac, 15 mg, Intravenous, Q6H  methocarbamol, 750 mg, Oral, 4x Daily  metoprolol tartrate, 50 mg, Oral, BID  micafungin (MYCAMINE) IV, 100 mg, Intravenous, Q24H  pantoprazole, 40 mg, Intravenous, Q AM  polyethylene glycol, 17 g, Oral, Daily  rosuvastatin, 10 mg, Oral, Daily  sodium chloride, 10 mL, Intravenous, Q12H  Testosterone Cypionate, 100 mg, Intramuscular, Q7 Days  tigecycline, 50 mg, Intravenous, Q12H  topiramate, 100 mg, Oral, BID               Assessment & Plan   Acute complicated diverticulitis with pelvic abscess: S/P laparoscopic drainage of pelvic abscess with culture revealing Candida glabrata,  ESBL E coli, VRE and Klebsiella pneumoniae. Currently on Tygacil and Micafungin per ID recs. ID has presented possible PO regimen but pt unsure due to GI intolerances and requesting to evaluate cost of IV regimen. Diet as tolerated. Taper pain medication regimen. Encourage ambulation. ID and surgery input appreciated.     JOSSELIN: Likely prerenal. No evidence of obstruction on imaging. Cr improved and stable today. Monitor UOP and repeat labs in the AM.     Hypokalemia: K+ low normal today and replaced. Mg previously <2 but improved. Cont to monitor.     Essential HTN: BP stable. Cont to monitor.     Hx of nonischemic cardiomyopathy: Echo on 6/18/24 revealed an EF of 56-60% (46-50% in 9/19), grade I diastolic dysfunction and mild pulmonary HTN. Appears compensated. Cont to monitor volume status.     HLD: Cont statin.     DVT PPX: SQ Lovenox     Disposition Pending clinical course with finalization of cultures and antimicrobials.     Luc Rowland,   06/23/24  14:32 EDT

## 2024-06-23 NOTE — PLAN OF CARE
Goal Outcome Evaluation:   Pt resting in bed. Prn pain medication given per mar. Pt ambulated in room. No acute changes this shift. VSS. Will continue plan of care.

## 2024-06-23 NOTE — PLAN OF CARE
Goal Outcome Evaluation:         Patient is resting comfortably at this time; he has had quite a bit of pain tonight. He is finally getting some relief. No acute changes this shift.

## 2024-06-24 ENCOUNTER — READMISSION MANAGEMENT (OUTPATIENT)
Dept: CALL CENTER | Facility: HOSPITAL | Age: 49
End: 2024-06-24
Payer: COMMERCIAL

## 2024-06-24 ENCOUNTER — APPOINTMENT (OUTPATIENT)
Dept: CT IMAGING | Facility: HOSPITAL | Age: 49
DRG: 853 | End: 2024-06-24
Payer: COMMERCIAL

## 2024-06-24 VITALS
DIASTOLIC BLOOD PRESSURE: 75 MMHG | TEMPERATURE: 98.4 F | SYSTOLIC BLOOD PRESSURE: 126 MMHG | HEIGHT: 66 IN | BODY MASS INDEX: 29.9 KG/M2 | OXYGEN SATURATION: 99 % | HEART RATE: 56 BPM | RESPIRATION RATE: 18 BRPM | WEIGHT: 186.07 LBS

## 2024-06-24 LAB
ALBUMIN SERPL-MCNC: 3.4 G/DL (ref 3.5–5.2)
ALBUMIN/GLOB SERPL: 1 G/DL
ALP SERPL-CCNC: 50 U/L (ref 39–117)
ALT SERPL W P-5'-P-CCNC: 14 U/L (ref 1–41)
ANION GAP SERPL CALCULATED.3IONS-SCNC: 11.6 MMOL/L (ref 5–15)
AST SERPL-CCNC: 18 U/L (ref 1–40)
BASOPHILS # BLD AUTO: 0.07 10*3/MM3 (ref 0–0.2)
BASOPHILS NFR BLD AUTO: 0.6 % (ref 0–1.5)
BILIRUB SERPL-MCNC: <0.2 MG/DL (ref 0–1.2)
BUN SERPL-MCNC: 26 MG/DL (ref 6–20)
BUN/CREAT SERPL: 23 (ref 7–25)
CALCIUM SPEC-SCNC: 8.9 MG/DL (ref 8.6–10.5)
CHLORIDE SERPL-SCNC: 112 MMOL/L (ref 98–107)
CO2 SERPL-SCNC: 17.4 MMOL/L (ref 22–29)
CREAT SERPL-MCNC: 1.13 MG/DL (ref 0.76–1.27)
DEPRECATED RDW RBC AUTO: 49 FL (ref 37–54)
EGFRCR SERPLBLD CKD-EPI 2021: 80.2 ML/MIN/1.73
EOSINOPHIL # BLD AUTO: 0.43 10*3/MM3 (ref 0–0.4)
EOSINOPHIL NFR BLD AUTO: 3.7 % (ref 0.3–6.2)
ERYTHROCYTE [DISTWIDTH] IN BLOOD BY AUTOMATED COUNT: 14.6 % (ref 12.3–15.4)
GLOBULIN UR ELPH-MCNC: 3.3 GM/DL
GLUCOSE SERPL-MCNC: 125 MG/DL (ref 65–99)
HCT VFR BLD AUTO: 30.8 % (ref 37.5–51)
HGB BLD-MCNC: 9.7 G/DL (ref 13–17.7)
IMM GRANULOCYTES # BLD AUTO: 0.08 10*3/MM3 (ref 0–0.05)
IMM GRANULOCYTES NFR BLD AUTO: 0.7 % (ref 0–0.5)
LYMPHOCYTES # BLD AUTO: 2.97 10*3/MM3 (ref 0.7–3.1)
LYMPHOCYTES NFR BLD AUTO: 25.8 % (ref 19.6–45.3)
MAGNESIUM SERPL-MCNC: 2.3 MG/DL (ref 1.6–2.6)
MCH RBC QN AUTO: 28.9 PG (ref 26.6–33)
MCHC RBC AUTO-ENTMCNC: 31.5 G/DL (ref 31.5–35.7)
MCV RBC AUTO: 91.7 FL (ref 79–97)
MONOCYTES # BLD AUTO: 0.6 10*3/MM3 (ref 0.1–0.9)
MONOCYTES NFR BLD AUTO: 5.2 % (ref 5–12)
NEUTROPHILS NFR BLD AUTO: 64 % (ref 42.7–76)
NEUTROPHILS NFR BLD AUTO: 7.36 10*3/MM3 (ref 1.7–7)
NRBC BLD AUTO-RTO: 0 /100 WBC (ref 0–0.2)
PLATELET # BLD AUTO: 446 10*3/MM3 (ref 140–450)
PMV BLD AUTO: 10.3 FL (ref 6–12)
POTASSIUM SERPL-SCNC: 3.4 MMOL/L (ref 3.5–5.2)
POTASSIUM SERPL-SCNC: 4.6 MMOL/L (ref 3.5–5.2)
PROT SERPL-MCNC: 6.7 G/DL (ref 6–8.5)
RBC # BLD AUTO: 3.36 10*6/MM3 (ref 4.14–5.8)
SODIUM SERPL-SCNC: 141 MMOL/L (ref 136–145)
WBC NRBC COR # BLD AUTO: 11.51 10*3/MM3 (ref 3.4–10.8)

## 2024-06-24 PROCEDURE — 99239 HOSP IP/OBS DSCHRG MGMT >30: CPT

## 2024-06-24 PROCEDURE — 84132 ASSAY OF SERUM POTASSIUM: CPT | Performed by: INTERNAL MEDICINE

## 2024-06-24 PROCEDURE — 85025 COMPLETE CBC W/AUTO DIFF WBC: CPT | Performed by: INTERNAL MEDICINE

## 2024-06-24 PROCEDURE — 83735 ASSAY OF MAGNESIUM: CPT | Performed by: INTERNAL MEDICINE

## 2024-06-24 PROCEDURE — 25010000002 MICAFUNGIN SODIUM 100 MG RECONSTITUTED SOLUTION 1 EACH VIAL: Performed by: NURSE PRACTITIONER

## 2024-06-24 PROCEDURE — 74176 CT ABD & PELVIS W/O CONTRAST: CPT | Performed by: RADIOLOGY

## 2024-06-24 PROCEDURE — 05HB33Z INSERTION OF INFUSION DEVICE INTO RIGHT BASILIC VEIN, PERCUTANEOUS APPROACH: ICD-10-PCS | Performed by: NURSE PRACTITIONER

## 2024-06-24 PROCEDURE — C1751 CATH, INF, PER/CENT/MIDLINE: HCPCS

## 2024-06-24 PROCEDURE — 36410 VNPNXR 3YR/> PHY/QHP DX/THER: CPT

## 2024-06-24 PROCEDURE — 80053 COMPREHEN METABOLIC PANEL: CPT | Performed by: SURGERY

## 2024-06-24 PROCEDURE — 99232 SBSQ HOSP IP/OBS MODERATE 35: CPT | Performed by: NURSE PRACTITIONER

## 2024-06-24 PROCEDURE — 25010000002 TIGECYCLINE PER 1 MG: Performed by: NURSE PRACTITIONER

## 2024-06-24 PROCEDURE — 74176 CT ABD & PELVIS W/O CONTRAST: CPT

## 2024-06-24 PROCEDURE — 25010000002 KETOROLAC TROMETHAMINE PER 15 MG: Performed by: SURGERY

## 2024-06-24 RX ORDER — SODIUM CHLORIDE 0.9 % (FLUSH) 0.9 %
10 SYRINGE (ML) INJECTION AS NEEDED
Status: DISCONTINUED | OUTPATIENT
Start: 2024-06-24 | End: 2024-06-24 | Stop reason: HOSPADM

## 2024-06-24 RX ORDER — ROSUVASTATIN CALCIUM 10 MG/1
10 TABLET, COATED ORAL DAILY
Qty: 30 TABLET | Refills: 3 | Status: SHIPPED | OUTPATIENT
Start: 2024-06-24

## 2024-06-24 RX ORDER — SODIUM CHLORIDE 0.9 % (FLUSH) 0.9 %
10 SYRINGE (ML) INJECTION EVERY 12 HOURS SCHEDULED
Status: DISCONTINUED | OUTPATIENT
Start: 2024-06-24 | End: 2024-06-24 | Stop reason: HOSPADM

## 2024-06-24 RX ORDER — POTASSIUM CHLORIDE 20 MEQ/1
40 TABLET, EXTENDED RELEASE ORAL EVERY 4 HOURS
Status: COMPLETED | OUTPATIENT
Start: 2024-06-24 | End: 2024-06-24

## 2024-06-24 RX ORDER — SODIUM CHLORIDE 9 MG/ML
40 INJECTION, SOLUTION INTRAVENOUS AS NEEDED
Status: DISCONTINUED | OUTPATIENT
Start: 2024-06-24 | End: 2024-06-24 | Stop reason: HOSPADM

## 2024-06-24 RX ADMIN — METHOCARBAMOL 750 MG: 750 TABLET, FILM COATED ORAL at 08:27

## 2024-06-24 RX ADMIN — Medication 10 ML: at 08:28

## 2024-06-24 RX ADMIN — MICAFUNGIN SODIUM 100 MG: 100 INJECTION, POWDER, LYOPHILIZED, FOR SOLUTION INTRAVENOUS at 14:17

## 2024-06-24 RX ADMIN — Medication 10 ML: at 13:00

## 2024-06-24 RX ADMIN — METOPROLOL TARTRATE 50 MG: 50 TABLET, FILM COATED ORAL at 08:27

## 2024-06-24 RX ADMIN — ACETAMINOPHEN 1000 MG: 500 TABLET ORAL at 08:26

## 2024-06-24 RX ADMIN — ANASTROZOLE 1 MG: 1 TABLET, COATED ORAL at 08:26

## 2024-06-24 RX ADMIN — MUPIROCIN 1 APPLICATION: 20 OINTMENT TOPICAL at 12:59

## 2024-06-24 RX ADMIN — TOPIRAMATE 100 MG: 100 TABLET, FILM COATED ORAL at 08:26

## 2024-06-24 RX ADMIN — SODIUM CHLORIDE 50 MG: 9 INJECTION, SOLUTION INTRAVENOUS at 14:17

## 2024-06-24 RX ADMIN — ACETAMINOPHEN 1000 MG: 500 TABLET ORAL at 02:56

## 2024-06-24 RX ADMIN — ROSUVASTATIN CALCIUM 10 MG: 10 TABLET, FILM COATED ORAL at 08:27

## 2024-06-24 RX ADMIN — OXYCODONE HYDROCHLORIDE 10 MG: 10 TABLET ORAL at 03:00

## 2024-06-24 RX ADMIN — Medication 1 MG: at 08:27

## 2024-06-24 RX ADMIN — METHOCARBAMOL 750 MG: 750 TABLET, FILM COATED ORAL at 12:59

## 2024-06-24 RX ADMIN — KETOROLAC TROMETHAMINE 15 MG: 30 INJECTION, SOLUTION INTRAMUSCULAR; INTRAVENOUS at 00:10

## 2024-06-24 RX ADMIN — KETOROLAC TROMETHAMINE 15 MG: 30 INJECTION, SOLUTION INTRAMUSCULAR; INTRAVENOUS at 12:59

## 2024-06-24 RX ADMIN — KETOROLAC TROMETHAMINE 15 MG: 30 INJECTION, SOLUTION INTRAMUSCULAR; INTRAVENOUS at 06:14

## 2024-06-24 RX ADMIN — PANTOPRAZOLE SODIUM 40 MG: 40 INJECTION, POWDER, FOR SOLUTION INTRAVENOUS at 06:16

## 2024-06-24 RX ADMIN — POTASSIUM CHLORIDE 40 MEQ: 1500 TABLET, EXTENDED RELEASE ORAL at 02:57

## 2024-06-24 RX ADMIN — CETIRIZINE HYDROCHLORIDE 10 MG: 10 TABLET, FILM COATED ORAL at 08:27

## 2024-06-24 RX ADMIN — SODIUM CHLORIDE 50 MG: 9 INJECTION, SOLUTION INTRAVENOUS at 04:42

## 2024-06-24 RX ADMIN — OXYCODONE HYDROCHLORIDE 10 MG: 10 TABLET ORAL at 08:39

## 2024-06-24 RX ADMIN — POTASSIUM CHLORIDE 40 MEQ: 1500 TABLET, EXTENDED RELEASE ORAL at 06:19

## 2024-06-24 NOTE — PROGRESS NOTES
PROGRESS NOTE         Patient Identification:  Name:  Samson Kendall  Age:  49 y.o.  Sex:  male  :  1975  MRN:  [id number]  Visit Number:  [id number]  Primary Care Provider:  Rosalina Pandya APRN         LOS: 8 days       ----------------------------------------------------------------------------------------------------------------------  Subjective       Chief Complaints:    Abdominal Pain and Rectal Pain        Interval History:      Patient resting comfortably in bed this morning.  Wife at bedside.  Currently on room air with no apparent distress.  Lungs clear to auscultation bilaterally.  Abdomen soft, nontender.  Afebrile, denies diarrhea.  Reports improved pain.  WBC stable 11.51.    Review of Systems:    Constitutional: no fever, chills and night sweats.  Generalized fatigue.  Eyes: no eye drainage, itching or redness.  HEENT: no mouth sores, dysphagia or nose bleed.  Respiratory: no for shortness of breath, cough or production of sputum.  Cardiovascular: no chest pain, no palpitations, no orthopnea.  Gastrointestinal: no nausea, vomiting or diarrhea. No abdominal pain, hematemesis or rectal bleeding.  Genitourinary: no dysuria or polyuria.  Hematologic/lymphatic: no lymph node abnormalities, no easy bruising or easy bleeding.  Musculoskeletal: no muscle or joint pain.  Skin: No rash and no itching.  Neurological: no loss of consciousness, no seizure, no headache.  Behavioral/Psych: no depression or suicidal ideation.  Endocrine: no hot flashes.  Immunologic: negative.    ----------------------------------------------------------------------------------------------------------------------      Objective       [facility] Meds:  acetaminophen, 1,000 mg, Oral, Q6H  anastrozole, 1 mg, Oral, Daily  cetirizine, 10 mg, Oral, Daily  cyanocobalamin, 1,000 mcg, Intramuscular, Q30 Days  enoxaparin, 40 mg, Subcutaneous, Nightly  folic acid, 1 mg, Oral, Daily  ketorolac, 15 mg, Intravenous,  Q6H  methocarbamol, 750 mg, Oral, 4x Daily  metoprolol tartrate, 50 mg, Oral, BID  micafungin (MYCAMINE) IV, 100 mg, Intravenous, Q24H  pantoprazole, 40 mg, Intravenous, Q AM  polyethylene glycol, 17 g, Oral, Daily  rosuvastatin, 10 mg, Oral, Daily  sodium chloride, 10 mL, Intravenous, Q12H  Testosterone Cypionate, 100 mg, Intramuscular, Q7 Days  tigecycline, 50 mg, Intravenous, Q12H  topiramate, 100 mg, Oral, BID         ----------------------------------------------------------------------------------------------------------------------    Vital Signs:  Temp:  [97.6 °F (36.4 °C)-98.4 °F (36.9 °C)] 97.7 °F (36.5 °C)  Heart Rate:  [56-71] 56  Resp:  [16-18] 18  BP: (126-138)/(66-75) 129/66  No data found.    SpO2 Percentage    06/23/24 1100 06/23/24 1854 06/24/24 0600   SpO2: 99% 98% 99%     SpO2:  [98 %-99 %] 99 %  on   ;   Device (Oxygen Therapy): room air    Body mass index is 30.03 kg/m².  Wt Readings from Last 3 Encounters:   06/20/24 84.4 kg (186 lb 1.1 oz)   06/14/24 86.2 kg (190 lb)   03/19/24 84.8 kg (187 lb)        Intake/Output Summary (Last 24 hours) at 6/24/2024 1042  Last data filed at 6/24/2024 0900  Gross per 24 hour   Intake 1280 ml   Output 15 ml   Net 1265 ml     Diet: Regular/House; Fluid Consistency: Thin (IDDSI 0)  ----------------------------------------------------------------------------------------------------------------------      Physical Exam:    Constitutional:  Well-developed and well-nourished.  No respiratory distress.  Resting comfortably in bed.  Wife at bedside. No complaints.  HENT:  Head: Normocephalic and atraumatic.  Mouth:  Moist mucous membranes.    Eyes:  Conjunctivae and EOM are normal.  No scleral icterus.  Neck:  Neck supple.  No JVD present.    Cardiovascular:  Normal rate, regular rhythm and normal heart sounds with no murmur. No edema.  Pulmonary/Chest:  No respiratory distress, no wheezes, no crackles, with normal breath sounds and good air movement.  Abdominal:   "Soft.  Bowel sounds are normal.  No distension and no tenderness.   Musculoskeletal:  No edema, no tenderness, and no deformity.  No swelling or redness of joints.  Neurological:  Alert and oriented to person, place, and time.  No facial droop.  No slurred speech.   Skin:  Skin is warm and dry.  No rash noted.  No pallor.  Perirectal incision with FATEMEH drain in place, serosanguineous drainage.   Psychiatric:  Normal mood and affect.  Behavior is normal.        ----------------------------------------------------------------------------------------------------------------------                  Results from last 7 days   Lab Units 06/24/24  0122 06/23/24  0248 06/22/24  0053 06/21/24  0237   CRP mg/dL  --  3.11* 3.40* 4.52*   WBC 10*3/mm3 11.51* 11.03* 10.86* 9.63   HEMOGLOBIN g/dL 9.7* 10.0* 9.5* 9.5*   HEMATOCRIT % 30.8* 31.2* 31.0* 30.2*   MCV fL 91.7 90.7 94.8 91.0   MCHC g/dL 31.5 32.1 30.6* 31.5   PLATELETS 10*3/mm3 446 415 346 335     Results from last 7 days   Lab Units 06/24/24  0122 06/23/24  1230 06/23/24  0248 06/22/24  0053 06/21/24  1245 06/21/24  0237 06/20/24  0244   SODIUM mmol/L 141  --  141 140  --  143 141   POTASSIUM mmol/L 3.4* 4.5 3.5 3.7   < > 3.6 3.2*   MAGNESIUM mg/dL 2.3  --   --   --   --  2.0 1.6   CHLORIDE mmol/L 112*  --  111* 111*  --  112* 109*   CO2 mmol/L 17.4*  --  18.9* 17.0*  --  17.7* 19.6*   BUN mg/dL 26*  --  20 11  --  8 8   CREATININE mg/dL 1.13  --  1.07 1.08  --  1.06 1.08   CALCIUM mg/dL 8.9  --  9.0 8.7  --  8.6 8.5*   GLUCOSE mg/dL 125*  --  128* 150*  --  99 105*   ALBUMIN g/dL 3.4*  --  3.4* 3.1*  --  3.2* 2.9*   BILIRUBIN mg/dL <0.2  --  <0.2 <0.2  --  <0.2 <0.2   ALK PHOS U/L 50  --  46 37*  --  36* 34*   AST (SGOT) U/L 18  --  16 21  --  16 14   ALT (SGPT) U/L 14  --  15 14  --  11 10    < > = values in this interval not displayed.   Estimated Creatinine Clearance: 81.4 mL/min (by C-G formula based on SCr of 1.13 mg/dL).  No results found for: \"AMMONIA\"    No results " "found for: \"HGBA1C\", \"POCGLU\"    Lab Results   Component Value Date    HGBA1C 6.20 (H) 06/16/2024     Lab Results   Component Value Date    TSH 1.860 06/16/2024    FREET4 1.12 09/06/2019       Blood Culture   Date Value Ref Range Status   06/17/2024 No growth at 4 days  Preliminary   06/17/2024 No growth at 5 days  Final   06/16/2024 No growth at 5 days  Final   06/16/2024 No growth at 5 days  Final     No results found for: \"URINECX\"  No results found for: \"WOUNDCX\"  No results found for: \"STOOLCX\"  No results found for: \"RESPCX\"  Pain Management Panel           No data to display                  ----------------------------------------------------------------------------------------------------------------------  Imaging Results (Last 24 Hours)       Procedure Component Value Units Date/Time    CT Abdomen Pelvis Without Contrast [[id number]] Resulted: 06/24/24 1031     Updated: 06/24/24 1031            ----------------------------------------------------------------------------------------------------------------------    Pertinent Infectious Disease Results                Assessment/Plan       Assessment         Pelvic abscess secondary to diverticulitis     Plan          Patient resting comfortably in bed this morning.  Wife at bedside.  Currently on room air with no apparent distress.  Lungs clear to auscultation bilaterally.  Abdomen soft, nontender.  Afebrile, denies diarrhea.  Reports improved pain.  WBC stable 11.51.    Body fluid culture from 6/18/2024 finalized as Candida glabrata, E. coli ESBL, Enterococcus faecium VRE, Klebsiella pneumoniae.     Case discussed with microbiology and ESBL E. coli, Enterococcus faecium VRE and Klebsiella pneumonia are all susceptible to tigecycline.  For now we will continue tigecycline 50 mg IV every 12 hours and micafungin 100 mg IV every 24 hours. Recommend to continue antibiotic therapy through 7/2/2024.  An alternative oral option would be fluconazole 400 mg p.o. " daily, linezolid 600 mg p.o. twice daily and Bactrim DS 1 tab p.o. twice daily.  At this option was also presented to the patient and patient's spouse.  At this time they wish to evaluate the cost of IV antibiotic therapy as patient develops GI upset easily and is worried he would not be able to tolerate multiple antimicrobial agents by mouth.   Patient will require outpatient infectious disease follow-up.      ANTIMICROBIAL THERAPY    amoxicillin-clavulanate - 875-125 MG  doxycycline - 100 MG  micafungin (MYCAMINE) IVPB  tigecycline (TYGACIL) 50 mg IVPB in 100 mL NS (VTB)     Code Status:   Code Status and Medical Interventions:   Ordered at: 06/16/24 1543     Code Status (Patient has no pulse and is not breathing):    CPR (Attempt to Resuscitate)     Medical Interventions (Patient has pulse or is breathing):    Full Support       JOSE J Mcmahon  06/24/24  10:42 EDT

## 2024-06-24 NOTE — DISCHARGE PLACEMENT REQUEST
"Samson Carbajal (49 y.o. Male)       Date of Birth   1975    Social Security Number       Address   [address] [id number]    Home Phone   [phone]    MRN   [id number]       Alevism   [facility]    Marital Status                               Admission Date   6/16/24    Admission Type   Emergency    Admitting Provider   Stefan Carbajal DO    Attending Provider   Luc Rowland DO    Department, Room/Bed   [address], 3334/       Discharge Date       Discharge Disposition       Discharge Destination                                 Attending Provider: Luc Rowland DO    Allergies: No Known Allergies    Isolation: Contact   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), 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)   Code Status: CPR    Ht: 167.6 cm (66\")   Wt: 84.4 kg (186 lb 1.1 oz)    Admission Cmt: None   Principal Problem: Diverticulitis [K57.92]                   Active Insurance as of 6/16/2024       Primary Coverage       Payor Plan Insurance Group Employer/Plan Group    [facility] BLUE CROSS [employer] [employer] EMPLOYEE [id number]       Payor Plan Address Payor Plan Phone Number Payor Plan Fax Number Effective Dates    [address] [phone]  11/1/2019 - None Entered    [place] [address]         Subscriber Name Subscriber Birth Date Member ID       SAMSON CARBAJAL 1975 [id number]                     Emergency Contacts        (Rel.) Home Phone Work Phone Mobile Phone    Roxanne Carbajal (Mother) [phone] -- --    CLARA BELLAMY (Spouse) [phone] -- --          micafungin sodium 100 mg in sodium chloride 0.9 % 100 mL IVPB [[id number]] (Order [id number])  Order  Date: 6/24/2024 Department: [address] Ordering/Authorizing: Marjorie Grigsby APRN     Medication  micafungin sodium 100 mg in sodium chloride 0.9 % 100 mL IVPB [[id number]]  Order History  Outpatient  Date/Time Action Taken User Additional Information   06/24/24 1338 Sign " Marjorie Grigsby APRN Reorder from Order:[id number]   06/24/24 1338 Taking Flag Checked Marjorie Grigsby APRN [id number]     Outpatient Morphine Milligram Equivalents Per Day  Expand All  Collapse All  6/24/24 and after  22.5 MME/Day                      Calculation Information             micafungin sodium 100 mg in sodium chloride 0.9 % 100 mL IVPB [[id number]]     Order Details  Dose: 100 mg Route: Intravenous Frequency: Every 24 Hours   Dispense Quantity: -- Refills: --    Indications of Use: Invasive Candidiasis         Sig: Infuse 100 mg into a venous catheter Daily for 9 doses. Indications: Serious Candida Infection in Unusual Areas of the Body         Stability: 24 Hours   Start Date: 06/24/24 End Date: 07/03/24 after 9 doses   Written Date: 06/24/24 Expiration Date: 06/24/25       Components    Component Ordered Dose Dispense Quantity   micafungin sodium 100 MG reconstituted solution 100 mg 100 mg   sodium chloride 0.9 % solution 100 mL 100 mL         Providers    Ordering Provider and Authorizing Provider:    Marjorie Grigsby APRN   [address] [place] [address]   Phone:  [phone]   Fax:  [phone]   NPI:  [id number]        Ordering User:  Marjorie Grigsby APRN             Orders with any of the following pharmaceutical classes: Antifungals    Name Dose Frequency Start Date End Date Medication Warnings Interventions? Order Mode    micafungin sodium (MYCAMINE) 100 mg in sodium chloride 0.9 % 100 mL IVPB 100 mg Every 24 Hours 06/21/24 1500 07/02/24 2359   Inpatient        tigecycline 50 mg in sodium chloride 0.9 % 100 mL IVPB [[id number]] (Order [id number])  Order  Date: 6/24/2024 Department: [address] Ordering/Authorizing: Marjorie Grigsby APRN     Medication  tigecycline 50 mg in sodium chloride 0.9 % 100 mL IVPB [[id number]]  Order History  Outpatient  Date/Time Action Taken User Additional Information   06/24/24 1338 Sign Marjorie Grigsby APRN Reorder from Order:[id number]   06/24/24  1338 Taking Flag Checked Marjorie Grigsby APRN [id number]     Outpatient Morphine Milligram Equivalents Per Day  Expand All  Collapse All  24 and after  22.5 MME/Day                      Calculation Information             tigecycline 50 mg in sodium chloride 0.9 % 100 mL IVPB [[id number]]     Order Details  Dose: 50 mg Route: Intravenous Frequency: Every 12 Hours   Dispense Quantity: -- Refills: --    Indications of Use: Intra-Abdominal Infection         Sig: Infuse 50 mg into a venous catheter Every 12 (Twelve) Hours for 15 doses. Indications: Infection Within the Abdomen         Start Date: 24 End Date: 24 after 15 doses   Written Date: 24 Expiration Date: 25       Components    Component Ordered Dose Dispense Quantity   tigecycline 50 MG reconstituted solution 50 mg 50 mg   sodium chloride 0.9 % solution 100 mL 100 mL         Providers    Ordering Provider and Authorizing Provider:    Marjorie Grigsby APRN   [facility] [address]   Phone:  [phone]   Fax:  [phone]   NPI:  [id number]        Ordering User:  Marjorie Grigsby APRN             Orders with any of the following pharmaceutical classes: Tetracyclines    Name Dose Frequency Start Date End Date Medication Warnings Interventions? Order Mode    tigecycline (TYGACIL) 50 mg in sodium chloride 0.9 % 100 mL IVPB-VTB 50 mg Every 12 Hours 24 0300 24 0259   Inpatient    doxycycline (MONODOX) 100 MG capsule 100 mg 2 Times Daily 24 Drug-Drug  Outpatient    tigecycline (TYGACIL) 100 mg in sodium chloride 0.9 % 100 mL IVPB 100 mg Once 24 1500 24 1601   Inpatient           History & Physical        Stefan Kendall DO at 24 1543              [facility]    Patient Identification:  Name:  Samson Kendall  Age:  49 y.o.  Sex:  male  :  1975  MRN:  [id number]   Admit Date: 2024   Visit Number:  [id number]  Room  number:  104/04  Primary Care Physician:  Rosalina Pandya APRN     Subjective     Chief complaint:    Chief Complaint   Patient presents with    Abdominal Pain    Rectal Pain       History of presenting illness:   Patient is a 49-year-old male with history significant for nonischemic cardiomyopathy (2018), type 2 diabetes mellitus and hypertension who presented to the ER with complaints of abdominal pain, fevers and poor oral intake.  Patient was seen in the ER few days ago and diagnosed with uncomplicated diverticulitis.  He was discharged with appropriate Augmentin therapy.  He says since being home he has had decreased oral intake, increasing abdominal pain Tmax of 102 earlier this morning.  He states today his abdominal pain became significantly worsened which prompted his presentation today to the ER.  He states he has been compliant with the outpatient antibiotics he was prescribed earlier this week but symptoms seem to worsen.  He says his pain currently is better and this is the first time he has been able to get relief today after receiving multiple doses of IV opiates.    In the ER, labs noted BUN/creatinine of 29/2, CRP of 44, WBC of 11 K and a CT abdomen pelvis with contrast that noted sigmoid diverticulitis with contained perforation.  No obvious drainable abscess was noted at this time.  He received several doses of IV opiates, cefepime and Flagyl.  General surgery was consulted from the ER who recommended conservative treatment for now and close monitoring.    Of note, he does take Mounjaro.    ---------------------------------------------------------------------------------------------------------------------   Review of Systems   Constitutional:  Positive for chills and fever. Negative for fatigue.   HENT:  Negative for congestion, sinus pain and sore throat.    Respiratory:  Negative for cough, chest tightness, shortness of breath and wheezing.    Cardiovascular:  Negative for chest pain,  palpitations and leg swelling.   Gastrointestinal:  Positive for abdominal pain. Negative for constipation, diarrhea, nausea and vomiting.   Genitourinary:  Negative for dysuria, frequency, hematuria and urgency.   Musculoskeletal:  Negative for arthralgias and myalgias.   Neurological:  Negative for dizziness, numbness and headaches.   Psychiatric/Behavioral:  Negative for confusion.      ---------------------------------------------------------------------------------------------------------------------   Past Medical History:   Diagnosis Date    Asthma     childhood    Cardiomyopathy     Diabetes mellitus     Heart murmur     as child    Hyperlipidemia     Hypertension     Palpitation     Sleep apnea     C pap use     Past Surgical History:   Procedure Laterality Date    APPENDECTOMY      HAND SURGERY      KNEE ARTHROSCOPY      LUMBAR FUSION      VASECTOMY      WISDOM TOOTH EXTRACTION       Family History   Problem Relation Age of Onset    Heart attack Maternal Grandfather     Heart failure Maternal Grandfather     Heart disease Maternal Grandfather     Arthritis Mother     Diabetes Father     Hearing loss Father     Asthma Maternal Grandmother      Social History     Socioeconomic History    Marital status:    Tobacco Use    Smoking status: Never     Passive exposure: Current    Smokeless tobacco: Current     Types: Snuff   Vaping Use    Vaping status: Never Used   Substance and Sexual Activity    Alcohol use: Not Currently    Drug use: No    Sexual activity: Yes     Partners: Female     Birth control/protection: Surgical, Same-sex partner     ---------------------------------------------------------------------------------------------------------------------   Allergies:  Patient has no known allergies.  ---------------------------------------------------------------------------------------------------------------------   Medications below are reported home medications pulling from within the system; at  this time, these medications have not been reconciled unless otherwise specified and are in the verification process for further verifcation as current home medications.    Prior to Admission Medications       Prescriptions Last Dose Informant Patient Reported? Taking?    amoxicillin-clavulanate (AUGMENTIN) 875-125 MG per tablet   No No    Take 1 tablet by mouth 2 (Two) Times a Day for 10 days.    Calcium-Magnesium-Zinc (OMERO-MAG-ZINC PO)   Yes No    Take  by mouth 3 (Three) Times a Day.    Cyanocobalamin (VITAMIN B-12 IJ)   Yes No    Inject  as directed.    dexlansoprazole (DEXILANT) 60 MG capsule  Pharmacy Yes No    Take 1 capsule by mouth Daily.    doxycycline (MONODOX) 100 MG capsule   No No    Take 1 capsule by mouth 2 (Two) Times a Day for 10 days.    fenofibrate (TRICOR) 145 MG tablet   Yes No    Take 1 tablet by mouth Daily.    furosemide (LASIX) 20 MG tablet   Yes No    Take 20 mg by mouth Daily.    Patient not taking:  Reported on 3/19/2024    hydroCHLOROthiazide (HYDRODIURIL) 12.5 MG tablet   Yes No    Take 12.5 mg by mouth Daily.    Patient not taking:  Reported on 3/19/2024    HYDROcodone-acetaminophen (NORCO) 5-325 MG per tablet   No No    Take 1 tablet by mouth Every 6 (Six) Hours As Needed for Severe Pain.    HYDROcodone-acetaminophen (NORCO) 7.5-325 MG per tablet  Self Yes No    Take 1 tablet by mouth Daily As Needed for Moderate Pain.    meloxicam (MOBIC) 15 MG tablet   Yes No    Take 1 tablet by mouth Daily.    methylPREDNISolone (MEDROL) 4 MG dose pack   No No    Take as directed on package instructions.    metoprolol succinate XL (TOPROL-XL) 100 MG 24 hr tablet   No No    Take 1 tablet by mouth Daily. for blood pressure    metoprolol succinate XL (TOPROL-XL) 50 MG 24 hr tablet   Yes No    Take 1 tablet by mouth Daily.    multivitamin (MULTI-VITAMIN PO)   Yes No    Take  by mouth Daily.    ondansetron ODT (ZOFRAN-ODT) 4 MG disintegrating tablet   No No    Place 1 tablet on the tongue Every 8  (Eight) Hours As Needed for Vomiting.    potassium chloride 10 MEQ CR tablet   Yes No    Take 10 mEq by mouth 2 (Two) Times a Day.    Patient not taking:  Reported on 3/19/2024    rosuvastatin (CRESTOR) 10 MG tablet   No No    Take 1 tablet by mouth Daily.    sildenafil (REVATIO) 20 MG tablet   Yes No    Take 1 tablet by mouth.    Testosterone Cypionate (DEPOTESTOTERONE CYPIONATE) 200 MG/ML injection   Yes No    inject 1 cc by intramuscular route every  2 weeks    Tirzepatide (Mounjaro) 5 MG/0.5ML solution pen-injector   Yes No    Inject  under the skin into the appropriate area as directed.    Tirzepatide (Mounjaro) 5 MG/0.5ML solution pen-injector   No No    Inject 5MG by subcutaneous route  once every week.    topiramate (TOPAMAX) 100 MG tablet   Yes No    Take 1 tablet by mouth 2 (Two) Times a Day.    valsartan (DIOVAN) 40 MG tablet   No No    Take 1 tablet by mouth Daily.    Patient not taking:  Reported on 3/19/2024    vitamin D (ERGOCALCIFEROL) 1.25 MG (68905 UT) capsule capsule   Yes No    TAKE ONE CAPSULE BY MOUTH ONCE EVERY WEEK FOR VITAMIN D DEFICIENCY    Patient not taking:  Reported on 3/19/2024          Objective     Vital Signs:  Temp:  [99.4 °F (37.4 °C)] 99.4 °F (37.4 °C)  Heart Rate:  [] 104  Resp:  [14] 14  BP: (110-147)/(37-88) 147/88    Mean Arterial Pressure (Non-Invasive) for the past 24 hrs (Last 3 readings):   Noninvasive MAP (mmHg)   06/16/24 1545 104   06/16/24 1530 91   06/16/24 1515 95     SpO2:  [97 %-100 %] 100 %  on   ;   Device (Oxygen Therapy): room air  Body mass index is 30.67 kg/m².    Wt Readings from Last 3 Encounters:   06/16/24 86.2 kg (190 lb)   06/14/24 86.2 kg (190 lb)   03/19/24 84.8 kg (187 lb)      ---------------------------------------------------------------------------------------------------------------------   Physical Exam:  Constitutional: Middle-age male, awake, alert, nontoxic, well-developed and well-nourished.  No respiratory distress.      HENT:   Head: Normocephalic and atraumatic.  Mouth:  Moist mucous membranes.    Eyes:  Conjunctivae and EOM are normal.  No scleral icterus.  Neck:  Neck supple.  No JVD present.    Cardiovascular: Tachycardic, regular rhythm and normal heart sounds with no murmur.  Pulmonary/Chest:  No respiratory distress, no wheezes, no crackles, with normal breath sounds and good air movement.  Abdominal: Mildly distended, generalized tenderness to palpation, no guarding or rebound, he does not have an acute abdomen  Musculoskeletal:  No tenderness, and no deformity.  No red or swollen joints anywhere.    Neurological:  Alert and oriented to person, place, and time.  No cranial nerve deficit.  No tongue deviation.  No facial droop.  No slurred speech.   Skin:  Skin is warm and dry.  No rash noted.  No pallor.   Peripheral vascular:  No edema and pulses on all 4 extremities.    ---------------------------------------------------------------------------------------------------------------------  EKG: Ordered  ECG 12 Lead Pre-Op / Pre-Procedure    (Results Pending)       Telemetry: Reviewed    I have personally looked at both the EKG and the telemetry strips.    Last echocardiogram:  Results for orders placed during the hospital encounter of 09/05/19    Adult Transthoracic Echo Complete With Contrast if Necessary Per Protocol    Interpretation Summary  · Normal left ventricular cavity size and wall thickness noted. There is left ventricular global hypokinesis noted.  · Left ventricular systolic function is mildly decreased  · Estimated EF appears to be in the range of 46 - 50%  · The aortic valve is structurally normal. No aortic valve regurgitation is present. No aortic valve stenosis is present.  · The mitral valve is normal in structure. No mitral valve regurgitation is present. No significant mitral valve stenosis is present.  · The tricuspid valve is normal. No evidence of tricuspid valve stenosis is present. Mild tricuspid valve  "regurgitation is present. Estimated right ventricular systolic pressure from tricuspid regurgitation is normal (<35 mmHg).  · There is no evidence of pericardial effusion.  · No previous studies available for comparison    --------------------------------------------------------------------------------------------------------------------  Labs:  Results from last 7 days   Lab Units 06/16/24  1615 06/16/24  1344 06/14/24  1313 06/14/24  1217   PROCALCITONIN ng/mL  --   --  0.25  --    LACTATE mmol/L 2.0  --  0.7  --    SED RATE mm/hr  --  82*  --  22*   CRP mg/dL  --  44.82*  --  10.71*   WBC 10*3/mm3  --  11.05*  --  12.75*   HEMOGLOBIN g/dL  --  10.7*  --  11.8*   HEMATOCRIT %  --  33.9*  --  35.8*   MCV fL  --  91.6  --  89.3   MCHC g/dL  --  31.6  --  33.0   PLATELETS 10*3/mm3  --  241  --  275         Results from last 7 days   Lab Units 06/16/24  1344 06/14/24  1217   SODIUM mmol/L 137 138   POTASSIUM mmol/L 4.1 4.1   CHLORIDE mmol/L 103 104   CO2 mmol/L 22.6 20.3*   BUN mg/dL 29* 34*   CREATININE mg/dL 2.09* 1.85*   CALCIUM mg/dL 10.1 9.8   GLUCOSE mg/dL 108* 120*   ALBUMIN g/dL 3.7 4.5   BILIRUBIN mg/dL 0.3 0.3   ALK PHOS U/L 50 43   AST (SGOT) U/L 14 20   ALT (SGPT) U/L 13 16   Estimated Creatinine Clearance: 44.5 mL/min (A) (by C-G formula based on SCr of 2.09 mg/dL (H)).    No results found for: \"AMMONIA\"          No results found for: \"HGBA1C\", \"POCGLU\"  Lab Results   Component Value Date    TSH 4.790 (H) 09/06/2019    FREET4 1.12 09/06/2019     No results found for: \"PREGTESTUR\", \"PREGSERUM\", \"HCG\", \"HCGQUANT\"  Pain Management Panel           No data to display              Brief Urine Lab Results  (Last result in the past 365 days)        Color   Clarity   Blood   Leuk Est   Nitrite   Protein   CREAT   Urine HCG        06/16/24 1442 Dark Yellow   Cloudy   Negative   Negative   Negative   30 mg/dL (1+)                 Blood Culture   Date Value Ref Range Status   06/14/2024 No growth at 2 days  " "Preliminary   06/14/2024 No growth at 2 days  Preliminary     No results found for: \"URINECX\"  No results found for: \"WOUNDCX\"  No results found for: \"STOOLCX\"    I have personally looked at the labs and they are summarized above.  ----------------------------------------------------------------------------------------------------------------------  Detailed radiology reports for the last 24 hours:    Imaging Results (Last 24 Hours)       Procedure Component Value Units Date/Time    CT Abdomen Pelvis With Contrast [[id number]] Collected: 06/16/24 1453     Updated: 06/16/24 5705    Narrative:      INDICATION: Abdominal pain. Rectal pain     COMPARISON: CT from 6/14/2024.     TECHNIQUE: Axial CT images of the abdomen and pelvis were obtained  following IV contrast administration. Coronal and sagittal reformations  were reviewed.     FINDINGS:  Visualized lung bases appear unremarkable.     The liver, gallbladder, spleen, pancreas and adrenal glands appear  unremarkable. No hydronephrosis. Punctate left renal calculus. Small  bilateral renal cysts. Fat-containing umbilical hernia.     Moderate inflammation surrounding the sigmoid colon with wall thickening  and adjacent inflammatory fat stranding. Small to moderate amount of  fluid adjacent to the sigmoid colon. No rim-enhancing drainable abscess  at this time. A few small foci of air likely related to contained  perforation. Small bowel dilation.     Moderate urinary bladder wall thickening. Small fat-containing bilateral  inguinal hernias     Degenerative changes in the spine. No acute osseous abnormality evident.       Impression:      1.  Sigmoid diverticulitis. Moderate inflammation surrounding the  sigmoid colon with wall thickening and adjacent inflammatory fat  stranding. Small to moderate amount of fluid adjacent to the sigmoid  colon. No rim-enhancing drainable abscess at this time. A few small foci  of air likely related to contained perforation.  2.  Small " bowel dilation could relate to ileus from above inflammation  or obstruction. Follow-up recommended.  3.  Moderate urinary bladder wall thickening likely from above  inflammation.        This report was finalized on 6/16/2024 2:55 PM by Alex Pallas, DO.             Final impressions for the last 30 days of radiology reports:    CT Abdomen Pelvis With Contrast    Result Date: 6/16/2024  1.  Sigmoid diverticulitis. Moderate inflammation surrounding the sigmoid colon with wall thickening and adjacent inflammatory fat stranding. Small to moderate amount of fluid adjacent to the sigmoid colon. No rim-enhancing drainable abscess at this time. A few small foci of air likely related to contained perforation. 2.  Small bowel dilation could relate to ileus from above inflammation or obstruction. Follow-up recommended. 3.  Moderate urinary bladder wall thickening likely from above inflammation.   This report was finalized on 6/16/2024 2:55 PM by Alex Pallas, DO.      US Scrotum & Testicles    Result Date: 6/14/2024    The right epididymis is enlarged and shows hypervascularity suggestive of epididymitis.   This report was finalized on 6/14/2024 1:14 PM by Dr. Colby Marley MD.      CT Abdomen Pelvis Without Contrast    Result Date: 6/14/2024  1.  Possibly minimal stranding around sigmoid colon that could represent noncomplicated acute diverticulitis. 2.  Bladder wall thickening which may be due to the decompressed state of the bladder or due to cystitis. 3.  Small umbilical hernia containing only fat.   This report was finalized on 6/14/2024 1:02 PM by Dr. Colby Marley MD.     I have personally looked at the radiology images and read the final radiology report.    Assessment & Plan      Patient is a 49-year-old male with history significant for nonischemic cardiomyopathy (2018), type 2 diabetes mellitus and hypertension who presented to the ER with complaints of abdominal pain, fevers and poor oral intake.    #Sepsis d/t  sigmoid diverticulitis with contained perforation  #Acute intractable abdominal pain  #Possible ileus versus early SBO  --Patient presented w/ 3-day history of worsening abdominal pain, fevers and chills.  He was seen a few days ago in the ER and diagnosed with uncomplicated diverticulitis, discharged with Augmentin which she was compliant with.  Symptoms progressed resulting in representation today.  --Question the role of Mounjaro in this acute presentation  --Admission labs showed WBC 11 K, CRP 44, lactic 2, ESR 82  --Blood cultures x 2  --CT abdomen pelvis 6/14 without contrast noted uncomplicated diverticulitis  --CT A/P with contrast today showed sigmoid diverticulitis with contained perforation, no rim-enhancing drainable abscess, possible SBO vs ileus-->clinically do not suspect obs-no nausea/no active emesis  --Okay for clear liquid diet  --As needed antiemetics, as needed Dilaudid/morphine for breakthrough, p.o. Roxicodone and scheduled Tylenol  --Start LR to 100 cc an hour  --Start Zosyn 3.375 every 6 hours for empiric intra-abdominal coverage  --Stat repeat CT abdomen pelvis with any worsening abdominal pain  --General surgery consulted, appreciate recommendations  --Ad[facility] to [facility]    #Nonischemic cardiomyopathy  #Essential hypertension  --patient denies any CP/pressure/tightness-has followed outpatient with cardiology-last appointment noted plans to repeat echo, order Holter and consider stress test but patient had not had these completed  --last echo noted EF 45-50%  --Repeat echo ordered for follow-up  --hold ace/arb d/t renal function  --No role for inpatient stress test at this time as he is asymptomatic  --resume BB, statin, hold lasix  --follows w/ cardiology outpatient    #CKD stage 3a  --last labs noted Cr 1.7 &1.8 earlier this year and in 2023, Cr 2 on admit  --CK ordered  --CT a/p w/out evidence of obstructive uropathy  --Continue IV fluids, repeat in a.m.    #Type 2 diabetes mellitus, SSI,  adjust as needed  #Hyperlipidemia, statin  #RANJEET, cpap HS    Checklist:  Antibiotics: zosyn  Steroids: none  DVT ppx: lovenox  GI ppx: ppi  Diet: clears  Code: CPR, full  Risk/dispo: Patient is a high risk due to acute sigmoid diverticulitis with contained perforation.  Anticipate greater than 2 midnight stay.  Disposition expected Home when medically stable.    Stefan Kendall DO  [facility]  06/16/24  16:56 EDT      Electronically signed by Stefan Kendall DO at 06/16/24 3889

## 2024-06-24 NOTE — CASE MANAGEMENT/SOCIAL WORK
Continued Stay Note  ALISSA Fisher     Patient Name: Samson Kendall  MRN: [id number]  Today's Date: 6/24/2024    Admit Date: 6/16/2024       Discharge Plan       Row Name 06/24/24 2675       Plan    Plan Per ID recommendations pt will need tigecycline 50 mg IV every 12 hours and micafungin 100 mg IV every 24 hours through 7/2/2024. CM contacted [employer] per Kelly and she will verify pt's insurance copay. CM faxed referral to [employer]. Pt has order for midline.    13:50 Midline is in. Per Kelly with [employer] pt has copay of $531.97 for first week then his deductible will be met and the cost will decrease to $132 weekly. Pt is agreeable to cost and he and his wife have been educated on administration per Kelly. Pt's wife is an RN and she declines the need for HH as she can change the midline dressing and d/c midline when antibiotics are finished. Pt will likely be d/c home later today. Faxed final antibiotic orders to [employer].                Carmen Bansal RN

## 2024-06-24 NOTE — OUTREACH NOTE
Prep Survey      Flowsheet Row Responses   [facility] patient discharged [facility]? [facility]   Is LACE score < 7 ? No   Eligibility Readm Mgmt   Discharge diagnosis laparoscopic drainage of pelvic abscess   Does the patient have one of the following disease processes/diagnoses(primary or secondary)? General Surgery   Prep survey completed? Yes            Erendira CABALLERO - Registered Nurse

## 2024-06-24 NOTE — CONSULTS
"Assessment:  Diagnosis: diverticulitis    No Known Allergies    Order Date/Time: 6/24/2024 0959  Indications: iv abx to 7/2  LABS:  Lab Results   Component Value Date    INR 1.19 (H) 06/17/2024    PROTIME 15.2 (H) 06/17/2024     No results found for: \"PTT\"  Lab Results   Component Value Date    WBC 11.51 (H) 06/24/2024    HGB 9.7 (L) 06/24/2024    HCT 30.8 (L) 06/24/2024    MCV 91.7 06/24/2024     06/24/2024     Lab Results   Component Value Date    BUN 26 (H) 06/24/2024     Lab Results   Component Value Date    CREATININE 1.13 06/24/2024     Lab Results   Component Value Date    EGFRIFNONA 92 10/28/2021     Labs Reviewed: all labs reviewed    Contraindications for PICC/Midline:  No contraindications noted    Recommendations:  PowerGlide Pro Midline Catheter; 18 g; 10 cm  Upper Right Basilic    Procedure Time Out:  Time out Time: 1100  Correct Patient Identity: Yes  Correct Surgical Side and Site Are Marked: Yes  Agreement on Procedure to be done: Yes  Antibiotic Given: N/A  RN:VIOLETTA Mendosa RN    PowerGlide Pro Midline Catheter placed with ultrasound guidance and verified by blood return. Minimal blood loss noted. Catheter flushes easily. Blood return noted. Patient nurse, Ed RN, made aware that midline is in upper R arm and ready for use.      Shabnam Mendosa RN    "

## 2024-06-24 NOTE — DISCHARGE SUMMARY
[facility] HOSPITALIST DISCHARGE SUMMARY    Patient Identification:  Name:  Samson Kendall  Age:  49 y.o.  Sex:  male  :  1975  MRN:  [id number]  Visit Number:  [id number]    Date of Admission: 2024  Date of Discharge:  24     PCP: Rosalina Pandya APRN    Discharging Provider: Lucio Kelly PA-C / Dr. Rowland     Discharge Diagnoses     Discharge Diagnoses:  Acute complicated diverticulitis with pelvic abscess  JOSSELIN, suspect prerenal and resolved  Hypokalemia    Secondary Diagnoses:  Hx nonischemic cardiomyopathy  HTN  HLD    Needs on follow up:  Infectious disease 1 week, general surgery 2 weeks  Consults/Procedures     Consults:   Consults       Date and Time Order Name Status Description    2024 12:59 PM Inpatient Infectious Diseases Consult Completed     2024  8:02 AM Inpatient General Surgery Consult              Procedures/Scans Performed:  Procedure(s):  laparoscopic drainage of pelvic abscess   CT abdomen and pelvis with contrast x 2  CT abdomen and pelvis with oral contrast      History of Presenting Illness     Chief Complaint   Patient presents with    Abdominal Pain    Rectal Pain       Patient is a 49 y.o. male who presented to [facility] complaining of abdominal pain, rectal pain.  Please see the admitting history and physical for further details.      Hospital Course     Patient was admitted to [facility] on 2024 following presentation to [facility] ED for further evaluation of abdominal pain, rectal pain.  Notably patient with recent ED visit secondary to abdominal pain, fevers, poor oral intake-found to have uncomplicated diverticulitis and discharged on Augmentin.  After return home patient reported symptoms had worsened which prompted a second ED visit.  Laboratory workup in the ED showed CRP 44 with WBC count 11 K and CT of the abdomen and pelvis noted sigmoid diverticulitis with contained perforation.  There was no obvious drainable abscess at that  time.  He was given cefepime and Flagyl and admitted for further management.  Antimicrobial coverage was continued with Zosyn.  General surgery was consulted for further assistance and recommendations.    General surgery did repeat CT of the abdomen with oral contrast day 2 of admission which showed concern for developing abscess.  Patient underwent laparoscopic drainage of the abscess with FATEMEH drain placement on 6/18/2024 with Dr. Haque.  Intraoperative cultures did grow VRE, ESBL E. coli, Candida glabrata, Klebsiella pneumoniae.  As such infectious disease was consulted for further assistance with antimicrobial management.  Given culture results ID transitioned to tigecycline and micafungin to be continued through 7/2/2024.  Patient's postoperative course has been largely noneventful-diet was advanced per general surgery as patient tolerated and he has been tolerating a full diet for over 48 hours at this time.  Clinically patient is reporting improvement as well with resolving abdominal pain.  CT of the abdomen and pelvis without contrast was obtained the morning of the date of discharge with drain noted in the lower pelvis with very minimal remaining pelvic fluid noted.    Of note on presentation to the hospital patient was noted with creatinine 1.85.  There is limited baseline data available for review though with BUN 34 and abdominal pain which had been limiting p.o. intake this was felt to be consistent with an acute prerenal JOSSELIN which has resolved with fluid replacement and improved oral intake with resolving abdominal pain.  On the date of discharge creatinine was remaining stable around 1.1.    Patient does have a history of nonischemic cardiomyopathy previously with mildly reduced ejection fraction of 46 to 50% in 2019.  Patient has remained clinically euvolemic during this admission and compensated from heart failure standpoint.  TTE was updated and showed an EF of 56 to 60%.  There was grade 1 diastolic  dysfunction and mild pulmonary hypertension.    Ultimately given clinical improvement with no further inpatient workup planned and antimicrobial recommendations in place per ID patient was felt to have reached the maximum benefit of the current hospitalization.  As such case was discussed with attending physician and agree he is stable for discharge home on this date.  Patient did have midline placed on the date of discharge to the right upper extremity.  He will continue micafungin, tigecycline through 7/2/2024 per ID recommendations at home with the assistance of his spouse.  Patient and spouse declined home health at discharge.  He will be referred to follow-up with ID in clinic within 1 week.  He will also follow-up with general surgery in 2 weeks.  Patient will need to have midline pulled in the infusion clinic after completion of therapy.  This discharge and follow-up plan was discussed with the patient and spouse next rest agreement understanding.    Discharge Vitals/Physical Examination     Vital Signs:  Temp:  [97.6 °F (36.4 °C)-98.4 °F (36.9 °C)] 97.7 °F (36.5 °C)  Heart Rate:  [56-71] 56  Resp:  [17-18] 18  BP: (126-130)/(66-75) 129/66  No data found.  SpO2 Percentage    06/23/24 1100 06/23/24 1854 06/24/24 0600   SpO2: 99% 98% 99%     SpO2:  [98 %-99 %] 99 %  on   ;   Device (Oxygen Therapy): room air    Body mass index is 30.03 kg/m².  Wt Readings from Last 3 Encounters:   06/20/24 84.4 kg (186 lb 1.1 oz)   06/14/24 86.2 kg (190 lb)   03/19/24 84.8 kg (187 lb)         Physical Exam:  Physical Exam  Vitals and nursing note reviewed.   Constitutional:       General: He is not in acute distress.  HENT:      Head: Normocephalic and atraumatic.   Eyes:      Extraocular Movements: Extraocular movements intact.   Cardiovascular:      Rate and Rhythm: Normal rate and regular rhythm.   Pulmonary:      Breath sounds: Normal breath sounds.   Abdominal:      General: There is no distension.   Musculoskeletal:       "Right lower leg: No edema.      Left lower leg: No edema.   Skin:     General: Skin is warm and dry.   Neurological:      Mental Status: He is alert. Mental status is at baseline.   Psychiatric:         Mood and Affect: Mood normal.         Behavior: Behavior normal.         Pertinent Laboratory/Radiology Results     Pertinent Laboratory Results:            Results from last 7 days   Lab Units 06/24/24  0122 06/23/24  0248 06/22/24  0053 06/21/24  0237   CRP mg/dL  --  3.11* 3.40* 4.52*   WBC 10*3/mm3 11.51* 11.03* 10.86* 9.63   HEMOGLOBIN g/dL 9.7* 10.0* 9.5* 9.5*   HEMATOCRIT % 30.8* 31.2* 31.0* 30.2*   MCV fL 91.7 90.7 94.8 91.0   MCHC g/dL 31.5 32.1 30.6* 31.5   PLATELETS 10*3/mm3 446 415 346 335     Results from last 7 days   Lab Units 06/24/24  0122 06/23/24  1230 06/23/24  0248 06/22/24  0053 06/21/24  1245 06/21/24  0237 06/20/24  0244   SODIUM mmol/L 141  --  141 140  --  143 141   POTASSIUM mmol/L 3.4* 4.5 3.5 3.7   < > 3.6 3.2*   MAGNESIUM mg/dL 2.3  --   --   --   --  2.0 1.6   CHLORIDE mmol/L 112*  --  111* 111*  --  112* 109*   CO2 mmol/L 17.4*  --  18.9* 17.0*  --  17.7* 19.6*   BUN mg/dL 26*  --  20 11  --  8 8   CREATININE mg/dL 1.13  --  1.07 1.08  --  1.06 1.08   CALCIUM mg/dL 8.9  --  9.0 8.7  --  8.6 8.5*   GLUCOSE mg/dL 125*  --  128* 150*  --  99 105*   ALBUMIN g/dL 3.4*  --  3.4* 3.1*  --  3.2* 2.9*   BILIRUBIN mg/dL <0.2  --  <0.2 <0.2  --  <0.2 <0.2   ALK PHOS U/L 50  --  46 37*  --  36* 34*   AST (SGOT) U/L 18  --  16 21  --  16 14   ALT (SGPT) U/L 14  --  15 14  --  11 10    < > = values in this interval not displayed.   Estimated Creatinine Clearance: 81.4 mL/min (by C-G formula based on SCr of 1.13 mg/dL).  No results found for: \"AMMONIA\"    No results found for: \"HGBA1C\", \"POCGLU\"  Lab Results   Component Value Date    HGBA1C 6.20 (H) 06/16/2024     Lab Results   Component Value Date    TSH 1.860 06/16/2024    FREET4 1.12 09/06/2019       Blood Culture   Date Value Ref Range Status " "  06/17/2024 No growth at 5 days  Final     No results found for: \"URINECX\"  No results found for: \"WOUNDCX\"  No results found for: \"STOOLCX\"  No results found for: \"RESPCX\"  Pain Management Panel           No data to display                Pertinent Radiology Results:  Imaging Results (All)       Procedure Component Value Units Date/Time    CT Abdomen Pelvis Without Contrast [[id number]] Collected: 06/24/24 1114     Updated: 06/24/24 1117    Narrative:      EXAM:    CT Abdomen and Pelvis Without Intravenous Contrast     EXAM DATE:    6/24/2024 10:31 AM     CLINICAL HISTORY:    Comparison, Pelvic abscess; K57.20-Diverticulitis of large intestine  with perforation and abscess without bleeding     TECHNIQUE:    Axial computed tomography images of the abdomen and pelvis without  intravenous contrast.  Sagittal and coronal reformatted images were  created and reviewed.  This CT exam was performed using one or more of  the following dose reduction techniques:  automated exposure control,  adjustment of the mA and/or kV according to patient size, and/or use of  iterative reconstruction technique.     COMPARISON:  6/17/2024     FINDINGS:    LUNG BASES:  Unremarkable as visualized.  No mass.  No consolidation.      ABDOMEN:    LIVER:  Unremarkable as visualized.    GALLBLADDER AND BILE DUCTS:  Unremarkable as visualized.  No calcified  stones.  No ductal dilation.    PANCREAS:  Unremarkable as visualized.  No ductal dilation.    SPLEEN:  Unremarkable as visualized.  No splenomegaly.    ADRENALS:  Unremarkable as visualized.  No mass.    KIDNEYS AND URETERS:  Unremarkable as visualized.  No obstructing  stones.  No hydronephrosis.    STOMACH AND BOWEL:  Unremarkable as visualized.  No obstruction.  No  mucosal thickening.      PELVIS:    APPENDIX:  No findings to suggest acute appendicitis.    BLADDER:  Unremarkable as visualized.  No stones.    REPRODUCTIVE:  Unremarkable as visualized.      ABDOMEN and PELVIS:    " INTRAPERITONEAL SPACE:  Drain noted within the lower pelvis with very  minimal remaining pelvic fluid.  No free air.    BONES/JOINTS:  No acute fracture.  No dislocation.    SOFT TISSUES:  Small umbilical hernia.    VASCULATURE:  Unremarkable as visualized.  No abdominal aortic  aneurysm.    LYMPH NODES:  Unremarkable as visualized.  No enlarged lymph nodes.       Impression:        Drain noted within the lower pelvis with very minimal remaining pelvic  fluid.        This report was finalized on 6/24/2024 11:15 AM by Dr. Colby Marley MD.       [place] yana endo (surgery) [[id number]] Resulted: 06/18/24 1403     Updated: 06/18/24 1403    Narrative:      This procedure was auto-finalized with no dictation required.    CT Abdomen Pelvis Without Contrast [[id number]] Collected: 06/17/24 1325     Updated: 06/17/24 1331    Narrative:      EXAM:    CT Abdomen and Pelvis Without Intravenous Contrast     EXAM DATE:    6/17/2024 1:10 PM     CLINICAL HISTORY:    diverticulitis with temp 102; K57.20-Diverticulitis of large intestine  with perforation and abscess without bleeding     TECHNIQUE:    Axial computed tomography images of the abdomen and pelvis without  intravenous contrast.  Sagittal and coronal reformatted images were  created and reviewed.  This CT exam was performed using one or more of  the following dose reduction techniques:  automated exposure control,  adjustment of the mA and/or kV according to patient size, and/or use of  iterative reconstruction technique.     COMPARISON:    6/16/2024     FINDINGS:    Lung bases:  Lung bases are clear.  No consolidation.      ABDOMEN:    Liver:  Fatty infiltration of liver.    Gallbladder and bile ducts:  Unremarkable as visualized.  No calcified  stones.  No ductal dilation.    Pancreas:  Unremarkable as visualized.  No ductal dilation.    Spleen:  Unremarkable as visualized.  No splenomegaly.    Adrenals:  Unremarkable as visualized.  No mass.    Kidneys and ureters:   Unremarkable as visualized.  No obstructing  stones.  No hydronephrosis.    Stomach and bowel:  Air-fluid collection is again noted extending from  the rectosigmoid junction in the left iliac fossa compatible with  localized contained perforation. May represent early evolving abscess  although no thick walled features identified and no focal areas yet  amenable to percutaneous drainage.  Sigmoid diverticulitis changes are  again noted with the degree of inflammation that appears slightly  improved.  Maximal thickness of the gas/fluid collection in the lower  pelvis is at the level of the rectosigmoid junction and is 2.64 cm with  the more superior air-containing component approximately 2.65 cm and  lies along the left common iliac vasculature.  No obstruction.      PELVIS:    Appendix:  No findings to suggest acute appendicitis.    Bladder:  Incomplete distention of urinary bladder likely accounts for  wall thickening. Secondary cystitis not excluded.    Reproductive:  Unremarkable as visualized.      ABDOMEN and PELVIS:    Intraperitoneal space:  Unremarkable as visualized.  No significant  contrast is identified within the gas-fluid collection in the lower  pelvis.    Bones/joints:  See above.    Soft tissues:  Fat only containing umbilical hernia, stable.    Vasculature:  Unremarkable as visualized.  No abdominal aortic  aneurysm.    Lymph nodes:  Unremarkable as visualized.  No enlarged lymph nodes.       Impression:      1.  Air-fluid collection is again noted extending from the rectosigmoid  junction into the left iliac fossa compatible with localized contained  perforation and may represent early evolving abscess although no thick  walled features identified and no focal areas that are yet amenable to  percutaneous drainage.  2.  No significant contrast is identified within the gas-fluid  collection in the lower pelvis.  3.  Sigmoid diverticulitis changes are again noted with the degree of  inflammation that  appears slightly improved.  4.  Incomplete distention of urinary bladder likely accounts for wall  thickening. Secondary cystitis not excluded.  5.  Fat only containing umbilical hernia, stable.  6.  Maximal thickness of the gas/fluid collection in the lower pelvis is  at the level of the rectosigmoid junction and is 2.64 cm with the more  superior air-containing component approximately 2.65 cm and lies along  the left common iliac vasculature.  7.  Fatty infiltration of liver.        This report was finalized on 6/17/2024 1:29 PM by Dr. Robert Ma MD.       CT Abdomen Pelvis With Contrast [[id number]] Collected: 06/16/24 1453     Updated: 06/16/24 1457    Narrative:      INDICATION: Abdominal pain. Rectal pain     COMPARISON: CT from 6/14/2024.     TECHNIQUE: Axial CT images of the abdomen and pelvis were obtained  following IV contrast administration. Coronal and sagittal reformations  were reviewed.     FINDINGS:  Visualized lung bases appear unremarkable.     The liver, gallbladder, spleen, pancreas and adrenal glands appear  unremarkable. No hydronephrosis. Punctate left renal calculus. Small  bilateral renal cysts. Fat-containing umbilical hernia.     Moderate inflammation surrounding the sigmoid colon with wall thickening  and adjacent inflammatory fat stranding. Small to moderate amount of  fluid adjacent to the sigmoid colon. No rim-enhancing drainable abscess  at this time. A few small foci of air likely related to contained  perforation. Small bowel dilation.     Moderate urinary bladder wall thickening. Small fat-containing bilateral  inguinal hernias     Degenerative changes in the spine. No acute osseous abnormality evident.       Impression:      1.  Sigmoid diverticulitis. Moderate inflammation surrounding the  sigmoid colon with wall thickening and adjacent inflammatory fat  stranding. Small to moderate amount of fluid adjacent to the sigmoid  colon. No rim-enhancing drainable abscess at this  time. A few small foci  of air likely related to contained perforation.  2.  Small bowel dilation could relate to ileus from above inflammation  or obstruction. Follow-up recommended.  3.  Moderate urinary bladder wall thickening likely from above  inflammation.        This report was finalized on 6/16/2024 2:55 PM by Alex Pallas, DO.               Discharge Disposition/Discharge Medications/Discharge Appointments     Discharge Disposition:   Home or Self Care    Condition at Discharge:  Stable     Discharge Medications:     Your medication list        START taking these medications        Instructions Last Dose Given Next Dose Due   micafungin sodium 100 mg in sodium chloride 0.9 % 100 mL IVPB      Infuse 100 mg into a venous catheter Daily for 9 doses. Please remove patient's Midline at completion of therapy  Indications: Serious Candida Infection in Unusual Areas of the Body       mupirocin 2 % ointment  Commonly known as: BACTROBAN      Apply in each nostiril 2 (Two) Times a Day for 9 doses.       tigecycline 50 mg in sodium chloride 0.9 % 100 mL IVPB      Infuse 50 mg into a venous catheter Every 12 (Twelve) Hours for 15 doses. Indications: Infection Within the Abdomen              CONTINUE taking these medications        Instructions Last Dose Given Next Dose Due   anastrozole 1 MG tablet  Commonly known as: ARIMIDEX      Take 1 tablet by mouth Daily.       OMEOR-MAG-ZINC PO      Take 1 tablet by mouth 2 (Two) Times a Day.       cyanocobalamin 1000 MCG/ML injection      Inject 1 mL into the appropriate muscle as directed by prescriber Every 30 (Thirty) Days.       dexlansoprazole 60 MG capsule  Commonly known as: DEXILANT      Take 1 capsule by mouth Daily.       fenofibrate 145 MG tablet  Commonly known as: TRICOR      Take 1 tablet by mouth Daily.       folic acid 1 MG tablet  Commonly known as: FOLVITE      Take 1 tablet by mouth Daily.       furosemide 20 MG tablet  Commonly known as: LASIX      Take 1  tablet by mouth Daily.       glucosamine-chondroitin 500-400 MG capsule capsule      Take 1 capsule by mouth 2 (Two) Times a Day With Meals.       HYDROcodone-acetaminophen 7.5-325 MG per tablet  Commonly known as: NORCO      Take 1 tablet by mouth Every 8 (Eight) Hours As Needed for Moderate Pain.       levocetirizine 5 MG tablet  Commonly known as: XYZAL      Take 1 tablet by mouth Every Evening.       meloxicam 15 MG tablet  Commonly known as: MOBIC      Take 1 tablet by mouth Daily.       metoprolol tartrate 50 MG tablet  Commonly known as: LOPRESSOR      Take 1 tablet by mouth 2 (Two) Times a Day.       ondansetron ODT 4 MG disintegrating tablet  Commonly known as: ZOFRAN-ODT      Place 1 tablet on the tongue Every 8 (Eight) Hours As Needed for Vomiting.       rosuvastatin 10 MG tablet  Commonly known as: CRESTOR      Take 1 tablet by mouth Daily.       Testosterone Cypionate 200 MG/ML injection  Commonly known as: DEPOTESTOTERONE CYPIONATE      Inject 0.5 mL into the appropriate muscle as directed by prescriber Every 7 (Seven) Days.       Tirzepatide 7.5 MG/0.5ML solution pen-injector pen  Commonly known as: MOUNJARO      Inject 0.5 mL under the skin into the appropriate area as directed 1 (One) Time Per Week.       topiramate 100 MG tablet  Commonly known as: TOPAMAX      Take 1 tablet by mouth 2 (Two) Times a Day.              STOP taking these medications      amoxicillin-clavulanate 875-125 MG per tablet  Commonly known as: AUGMENTIN        doxycycline 100 MG capsule  Commonly known as: MONODOX                  Where to Get Your Medications        These medications were sent to [id number]      Hours: Monday to Friday 7 AM to 6 PM Phone: [phone]   mupirocin 2 % ointment  rosuvastatin 10 MG tablet       Information about where to get these medications is not yet available    Ask your nurse or doctor about these medications  micafungin sodium 100 mg  in sodium chloride 0.9 % 100 mL IVPB  tigecycline 50 mg in sodium chloride 0.9 % 100 mL IVPB          Discharge Diet:  ad vahe    Discharge Activity:  ad vahe      Time spent on this discharge exceeded 30 minutes.

## 2024-06-24 NOTE — CASE MANAGEMENT/SOCIAL WORK
Case Management Discharge Note      Final Note: Pt is being d/c home. Pt will receive antibiotics prior to leaving today. IV antibiotics and supplies will be delivered to pt's home later today per [employer]. Pt's spouse will transport.            Final Discharge Disposition Code: 01 - home or self-care

## 2024-06-24 NOTE — PLAN OF CARE
Goal Outcome Evaluation:              Outcome Evaluation: Pt rested in bed this shift with wife at bedside. PRN pain meds given. FATEMEH drain stripped and emptied as ordered. IV abx given as scheduled. Pt ambulated to restroom several times, VSS and will continue with POC.

## 2024-06-24 NOTE — DISCHARGE PLACEMENT REQUEST
"Samson Carbajal (49 y.o. Male)       Date of Birth   1975    Social Security Number       Address   [address] [address]    Home Phone   [phone]    MRN   [id number]       [facility]    Marital Status                               Admission Date   24    Admission Type   Emergency    Admitting Provider   Stefan Carbajal DO    Attending Provider   Luc Rowland DO    Department, Room/Bed   [address], 3334/1P       Discharge Date       Discharge Disposition       Discharge Destination                                 Attending Provider: Luc Rowland DO    Allergies: No Known Allergies    Isolation: Contact   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), ESBL E coli (24)   Code Status: CPR    Ht: 167.6 cm (66\")   Wt: 84.4 kg (186 lb 1.1 oz)    Admission Cmt: None   Principal Problem: Diverticulitis [K57.92]                   Active Insurance as of 2024       Primary Coverage       Payor Plan Insurance Group Employer/Plan Group    [facility] [employer] [id number]       Payor Plan Address Payor Plan Phone Number Payor Plan Fax Number Effective Dates    [address] [phone]  2019 - None Entered    [place] [address]         Subscriber Name Subscriber Birth Date Member ID       SAMSON CARBAJAL 1975 [id number]                     Emergency Contacts        (Rel.) Home Phone Work Phone Mobile Phone    Roxanne Carbajal (Mother) [phone] -- --    CLARA BELLAMY (Spouse) [phone] -- --               Marjorie Grigsby APRN at 24 1218                     PROGRESS NOTE         Patient Identification:  Name:  Samson Carbajal  Age:  49 y.o.  Sex:  male  :  1975  MRN:  [id number]  Visit Number:  [id number]  Primary Care Provider:  Rosalina Pandya APRN         LOS: 7 days "       ----------------------------------------------------------------------------------------------------------------------  Subjective       Chief Complaints:    Abdominal Pain and Rectal Pain        Interval History:      Patient resting comfortably in bed.  Wife at bedside.  Currently on room air with no apparent distress.  Reports improved pain.  Afebrile, denies any diarrhea.  WBC slightly elevated 11.03.  CRP improving at 3.11.  Body fluid culture from 6/18/2024 finalized as Candida glabrata, E. coli ESBL, Enterococcus faecium VRE, Klebsiella pneumoniae.     Review of Systems:    Constitutional: no fever, chills and night sweats.  Generalized fatigue.  Eyes: no eye drainage, itching or redness.  HEENT: no mouth sores, dysphagia or nose bleed.  Respiratory: no for shortness of breath, cough or production of sputum.  Cardiovascular: no chest pain, no palpitations, no orthopnea.  Gastrointestinal: no nausea, vomiting or diarrhea. No abdominal pain, hematemesis or rectal bleeding.  Genitourinary: no dysuria or polyuria.  Hematologic/lymphatic: no lymph node abnormalities, no easy bruising or easy bleeding.  Musculoskeletal: no muscle or joint pain.  Skin: No rash and no itching.  Neurological: no loss of consciousness, no seizure, no headache.  Behavioral/Psych: no depression or suicidal ideation.  Endocrine: no hot flashes.  Immunologic: negative.    ----------------------------------------------------------------------------------------------------------------------      Objective       Current Hospital Meds:  acetaminophen, 1,000 mg, Oral, Q6H  anastrozole, 1 mg, Oral, Daily  cetirizine, 10 mg, Oral, Daily  cyanocobalamin, 1,000 mcg, Intramuscular, Q30 Days  enoxaparin, 40 mg, Subcutaneous, Nightly  folic acid, 1 mg, Oral, Daily  ketorolac, 15 mg, Intravenous, Q6H  methocarbamol, 750 mg, Oral, 4x Daily  metoprolol tartrate, 50 mg, Oral, BID  micafungin (MYCAMINE) IV, 100 mg, Intravenous, Q24H  pantoprazole, 40  mg, Intravenous, Q AM  polyethylene glycol, 17 g, Oral, Daily  rosuvastatin, 10 mg, Oral, Daily  sodium chloride, 10 mL, Intravenous, Q12H  Testosterone Cypionate, 100 mg, Intramuscular, Q7 Days  tigecycline, 50 mg, Intravenous, Q12H  topiramate, 100 mg, Oral, BID         ----------------------------------------------------------------------------------------------------------------------    Vital Signs:  Temp:  [97.8 °F (36.6 °C)-98.9 °F (37.2 °C)] 98.4 °F (36.9 °C)  Heart Rate:  [57-67] 58  Resp:  [16-20] 16  BP: (124-163)/(68-81) 138/68  Mean Arterial Pressure (Non-Invasive) for the past 24 hrs (Last 3 readings):   Noninvasive MAP (mmHg)   06/22/24 1300 101     SpO2 Percentage    06/22/24 1300 06/23/24 0700 06/23/24 1100   SpO2: 99% 99% 99%     SpO2:  [99 %] 99 %  on   ;   Device (Oxygen Therapy): room air    Body mass index is 30.03 kg/m².  Wt Readings from Last 3 Encounters:   06/20/24 84.4 kg (186 lb 1.1 oz)   06/14/24 86.2 kg (190 lb)   03/19/24 84.8 kg (187 lb)        Intake/Output Summary (Last 24 hours) at 6/23/2024 1218  Last data filed at 6/23/2024 0500  Gross per 24 hour   Intake 590 ml   Output 20 ml   Net 570 ml     Diet: Regular/House; Fluid Consistency: Thin (IDDSI 0)  ----------------------------------------------------------------------------------------------------------------------      Physical Exam:    Constitutional:  Well-developed and well-nourished.  No respiratory distress.  Resting comfortably in bed.  Wife at bedside.  HENT:  Head: Normocephalic and atraumatic.  Mouth:  Moist mucous membranes.    Eyes:  Conjunctivae and EOM are normal.  No scleral icterus.  Neck:  Neck supple.  No JVD present.    Cardiovascular:  Normal rate, regular rhythm and normal heart sounds with no murmur. No edema.  Pulmonary/Chest:  No respiratory distress, no wheezes, no crackles, with normal breath sounds and good air movement.  Abdominal:  Soft.  Bowel sounds are normal.  No distension and no tenderness.    Musculoskeletal:  No edema, no tenderness, and no deformity.  No swelling or redness of joints.  Neurological:  Alert and oriented to person, place, and time.  No facial droop.  No slurred speech.   Skin:  Skin is warm and dry.  No rash noted.  No pallor.  Perirectal incision with FATEMEH drain in place, serosanguineous drainage.   Psychiatric:  Normal mood and affect.  Behavior is normal.        ----------------------------------------------------------------------------------------------------------------------  Results from last 7 days   Lab Units 06/16/24  1344   CK TOTAL U/L 79       Results from last 7 days   Lab Units 06/16/24  1344   CHOLESTEROL mg/dL 131   TRIGLYCERIDES mg/dL 224*   HDL CHOL mg/dL 21*   LDL CHOL mg/dL 73       Results from last 7 days   Lab Units 06/23/24  0248 06/22/24  0053 06/21/24  0237 06/18/24  0108 06/17/24  1014 06/17/24  0054 06/16/24  1615   CRP mg/dL 3.11* 3.40* 4.52*   < >  --   --   --    LACTATE mmol/L  --   --   --   --  0.9  --  2.0   WBC 10*3/mm3 11.03* 10.86* 9.63   < >  --  10.77  --    HEMOGLOBIN g/dL 10.0* 9.5* 9.5*   < >  --  10.4*  --    HEMATOCRIT % 31.2* 31.0* 30.2*   < >  --  32.6*  --    MCV fL 90.7 94.8 91.0   < >  --  91.8  --    MCHC g/dL 32.1 30.6* 31.5   < >  --  31.9  --    PLATELETS 10*3/mm3 415 346 335   < >  --  258  --    INR   --   --   --   --   --  1.19*  --     < > = values in this interval not displayed.     Results from last 7 days   Lab Units 06/23/24  0248 06/22/24  0053 06/21/24  1245 06/21/24  0237 06/20/24  0244   SODIUM mmol/L 141 140  --  143 141   POTASSIUM mmol/L 3.5 3.7 3.9 3.6 3.2*   MAGNESIUM mg/dL  --   --   --  2.0 1.6   CHLORIDE mmol/L 111* 111*  --  112* 109*   CO2 mmol/L 18.9* 17.0*  --  17.7* 19.6*   BUN mg/dL 20 11  --  8 8   CREATININE mg/dL 1.07 1.08  --  1.06 1.08   CALCIUM mg/dL 9.0 8.7  --  8.6 8.5*   GLUCOSE mg/dL 128* 150*  --  99 105*   ALBUMIN g/dL 3.4* 3.1*  --  3.2* 2.9*   BILIRUBIN mg/dL <0.2 <0.2  --  <0.2 <0.2   ALK  "PHOS U/L 46 37*  --  36* 34*   AST (SGOT) U/L 16 21  --  16 14   ALT (SGPT) U/L 15 14  --  11 10   Estimated Creatinine Clearance: 86 mL/min (by C-G formula based on SCr of 1.07 mg/dL).  No results found for: \"AMMONIA\"    No results found for: \"HGBA1C\", \"POCGLU\"    Lab Results   Component Value Date    HGBA1C 6.20 (H) 06/16/2024     Lab Results   Component Value Date    TSH 1.860 06/16/2024    FREET4 1.12 09/06/2019       Blood Culture   Date Value Ref Range Status   06/17/2024 No growth at 4 days  Preliminary   06/17/2024 No growth at 5 days  Final   06/16/2024 No growth at 5 days  Final   06/16/2024 No growth at 5 days  Final     No results found for: \"URINECX\"  No results found for: \"WOUNDCX\"  No results found for: \"STOOLCX\"  No results found for: \"RESPCX\"  Pain Management Panel           No data to display                  ----------------------------------------------------------------------------------------------------------------------  Imaging Results (Last 24 Hours)       ** No results found for the last 24 hours. **            ----------------------------------------------------------------------------------------------------------------------    Pertinent Infectious Disease Results                Assessment/Plan       Assessment         Pelvic abscess secondary to diverticulitis     Plan        Patient resting comfortably in bed.  Wife at bedside.  Currently on room air with no apparent distress.  Reports improved pain.  Afebrile, denies any diarrhea.  WBC slightly elevated 11.03.  CRP improving at 3.11.  Body fluid culture from 6/18/2024 finalized as Candida glabrata, E. coli ESBL, Enterococcus faecium VRE, Klebsiella pneumoniae.     Case discussed with microbiology and ESBL E. coli, Enterococcus faecium VRE and Klebsiella pneumonia are all susceptible to tigecycline.  For now we will continue tigecycline 50 mg IV every 12 hours and micafungin 100 mg IV every 24 hours. Recommend to continue antibiotic " therapy through 7/2/2024.  An alternative oral option would be fluconazole 400 mg p.o. daily, linezolid 600 mg p.o. twice daily and Bactrim DS 1 tab p.o. twice daily.  At this option was also presented to the patient and patient's spouse.  At this time they wish to evaluate the cost of IV antibiotic therapy as patient develops GI upset easily and is worried he would not be able to tolerate multiple antimicrobial agents by mouth.   Patient will require outpatient infectious disease follow-up.      ANTIMICROBIAL THERAPY    amoxicillin-clavulanate - 875-125 MG  doxycycline - 100 MG  micafungin (MYCAMINE) IVPB  tigecycline (TYGACIL) 50 mg IVPB in 100 mL NS (VTB)     Code Status:   Code Status and Medical Interventions:   Ordered at: 06/16/24 9813     Code Status (Patient has no pulse and is not breathing):    CPR (Attempt to Resuscitate)     Medical Interventions (Patient has pulse or is breathing):    Full Support       JOSE J Mcmahon  06/23/24  12:18 EDT    Electronically signed by Marjorie Grigsby APRN at 06/23/24 1228

## 2024-06-25 NOTE — PAYOR COMM NOTE
"CONTACT:  PAGE WHITT RN  UTILIZATION MANAGEMENT DEPT.   [facility]   [address] [facility], [address]   PHONE:  [phone]   FAX: [phone]     ADDITIONAL CLINICALS FOR REVIEW--DC HOME 6/24/24---AUTH PENDING    REF # [id number]          Samson Kendall (49 y.o. Male)       Date of Birth   1975    Social Security Number       Address   [address] [facility] [address] [facility] [address]    Home Phone   [phone]    MRN   [id number]       [facility]    Marital Status                               Admission Date   6/16/24    Admission Type   Emergency    Admitting Provider   Stefan Kendall DO    Attending Provider       Department, Room/Bed   [address] 1284/       Discharge Date   6/24/2024    Discharge Disposition   Home or Self Care    Discharge Destination                                 Attending Provider: (none)   Allergies: No Known Allergies    Isolation: None   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), 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)   Code Status: Prior    Ht: 167.6 cm (66\")   Wt: 84.4 kg (186 lb 1.1 oz)    Admission Cmt: None   Principal Problem: Diverticulitis [K57.92]                   Active Insurance as of 6/16/2024       Primary Coverage       Payor Plan Insurance Group Employer/Plan Group    [employer] EMPL[employer] [id number]       Payor Plan Address Payor Plan Phone Number Payor Plan Fax Number Effective Dates    [address] [phone]  11/1/2019 - None Entered    [place] [address]         Subscriber Name Subscriber Birth Date Member ID       SAMSON KENDALL 1975 [id number]                     Emergency Contacts        (Rel.) Home Phone Work Phone Mobile Phone    Roxanne Kendall (Mother) [phone] -- --    CLARA BELLAMY (Spouse) [phone] -- --              Orders (last 72 hrs)        Start     Ordered    06/25/24 0600  CBC & Differential  Morning Draw,   Status:  Canceled         06/24/24 0714    " 06/25/24 0600  Basic Metabolic Panel  Morning Draw,   Status:  Canceled         06/24/24 0714    06/24/24 1401  Discontinue Telemetry  Once         06/24/24 1406    06/24/24 1357  Discharge patient  Once         06/24/24 1406    06/24/24 1300  mupirocin (BACTROBAN) 2 % nasal ointment 1 Application  2 Times Daily,   Status:  Discontinued         06/24/24 1152    06/24/24 1247  Potassium  Timed         06/24/24 0209    06/24/24 1245  sodium chloride 0.9 % flush 10 mL  Every 12 Hours Scheduled,   Status:  Discontinued         06/24/24 1152    06/24/24 1153  Connectors / Hubs Must Be Scrubbed 15 Seconds Using 70% Alcohol Before Access - Allow to Dry Before Accessing Line  Continuous,   Status:  Canceled         06/24/24 1152    06/24/24 1153  Change CHG Dressing or Transparent Dressing with CHG Disk, Needleless Connectors and Securement Device Every 7 Days  Weekly,   Status:  Canceled        Comments: Per [facility] Policy    06/24/24 1152    06/24/24 1153  Discontinue mupirocin for decolonization after 5 days or sooner if patient no longer has a central venous access device, accessed port, hemodialysis catheter, or midline  Continuous,   Status:  Canceled         06/24/24 1152    06/24/24 1152  sodium chloride 0.9 % flush 10 mL  As Needed,   Status:  Discontinued         06/24/24 1152    06/24/24 1152  sodium chloride 0.9 % infusion 40 mL  As Needed,   Status:  Discontinued         06/24/24 1152    06/24/24 1152  Change Dressing to IV Site As Needed When Damp, Loose or Soiled  As Needed,   Status:  Canceled       06/24/24 1152    06/24/24 1152  Change Needleless Connectors  As Needed,   Status:  Canceled      Comments: Change Needleless Connectors When:  - Administration Set Changed  - Dressing Changed  - Removed For Any Reason  - Residual Blood or Debris Within Connector  - Prior to Drawing Blood Cultures  - Contamination of Connector  - After Administration of Blood or Blood Components    06/24/24 1152    06/24/24 0959   Midline Consult  Once        Provider:  (Not yet assigned)    06/24/24 0959    06/24/24 0811  CT Abdomen Pelvis Without Contrast  1 Time Imaging         06/24/24 0810    06/24/24 0600  CBC & Differential  Morning Draw         06/23/24 0815    06/24/24 0600  Basic Metabolic Panel  Morning Draw,   Status:  Canceled         06/23/24 0815    06/24/24 0600  Magnesium  Morning Draw         06/23/24 0815    06/24/24 0600  CBC Auto Differential  PROCEDURE ONCE         06/23/24 2201    06/24/24 0300  potassium chloride (KLOR-CON M20) CR tablet 40 mEq  Every 4 Hours         06/24/24 0209    06/24/24 0000  Ambulatory Referral to Infectious Disease  Status:  Canceled         06/24/24 1044    06/24/24 0000  tigecycline 50 mg in sodium chloride 0.9 % 100 mL IVPB  Every 12 Hours         06/24/24 1338    06/24/24 0000  micafungin sodium 100 mg in sodium chloride 0.9 % 100 mL IVPB  Every 24 Hours,   Status:  Discontinued         06/24/24 1338    06/24/24 0000  Ambulatory Referral to Infectious Disease         06/24/24 1406    06/24/24 0000  micafungin sodium 100 mg in sodium chloride 0.9 % 100 mL IVPB  Every 24 Hours         06/24/24 1406    06/24/24 0000  mupirocin (BACTROBAN) 2 % ointment  2 Times Daily         06/24/24 1406    06/24/24 0000  Ambulatory Referral to General Surgery         06/24/24 1406    06/24/24 0000  rosuvastatin (CRESTOR) 10 MG tablet  Daily         06/24/24 1406    06/24/24 0000  Discharge Follow-up with PCP         06/24/24 1535    06/23/24 1238  Potassium  Timed         06/23/24 0337    06/23/24 0837  C-reactive Protein  Once         06/23/24 0836    06/23/24 0836  C-reactive Protein  Once,   Status:  Canceled         06/23/24 0835    06/23/24 0600  CBC & Differential  Morning Draw         06/22/24 0815    06/23/24 0600  Basic Metabolic Panel  Morning Draw,   Status:  Canceled         06/22/24 0815    06/23/24 0600  CBC Auto Differential  PROCEDURE ONCE         06/22/24 2201    06/23/24 0430  potassium  "chloride (KLOR-CON M20) CR tablet 40 mEq  Every 4 Hours         06/23/24 0337    06/22/24 0900  polyethylene glycol (MIRALAX) packet 17 g  Daily,   Status:  Discontinued         06/21/24 0743    06/22/24 0618  HYDROmorphone (DILAUDID) injection 0.5 mg  Every 4 Hours PRN,   Status:  Discontinued         06/22/24 0618    06/22/24 0300  tigecycline (TYGACIL) 50 mg in sodium chloride 0.9 % 100 mL IVPB-VTB  Every 12 Hours,   Status:  Discontinued        Placed in \"Followed by\" Linked Group    06/21/24 1323    06/21/24 1500  micafungin sodium (MYCAMINE) 100 mg in sodium chloride 0.9 % 100 mL IVPB  Every 24 Hours,   Status:  Discontinued         06/21/24 1323    06/21/24 0744  oxyCODONE (ROXICODONE) immediate release tablet 10 mg  Every 4 Hours PRN,   Status:  Discontinued         06/21/24 0745    06/21/24 0000  Testosterone Cypionate (DEPOTESTOTERONE CYPIONATE) injection 100 mg  Every 7 Days,   Status:  Discontinued         06/17/24 0816    06/20/24 1500  acetaminophen (TYLENOL) tablet 1,000 mg  Every 6 Hours,   Status:  Discontinued         06/20/24 1125    06/20/24 1200  methocarbamol (ROBAXIN) tablet 750 mg  4 Times Daily,   Status:  Discontinued         06/20/24 1125    06/20/24 1200  ketorolac (TORADOL) injection 15 mg  Every 6 Hours,   Status:  Discontinued         06/20/24 1125    06/20/24 0817  Potassium Replacement - Follow Nurse / BPA Driven Protocol  As Needed,   Status:  Discontinued         06/20/24 0817    06/20/24 0817  Magnesium Standard Dose Replacement - Follow Nurse / BPA Driven Protocol  As Needed,   Status:  Discontinued         06/20/24 0817    06/20/24 0817  Phosphorus Replacement - Follow Nurse / BPA Driven Protocol  As Needed,   Status:  Discontinued         06/20/24 0817    06/20/24 0817  Calcium Replacement - Follow Nurse / BPA Driven Protocol  As Needed,   Status:  Discontinued         06/20/24 0817    06/19/24 0746  Strip Bulb Suction  Every Shift,   Status:  Canceled       06/19/24 0745    " 06/17/24 0915  topiramate (TOPAMAX) tablet 100 mg  2 Times Daily,   Status:  Discontinued         06/17/24 0816    06/17/24 0915  anastrozole (ARIMIDEX) tablet 1 mg  Daily,   Status:  Discontinued         06/17/24 0816    06/17/24 0915  metoprolol tartrate (LOPRESSOR) tablet 50 mg  2 Times Daily,   Status:  Discontinued         06/17/24 0816    06/17/24 0915  cyanocobalamin injection 1,000 mcg  Every 30 Days,   Status:  Discontinued         06/17/24 0816    06/17/24 0915  folic acid (FOLVITE) tablet 1 mg  Daily,   Status:  Discontinued         06/17/24 0816    06/17/24 0915  cetirizine (zyrTEC) tablet 10 mg  Daily,   Status:  Discontinued         06/17/24 0816    06/17/24 0814  ondansetron ODT (ZOFRAN-ODT) disintegrating tablet 4 mg  Every 8 Hours PRN,   Status:  Discontinued         06/17/24 0816    06/17/24 0600  Comprehensive Metabolic Panel  Daily,   Status:  Canceled       06/16/24 1542    06/17/24 0600  pantoprazole (PROTONIX) injection 40 mg  Every Early Morning,   Status:  Discontinued         06/16/24 1734    06/16/24 2100  sodium chloride 0.9 % flush 10 mL  Every 12 Hours Scheduled,   Status:  Discontinued         06/16/24 1734 06/16/24 2100  Enoxaparin Sodium (LOVENOX) syringe 40 mg  Nightly,   Status:  Discontinued         06/16/24 1734    06/16/24 2000  Vital Signs  Every 4 Hours,   Status:  Canceled       06/16/24 1734    06/16/24 1900  rosuvastatin (CRESTOR) tablet 10 mg  Daily,   Status:  Discontinued         06/16/24 1734    06/16/24 1800  Oral Care  2 Times Daily,   Status:  Canceled       06/16/24 1734    06/16/24 1800  Incentive Spirometry  Every 4 Hours While Awake,   Status:  Canceled       06/16/24 1734    06/16/24 1735  Intake & Output  Every Shift,   Status:  Canceled       06/16/24 1734    06/16/24 1734  Up With Assistance  As Needed,   Status:  Canceled       06/16/24 1734    06/16/24 1734  sennosides-docusate (PERICOLACE) 8.6-50 MG per tablet 2 tablet  2 Times Daily PRN,   Status:   "Discontinued        Placed in \"And\" Linked Group    06/16/24 1734    06/16/24 1734  polyethylene glycol (MIRALAX) packet 17 g  Daily PRN,   Status:  Discontinued        Placed in \"And\" Linked Group    06/16/24 1734    06/16/24 1734  bisacodyl (DULCOLAX) EC tablet 5 mg  Daily PRN,   Status:  Discontinued        Placed in \"And\" Linked Group    06/16/24 1734    06/16/24 1734  bisacodyl (DULCOLAX) suppository 10 mg  Daily PRN,   Status:  Discontinued        Placed in \"And\" Linked Group    06/16/24 1734    06/16/24 1734  prochlorperazine (COMPAZINE) injection 10 mg  Every 6 Hours PRN,   Status:  Discontinued         06/16/24 1734    06/16/24 1734  Patient May Use Home CPAP / BIPAP For Sleep or As Needed  As Needed,   Status:  Canceled       06/16/24 1734    06/16/24 1734  sodium chloride 0.9 % flush 10 mL  As Needed,   Status:  Discontinued         06/16/24 1734    06/16/24 1734  sodium chloride 0.9 % infusion 40 mL  As Needed,   Status:  Discontinued         06/16/24 1734    06/16/24 1317  sodium chloride 0.9 % flush 10 mL  As Needed,   Status:  Discontinued        Placed in \"And\" Linked Group    06/16/24 1317    06/16/24 1317  sodium chloride 0.9 % flush 10 mL  As Needed,   Status:  Discontinued         06/16/24 1317    Signed and Held  HYDROmorphone (DILAUDID) injection 1 mg  Every 2 Hours PRN,   Status:  Canceled         Signed and Held    Signed and Held  lactated ringers bolus 1,000 mL  Once,   Status:  Canceled         Signed and Held    --  anastrozole (ARIMIDEX) 1 MG tablet  Daily         06/16/24 1612    --  metoprolol tartrate (LOPRESSOR) 50 MG tablet  2 Times Daily         06/16/24 1616    --  levocetirizine (XYZAL) 5 MG tablet  Every Evening         06/16/24 1801    --  cyanocobalamin 1000 MCG/ML injection  Every 30 Days         06/16/24 1801    --  furosemide (LASIX) 20 MG tablet  Daily         06/16/24 1803    --  Tirzepatide (MOUNJARO) 7.5 MG/0.5ML solution pen-injector pen  Weekly         06/16/24 1807 "    --  folic acid (FOLVITE) 1 MG tablet  Daily         24    --  glucosamine-chondroitin 500-400 MG capsule capsule  2 Times Daily With Meals         24                     Physician Progress Notes (last 72 hours)        Marjorie Grigsby APRN at 24 1042                     PROGRESS NOTE         Patient Identification:  Name:  Samson Kendall  Age:  49 y.o.  Sex:  male  :  1975  MRN:  [id number]  Visit Number:  [id number]  Primary Care Provider:  Rosalina Pandya APRN         LOS: 8 days       ----------------------------------------------------------------------------------------------------------------------  Subjective       Chief Complaints:    Abdominal Pain and Rectal Pain        Interval History:      Patient resting comfortably in bed this morning.  Wife at bedside.  Currently on room air with no apparent distress.  Lungs clear to auscultation bilaterally.  Abdomen soft, nontender.  Afebrile, denies diarrhea.  Reports improved pain.  WBC stable 11.51.    Review of Systems:    Constitutional: no fever, chills and night sweats.  Generalized fatigue.  Eyes: no eye drainage, itching or redness.  HEENT: no mouth sores, dysphagia or nose bleed.  Respiratory: no for shortness of breath, cough or production of sputum.  Cardiovascular: no chest pain, no palpitations, no orthopnea.  Gastrointestinal: no nausea, vomiting or diarrhea. No abdominal pain, hematemesis or rectal bleeding.  Genitourinary: no dysuria or polyuria.  Hematologic/lymphatic: no lymph node abnormalities, no easy bruising or easy bleeding.  Musculoskeletal: no muscle or joint pain.  Skin: No rash and no itching.  Neurological: no loss of consciousness, no seizure, no headache.  Behavioral/Psych: no depression or suicidal ideation.  Endocrine: no hot flashes.  Immunologic: negative.    ----------------------------------------------------------------------------------------------------------------------      Objective        Current Hospital Meds:  acetaminophen, 1,000 mg, Oral, Q6H  anastrozole, 1 mg, Oral, Daily  cetirizine, 10 mg, Oral, Daily  cyanocobalamin, 1,000 mcg, Intramuscular, Q30 Days  enoxaparin, 40 mg, Subcutaneous, Nightly  folic acid, 1 mg, Oral, Daily  ketorolac, 15 mg, Intravenous, Q6H  methocarbamol, 750 mg, Oral, 4x Daily  metoprolol tartrate, 50 mg, Oral, BID  micafungin (MYCAMINE) IV, 100 mg, Intravenous, Q24H  pantoprazole, 40 mg, Intravenous, Q AM  polyethylene glycol, 17 g, Oral, Daily  rosuvastatin, 10 mg, Oral, Daily  sodium chloride, 10 mL, Intravenous, Q12H  Testosterone Cypionate, 100 mg, Intramuscular, Q7 Days  tigecycline, 50 mg, Intravenous, Q12H  topiramate, 100 mg, Oral, BID         ----------------------------------------------------------------------------------------------------------------------    Vital Signs:  Temp:  [97.6 °F (36.4 °C)-98.4 °F (36.9 °C)] 97.7 °F (36.5 °C)  Heart Rate:  [56-71] 56  Resp:  [16-18] 18  BP: (126-138)/(66-75) 129/66  No data found.    SpO2 Percentage    06/23/24 1100 06/23/24 1854 06/24/24 0600   SpO2: 99% 98% 99%     SpO2:  [98 %-99 %] 99 %  on   ;   Device (Oxygen Therapy): room air    Body mass index is 30.03 kg/m².  Wt Readings from Last 3 Encounters:   06/20/24 84.4 kg (186 lb 1.1 oz)   06/14/24 86.2 kg (190 lb)   03/19/24 84.8 kg (187 lb)        Intake/Output Summary (Last 24 hours) at 6/24/2024 1042  Last data filed at 6/24/2024 0900  Gross per 24 hour   Intake 1280 ml   Output 15 ml   Net 1265 ml     Diet: Regular/House; Fluid Consistency: Thin (IDDSI 0)  ----------------------------------------------------------------------------------------------------------------------      Physical Exam:    Constitutional:  Well-developed and well-nourished.  No respiratory distress.  Resting comfortably in bed.  Wife at bedside. No complaints.  HENT:  Head: Normocephalic and atraumatic.  Mouth:  Moist mucous membranes.    Eyes:  Conjunctivae and EOM are normal.  No  scleral icterus.  Neck:  Neck supple.  No JVD present.    Cardiovascular:  Normal rate, regular rhythm and normal heart sounds with no murmur. No edema.  Pulmonary/Chest:  No respiratory distress, no wheezes, no crackles, with normal breath sounds and good air movement.  Abdominal:  Soft.  Bowel sounds are normal.  No distension and no tenderness.   Musculoskeletal:  No edema, no tenderness, and no deformity.  No swelling or redness of joints.  Neurological:  Alert and oriented to person, place, and time.  No facial droop.  No slurred speech.   Skin:  Skin is warm and dry.  No rash noted.  No pallor.  Perirectal incision with FATEMEH drain in place, serosanguineous drainage.   Psychiatric:  Normal mood and affect.  Behavior is normal.        ----------------------------------------------------------------------------------------------------------------------                  Results from last 7 days   Lab Units 06/24/24  0122 06/23/24  0248 06/22/24  0053 06/21/24  0237   CRP mg/dL  --  3.11* 3.40* 4.52*   WBC 10*3/mm3 11.51* 11.03* 10.86* 9.63   HEMOGLOBIN g/dL 9.7* 10.0* 9.5* 9.5*   HEMATOCRIT % 30.8* 31.2* 31.0* 30.2*   MCV fL 91.7 90.7 94.8 91.0   MCHC g/dL 31.5 32.1 30.6* 31.5   PLATELETS 10*3/mm3 446 415 346 335     Results from last 7 days   Lab Units 06/24/24  0122 06/23/24  1230 06/23/24  0248 06/22/24  0053 06/21/24  1245 06/21/24  0237 06/20/24  0244   SODIUM mmol/L 141  --  141 140  --  143 141   POTASSIUM mmol/L 3.4* 4.5 3.5 3.7   < > 3.6 3.2*   MAGNESIUM mg/dL 2.3  --   --   --   --  2.0 1.6   CHLORIDE mmol/L 112*  --  111* 111*  --  112* 109*   CO2 mmol/L 17.4*  --  18.9* 17.0*  --  17.7* 19.6*   BUN mg/dL 26*  --  20 11  --  8 8   CREATININE mg/dL 1.13  --  1.07 1.08  --  1.06 1.08   CALCIUM mg/dL 8.9  --  9.0 8.7  --  8.6 8.5*   GLUCOSE mg/dL 125*  --  128* 150*  --  99 105*   ALBUMIN g/dL 3.4*  --  3.4* 3.1*  --  3.2* 2.9*   BILIRUBIN mg/dL <0.2  --  <0.2 <0.2  --  <0.2 <0.2   ALK PHOS U/L 50  --  46  "37*  --  36* 34*   AST (SGOT) U/L 18  --  16 21  --  16 14   ALT (SGPT) U/L 14  --  15 14  --  11 10    < > = values in this interval not displayed.   Estimated Creatinine Clearance: 81.4 mL/min (by C-G formula based on SCr of 1.13 mg/dL).  No results found for: \"AMMONIA\"    No results found for: \"HGBA1C\", \"POCGLU\"    Lab Results   Component Value Date    HGBA1C 6.20 (H) 06/16/2024     Lab Results   Component Value Date    TSH 1.860 06/16/2024    FREET4 1.12 09/06/2019       Blood Culture   Date Value Ref Range Status   06/17/2024 No growth at 4 days  Preliminary   06/17/2024 No growth at 5 days  Final   06/16/2024 No growth at 5 days  Final   06/16/2024 No growth at 5 days  Final     No results found for: \"URINECX\"  No results found for: \"WOUNDCX\"  No results found for: \"STOOLCX\"  No results found for: \"RESPCX\"  Pain Management Panel           No data to display                  ----------------------------------------------------------------------------------------------------------------------  Imaging Results (Last 24 Hours)       Procedure Component Value Units Date/Time    CT Abdomen Pelvis Without Contrast [[id number]] Resulted: 06/24/24 1031     Updated: 06/24/24 1031            ----------------------------------------------------------------------------------------------------------------------    Pertinent Infectious Disease Results                Assessment/Plan       Assessment         Pelvic abscess secondary to diverticulitis     Plan          Patient resting comfortably in bed this morning.  Wife at bedside.  Currently on room air with no apparent distress.  Lungs clear to auscultation bilaterally.  Abdomen soft, nontender.  Afebrile, denies diarrhea.  Reports improved pain.  WBC stable 11.51.    Body fluid culture from 6/18/2024 finalized as Candida glabrata, E. coli ESBL, Enterococcus faecium VRE, Klebsiella pneumoniae.     Case discussed with microbiology and ESBL E. coli, Enterococcus faecium VRE " and Klebsiella pneumonia are all susceptible to tigecycline.  For now we will continue tigecycline 50 mg IV every 12 hours and micafungin 100 mg IV every 24 hours. Recommend to continue antibiotic therapy through 7/2/2024.  An alternative oral option would be fluconazole 400 mg p.o. daily, linezolid 600 mg p.o. twice daily and Bactrim DS 1 tab p.o. twice daily.  At this option was also presented to the patient and patient's spouse.  At this time they wish to evaluate the cost of IV antibiotic therapy as patient develops GI upset easily and is worried he would not be able to tolerate multiple antimicrobial agents by mouth.   Patient will require outpatient infectious disease follow-up.      ANTIMICROBIAL THERAPY    amoxicillin-clavulanate - 875-125 MG  doxycycline - 100 MG  micafungin (MYCAMINE) IVPB  tigecycline (TYGACIL) 50 mg IVPB in 100 mL NS (VTB)     Code Status:   Code Status and Medical Interventions:   Ordered at: 06/16/24 1543     Code Status (Patient has no pulse and is not breathing):    CPR (Attempt to Resuscitate)     Medical Interventions (Patient has pulse or is breathing):    Full Support       JOSE J Mcmahon  06/24/24  10:42 EDT    Electronically signed by Marjorie Grigsby APRN at 06/24/24 1044       Luc Rowland DO at 06/23/24 1432                [facility]    Subjective     History:   Samson Kendall is a 49 y.o. male admitted on 6/16/2024 secondary to Diverticulitis     Procedures:   6/18/24: Laparoscopic drainage of pelvic abscess     CC: Follow up complicated diverticulitis with pelvic abscess    Patient seen and examined with Ed, RN. Sleeping upon my arrival but awakens to stimuli. His wife is present at bedside. States he did not sleep well last night 2/2 pain. Tolerating PO intake with no reported vomiting. (+) BM. No acute events overnight per RN.     History taken from: patient, chart, and RN.      Objective     Vital Signs  Temp:  [97.8  °F (36.6 °C)-98.9 °F (37.2 °C)] 98.4 °F (36.9 °C)  Heart Rate:  [58-67] 58  Resp:  [16-20] 16  BP: (138-163)/(68-81) 138/68    Intake/Output Summary (Last 24 hours) at 6/23/2024 1432  Last data filed at 6/23/2024 0500  Gross per 24 hour   Intake 230 ml   Output 20 ml   Net 210 ml         Physical Exam: Unchanged from previous.   General:    Awake, alert, in no acute distress   Heart:      Normal S1 and S2. Regular rate and rhythm. No significant murmur, rubs or gallops appreciated.   Lungs:     Respirations regular, even and unlabored. Lungs clear to auscultation B/L. No wheezes, rales or rhonchi.   Abdomen:   Soft. Generalized TTP, worse in LLQ. No guarding, rebound tenderness or  organomegaly noted. Bowel sounds present x 4. (+) FATEMEH drain   Extremities:  No clubbing, cyanosis or edema noted. Moves UE and LE equally B/L.     Results Review:    Results from last 7 days   Lab Units 06/23/24  0248 06/22/24  0053 06/21/24  0237 06/20/24  0244 06/19/24  0013 06/18/24  0108 06/17/24  0054   WBC 10*3/mm3 11.03* 10.86* 9.63 9.44 7.11 7.72 10.77   HEMOGLOBIN g/dL 10.0* 9.5* 9.5* 9.0* 9.2* 9.0* 10.4*   PLATELETS 10*3/mm3 415 346 335 293 276 250 258     Results from last 7 days   Lab Units 06/23/24  1230 06/23/24  0248 06/22/24  0053 06/21/24  1245 06/21/24  0237 06/20/24  0244 06/19/24  0013 06/18/24  0108 06/17/24  0054   SODIUM mmol/L  --  141 140  --  143 141 138 137 136   POTASSIUM mmol/L 4.5 3.5 3.7 3.9 3.6 3.2* 4.1 3.7 3.5   CHLORIDE mmol/L  --  111* 111*  --  112* 109* 107 106 103   CO2 mmol/L  --  18.9* 17.0*  --  17.7* 19.6* 17.1* 20.3* 21.4*   BUN mg/dL  --  20 11  --  8 8 9 11 21*   CREATININE mg/dL  --  1.07 1.08  --  1.06 1.08 1.16 1.34* 1.73*   CALCIUM mg/dL  --  9.0 8.7  --  8.6 8.5* 9.1 8.9 10.1   GLUCOSE mg/dL  --  128* 150*  --  99 105* 138* 93 99     Results from last 7 days   Lab Units 06/23/24  0248 06/22/24  0053 06/21/24  0237 06/20/24  0244 06/19/24  0013 06/18/24  0108 06/17/24  0054   BILIRUBIN mg/dL  <0.2 <0.2 <0.2 <0.2 0.2 0.2 0.3   ALK PHOS U/L 46 37* 36* 34* 36* 35* 42   AST (SGOT) U/L 16 21 16 14 15 15 15   ALT (SGPT) U/L 15 14 11 10 9 11 13     Results from last 7 days   Lab Units 06/21/24  0237 06/20/24  0244   MAGNESIUM mg/dL 2.0 1.6     Results from last 7 days   Lab Units 06/17/24  0054   INR  1.19*             Imaging Results (Last 24 Hours)       ** No results found for the last 24 hours. **              Medications:  acetaminophen, 1,000 mg, Oral, Q6H  anastrozole, 1 mg, Oral, Daily  cetirizine, 10 mg, Oral, Daily  cyanocobalamin, 1,000 mcg, Intramuscular, Q30 Days  enoxaparin, 40 mg, Subcutaneous, Nightly  folic acid, 1 mg, Oral, Daily  ketorolac, 15 mg, Intravenous, Q6H  methocarbamol, 750 mg, Oral, 4x Daily  metoprolol tartrate, 50 mg, Oral, BID  micafungin (MYCAMINE) IV, 100 mg, Intravenous, Q24H  pantoprazole, 40 mg, Intravenous, Q AM  polyethylene glycol, 17 g, Oral, Daily  rosuvastatin, 10 mg, Oral, Daily  sodium chloride, 10 mL, Intravenous, Q12H  Testosterone Cypionate, 100 mg, Intramuscular, Q7 Days  tigecycline, 50 mg, Intravenous, Q12H  topiramate, 100 mg, Oral, BID               Assessment & Plan   Acute complicated diverticulitis with pelvic abscess: S/P laparoscopic drainage of pelvic abscess with culture revealing Candida glabrata, ESBL E coli, VRE and Klebsiella pneumoniae. Currently on Tygacil and Micafungin per ID recs. ID has presented possible PO regimen but pt unsure due to GI intolerances and requesting to evaluate cost of IV regimen. Diet as tolerated. Taper pain medication regimen. Encourage ambulation. ID and surgery input appreciated.     JOSSELIN: Likely prerenal. No evidence of obstruction on imaging. Cr improved and stable today. Monitor UOP and repeat labs in the AM.     Hypokalemia: K+ low normal today and replaced. Mg previously <2 but improved. Cont to monitor.     Essential HTN: BP stable. Cont to monitor.     Hx of nonischemic cardiomyopathy: Echo on 6/18/24 revealed an  EF of 56-60% (46-50% in ), grade I diastolic dysfunction and mild pulmonary HTN. Appears compensated. Cont to monitor volume status.     HLD: Cont statin.     DVT PPX: SQ Lovenox     Disposition Pending clinical course with finalization of cultures and antimicrobials.     Luc Rowland DO  24  14:32 EDT     Electronically signed by Luc Rowland DO at 24 1436       Marjorie Grigsby APRN at 24 1218                     PROGRESS NOTE         Patient Identification:  Name:  Samson Kendall  Age:  49 y.o.  Sex:  male  :  1975  MRN:  [id number]  Visit Number:  [id number]  Primary Care Provider:  Rosalina Pandya APRN         LOS: 7 days       ----------------------------------------------------------------------------------------------------------------------  Subjective       Chief Complaints:    Abdominal Pain and Rectal Pain        Interval History:      Patient resting comfortably in bed.  Wife at bedside.  Currently on room air with no apparent distress.  Reports improved pain.  Afebrile, denies any diarrhea.  WBC slightly elevated 11.03.  CRP improving at 3.11.  Body fluid culture from 2024 finalized as Candida glabrata, E. coli ESBL, Enterococcus faecium VRE, Klebsiella pneumoniae.     Review of Systems:    Constitutional: no fever, chills and night sweats.  Generalized fatigue.  Eyes: no eye drainage, itching or redness.  HEENT: no mouth sores, dysphagia or nose bleed.  Respiratory: no for shortness of breath, cough or production of sputum.  Cardiovascular: no chest pain, no palpitations, no orthopnea.  Gastrointestinal: no nausea, vomiting or diarrhea. No abdominal pain, hematemesis or rectal bleeding.  Genitourinary: no dysuria or polyuria.  Hematologic/lymphatic: no lymph node abnormalities, no easy bruising or easy bleeding.  Musculoskeletal: no muscle or joint pain.  Skin: No rash and no itching.  Neurological: no loss of consciousness, no seizure, no  headache.  Behavioral/Psych: no depression or suicidal ideation.  Endocrine: no hot flashes.  Immunologic: negative.    ----------------------------------------------------------------------------------------------------------------------      Objective       \[facility] [place] [facility]\"" Meds:  acetaminophen, 1,000 mg, Oral, Q6H  anastrozole, 1 mg, Oral, Daily  cetirizine, 10 mg, Oral, Daily  cyanocobalamin, 1,000 mcg, Intramuscular, Q30 Days  enoxaparin, 40 mg, Subcutaneous, Nightly  folic acid, 1 mg, Oral, Daily  ketorolac, 15 mg, Intravenous, Q6H  methocarbamol, 750 mg, Oral, 4x Daily  metoprolol tartrate, 50 mg, Oral, BID  micafungin (MYCAMINE) IV, 100 mg, Intravenous, Q24H  pantoprazole, 40 mg, Intravenous, Q AM  polyethylene glycol, 17 g, Oral, Daily  rosuvastatin, 10 mg, Oral, Daily  sodium chloride, 10 mL, Intravenous, Q12H  Testosterone Cypionate, 100 mg, Intramuscular, Q7 Days  tigecycline, 50 mg, Intravenous, Q12H  topiramate, 100 mg, Oral, BID         ----------------------------------------------------------------------------------------------------------------------    Vital Signs:  Temp:  [97.8 °F (36.6 °C)-98.9 °F (37.2 °C)] 98.4 °F (36.9 °C)  Heart Rate:  [57-67] 58  Resp:  [16-20] 16  BP: (124-163)/(68-81) 138/68  Mean Arterial Pressure (Non-Invasive) for the past 24 hrs (Last 3 readings):   Noninvasive MAP (mmHg)   06/22/24 1300 101     SpO2 Percentage    06/22/24 1300 06/23/24 0700 06/23/24 1100   SpO2: 99% 99% 99%     SpO2:  [99 %] 99 %  on   ;   Device (Oxygen Therapy): room air    Body mass index is 30.03 kg/m².  Wt Readings from Last 3 Encounters:   06/20/24 84.4 kg (186 lb 1.1 oz)   06/14/24 86.2 kg (190 lb)   03/19/24 84.8 kg (187 lb)        Intake/Output Summary (Last 24 hours) at 6/23/2024 1218  Last data filed at 6/23/2024 0500  Gross per 24 hour   Intake 590 ml   Output 20 ml   Net 570 ml     Diet: Regular/House; Fluid Consistency: Thin (IDDSI  0)  ----------------------------------------------------------------------------------------------------------------------      Physical Exam:    Constitutional:  Well-developed and well-nourished.  No respiratory distress.  Resting comfortably in bed.  Wife at bedside.  HENT:  Head: Normocephalic and atraumatic.  Mouth:  Moist mucous membranes.    Eyes:  Conjunctivae and EOM are normal.  No scleral icterus.  Neck:  Neck supple.  No JVD present.    Cardiovascular:  Normal rate, regular rhythm and normal heart sounds with no murmur. No edema.  Pulmonary/Chest:  No respiratory distress, no wheezes, no crackles, with normal breath sounds and good air movement.  Abdominal:  Soft.  Bowel sounds are normal.  No distension and no tenderness.   Musculoskeletal:  No edema, no tenderness, and no deformity.  No swelling or redness of joints.  Neurological:  Alert and oriented to person, place, and time.  No facial droop.  No slurred speech.   Skin:  Skin is warm and dry.  No rash noted.  No pallor.  Perirectal incision with FATEMEH drain in place, serosanguineous drainage.   Psychiatric:  Normal mood and affect.  Behavior is normal.        ----------------------------------------------------------------------------------------------------------------------  Results from last 7 days   Lab Units 06/16/24  1344   CK TOTAL U/L 79       Results from last 7 days   Lab Units 06/16/24  1344   CHOLESTEROL mg/dL 131   TRIGLYCERIDES mg/dL 224*   HDL CHOL mg/dL 21*   LDL CHOL mg/dL 73       Results from last 7 days   Lab Units 06/23/24  0248 06/22/24  0053 06/21/24  0237 06/18/24  0108 06/17/24  1014 06/17/24  0054 06/16/24  1615   CRP mg/dL 3.11* 3.40* 4.52*   < >  --   --   --    LACTATE mmol/L  --   --   --   --  0.9  --  2.0   WBC 10*3/mm3 11.03* 10.86* 9.63   < >  --  10.77  --    HEMOGLOBIN g/dL 10.0* 9.5* 9.5*   < >  --  10.4*  --    HEMATOCRIT % 31.2* 31.0* 30.2*   < >  --  32.6*  --    MCV fL 90.7 94.8 91.0   < >  --  91.8  --    MCHC  "g/dL 32.1 30.6* 31.5   < >  --  31.9  --    PLATELETS 10*3/mm3 415 346 335   < >  --  258  --    INR   --   --   --   --   --  1.19*  --     < > = values in this interval not displayed.     Results from last 7 days   Lab Units 06/23/24  0248 06/22/24  0053 06/21/24  1245 06/21/24  0237 06/20/24  0244   SODIUM mmol/L 141 140  --  143 141   POTASSIUM mmol/L 3.5 3.7 3.9 3.6 3.2*   MAGNESIUM mg/dL  --   --   --  2.0 1.6   CHLORIDE mmol/L 111* 111*  --  112* 109*   CO2 mmol/L 18.9* 17.0*  --  17.7* 19.6*   BUN mg/dL 20 11  --  8 8   CREATININE mg/dL 1.07 1.08  --  1.06 1.08   CALCIUM mg/dL 9.0 8.7  --  8.6 8.5*   GLUCOSE mg/dL 128* 150*  --  99 105*   ALBUMIN g/dL 3.4* 3.1*  --  3.2* 2.9*   BILIRUBIN mg/dL <0.2 <0.2  --  <0.2 <0.2   ALK PHOS U/L 46 37*  --  36* 34*   AST (SGOT) U/L 16 21  --  16 14   ALT (SGPT) U/L 15 14  --  11 10   Estimated Creatinine Clearance: 86 mL/min (by C-G formula based on SCr of 1.07 mg/dL).  No results found for: \"AMMONIA\"    No results found for: \"HGBA1C\", \"POCGLU\"    Lab Results   Component Value Date    HGBA1C 6.20 (H) 06/16/2024     Lab Results   Component Value Date    TSH 1.860 06/16/2024    FREET4 1.12 09/06/2019       Blood Culture   Date Value Ref Range Status   06/17/2024 No growth at 4 days  Preliminary   06/17/2024 No growth at 5 days  Final   06/16/2024 No growth at 5 days  Final   06/16/2024 No growth at 5 days  Final     No results found for: \"URINECX\"  No results found for: \"WOUNDCX\"  No results found for: \"STOOLCX\"  No results found for: \"RESPCX\"  Pain Management Panel           No data to display                  ----------------------------------------------------------------------------------------------------------------------  Imaging Results (Last 24 Hours)       ** No results found for the last 24 hours. **            ----------------------------------------------------------------------------------------------------------------------    Pertinent Infectious Disease " Results                Assessment/Plan       Assessment         Pelvic abscess secondary to diverticulitis     Plan        Patient resting comfortably in bed.  Wife at bedside.  Currently on room air with no apparent distress.  Reports improved pain.  Afebrile, denies any diarrhea.  WBC slightly elevated 11.03.  CRP improving at 3.11.  Body fluid culture from 6/18/2024 finalized as Candida glabrata, E. coli ESBL, Enterococcus faecium VRE, Klebsiella pneumoniae.     Case discussed with microbiology and ESBL E. coli, Enterococcus faecium VRE and Klebsiella pneumonia are all susceptible to tigecycline.  For now we will continue tigecycline 50 mg IV every 12 hours and micafungin 100 mg IV every 24 hours. Recommend to continue antibiotic therapy through 7/2/2024.  An alternative oral option would be fluconazole 400 mg p.o. daily, linezolid 600 mg p.o. twice daily and Bactrim DS 1 tab p.o. twice daily.  At this option was also presented to the patient and patient's spouse.  At this time they wish to evaluate the cost of IV antibiotic therapy as patient develops GI upset easily and is worried he would not be able to tolerate multiple antimicrobial agents by mouth.   Patient will require outpatient infectious disease follow-up.      ANTIMICROBIAL THERAPY    amoxicillin-clavulanate - 875-125 MG  doxycycline - 100 MG  micafungin (MYCAMINE) IVPB  tigecycline (TYGACIL) 50 mg IVPB in 100 mL NS (VTB)     Code Status:   Code Status and Medical Interventions:   Ordered at: 06/16/24 6728     Code Status (Patient has no pulse and is not breathing):    CPR (Attempt to Resuscitate)     Medical Interventions (Patient has pulse or is breathing):    Full Support       JOSE J Mcmahon  06/23/24  12:18 EDT    Electronically signed by Marjorie Grigsby APRN at 06/23/24 1223       Luc Rowland DO at 06/22/24 1527                [facility] HOSPITALIST PROGRESS NOTE    Subjective     History:   Samson Kendall is a 49  y.o. male admitted on 6/16/2024 secondary to Diverticulitis     Procedures:   6/18/24: Laparoscopic drainage of pelvic abscess     CC: Follow up complicated diverticulitis with pelvic abscess    Patient seen and examined with ANNA Parish. Awake and alert with his wife present at bedside. Continues to report abdominal pain but appears overall improved. Tolerating PO intake with no reported vomiting. (+) BM. No acute events overnight per RN.     History taken from: patient, chart, and RN.      Objective     Vital Signs  Temp:  [97.7 °F (36.5 °C)-98.7 °F (37.1 °C)] 98.7 °F (37.1 °C)  Heart Rate:  [52-83] 57  Resp:  [16-20] 20  BP: (121-148)/(66-84) 124/74    Intake/Output Summary (Last 24 hours) at 6/22/2024 1529  Last data filed at 6/22/2024 0900  Gross per 24 hour   Intake 440 ml   Output 25 ml   Net 415 ml         Physical Exam:  General:    Awake, alert, in no acute distress   Heart:      Normal S1 and S2. Regular rate and rhythm. No significant murmur, rubs or gallops appreciated.   Lungs:     Respirations regular, even and unlabored. Lungs clear to auscultation B/L. No wheezes, rales or rhonchi.   Abdomen:   Soft. Generalized TTP, worse in LLQ. No guarding, rebound tenderness or  organomegaly noted. Bowel sounds present x 4. (+) FATEMEH drain   Extremities:  No clubbing, cyanosis or edema noted. Moves UE and LE equally B/L.     Results Review:    Results from last 7 days   Lab Units 06/22/24  0053 06/21/24  0237 06/20/24  0244 06/19/24  0013 06/18/24  0108 06/17/24  0054 06/16/24  1344   WBC 10*3/mm3 10.86* 9.63 9.44 7.11 7.72 10.77 11.05*   HEMOGLOBIN g/dL 9.5* 9.5* 9.0* 9.2* 9.0* 10.4* 10.7*   PLATELETS 10*3/mm3 346 335 293 276 250 258 241     Results from last 7 days   Lab Units 06/22/24  0053 06/21/24  1245 06/21/24  0237 06/20/24  0244 06/19/24  0013 06/18/24  0108 06/17/24  0054 06/16/24  1344   SODIUM mmol/L 140  --  143 141 138 137 136 137   POTASSIUM mmol/L 3.7 3.9 3.6 3.2* 4.1 3.7 3.5 4.1   CHLORIDE mmol/L  111*  --  112* 109* 107 106 103 103   CO2 mmol/L 17.0*  --  17.7* 19.6* 17.1* 20.3* 21.4* 22.6   BUN mg/dL 11  --  8 8 9 11 21* 29*   CREATININE mg/dL 1.08  --  1.06 1.08 1.16 1.34* 1.73* 2.09*   CALCIUM mg/dL 8.7  --  8.6 8.5* 9.1 8.9 10.1 10.1   GLUCOSE mg/dL 150*  --  99 105* 138* 93 99 108*     Results from last 7 days   Lab Units 06/22/24  0053 06/21/24  0237 06/20/24  0244 06/19/24  0013 06/18/24  0108 06/17/24  0054 06/16/24  1344   BILIRUBIN mg/dL <0.2 <0.2 <0.2 0.2 0.2 0.3 0.3   ALK PHOS U/L 37* 36* 34* 36* 35* 42 50   AST (SGOT) U/L 21 16 14 15 15 15 14   ALT (SGPT) U/L 14 11 10 9 11 13 13     Results from last 7 days   Lab Units 06/21/24  0237 06/20/24  0244   MAGNESIUM mg/dL 2.0 1.6     Results from last 7 days   Lab Units 06/17/24  0054   INR  1.19*     Results from last 7 days   Lab Units 06/16/24  1344   CK TOTAL U/L 79       Imaging Results (Last 24 Hours)       ** No results found for the last 24 hours. **              Medications:  acetaminophen, 1,000 mg, Oral, Q6H  anastrozole, 1 mg, Oral, Daily  cetirizine, 10 mg, Oral, Daily  cyanocobalamin, 1,000 mcg, Intramuscular, Q30 Days  enoxaparin, 40 mg, Subcutaneous, Nightly  folic acid, 1 mg, Oral, Daily  ketorolac, 15 mg, Intravenous, Q6H  methocarbamol, 750 mg, Oral, 4x Daily  metoprolol tartrate, 50 mg, Oral, BID  micafungin (MYCAMINE) IV, 100 mg, Intravenous, Q24H  pantoprazole, 40 mg, Intravenous, Q AM  polyethylene glycol, 17 g, Oral, Daily  rosuvastatin, 10 mg, Oral, Daily  sodium chloride, 10 mL, Intravenous, Q12H  Testosterone Cypionate, 100 mg, Intramuscular, Q7 Days  tigecycline, 50 mg, Intravenous, Q12H  topiramate, 100 mg, Oral, BID               Assessment & Plan   Acute complicated diverticulitis with pelvic abscess: S/P laparoscopic drainage of pelvic abscess with culture revealing Candida glabrata, ESBL E coli, VRE and GNB. Currently on Tygacil and Micafungin per ID recs. Diet as tolerated. Taper pain medication regimen. Encourage  ambulation. ID and surgery input appreciated.     JOSSELIN: Likely prerenal. No evidence of obstruction on imaging. Cr improved and stable today. Monitor UOP and repeat labs in the AM.     Hypokalemia: K+ improved with supplementation and stable today. Mg previously <2 but improved. Cont to monitor.     Essential HTN: BP stable. Cont to monitor.     Hx of nonischemic cardiomyopathy: Echo on 24 revealed an EF of 56-60% (46-50% in ), grade I diastolic dysfunction and mild pulmonary HTN. Appears compensated. Cont to monitor volume status.     HLD: Cont statin.     DVT PPX: SQ Lovenox     Disposition Pending clinical course with finalization of cultures and antimicrobials.     Luc Rowland DO  24  15:29 EDT     Electronically signed by Luc Rowland DO at 24 1618       Consult Notes (last 72 hours)  Notes from 24 1342 through 24 1342   No notes of this type exist for this encounter.          Discharge Summary        Jose Kelly PA-C at 24 1406       Attestation signed by Luc Rowland DO at 24 1626    I have reviewed this documentation and agree. Treatment plan discussed with NINA.                       [facility]    Patient Identification:  Name:  Samson Kendall  Age:  49 y.o.  Sex:  male  :  1975  MRN:  [id number]  Visit Number:  [id number]    Date of Admission: 2024  Date of Discharge:  24     PCP: Rosalina Pandya APRN    Discharging Provider: Lucio Kelly PA-C / Dr. Rowland     Discharge Diagnoses     Discharge Diagnoses:  Acute complicated diverticulitis with pelvic abscess  JOSSELIN, suspect prerenal and resolved  Hypokalemia    Secondary Diagnoses:  Hx nonischemic cardiomyopathy  HTN  HLD    Needs on follow up:  Infectious disease 1 week, general surgery 2 weeks  Consults/Procedures     Consults:   Consults       Date and Time Order Name Status Description    2024 12:59  PM Inpatient Infectious Diseases Consult Completed     6/17/2024  8:02 AM Inpatient General Surgery Consult              Procedures/Scans Performed:  Procedure(s):  laparoscopic drainage of pelvic abscess   CT abdomen and pelvis with contrast x 2  CT abdomen and pelvis with oral contrast      History of Presenting Illness     Chief Complaint   Patient presents with    Abdominal Pain    Rectal Pain       Patient is a 49 y.o. male who presented to [facility] complaining of abdominal pain, rectal pain.  Please see the admitting history and physical for further details.      Hospital Course     Patient was admitted to [facility] on 6/16/2024 following presentation to [facility] ED for further evaluation of abdominal pain, rectal pain.  Notably patient with recent ED visit secondary to abdominal pain, fevers, poor oral intake-found to have uncomplicated diverticulitis and discharged on Augmentin.  After return home patient reported symptoms had worsened which prompted a second ED visit.  Laboratory workup in the ED showed CRP 44 with WBC count 11 K and CT of the abdomen and pelvis noted sigmoid diverticulitis with contained perforation.  There was no obvious drainable abscess at that time.  He was given cefepime and Flagyl and admitted for further management.  Antimicrobial coverage was continued with Zosyn.  General surgery was consulted for further assistance and recommendations.    General surgery did repeat CT of the abdomen with oral contrast day 2 of admission which showed concern for developing abscess.  Patient underwent laparoscopic drainage of the abscess with FATEMEH drain placement on 6/18/2024 with Dr. Haque.  Intraoperative cultures did grow VRE, ESBL E. coli, Candida glabrata, Klebsiella pneumoniae.  As such infectious disease was consulted for further assistance with antimicrobial management.  Given culture results ID transitioned to tigecycline and micafungin to be continued through 7/2/2024.  Patient's  postoperative course has been largely noneventful-diet was advanced per general surgery as patient tolerated and he has been tolerating a full diet for over 48 hours at this time.  Clinically patient is reporting improvement as well with resolving abdominal pain.  CT of the abdomen and pelvis without contrast was obtained the morning of the date of discharge with drain noted in the lower pelvis with very minimal remaining pelvic fluid noted.    Of note on presentation to the hospital patient was noted with creatinine 1.85.  There is limited baseline data available for review though with BUN 34 and abdominal pain which had been limiting p.o. intake this was felt to be consistent with an acute prerenal JOSSELIN which has resolved with fluid replacement and improved oral intake with resolving abdominal pain.  On the date of discharge creatinine was remaining stable around 1.1.    Patient does have a history of nonischemic cardiomyopathy previously with mildly reduced ejection fraction of 46 to 50% in 2019.  Patient has remained clinically euvolemic during this admission and compensated from heart failure standpoint.  TTE was updated and showed an EF of 56 to 60%.  There was grade 1 diastolic dysfunction and mild pulmonary hypertension.    Ultimately given clinical improvement with no further inpatient workup planned and antimicrobial recommendations in place per ID patient was felt to have reached the maximum benefit of the current hospitalization.  As such case was discussed with attending physician and agree he is stable for discharge home on this date.  Patient did have midline placed on the date of discharge to the right upper extremity.  He will continue micafungin, tigecycline through 7/2/2024 per ID recommendations at home with the assistance of his spouse.  Patient and spouse declined home health at discharge.  He will be referred to follow-up with ID in clinic within 1 week.  He will also follow-up with general  surgery in 2 weeks.  Patient will need to have midline pulled in the infusion clinic after completion of therapy.  This discharge and follow-up plan was discussed with the patient and spouse next rest agreement understanding.    Discharge Vitals/Physical Examination     Vital Signs:  Temp:  [97.6 °F (36.4 °C)-98.4 °F (36.9 °C)] 97.7 °F (36.5 °C)  Heart Rate:  [56-71] 56  Resp:  [17-18] 18  BP: (126-130)/(66-75) 129/66  No data found.  SpO2 Percentage    06/23/24 1100 06/23/24 1854 06/24/24 0600   SpO2: 99% 98% 99%     SpO2:  [98 %-99 %] 99 %  on   ;   Device (Oxygen Therapy): room air    Body mass index is 30.03 kg/m².  Wt Readings from Last 3 Encounters:   06/20/24 84.4 kg (186 lb 1.1 oz)   06/14/24 86.2 kg (190 lb)   03/19/24 84.8 kg (187 lb)         Physical Exam:  Physical Exam  Vitals and nursing note reviewed.   Constitutional:       General: He is not in acute distress.  HENT:      Head: Normocephalic and atraumatic.   Eyes:      Extraocular Movements: Extraocular movements intact.   Cardiovascular:      Rate and Rhythm: Normal rate and regular rhythm.   Pulmonary:      Breath sounds: Normal breath sounds.   Abdominal:      General: There is no distension.   Musculoskeletal:      Right lower leg: No edema.      Left lower leg: No edema.   Skin:     General: Skin is warm and dry.   Neurological:      Mental Status: He is alert. Mental status is at baseline.   Psychiatric:         Mood and Affect: Mood normal.         Behavior: Behavior normal.         Pertinent Laboratory/Radiology Results     Pertinent Laboratory Results:            Results from last 7 days   Lab Units 06/24/24  0122 06/23/24  0248 06/22/24  0053 06/21/24  0237   CRP mg/dL  --  3.11* 3.40* 4.52*   WBC 10*3/mm3 11.51* 11.03* 10.86* 9.63   HEMOGLOBIN g/dL 9.7* 10.0* 9.5* 9.5*   HEMATOCRIT % 30.8* 31.2* 31.0* 30.2*   MCV fL 91.7 90.7 94.8 91.0   MCHC g/dL 31.5 32.1 30.6* 31.5   PLATELETS 10*3/mm3 446 415 070 335     Results from last 7 days  "  Lab Units 06/24/24  0122 06/23/24  1230 06/23/24  0248 06/22/24  0053 06/21/24  1245 06/21/24  0237 06/20/24  0244   SODIUM mmol/L 141  --  141 140  --  143 141   POTASSIUM mmol/L 3.4* 4.5 3.5 3.7   < > 3.6 3.2*   MAGNESIUM mg/dL 2.3  --   --   --   --  2.0 1.6   CHLORIDE mmol/L 112*  --  111* 111*  --  112* 109*   CO2 mmol/L 17.4*  --  18.9* 17.0*  --  17.7* 19.6*   BUN mg/dL 26*  --  20 11  --  8 8   CREATININE mg/dL 1.13  --  1.07 1.08  --  1.06 1.08   CALCIUM mg/dL 8.9  --  9.0 8.7  --  8.6 8.5*   GLUCOSE mg/dL 125*  --  128* 150*  --  99 105*   ALBUMIN g/dL 3.4*  --  3.4* 3.1*  --  3.2* 2.9*   BILIRUBIN mg/dL <0.2  --  <0.2 <0.2  --  <0.2 <0.2   ALK PHOS U/L 50  --  46 37*  --  36* 34*   AST (SGOT) U/L 18  --  16 21  --  16 14   ALT (SGPT) U/L 14  --  15 14  --  11 10    < > = values in this interval not displayed.   Estimated Creatinine Clearance: 81.4 mL/min (by C-G formula based on SCr of 1.13 mg/dL).  No results found for: \"AMMONIA\"    No results found for: \"HGBA1C\", \"POCGLU\"  Lab Results   Component Value Date    HGBA1C 6.20 (H) 06/16/2024     Lab Results   Component Value Date    TSH 1.860 06/16/2024    FREET4 1.12 09/06/2019       Blood Culture   Date Value Ref Range Status   06/17/2024 No growth at 5 days  Final     No results found for: \"URINECX\"  No results found for: \"WOUNDCX\"  No results found for: \"STOOLCX\"  No results found for: \"RESPCX\"  Pain Management Panel           No data to display                Pertinent Radiology Results:  Imaging Results (All)       Procedure Component Value Units Date/Time    CT Abdomen Pelvis Without Contrast [[id number]] Collected: 06/24/24 1114     Updated: 06/24/24 1117    Narrative:      EXAM:    CT Abdomen and Pelvis Without Intravenous Contrast     EXAM DATE:    6/24/2024 10:31 AM     CLINICAL HISTORY:    Comparison, Pelvic abscess; K57.20-Diverticulitis of large intestine  with perforation and abscess without bleeding     TECHNIQUE:    Axial computed " tomography images of the abdomen and pelvis without  intravenous contrast.  Sagittal and coronal reformatted images were  created and reviewed.  This CT exam was performed using one or more of  the following dose reduction techniques:  automated exposure control,  adjustment of the mA and/or kV according to patient size, and/or use of  iterative reconstruction technique.     COMPARISON:  6/17/2024     FINDINGS:    LUNG BASES:  Unremarkable as visualized.  No mass.  No consolidation.      ABDOMEN:    LIVER:  Unremarkable as visualized.    GALLBLADDER AND BILE DUCTS:  Unremarkable as visualized.  No calcified  stones.  No ductal dilation.    PANCREAS:  Unremarkable as visualized.  No ductal dilation.    SPLEEN:  Unremarkable as visualized.  No splenomegaly.    ADRENALS:  Unremarkable as visualized.  No mass.    KIDNEYS AND URETERS:  Unremarkable as visualized.  No obstructing  stones.  No hydronephrosis.    STOMACH AND BOWEL:  Unremarkable as visualized.  No obstruction.  No  mucosal thickening.      PELVIS:    APPENDIX:  No findings to suggest acute appendicitis.    BLADDER:  Unremarkable as visualized.  No stones.    REPRODUCTIVE:  Unremarkable as visualized.      ABDOMEN and PELVIS:    INTRAPERITONEAL SPACE:  Drain noted within the lower pelvis with very  minimal remaining pelvic fluid.  No free air.    BONES/JOINTS:  No acute fracture.  No dislocation.    SOFT TISSUES:  Small umbilical hernia.    VASCULATURE:  Unremarkable as visualized.  No abdominal aortic  aneurysm.    LYMPH NODES:  Unremarkable as visualized.  No enlarged lymph nodes.       Impression:        Drain noted within the lower pelvis with very minimal remaining pelvic  fluid.        This report was finalized on 6/24/2024 11:15 AM by Dr. Colby Marley MD.       [place] [facility] (surgery) [[id number]] Resulted: 06/18/24 1403     Updated: 06/18/24 1403    Narrative:      This procedure was auto-finalized with no dictation required.    CT Abdomen Pelvis  Without Contrast [[id number]] Collected: 06/17/24 1325     Updated: 06/17/24 1331    Narrative:      EXAM:    CT Abdomen and Pelvis Without Intravenous Contrast     EXAM DATE:    6/17/2024 1:10 PM     CLINICAL HISTORY:    diverticulitis with temp 102; K57.20-Diverticulitis of large intestine  with perforation and abscess without bleeding     TECHNIQUE:    Axial computed tomography images of the abdomen and pelvis without  intravenous contrast.  Sagittal and coronal reformatted images were  created and reviewed.  This CT exam was performed using one or more of  the following dose reduction techniques:  automated exposure control,  adjustment of the mA and/or kV according to patient size, and/or use of  iterative reconstruction technique.     COMPARISON:    6/16/2024     FINDINGS:    Lung bases:  Lung bases are clear.  No consolidation.      ABDOMEN:    Liver:  Fatty infiltration of liver.    Gallbladder and bile ducts:  Unremarkable as visualized.  No calcified  stones.  No ductal dilation.    Pancreas:  Unremarkable as visualized.  No ductal dilation.    Spleen:  Unremarkable as visualized.  No splenomegaly.    Adrenals:  Unremarkable as visualized.  No mass.    Kidneys and ureters:  Unremarkable as visualized.  No obstructing  stones.  No hydronephrosis.    Stomach and bowel:  Air-fluid collection is again noted extending from  the rectosigmoid junction in the left iliac fossa compatible with  localized contained perforation. May represent early evolving abscess  although no thick walled features identified and no focal areas yet  amenable to percutaneous drainage.  Sigmoid diverticulitis changes are  again noted with the degree of inflammation that appears slightly  improved.  Maximal thickness of the gas/fluid collection in the lower  pelvis is at the level of the rectosigmoid junction and is 2.64 cm with  the more superior air-containing component approximately 2.65 cm and  lies along the left common iliac  vasculature.  No obstruction.      PELVIS:    Appendix:  No findings to suggest acute appendicitis.    Bladder:  Incomplete distention of urinary bladder likely accounts for  wall thickening. Secondary cystitis not excluded.    Reproductive:  Unremarkable as visualized.      ABDOMEN and PELVIS:    Intraperitoneal space:  Unremarkable as visualized.  No significant  contrast is identified within the gas-fluid collection in the lower  pelvis.    Bones/joints:  See above.    Soft tissues:  Fat only containing umbilical hernia, stable.    Vasculature:  Unremarkable as visualized.  No abdominal aortic  aneurysm.    Lymph nodes:  Unremarkable as visualized.  No enlarged lymph nodes.       Impression:      1.  Air-fluid collection is again noted extending from the rectosigmoid  junction into the left iliac fossa compatible with localized contained  perforation and may represent early evolving abscess although no thick  walled features identified and no focal areas that are yet amenable to  percutaneous drainage.  2.  No significant contrast is identified within the gas-fluid  collection in the lower pelvis.  3.  Sigmoid diverticulitis changes are again noted with the degree of  inflammation that appears slightly improved.  4.  Incomplete distention of urinary bladder likely accounts for wall  thickening. Secondary cystitis not excluded.  5.  Fat only containing umbilical hernia, stable.  6.  Maximal thickness of the gas/fluid collection in the lower pelvis is  at the level of the rectosigmoid junction and is 2.64 cm with the more  superior air-containing component approximately 2.65 cm and lies along  the left common iliac vasculature.  7.  Fatty infiltration of liver.        This report was finalized on 6/17/2024 1:29 PM by Dr. Robert Ma MD.       CT Abdomen Pelvis With Contrast [[id number]] Collected: 06/16/24 1453     Updated: 06/16/24 1458    Narrative:      INDICATION: Abdominal pain. Rectal pain     COMPARISON:  CT from 6/14/2024.     TECHNIQUE: Axial CT images of the abdomen and pelvis were obtained  following IV contrast administration. Coronal and sagittal reformations  were reviewed.     FINDINGS:  Visualized lung bases appear unremarkable.     The liver, gallbladder, spleen, pancreas and adrenal glands appear  unremarkable. No hydronephrosis. Punctate left renal calculus. Small  bilateral renal cysts. Fat-containing umbilical hernia.     Moderate inflammation surrounding the sigmoid colon with wall thickening  and adjacent inflammatory fat stranding. Small to moderate amount of  fluid adjacent to the sigmoid colon. No rim-enhancing drainable abscess  at this time. A few small foci of air likely related to contained  perforation. Small bowel dilation.     Moderate urinary bladder wall thickening. Small fat-containing bilateral  inguinal hernias     Degenerative changes in the spine. No acute osseous abnormality evident.       Impression:      1.  Sigmoid diverticulitis. Moderate inflammation surrounding the  sigmoid colon with wall thickening and adjacent inflammatory fat  stranding. Small to moderate amount of fluid adjacent to the sigmoid  colon. No rim-enhancing drainable abscess at this time. A few small foci  of air likely related to contained perforation.  2.  Small bowel dilation could relate to ileus from above inflammation  or obstruction. Follow-up recommended.  3.  Moderate urinary bladder wall thickening likely from above  inflammation.        This report was finalized on 6/16/2024 2:55 PM by Alex Pallas, DO.               Discharge Disposition/Discharge Medications/Discharge Appointments     Discharge Disposition:   Home or Self Care    Condition at Discharge:  Stable     Discharge Medications:     Your medication list        START taking these medications        Instructions Last Dose Given Next Dose Due   micafungin sodium 100 mg in sodium chloride 0.9 % 100 mL IVPB      Infuse 100 mg into a venous  catheter Daily for 9 doses. Please remove patient's Midline at completion of therapy  Indications: Serious Candida Infection in Unusual Areas of the Body       mupirocin 2 % ointment  Commonly known as: BACTROBAN      Apply in each nostiril 2 (Two) Times a Day for 9 doses.       tigecycline 50 mg in sodium chloride 0.9 % 100 mL IVPB      Infuse 50 mg into a venous catheter Every 12 (Twelve) Hours for 15 doses. Indications: Infection Within the Abdomen              CONTINUE taking these medications        Instructions Last Dose Given Next Dose Due   anastrozole 1 MG tablet  Commonly known as: ARIMIDEX      Take 1 tablet by mouth Daily.       OMERO-MAG-ZINC PO      Take 1 tablet by mouth 2 (Two) Times a Day.       cyanocobalamin 1000 MCG/ML injection      Inject 1 mL into the appropriate muscle as directed by prescriber Every 30 (Thirty) Days.       dexlansoprazole 60 MG capsule  Commonly known as: DEXILANT      Take 1 capsule by mouth Daily.       fenofibrate 145 MG tablet  Commonly known as: TRICOR      Take 1 tablet by mouth Daily.       folic acid 1 MG tablet  Commonly known as: FOLVITE      Take 1 tablet by mouth Daily.       furosemide 20 MG tablet  Commonly known as: LASIX      Take 1 tablet by mouth Daily.       glucosamine-chondroitin 500-400 MG capsule capsule      Take 1 capsule by mouth 2 (Two) Times a Day With Meals.       HYDROcodone-acetaminophen 7.5-325 MG per tablet  Commonly known as: NORCO      Take 1 tablet by mouth Every 8 (Eight) Hours As Needed for Moderate Pain.       levocetirizine 5 MG tablet  Commonly known as: XYZAL      Take 1 tablet by mouth Every Evening.       meloxicam 15 MG tablet  Commonly known as: MOBIC      Take 1 tablet by mouth Daily.       metoprolol tartrate 50 MG tablet  Commonly known as: LOPRESSOR      Take 1 tablet by mouth 2 (Two) Times a Day.       ondansetron ODT 4 MG disintegrating tablet  Commonly known as: ZOFRAN-ODT      Place 1 tablet on the tongue Every 8 (Eight)  Hours As Needed for Vomiting.       rosuvastatin 10 MG tablet  Commonly known as: CRESTOR      Take 1 tablet by mouth Daily.       Testosterone Cypionate 200 MG/ML injection  Commonly known as: DEPOTESTOTERONE CYPIONATE      Inject 0.5 mL into the appropriate muscle as directed by prescriber Every 7 (Seven) Days.       Tirzepatide 7.5 MG/0.5ML solution pen-injector pen  Commonly known as: MOUNJARO      Inject 0.5 mL under the skin into the appropriate area as directed 1 (One) Time Per Week.       topiramate 100 MG tablet  Commonly known as: TOPAMAX      Take 1 tablet by mouth 2 (Two) Times a Day.              STOP taking these medications      amoxicillin-clavulanate 875-125 MG per tablet  Commonly known as: AUGMENTIN        doxycycline 100 MG capsule  Commonly known as: MONODOX                  Where to Get Your Medications        These medications were sent to [id number]      Hours: Monday to Friday 7 AM to 6 PM Phone: [phone]   mupirocin 2 % ointment  rosuvastatin 10 MG tablet       Information about where to get these medications is not yet available    Ask your nurse or doctor about these medications  micafungin sodium 100 mg in sodium chloride 0.9 % 100 mL IVPB  tigecycline 50 mg in sodium chloride 0.9 % 100 mL IVPB          Discharge Diet:  ad vahe    Discharge Activity:  ad vahe      Time spent on this discharge exceeded 30 minutes.      Electronically signed by Aldair Teague DO at 06/24/24 1626       Discharge Order (From admission, onward)       Start     Ordered    06/24/24 1357  Discharge patient  Once        Expected Discharge Date: 06/24/24   Discharge Disposition: Home or Self Care   Physician of Record for Attribution - Please select from Treatment Team: ALDAIR TEAGUE [[id number]]   Review needed by CMO to determine Physician of Record: No      Question Answer Comment   Physician of Record for Attribution - Please select from  Treatment Team ALDAIR TEAGUE A    Review needed by CMO to determine Physician of Record No        06/24/24 0230

## 2024-06-27 ENCOUNTER — READMISSION MANAGEMENT (OUTPATIENT)
Dept: CALL CENTER | Facility: HOSPITAL | Age: 49
End: 2024-06-27
Payer: COMMERCIAL

## 2024-06-27 ENCOUNTER — OFFICE VISIT (OUTPATIENT)
Dept: SURGERY | Facility: CLINIC | Age: 49
End: 2024-06-27
Payer: COMMERCIAL

## 2024-06-27 VITALS
DIASTOLIC BLOOD PRESSURE: 64 MMHG | SYSTOLIC BLOOD PRESSURE: 102 MMHG | HEART RATE: 70 BPM | WEIGHT: 170.2 LBS | BODY MASS INDEX: 27.35 KG/M2 | HEIGHT: 66 IN

## 2024-06-27 DIAGNOSIS — K57.20 COLONIC DIVERTICULAR ABSCESS: Primary | ICD-10-CM

## 2024-06-27 PROCEDURE — 99024 POSTOP FOLLOW-UP VISIT: CPT | Performed by: SURGERY

## 2024-06-27 NOTE — PROGRESS NOTES
Subjective   Samson Kendall is a 49 y.o. male here today for post op.    History of Present Illness  Mr. Kendall was seen in the office today for his first postop visit since discharge following a laparoscopic drainage of a pelvic abscess on 6/18. 4.  Patient spent 8 days in the hospital and was discharged home his course of IV antibiotics which included micafungin and tigecycline through next week.  Patient reports some local discomfort from the drain but it is putting out essentially next to nothing.  CT scan of the abdomen and pelvis done prior to discharge demonstrated no residual fluid in the pelvis.  Patient states his bowels are working.  He denies tenesmus.  He did have a fever of 99 at 1 point in time but his wife stated he was moving around a lot in the heat.  No Known Allergies      Current Outpatient Medications   Medication Sig Dispense Refill    anastrozole (ARIMIDEX) 1 MG tablet Take 1 tablet by mouth Daily.      Calcium-Magnesium-Zinc (OMERO-MAG-ZINC PO) Take 1 tablet by mouth 2 (Two) Times a Day.      cyanocobalamin 1000 MCG/ML injection Inject 1 mL into the appropriate muscle as directed by prescriber Every 30 (Thirty) Days.      dexlansoprazole (DEXILANT) 60 MG capsule Take 1 capsule by mouth Daily.      fenofibrate (TRICOR) 145 MG tablet Take 1 tablet by mouth Daily.      folic acid (FOLVITE) 1 MG tablet Take 1 tablet by mouth Daily.      furosemide (LASIX) 20 MG tablet Take 1 tablet by mouth Daily.      glucosamine-chondroitin 500-400 MG capsule capsule Take 1 capsule by mouth 2 (Two) Times a Day With Meals.      HYDROcodone-acetaminophen (NORCO) 7.5-325 MG per tablet Take 1 tablet by mouth Every 8 (Eight) Hours As Needed for Moderate Pain.      levocetirizine (XYZAL) 5 MG tablet Take 1 tablet by mouth Every Evening.      meloxicam (MOBIC) 15 MG tablet Take 1 tablet by mouth Daily.      metoprolol tartrate (LOPRESSOR) 50 MG tablet Take 1 tablet by mouth 2 (Two) Times a Day.      micafungin  "sodium 100 mg in sodium chloride 0.9 % 100 mL IVPB Infuse 100 mg into a venous catheter Daily for 9 doses. Please remove patient's Midline at completion of therapy  Indications: Serious Candida Infection in Unusual Areas of the Body      mupirocin (BACTROBAN) 2 % ointment Apply in each nostiril 2 (Two) Times a Day for 9 doses. 22 g 0    ondansetron ODT (ZOFRAN-ODT) 4 MG disintegrating tablet Place 1 tablet on the tongue Every 8 (Eight) Hours As Needed for Vomiting. 30 tablet 0    rosuvastatin (CRESTOR) 10 MG tablet Take 1 tablet by mouth Daily. 30 tablet 3    Testosterone Cypionate (DEPOTESTOTERONE CYPIONATE) 200 MG/ML injection Inject 0.5 mL into the appropriate muscle as directed by prescriber Every 7 (Seven) Days.      tigecycline 50 mg in sodium chloride 0.9 % 100 mL IVPB Infuse 50 mg into a venous catheter Every 12 (Twelve) Hours for 15 doses. Indications: Infection Within the Abdomen      Tirzepatide (MOUNJARO) 7.5 MG/0.5ML solution pen-injector pen Inject 0.5 mL under the skin into the appropriate area as directed 1 (One) Time Per Week.      topiramate (TOPAMAX) 100 MG tablet Take 1 tablet by mouth 2 (Two) Times a Day.       No current facility-administered medications for this visit.       Objective   /64 (BP Location: Left arm)   Pulse 70   Ht 167.6 cm (66\")   Wt 77.2 kg (170 lb 3.2 oz)   BMI 27.47 kg/m²    Physical Exam  This is a well-developed well-nourished male in no acute distress  HEENT examination: Sclera are anicteric  Abdomen: Active bowel sounds.  Abdomen is soft, nontender  Skin/incisions: Incision sites were inspected and demonstrate no drainage or erythema.  FATEMEH drain and left lower quadrant which was removed without complication  Results/Data      Procedures     Assessment & Plan   Status post laparoscopic drainage of pelvic abscess    Patient to complete antibiotics per infectious disease recommendation  Plan to repeat CT abdomen and pelvis with oral contrast 2 weeks post completion " of antibiotics with office follow-up.  The patient has seen Dr. Nicholson in the past and was advised to schedule follow-up 2 months out for follow-up colonoscopy       Discussion/Summary    BMI is >= 25 and <30. (Overweight) The following options were offered after discussion;: nutrition counseling/recommendations       Future Appointments   Date Time Provider Department Center   7/3/2024  1:15 PM Tona Betancourt PA-C [facility]         This document has been electronically signed by Frida Ly MA   June 27, 2024 16:09 EDT      Please note that portions of this note were completed with a voice recognition program.

## 2024-06-27 NOTE — OUTREACH NOTE
General Surgery Week 1 Survey      Flowsheet Row Responses   [facility] patient discharged from? [facility]   Does the patient have one of the following disease processes/diagnoses(primary or secondary)? General Surgery   Week 1 attempt successful? Yes   Call start time 0927   Call end time 0930   Discharge diagnosis laparoscopic drainage of pelvic abscess   Meds reviewed with patient/caregiver? Yes   Is the patient having any side effects they believe may be caused by any medication additions or changes? No   Does the patient have all medications related to this admission filled (includes all antibiotics, pain medications, etc.) Yes   Is the patient taking all medications as directed (includes completed medication regime)? Yes   Does the patient have a follow up appointment scheduled with their surgeon? Yes   Has the patient kept scheduled appointments due by today? N/A   Comments today   Has home health visited the patient within 72 hours of discharge? N/A   Psychosocial issues? No   Did the patient receive a copy of their discharge instructions? Yes   Nursing interventions Reviewed instructions with patient   What is the patient's perception of their health status since discharge? Improving  [just little improvement]   Nursing interventions Nurse provided patient education   Is the patient /caregiver able to teach back basic post-op care? Lifting as instructed by MD in discharge instructions, Take showers only when approved by MD-sponge bathe until then, No tub bath, swimming, or hot tub until instructed by MD   Is the patient/caregiver able to teach back signs and symptoms of incisional infection? Increased redness, swelling or pain at the incisonal site, Increased drainage or bleeding, Incisional warmth, Pus or odor from incision, Fever   Is the patient/caregiver able to teach back steps to recovery at home? Set small, achievable goals for return to baseline health, Eat a well-balance diet   If the patient is  a current smoker, are they able to teach back resources for cessation? Not a smoker   Is the patient/caregiver able to teach back the hierarchy of who to call/visit for symptoms/problems? PCP, Specialist, Home health nurse, Urgent Care, ED, 911 Yes   Week 1 call completed? Yes   Graduated Yes   Graduated/Revoked comments Pt reports a little improvement. Denies s/s of infection or fever. Pt has FU appt today with surgeon.   Call end time 0930            ADAN WOODS - Registered Nurse

## 2024-07-03 ENCOUNTER — OFFICE VISIT (OUTPATIENT)
Dept: INFECTIOUS DISEASES | Facility: CLINIC | Age: 49
End: 2024-07-03
Payer: COMMERCIAL

## 2024-07-03 VITALS
SYSTOLIC BLOOD PRESSURE: 145 MMHG | WEIGHT: 177.8 LBS | OXYGEN SATURATION: 97 % | HEIGHT: 66 IN | DIASTOLIC BLOOD PRESSURE: 88 MMHG | HEART RATE: 114 BPM | BODY MASS INDEX: 28.57 KG/M2

## 2024-07-03 DIAGNOSIS — K57.20 DIVERTICULITIS OF COLON WITH PERFORATION: Primary | ICD-10-CM

## 2024-07-03 PROCEDURE — 86140 C-REACTIVE PROTEIN: CPT | Performed by: NURSE PRACTITIONER

## 2024-07-03 PROCEDURE — 85027 COMPLETE CBC AUTOMATED: CPT | Performed by: NURSE PRACTITIONER

## 2024-07-03 PROCEDURE — 99213 OFFICE O/P EST LOW 20 MIN: CPT | Performed by: NURSE PRACTITIONER

## 2024-07-03 NOTE — PROGRESS NOTES
[facility]         Referring Provider: No referring provider defined for this encounter.    Subjective      Chief Complaint  hospital f/u (Status post/ aparoscopic)    History of Present Illness  Samson Kendall is a 49 y.o. male who presents today to [facility] for Hospital Follow Up . Past medical history is significant for recent admission to [facility] on 6/16/2024 through 6/24/2024 for acute complicated diverticulitis with pelvic abscess, JOSSELIN, hypokalemia.  The patient presented to the ED for further evaluation of abdominal pain, rectal pain.  Notably the patient had had a recent ED visit secondary to abdominal pain, fevers, poor oral intake and was found to have uncomplicated diverticulitis and discharged on Augmentin.  After return home the patient reported symptoms worsened which prompted the second ED visit.  Lab workup in the ED showed CRP 44 with WBC count 11 K and CT of the abdomen pelvis noted sigmoid diverticulitis with contained perforation.  No obvious drainable abscess at that time.  He was given cefepime and Flagyl and admitted for further management.  General surgery did repeat CT of the abdomen with oral contrast on day 2 of admission which showed concern for developing abscess.  The patient underwent laparoscopic drainage of the abscess with FATEMEH drain placement on 6/18/2024 with Dr. Haque.  Intraoperative cultures did grow VRE, ESBL E. coli, Candida glabrata, Klebsiella pneumonia.  Infectious disease was consulted for further assistance with antimicrobial management.  Given culture results, ID transition to tigecycline and micafungin to be continued through 7/2/2024.  The patient's postoperative course was largely uneventful, diet was advanced per general surgery as the patient tolerated.  Clinically the patient was improving with resolving abdominal pain.  CT of the abdomen and pelvis without contrast was obtained the morning of the date of  discharge with drain noted in the lower pelvis with very minimal remaining pelvic fluid noted.  Ultimately given clinical improvement with no further inpatient workup planned and antimicrobial recommendations in place per ID, patient was felt to have reached maximum benefit of current hospitalization.  The patient had a midline placed on the day of discharge to the right upper extremity.  He was scheduled to continue micafungin and tigecycline through 7-2-24 per ID recommendations at home with the assistance of his spouse.  The patient followed up with general surgery Dr. Haque on 6/27 who recommended to continue antibiotic course as recommended and repeat CT abdomen and pelvis with oral contrast 2 weeks post completion of antibiotics with office follow-up.  The patient was seen by Dr. Nicholson and advised to schedule 2 months out for follow-up colonoscopy.    Interval history:  7/3/2024: The patient is accompanied by his wife.  He is nontoxic-appearing.  On room air without any apparent distress.  The patient did complain of some nausea/vomiting and diarrhea over the last 3 days or so which she feels is related to the antibiotic therapy.  The patient reports he commonly has the symptoms when he takes antibiotics.  He denies any fever/chills, night sweats, dysuria.  Does report some tenderness at the site of his FATEMEH drain removal which radiates into his groin.    Past Medical History:   Diagnosis Date    Asthma     childhood    Cardiomyopathy     DDD (degenerative disc disease),     Diabetes mellitus     Diverticulitis of colon with perforation 06/16/2024    Heart murmur     as child    Hyperlipidemia     Hypertension     Palpitation     PTSD (post-traumatic stress disorder)     Sleep apnea     C pap use    Umbilical hernia        Past Surgical History:   Procedure Laterality Date    APPENDECTOMY      CERVICAL FUSION      C5-6    COLONOSCOPY      DIAGNOSTIC LAPAROSCOPY N/A 6/18/2024    Procedure: laparoscopic drainage  of pelvic abscess;  Surgeon: Marie Haque MD;  Location: [facility];  Service: General;  Laterality: N/A;    ENDOSCOPY      HAND SURGERY      KNEE ARTHROSCOPY      VASECTOMY      WISDOM TOOTH EXTRACTION         Social History     Socioeconomic History    Marital status:    Tobacco Use    Smoking status: Never     Passive exposure: Current    Smokeless tobacco: Current     Types: Snuff   Vaping Use    Vaping status: Never Used   Substance and Sexual Activity    Alcohol use: Not Currently    Drug use: No    Sexual activity: Yes     Partners: Female     Birth control/protection: Surgical       Family History  family history includes Arthritis in his mother; Asthma in his maternal grandmother; Diabetes in his father; Hearing loss in his father; Heart attack in his maternal grandfather; Heart disease in his maternal grandfather; Heart failure in his maternal grandfather.    Immunization History   Administered Date(s) Administered    COVID-19 (MODERNA) 1st,2nd,3rd Dose Monovalent 11/17/2021, 01/14/2022    Hepatitis A 02/15/2019    Tdap 02/15/2019        Allergies  No Known Allergies    The medication list has been reviewed and updated.   Current Medications    Current Outpatient Medications:     anastrozole (ARIMIDEX) 1 MG tablet, Take 1 tablet by mouth Daily., Disp: , Rfl:     Calcium-Magnesium-Zinc (OMERO-MAG-ZINC PO), Take 1 tablet by mouth 2 (Two) Times a Day., Disp: , Rfl:     cyanocobalamin 1000 MCG/ML injection, Inject 1 mL into the appropriate muscle as directed by prescriber Every 30 (Thirty) Days., Disp: , Rfl:     dexlansoprazole (DEXILANT) 60 MG capsule, Take 1 capsule by mouth Daily., Disp: , Rfl:     fenofibrate (TRICOR) 145 MG tablet, Take 1 tablet by mouth Daily., Disp: , Rfl:     folic acid (FOLVITE) 1 MG tablet, Take 1 tablet by mouth Daily., Disp: , Rfl:     furosemide (LASIX) 20 MG tablet, Take 1 tablet by mouth Daily., Disp: , Rfl:     glucosamine-chondroitin 500-400 MG capsule capsule, Take  1 capsule by mouth 2 (Two) Times a Day With Meals., Disp: , Rfl:     HYDROcodone-acetaminophen (NORCO) 7.5-325 MG per tablet, Take 1 tablet by mouth Every 8 (Eight) Hours As Needed for Moderate Pain., Disp: , Rfl:     levocetirizine (XYZAL) 5 MG tablet, Take 1 tablet by mouth Every Evening., Disp: , Rfl:     meloxicam (MOBIC) 15 MG tablet, Take 1 tablet by mouth Daily., Disp: , Rfl:     metoprolol tartrate (LOPRESSOR) 50 MG tablet, Take 1 tablet by mouth 2 (Two) Times a Day., Disp: , Rfl:     micafungin sodium 100 mg in sodium chloride 0.9 % 100 mL IVPB, Infuse 100 mg into a venous catheter Daily for 9 doses. Please remove patient's Midline at completion of therapy  Indications: Serious Candida Infection in Unusual Areas of the Body, Disp: , Rfl:     mupirocin (BACTROBAN) 2 % ointment, Apply in each nostiril 2 (Two) Times a Day for 9 doses., Disp: 22 g, Rfl: 0    ondansetron ODT (ZOFRAN-ODT) 4 MG disintegrating tablet, Place 1 tablet on the tongue Every 8 (Eight) Hours As Needed for Vomiting., Disp: 30 tablet, Rfl: 0    rosuvastatin (CRESTOR) 10 MG tablet, Take 1 tablet by mouth Daily., Disp: 30 tablet, Rfl: 3    Testosterone Cypionate (DEPOTESTOTERONE CYPIONATE) 200 MG/ML injection, Inject 0.5 mL into the appropriate muscle as directed by prescriber Every 7 (Seven) Days., Disp: , Rfl:     Tirzepatide (MOUNJARO) 7.5 MG/0.5ML solution pen-injector pen, Inject 0.5 mL under the skin into the appropriate area as directed 1 (One) Time Per Week., Disp: , Rfl:     topiramate (TOPAMAX) 100 MG tablet, Take 1 tablet by mouth 2 (Two) Times a Day., Disp: , Rfl:       Review of Systems    Review of Systems   Constitutional: Negative.    Respiratory: Negative.     Cardiovascular: Negative.    Gastrointestinal: Negative.    Genitourinary: Negative.    Skin:  Positive for wound.        FATEMEH site to the left lower quadrant    Midline right upper extremity   Neurological: Negative.         Objective     Vital Signs:  /88 (BP  "Location: Left arm, Patient Position: Sitting, Cuff Size: Small Adult)   Pulse 114   Ht 167.6 cm (66\")   Wt 80.6 kg (177 lb 12.8 oz)   SpO2 97%   BMI 28.70 kg/m²   Estimated body mass index is 28.7 kg/m² as calculated from the following:    Height as of this encounter: 167.6 cm (66\").    Weight as of this encounter: 80.6 kg (177 lb 12.8 oz).    Physical Exam  Vitals reviewed.   Constitutional:       Appearance: Normal appearance.   HENT:      Head: Normocephalic and atraumatic.   Eyes:      Pupils: Pupils are equal, round, and reactive to light.   Cardiovascular:      Rate and Rhythm: Normal rate and regular rhythm.      Pulses: Normal pulses.      Heart sounds: Normal heart sounds.   Pulmonary:      Effort: Pulmonary effort is normal.      Breath sounds: Normal breath sounds.   Abdominal:      General: Bowel sounds are normal.      Palpations: Abdomen is soft.   Musculoskeletal:         General: Normal range of motion.      Cervical back: Normal range of motion and neck supple.   Skin:     General: Skin is warm and dry.      Capillary Refill: Capillary refill takes less than 2 seconds.      Comments: Midline right upper extremity without any evidence of erythema, infection or phlebitis.  Removed at today's visit.  Tip is intact.    Laparoscopic site to the left lower quadrant is well-healing with no surrounding erythema, no open areas or drainage.  No edema.   Neurological:      Mental Status: He is alert and oriented to person, place, and time.          Result Review :  The following data was reviewed by JOSE J Ordonez     Lab Results  Lab Results   Component Value Date    WBC 11.51 (H) 06/24/2024    HGB 9.7 (L) 06/24/2024    HCT 30.8 (L) 06/24/2024    MCV 91.7 06/24/2024     06/24/2024     Lab Results   Component Value Date    GLUCOSE 125 (H) 06/24/2024    BUN 26 (H) 06/24/2024    CREATININE 1.13 06/24/2024    EGFRIFNONA 92 10/28/2021    BCR 23.0 06/24/2024    K 4.6 06/24/2024    CO2 17.4 (L) " "06/24/2024    CALCIUM 8.9 06/24/2024    ALBUMIN 3.4 (L) 06/24/2024    AST 18 06/24/2024    ALT 14 06/24/2024      Lab Results   Component Value Date    CRP 3.11 (H) 06/23/2024        No results found for: \"ACANTHNAEG\", \"AFBCX\", \"BPERTUSSISCX\", \"BLOODCX\"  No results found for: \"BCIDPCR\", \"CXREFLEX\", \"CSFCX\", \"CULTURETIS\"  No results found for: \"CULTURES\", \"HSVCX\", \"URCX\"  No results found for: \"EYECULTURE\", \"GCCX\", \"HSVCULTURE\", \"LABHSV\"  No results found for: \"LEGIONELLA\", \"MRSACX\", \"MUMPSCX\", \"MYCOPLASCX\"  No results found for: \"NOCARDIACX\", \"STOOLCX\"  No results found for: \"THROATCX\", \"UNSTIMCULT\", \"URINECX\", \"CULTURE\", \"VZVCULTUR\"  No results found for: \"VIRALCULTU\", \"WOUNDCX\"    Radiology Results  CT Abdomen Pelvis Without Contrast    Result Date: 6/24/2024  Impression:   Drain noted within the lower pelvis with very minimal remaining pelvic fluid.   This report was finalized on 6/24/2024 11:15 AM by Dr. Colby Marley MD.      CT Abdomen Pelvis Without Contrast    Result Date: 6/17/2024  Impression: 1.  Air-fluid collection is again noted extending from the rectosigmoid junction into the left iliac fossa compatible with localized contained perforation and may represent early evolving abscess although no thick walled features identified and no focal areas that are yet amenable to percutaneous drainage. 2.  No significant contrast is identified within the gas-fluid collection in the lower pelvis. 3.  Sigmoid diverticulitis changes are again noted with the degree of inflammation that appears slightly improved. 4.  Incomplete distention of urinary bladder likely accounts for wall thickening. Secondary cystitis not excluded. 5.  Fat only containing umbilical hernia, stable. 6.  Maximal thickness of the gas/fluid collection in the lower pelvis is at the level of the rectosigmoid junction and is 2.64 cm with the more superior air-containing component approximately 2.65 cm and lies along the left common iliac " vasculature. 7.  Fatty infiltration of liver.   This report was finalized on 6/17/2024 1:29 PM by Dr. Robert Ma MD.      CT Abdomen Pelvis With Contrast    Result Date: 6/16/2024  Impression: 1.  Sigmoid diverticulitis. Moderate inflammation surrounding the sigmoid colon with wall thickening and adjacent inflammatory fat stranding. Small to moderate amount of fluid adjacent to the sigmoid colon. No rim-enhancing drainable abscess at this time. A few small foci of air likely related to contained perforation. 2.  Small bowel dilation could relate to ileus from above inflammation or obstruction. Follow-up recommended. 3.  Moderate urinary bladder wall thickening likely from above inflammation.   This report was finalized on 6/16/2024 2:55 PM by Alex Pallas, DO.      US Scrotum & Testicles    Result Date: 6/14/2024  Impression:   The right epididymis is enlarged and shows hypervascularity suggestive of epididymitis.   This report was finalized on 6/14/2024 1:14 PM by Dr. Colby Marley MD.      CT Abdomen Pelvis Without Contrast    Result Date: 6/14/2024  Impression: 1.  Possibly minimal stranding around sigmoid colon that could represent noncomplicated acute diverticulitis. 2.  Bladder wall thickening which may be due to the decompressed state of the bladder or due to cystitis. 3.  Small umbilical hernia containing only fat.   This report was finalized on 6/14/2024 1:02 PM by Dr. Colby Marley MD.              Assessment / Plan        Diagnoses and all orders for this visit:    1. Diverticulitis of colon with perforation (Primary)  -     C-reactive Protein; Future  -     CBC (No Diff); Future  -     C-reactive Protein  -     CBC (No Diff)      The patient has completed courses of tigecycline and micafungin.  Will collect a CRP and CBC today.  Midline removed at this office visit.  The patient is scheduled for a CT scan of the abdomen with oral contrast 2 weeks from now.  We will plan to follow-up in 1 week to  ensure resolution of nausea/vomiting and diarrhea.  Plan to contact the patient with any abnormal lab values.          Follow Up   Return in about 1 week (around 7/10/2024).    Visit Diagnoses:    ICD-10-CM ICD-9-CM   1. Diverticulitis of colon with perforation  K57.20 562.11       Patient was given instructions and counseling regarding his condition or for health maintenance advice. Please see specific information pulled into the AVS if appropriate.     This document has been electronically signed by JOSE J Ordonez   July 3, 2024 12:46 EDT      No orders of the defined types were placed in this encounter.     Dictated Utilizing Dragon Dictation: Part of this note may be an electronic transcription/translation of spoken language to printed text using the Dragon Dictation System.

## 2024-07-04 LAB
CRP SERPL-MCNC: 1.05 MG/DL (ref 0–0.5)
DEPRECATED RDW RBC AUTO: 46.5 FL (ref 37–54)
ERYTHROCYTE [DISTWIDTH] IN BLOOD BY AUTOMATED COUNT: 14.5 % (ref 12.3–15.4)
HCT VFR BLD AUTO: 38.5 % (ref 37.5–51)
HGB BLD-MCNC: 12.4 G/DL (ref 13–17.7)
MCH RBC QN AUTO: 28.5 PG (ref 26.6–33)
MCHC RBC AUTO-ENTMCNC: 32.2 G/DL (ref 31.5–35.7)
MCV RBC AUTO: 88.5 FL (ref 79–97)
PLATELET # BLD AUTO: 461 10*3/MM3 (ref 140–450)
PMV BLD AUTO: 11.2 FL (ref 6–12)
RBC # BLD AUTO: 4.35 10*6/MM3 (ref 4.14–5.8)
WBC NRBC COR # BLD AUTO: 5.71 10*3/MM3 (ref 3.4–10.8)

## 2024-07-09 ENCOUNTER — HOSPITAL ENCOUNTER (OUTPATIENT)
Facility: HOSPITAL | Age: 49
Discharge: HOME | End: 2024-07-09
Admitting: SURGERY
Payer: COMMERCIAL

## 2024-07-09 DIAGNOSIS — K57.20 COLONIC DIVERTICULAR ABSCESS: ICD-10-CM

## 2024-07-09 PROCEDURE — 74176 CT ABD & PELVIS W/O CONTRAST: CPT

## 2024-07-10 ENCOUNTER — OFFICE VISIT (OUTPATIENT)
Dept: INFECTIOUS DISEASES | Facility: CLINIC | Age: 49
End: 2024-07-10
Payer: COMMERCIAL

## 2024-07-10 VITALS
HEIGHT: 66 IN | DIASTOLIC BLOOD PRESSURE: 96 MMHG | OXYGEN SATURATION: 99 % | HEART RATE: 85 BPM | WEIGHT: 191.2 LBS | SYSTOLIC BLOOD PRESSURE: 147 MMHG | BODY MASS INDEX: 30.73 KG/M2

## 2024-07-10 DIAGNOSIS — K57.20 DIVERTICULITIS OF COLON WITH PERFORATION: Primary | ICD-10-CM

## 2024-07-10 PROCEDURE — 85025 COMPLETE CBC W/AUTO DIFF WBC: CPT

## 2024-07-10 PROCEDURE — 86140 C-REACTIVE PROTEIN: CPT

## 2024-07-10 NOTE — PROGRESS NOTES
[facility]         Referring Provider: Rosalina Pandya, JOSE J  [address] [place] [address]    Subjective      Chief Complaint  No chief complaint on file.    History of Present Illness  Samson Kendall is a 49 y.o. male who presents today to [facility] IN[facility]ES for Follow Up .Past medical history is significant for recent admission to [facility] on 6/16/2024 through 6/24/2024 for acute complicated diverticulitis with pelvic abscess, JOSSELIN, hypokalemia.  The patient presented to the ED for further evaluation of abdominal pain, rectal pain.  Notably the patient had had a recent ED visit secondary to abdominal pain, fevers, poor oral intake and was found to have uncomplicated diverticulitis and discharged on Augmentin.  After return home the patient reported symptoms worsened which prompted the second ED visit.  Lab workup in the ED showed CRP 44 with WBC count 11 K and CT of the abdomen pelvis noted sigmoid diverticulitis with contained perforation.  No obvious drainable abscess at that time.  He was given cefepime and Flagyl and admitted for further management.  General surgery did repeat CT of the abdomen with oral contrast on day 2 of admission which showed concern for developing abscess.  The patient underwent laparoscopic drainage of the abscess with FATEMEH drain placement on 6/18/2024 with Dr. Haque.  Intraoperative cultures did grow VRE, ESBL E. coli, Candida glabrata, Klebsiella pneumonia.  Infectious disease was consulted for further assistance with antimicrobial management.  Given culture results, ID transition to tigecycline and micafungin to be continued through 7/2/2024.  The patient's postoperative course was largely uneventful, diet was advanced per general surgery as the patient tolerated.  Clinically the patient was improving with resolving abdominal pain.  CT of the abdomen and pelvis without contrast was obtained the morning of the date of discharge with drain  noted in the lower pelvis with very minimal remaining pelvic fluid noted.  Ultimately given clinical improvement with no further inpatient workup planned and antimicrobial recommendations in place per ID, patient was felt to have reached maximum benefit of current hospitalization.  The patient had a midline placed on the day of discharge to the right upper extremity.  He was scheduled to continue micafungin and tigecycline through 7-2-24 per ID recommendations at home with the assistance of his spouse.  The patient followed up with general surgery Dr. Haque on 6/27 who recommended to continue antibiotic course as recommended and repeat CT abdomen and pelvis with oral contrast 2 weeks post completion of antibiotics with office follow-up.  The patient was seen by Dr. Nicholson and advised to schedule 2 months out for follow-up colonoscopy.     Interval history:  7/3/2024: The patient is accompanied by his wife.  He is nontoxic-appearing.  On room air without any apparent distress.  The patient did complain of some nausea/vomiting and diarrhea over the last 3 days or so which she feels is related to the antibiotic therapy.  The patient reports he commonly has the symptoms when he takes antibiotics.  He denies any fever/chills, night sweats, dysuria.  Does report some tenderness at the site of his FATEMEH drain removal which radiates into his groin.       7/10/24 Patient here for follow up today. He is complaining of abdominal pain, nausea and diarrhea. He describes the abdominal pain as cramping. He states he had a CT scan of the abdomen yesterday but results have not been given to him and are not available at this time for me to access. Patient was encouraged to call the provider who ordered it for results. He denies actually vomiting, fever, chills. He states he has had several episodes of diarrhea throughout the day with at least 5 times today. He is currently not on antibiotics.     Past Medical History:   Diagnosis Date     Asthma     childhood    Cardiomyopathy     DDD (degenerative disc disease),     Diabetes mellitus     Diverticulitis of colon with perforation 06/16/2024    Heart murmur     as child    Hyperlipidemia     Hypertension     Palpitation     PTSD (post-traumatic stress disorder)     Sleep apnea     C pap use    Umbilical hernia        Past Surgical History:   Procedure Laterality Date    APPENDECTOMY      CERVICAL FUSION      C5-6    COLONOSCOPY      DIAGNOSTIC LAPAROSCOPY N/A 6/18/2024    Procedure: laparoscopic drainage of pelvic abscess;  Surgeon: Marie Haque MD;  [facility]: [facility];  Service: General;  Laterality: N/A;    ENDOSCOPY      HAND SURGERY      KNEE ARTHROSCOPY      VASECTOMY      WISDOM TOOTH EXTRACTION         Social History     Socioeconomic History    Marital status:    Tobacco Use    Smoking status: Never     Passive exposure: Current    Smokeless tobacco: Current     Types: Snuff   Vaping Use    Vaping status: Never Used   Substance and Sexual Activity    Alcohol use: Not Currently    Drug use: No    Sexual activity: Yes     Partners: Female     Birth control/protection: Surgical       Family History  family history includes Arthritis in his mother; Asthma in his maternal grandmother; Diabetes in his father; Hearing loss in his father; Heart attack in his maternal grandfather; Heart disease in his maternal grandfather; Heart failure in his maternal grandfather.    Immunization History   Administered Date(s) Administered    COVID-19 (MODERNA) 1st,2nd,3rd Dose Monovalent 11/17/2021, 01/14/2022    Hepatitis A 02/15/2019    Tdap 02/15/2019        Allergies  No Known Allergies    The medication list has been reviewed and updated.   Current Medications    Current Outpatient Medications:     anastrozole (ARIMIDEX) 1 MG tablet, Take 1 tablet by mouth Daily., Disp: , Rfl:     Calcium-Magnesium-Zinc (OMERO-MAG-ZINC PO), Take 1 tablet by mouth 2 (Two) Times a Day., Disp: , Rfl:      cyanocobalamin 1000 MCG/ML injection, Inject 1 mL into the appropriate muscle as directed by prescriber Every 30 (Thirty) Days., Disp: , Rfl:     dexlansoprazole (DEXILANT) 60 MG capsule, Take 1 capsule by mouth Daily., Disp: , Rfl:     fenofibrate (TRICOR) 145 MG tablet, Take 1 tablet by mouth Daily., Disp: , Rfl:     folic acid (FOLVITE) 1 MG tablet, Take 1 tablet by mouth Daily., Disp: , Rfl:     furosemide (LASIX) 20 MG tablet, Take 1 tablet by mouth Daily., Disp: , Rfl:     glucosamine-chondroitin 500-400 MG capsule capsule, Take 1 capsule by mouth 2 (Two) Times a Day With Meals., Disp: , Rfl:     HYDROcodone-acetaminophen (NORCO) 7.5-325 MG per tablet, Take 1 tablet by mouth Every 8 (Eight) Hours As Needed for Moderate Pain., Disp: , Rfl:     levocetirizine (XYZAL) 5 MG tablet, Take 1 tablet by mouth Every Evening., Disp: , Rfl:     meloxicam (MOBIC) 15 MG tablet, Take 1 tablet by mouth Daily., Disp: , Rfl:     metoprolol tartrate (LOPRESSOR) 50 MG tablet, Take 1 tablet by mouth 2 (Two) Times a Day., Disp: , Rfl:     ondansetron ODT (ZOFRAN-ODT) 4 MG disintegrating tablet, Place 1 tablet on the tongue Every 8 (Eight) Hours As Needed for Vomiting., Disp: 30 tablet, Rfl: 0    rosuvastatin (CRESTOR) 10 MG tablet, Take 1 tablet by mouth Daily., Disp: 30 tablet, Rfl: 3    Testosterone Cypionate (DEPOTESTOTERONE CYPIONATE) 200 MG/ML injection, Inject 0.5 mL into the appropriate muscle as directed by prescriber Every 7 (Seven) Days., Disp: , Rfl:     Tirzepatide (MOUNJARO) 7.5 MG/0.5ML solution pen-injector pen, Inject 0.5 mL under the skin into the appropriate area as directed 1 (One) Time Per Week., Disp: , Rfl:     topiramate (TOPAMAX) 100 MG tablet, Take 1 tablet by mouth 2 (Two) Times a Day., Disp: , Rfl:       Review of Systems    Review of Systems   Constitutional:  Negative for activity change, appetite change, chills, diaphoresis and fever.   HENT:  Negative for congestion, dental problem, drooling, mouth  "sores, sinus pressure and sneezing.    Eyes:  Negative for blurred vision, double vision, photophobia and discharge.   Respiratory:  Negative for apnea, cough, choking, chest tightness, shortness of breath and wheezing.    Cardiovascular:  Negative for chest pain, palpitations and leg swelling.   Gastrointestinal:  Positive for abdominal pain, diarrhea and nausea. Negative for abdominal distention and vomiting.   Genitourinary:  Negative for dysuria, flank pain and frequency.   Musculoskeletal:  Negative for arthralgias, gait problem, neck pain and neck stiffness.   Skin:  Negative for rash.   Neurological:  Negative for dizziness, tremors, seizures, syncope, speech difficulty, numbness, headache and confusion.   Psychiatric/Behavioral:  Negative for agitation, behavioral problems, hallucinations and suicidal ideas.         Objective     Vital Signs:  There were no vitals taken for this visit.  Estimated body mass index is 28.7 kg/m² as calculated from the following:    Height as of 7/3/24: 167.6 cm (66\").    Weight as of 7/3/24: 80.6 kg (177 lb 12.8 oz).    Physical Exam  Constitutional:       General: He is not in acute distress.     Appearance: Normal appearance. He is normal weight. He is not ill-appearing, toxic-appearing or diaphoretic.   HENT:      Head: Normocephalic and atraumatic.      Nose: Nose normal. No congestion or rhinorrhea.      Mouth/Throat:      Mouth: Mucous membranes are moist.      Pharynx: Oropharynx is clear.   Eyes:      General: No scleral icterus.     Extraocular Movements: Extraocular movements intact.      Pupils: Pupils are equal, round, and reactive to light.   Neck:      Vascular: No carotid bruit.   Cardiovascular:      Rate and Rhythm: Normal rate and regular rhythm.      Pulses: Normal pulses.      Heart sounds: No murmur heard.  Pulmonary:      Effort: Pulmonary effort is normal. No respiratory distress.      Breath sounds: Normal breath sounds. No stridor. No wheezing, rhonchi " "or rales.   Chest:      Chest wall: No tenderness.   Abdominal:      General: Abdomen is flat. Bowel sounds are normal. There is no distension.      Palpations: Abdomen is soft.      Tenderness: There is no abdominal tenderness. There is no guarding or rebound.   Musculoskeletal:      Cervical back: Normal range of motion and neck supple. No rigidity or tenderness.   Lymphadenopathy:      Cervical: No cervical adenopathy.   Skin:     Coloration: Skin is not jaundiced.      Findings: No lesion or rash.   Neurological:      General: No focal deficit present.      Mental Status: He is alert and oriented to person, place, and time.   Psychiatric:         Mood and Affect: Mood normal.         Behavior: Behavior normal.          Result Review :  The following data was reviewed by Tona Lang PA-C     Lab Results  Lab Results   Component Value Date    WBC 5.71 07/03/2024    HGB 12.4 (L) 07/03/2024    HCT 38.5 07/03/2024    MCV 88.5 07/03/2024     (H) 07/03/2024     Lab Results   Component Value Date    GLUCOSE 125 (H) 06/24/2024    BUN 26 (H) 06/24/2024    CREATININE 1.13 06/24/2024    EGFRIFNONA 92 10/28/2021    BCR 23.0 06/24/2024    K 4.6 06/24/2024    CO2 17.4 (L) 06/24/2024    CALCIUM 8.9 06/24/2024    ALBUMIN 3.4 (L) 06/24/2024    AST 18 06/24/2024    ALT 14 06/24/2024      Lab Results   Component Value Date    CRP 1.05 (H) 07/03/2024        No results found for: \"ACANTHNAEG\", \"AFBCX\", \"BPERTUSSISCX\", \"BLOODCX\"  No results found for: \"BCIDPCR\", \"CXREFLEX\", \"CSFCX\", \"CULTURETIS\"  No results found for: \"CULTURES\", \"HSVCX\", \"URCX\"  No results found for: \"EYECULTURE\", \"GCCX\", \"HSVCULTURE\", \"LABHSV\"  No results found for: \"LEGIONELLA\", \"MRSACX\", \"MUMPSCX\", \"MYCOPLASCX\"  No results found for: \"NOCARDIACX\", \"STOOLCX\"  No results found for: \"THROATCX\", \"UNSTIMCULT\", \"URINECX\", \"CULTURE\", \"VZVCULTUR\"  No results found for: \"VIRALCULTU\", \"WOUNDCX\"    Radiology Results  CT Abdomen Pelvis Without Contrast    Result " Date: 7/9/2024  Impression:   Left lower quadrant drain has been removed. No definite residual fluid collection at this time.   This report was finalized on 7/9/2024 9:37 AM by Dr. Colby Marley MD.      CT Abdomen Pelvis Without Contrast    Result Date: 6/24/2024  Impression:   Drain noted within the lower pelvis with very minimal remaining pelvic fluid.   This report was finalized on 6/24/2024 11:15 AM by Dr. Colby Marley MD.      CT Abdomen Pelvis Without Contrast    Result Date: 6/17/2024  Impression: 1.  Air-fluid collection is again noted extending from the rectosigmoid junction into the left iliac fossa compatible with localized contained perforation and may represent early evolving abscess although no thick walled features identified and no focal areas that are yet amenable to percutaneous drainage. 2.  No significant contrast is identified within the gas-fluid collection in the lower pelvis. 3.  Sigmoid diverticulitis changes are again noted with the degree of inflammation that appears slightly improved. 4.  Incomplete distention of urinary bladder likely accounts for wall thickening. Secondary cystitis not excluded. 5.  Fat only containing umbilical hernia, stable. 6.  Maximal thickness of the gas/fluid collection in the lower pelvis is at the level of the rectosigmoid junction and is 2.64 cm with the more superior air-containing component approximately 2.65 cm and lies along the left common iliac vasculature. 7.  Fatty infiltration of liver.   This report was finalized on 6/17/2024 1:29 PM by Dr. Robert Ma MD.      CT Abdomen Pelvis With Contrast    Result Date: 6/16/2024  Impression: 1.  Sigmoid diverticulitis. Moderate inflammation surrounding the sigmoid colon with wall thickening and adjacent inflammatory fat stranding. Small to moderate amount of fluid adjacent to the sigmoid colon. No rim-enhancing drainable abscess at this time. A few small foci of air likely related to contained perforation.  2.  Small bowel dilation could relate to ileus from above inflammation or obstruction. Follow-up recommended. 3.  Moderate urinary bladder wall thickening likely from above inflammation.   This report was finalized on 6/16/2024 2:55 PM by Alex Pallas, DO.      US Scrotum & Testicles    Result Date: 6/14/2024  Impression:   The right epididymis is enlarged and shows hypervascularity suggestive of epididymitis.   This report was finalized on 6/14/2024 1:14 PM by Dr. Colby Marley MD.      CT Abdomen Pelvis Without Contrast    Result Date: 6/14/2024  Impression: 1.  Possibly minimal stranding around sigmoid colon that could represent noncomplicated acute diverticulitis. 2.  Bladder wall thickening which may be due to the decompressed state of the bladder or due to cystitis. 3.  Small umbilical hernia containing only fat.   This report was finalized on 6/14/2024 1:02 PM by Dr. Colby Marley MD.              Assessment / Plan        Diagnoses and all orders for this visit:    1. Diverticulitis of colon with perforation (Primary)  -     Clostridioides difficile Toxin, PCR - Stool, Per Rectum; Future  -     CBC & Differential; Future  -     C-reactive Protein; Future      Due to patients complaints today, CRP, CBC and C. Diff PCR has been ordered. Patient will need to follow up in 1 week. Patient was informed we would contact him with any abnormal results.           Follow Up   No follow-ups on file.    Visit Diagnoses:    ICD-10-CM ICD-9-CM   1. Diverticulitis of colon with perforation  K57.20 562.11       Patient was given instructions and counseling regarding his condition or for health maintenance advice. Please see specific information pulled into the AVS if appropriate.     This document has been electronically signed by Tona Lang PA-C   July 10, 2024 14:27 EDT      No orders of the defined types were placed in this encounter.     Dictated Utilizing Dragon Dictation: Part of this note may be an electronic  transcription/translation of spoken language to printed text using the Dragon Dictation System.

## 2024-07-11 LAB
BASOPHILS # BLD AUTO: 0.07 10*3/MM3 (ref 0–0.2)
BASOPHILS NFR BLD AUTO: 0.9 % (ref 0–1.5)
CRP SERPL-MCNC: <0.3 MG/DL (ref 0–0.5)
DEPRECATED RDW RBC AUTO: 46.5 FL (ref 37–54)
EOSINOPHIL # BLD AUTO: 0.75 10*3/MM3 (ref 0–0.4)
EOSINOPHIL NFR BLD AUTO: 9.8 % (ref 0.3–6.2)
ERYTHROCYTE [DISTWIDTH] IN BLOOD BY AUTOMATED COUNT: 14.4 % (ref 12.3–15.4)
HCT VFR BLD AUTO: 33.2 % (ref 37.5–51)
HGB BLD-MCNC: 10.7 G/DL (ref 13–17.7)
IMM GRANULOCYTES # BLD AUTO: 0.02 10*3/MM3 (ref 0–0.05)
IMM GRANULOCYTES NFR BLD AUTO: 0.3 % (ref 0–0.5)
LYMPHOCYTES # BLD AUTO: 2.45 10*3/MM3 (ref 0.7–3.1)
LYMPHOCYTES NFR BLD AUTO: 32.2 % (ref 19.6–45.3)
MCH RBC QN AUTO: 29 PG (ref 26.6–33)
MCHC RBC AUTO-ENTMCNC: 32.2 G/DL (ref 31.5–35.7)
MCV RBC AUTO: 90 FL (ref 79–97)
MONOCYTES # BLD AUTO: 0.53 10*3/MM3 (ref 0.1–0.9)
MONOCYTES NFR BLD AUTO: 7 % (ref 5–12)
NEUTROPHILS NFR BLD AUTO: 3.8 10*3/MM3 (ref 1.7–7)
NEUTROPHILS NFR BLD AUTO: 49.8 % (ref 42.7–76)
NRBC BLD AUTO-RTO: 0 /100 WBC (ref 0–0.2)
PLATELET # BLD AUTO: 306 10*3/MM3 (ref 140–450)
PMV BLD AUTO: 10.9 FL (ref 6–12)
RBC # BLD AUTO: 3.69 10*6/MM3 (ref 4.14–5.8)
WBC NRBC COR # BLD AUTO: 7.62 10*3/MM3 (ref 3.4–10.8)

## 2024-07-17 ENCOUNTER — OFFICE VISIT (OUTPATIENT)
Dept: INFECTIOUS DISEASES | Facility: CLINIC | Age: 49
End: 2024-07-17
Payer: COMMERCIAL

## 2024-07-17 VITALS
TEMPERATURE: 98.6 F | SYSTOLIC BLOOD PRESSURE: 129 MMHG | DIASTOLIC BLOOD PRESSURE: 82 MMHG | OXYGEN SATURATION: 98 % | BODY MASS INDEX: 30.02 KG/M2 | HEART RATE: 63 BPM | WEIGHT: 186.8 LBS | HEIGHT: 66 IN

## 2024-07-17 DIAGNOSIS — K57.20 DIVERTICULITIS OF COLON WITH PERFORATION: Primary | ICD-10-CM

## 2024-07-17 PROCEDURE — 85025 COMPLETE CBC W/AUTO DIFF WBC: CPT

## 2024-07-17 PROCEDURE — 86140 C-REACTIVE PROTEIN: CPT

## 2024-07-17 NOTE — PROGRESS NOTES
[facility]         Referring Provider: No referring provider defined for this encounter.    Subjective      Chief Complaint  Follow-up (Diverticulitis of colon with perforation)    Follow-upCurrent symptoms: nausea. Current symptoms include no chest pain, no confusion, no dizziness, no palpitations, no shortness of breath, no blurred vision, no neck pain, no wheezing, no abdominal pain, no choking and no cough.     Samson Kendall is a 49 y.o. male who presents today to [facility] for Follow Up . .Past medical history is significant for recent admission to [facility] [place] on 6/16/2024 through 6/24/2024 for acute complicated diverticulitis with pelvic abscess, JOSSELIN, hypokalemia.  The patient presented to the ED for further evaluation of abdominal pain, rectal pain.  Notably the patient had had a recent ED visit secondary to abdominal pain, fevers, poor oral intake and was found to have uncomplicated diverticulitis and discharged on Augmentin.  After return home the patient reported symptoms worsened which prompted the second ED visit.  Lab workup in the ED showed CRP 44 with WBC count 11 K and CT of the abdomen pelvis noted sigmoid diverticulitis with contained perforation.  No obvious drainable abscess at that time.  He was given cefepime and Flagyl and admitted for further management.  General surgery did repeat CT of the abdomen with oral contrast on day 2 of admission which showed concern for developing abscess.  The patient underwent laparoscopic drainage of the abscess with FATEMEH drain placement on 6/18/2024 with Dr. Haque.  Intraoperative cultures did grow VRE, ESBL E. coli, Candida glabrata, Klebsiella pneumonia.  Infectious disease was consulted for further assistance with antimicrobial management.  Given culture results, ID transition to tigecycline and micafungin to be continued through 7/2/2024.  The patient's postoperative course was largely uneventful, diet was  advanced per general surgery as the patient tolerated.  Clinically the patient was improving with resolving abdominal pain.  CT of the abdomen and pelvis without contrast was obtained the morning of the date of discharge with drain noted in the lower pelvis with very minimal remaining pelvic fluid noted.  Ultimately given clinical improvement with no further inpatient workup planned and antimicrobial recommendations in place per ID, patient was felt to have reached maximum benefit of current hospitalization.  The patient had a midline placed on the day of discharge to the right upper extremity.  He was scheduled to continue micafungin and tigecycline through 7-2-24 per ID recommendations at home with the assistance of his spouse.  The patient followed up with general surgery Dr. Haque on 6/27 who recommended to continue antibiotic course as recommended and repeat CT abdomen and pelvis with oral contrast 2 weeks post completion of antibiotics with office follow-up.  The patient was seen by Dr. Nicholson and advised to schedule 2 months out for follow-up colonoscopy.     Interval history:  7/3/2024: The patient is accompanied by his wife.  He is nontoxic-appearing.  On room air without any apparent distress.  The patient did complain of some nausea/vomiting and diarrhea over the last 3 days or so which she feels is related to the antibiotic therapy.  The patient reports he commonly has the symptoms when he takes antibiotics.  He denies any fever/chills, night sweats, dysuria.  Does report some tenderness at the site of his FATEMEH drain removal which radiates into his groin.        7/10/24 Patient here for follow up today. He is complaining of abdominal pain, nausea and diarrhea. He describes the abdominal pain as cramping. He states he had a CT scan of the abdomen yesterday but results have not been given to him and are not available at this time for me to access. Patient was encouraged to call the provider who ordered it  for results. He denies actually vomiting, fever, chills. He states he has had several episodes of diarrhea throughout the day with at least 5 times today. He is currently not on antibiotics.     7/17/2024 Patient here for follow up visit. He states he had been doing very well up until today when he was walking his dog. He reports he broke out into a sweat, became very nauseous and ended up vomiting a small amount of bile. He states he had been asymptomatic since his last visit here and that diarrhea had resolved. He denies nausea at this time. He reports he has remained afebrile. CRP from last visit was normal at <0.30.  WBC also normal at 7.62.    Past Medical History:   Diagnosis Date    Asthma     childhood    Cardiomyopathy     DDD (degenerative disc disease),     Diabetes mellitus     Diverticulitis of colon with perforation 06/16/2024    Heart murmur     as child    Hyperlipidemia     Hypertension     Palpitation     PTSD (post-traumatic stress disorder)     Sleep apnea     C pap use    Umbilical hernia        Past Surgical History:   Procedure Laterality Date    APPENDECTOMY      CERVICAL FUSION      C5-6    COLONOSCOPY      DIAGNOSTIC LAPAROSCOPY N/A 6/18/2024    Procedure: laparoscopic drainage of pelvic abscess;  Surgeon: Marie Haque MD;  Location: [facility];  Service: General;  Laterality: N/A;    ENDOSCOPY      HAND SURGERY      KNEE ARTHROSCOPY      VASECTOMY      WISDOM TOOTH EXTRACTION         Social History     Socioeconomic History    Marital status:    Tobacco Use    Smoking status: Never     Passive exposure: Current    Smokeless tobacco: Current     Types: Snuff   Vaping Use    Vaping status: Never Used   Substance and Sexual Activity    Alcohol use: Not Currently    Drug use: No    Sexual activity: Yes     Partners: Female     Birth control/protection: Surgical       Family History  family history includes Arthritis in his mother; Asthma in his maternal grandmother; Diabetes in his  father; Hearing loss in his father; Heart attack in his maternal grandfather; Heart disease in his maternal grandfather; Heart failure in his maternal grandfather.    Immunization History   Administered Date(s) Administered    COVID-19 (MODERNA) 1st,2nd,3rd Dose Monovalent 11/17/2021, 01/14/2022    Hepatitis A 02/15/2019    Tdap 02/15/2019        Allergies  No Known Allergies    The medication list has been reviewed and updated.   Current Medications    Current Outpatient Medications:     anastrozole (ARIMIDEX) 1 MG tablet, Take 1 tablet by mouth Daily., Disp: , Rfl:     Calcium-Magnesium-Zinc (OMERO-MAG-ZINC PO), Take 1 tablet by mouth 2 (Two) Times a Day., Disp: , Rfl:     cyanocobalamin 1000 MCG/ML injection, Inject 1 mL into the appropriate muscle as directed by prescriber Every 30 (Thirty) Days., Disp: , Rfl:     dexlansoprazole (DEXILANT) 60 MG capsule, Take 1 capsule by mouth Daily., Disp: , Rfl:     fenofibrate (TRICOR) 145 MG tablet, Take 1 tablet by mouth Daily., Disp: , Rfl:     folic acid (FOLVITE) 1 MG tablet, Take 1 tablet by mouth Daily., Disp: , Rfl:     furosemide (LASIX) 20 MG tablet, Take 1 tablet by mouth Daily., Disp: , Rfl:     glucosamine-chondroitin 500-400 MG capsule capsule, Take 1 capsule by mouth 2 (Two) Times a Day With Meals., Disp: , Rfl:     HYDROcodone-acetaminophen (NORCO) 7.5-325 MG per tablet, Take 1 tablet by mouth Every 8 (Eight) Hours As Needed for Moderate Pain., Disp: , Rfl:     levocetirizine (XYZAL) 5 MG tablet, Take 1 tablet by mouth Every Evening., Disp: , Rfl:     meloxicam (MOBIC) 15 MG tablet, Take 1 tablet by mouth Daily., Disp: , Rfl:     metoprolol tartrate (LOPRESSOR) 50 MG tablet, Take 1 tablet by mouth 2 (Two) Times a Day., Disp: , Rfl:     ondansetron ODT (ZOFRAN-ODT) 4 MG disintegrating tablet, Place 1 tablet on the tongue Every 8 (Eight) Hours As Needed for Vomiting., Disp: 30 tablet, Rfl: 0    rosuvastatin (CRESTOR) 10 MG tablet, Take 1 tablet by mouth Daily.,  "Disp: 30 tablet, Rfl: 3    Testosterone Cypionate (DEPOTESTOTERONE CYPIONATE) 200 MG/ML injection, Inject 0.5 mL into the appropriate muscle as directed by prescriber Every 7 (Seven) Days., Disp: , Rfl:     Tirzepatide (MOUNJARO) 7.5 MG/0.5ML solution pen-injector pen, Inject 0.5 mL under the skin into the appropriate area as directed 1 (One) Time Per Week., Disp: , Rfl:     topiramate (TOPAMAX) 100 MG tablet, Take 1 tablet by mouth 2 (Two) Times a Day., Disp: , Rfl:       Review of Systems    Review of Systems   Constitutional:  Negative for activity change, appetite change, chills, diaphoresis and fever.   HENT:  Negative for congestion, dental problem, drooling, mouth sores, sinus pressure and sneezing.    Eyes:  Negative for blurred vision, double vision, photophobia and discharge.   Respiratory:  Negative for apnea, cough, choking, chest tightness, shortness of breath and wheezing.    Cardiovascular:  Negative for chest pain, palpitations and leg swelling.   Gastrointestinal:  Positive for nausea and vomiting. Negative for abdominal distention, abdominal pain and diarrhea.   Genitourinary:  Negative for dysuria, flank pain and frequency.   Musculoskeletal:  Negative for arthralgias, gait problem, neck pain and neck stiffness.   Skin:  Negative for rash.   Neurological:  Negative for dizziness, tremors, seizures, syncope, speech difficulty, numbness, headache and confusion.   Psychiatric/Behavioral:  Negative for agitation, behavioral problems, hallucinations and suicidal ideas.         Objective     Vital Signs:  /82 (BP Location: Right arm, Patient Position: Sitting, Cuff Size: Small Adult)   Pulse 63   Temp 98.6 °F (37 °C) (Temporal)   Ht 167.6 cm (66\")   Wt 84.7 kg (186 lb 12.8 oz)   SpO2 98%   BMI 30.15 kg/m²   Estimated body mass index is 30.15 kg/m² as calculated from the following:    Height as of this encounter: 167.6 cm (66\").    Weight as of this encounter: 84.7 kg (186 lb 12.8 " oz).    Physical Exam  Constitutional:       General: He is not in acute distress.     Appearance: Normal appearance. He is normal weight. He is not ill-appearing, toxic-appearing or diaphoretic.   HENT:      Head: Normocephalic and atraumatic.      Nose: Nose normal. No congestion or rhinorrhea.      Mouth/Throat:      Mouth: Mucous membranes are moist.      Pharynx: Oropharynx is clear.   Eyes:      General: No scleral icterus.     Extraocular Movements: Extraocular movements intact.      Pupils: Pupils are equal, round, and reactive to light.   Neck:      Vascular: No carotid bruit.   Cardiovascular:      Rate and Rhythm: Normal rate and regular rhythm.      Pulses: Normal pulses.      Heart sounds: No murmur heard.  Pulmonary:      Effort: Pulmonary effort is normal. No respiratory distress.      Breath sounds: Normal breath sounds. No stridor. No wheezing, rhonchi or rales.   Chest:      Chest wall: No tenderness.   Abdominal:      General: Abdomen is flat. Bowel sounds are normal. There is no distension.      Palpations: Abdomen is soft.      Tenderness: There is no abdominal tenderness. There is no guarding or rebound.   Musculoskeletal:      Cervical back: Normal range of motion and neck supple. No rigidity or tenderness.   Lymphadenopathy:      Cervical: No cervical adenopathy.   Skin:     Coloration: Skin is not jaundiced.      Findings: No lesion or rash.   Neurological:      General: No focal deficit present.      Mental Status: He is alert and oriented to person, place, and time.   Psychiatric:         Mood and Affect: Mood normal.         Behavior: Behavior normal.          Result Review :  The following data was reviewed by Tona Lang PA-C     Lab Results  Lab Results   Component Value Date    WBC 7.62 07/10/2024    HGB 10.7 (L) 07/10/2024    HCT 33.2 (L) 07/10/2024    MCV 90.0 07/10/2024     07/10/2024     Lab Results   Component Value Date    GLUCOSE 125 (H) 06/24/2024    BUN 26 (H)  "06/24/2024    CREATININE 1.13 06/24/2024    EGFRIFNONA 92 10/28/2021    BCR 23.0 06/24/2024    K 4.6 06/24/2024    CO2 17.4 (L) 06/24/2024    CALCIUM 8.9 06/24/2024    ALBUMIN 3.4 (L) 06/24/2024    AST 18 06/24/2024    ALT 14 06/24/2024      Lab Results   Component Value Date    CRP <0.30 07/10/2024        No results found for: \"ACANTHNAEG\", \"AFBCX\", \"BPERTUSSISCX\", \"BLOODCX\"  No results found for: \"BCIDPCR\", \"CXREFLEX\", \"CSFCX\", \"CULTURETIS\"  No results found for: \"CULTURES\", \"HSVCX\", \"URCX\"  No results found for: \"EYECULTURE\", \"GCCX\", \"HSVCULTURE\", \"LABHSV\"  No results found for: \"LEGIONELLA\", \"MRSACX\", \"MUMPSCX\", \"MYCOPLASCX\"  No results found for: \"NOCARDIACX\", \"STOOLCX\"  No results found for: \"THROATCX\", \"UNSTIMCULT\", \"URINECX\", \"CULTURE\", \"VZVCULTUR\"  No results found for: \"VIRALCULTU\", \"WOUNDCX\"    Radiology Results  CT Abdomen Pelvis Without Contrast    Result Date: 7/9/2024  Impression:   Left lower quadrant drain has been removed. No definite residual fluid collection at this time.   This report was finalized on 7/9/2024 9:37 AM by Dr. Colby Marley MD.      CT Abdomen Pelvis Without Contrast    Result Date: 6/24/2024  Impression:   Drain noted within the lower pelvis with very minimal remaining pelvic fluid.   This report was finalized on 6/24/2024 11:15 AM by Dr. Colby Marley MD.              Assessment / Plan        Diagnoses and all orders for this visit:    1. Diverticulitis of colon with perforation (Primary)  -     CBC & Differential; Future  -     C-reactive Protein; Future  -     CBC & Differential  -     C-reactive Protein        Patient and I discussed possible CT of the abdomen today due to his complaints. Patient recently had CT abdomen on 7/9/24. However, he would like to have labs collected and if there are any abnormalities that he will come back for the imaging. Patient reports he needs to get back to work. He is nontoxic appearing. CBC and CRP has been ordered today. He will need to " follow up in 1 week. If labs are abnormal, I will contact patient with results and order CT abdomen/pelvis with a closer follow up visit.        Follow Up   No follow-ups on file.    Visit Diagnoses:    ICD-10-CM ICD-9-CM   1. Diverticulitis of colon with perforation  K57.20 562.11       Patient was given instructions and counseling regarding his condition or for health maintenance advice. Please see specific information pulled into the AVS if appropriate.     This document has been electronically signed by Tona Lang PA-C   July 17, 2024 14:16 EDT      No orders of the defined types were placed in this encounter.     Dictated Utilizing Dragon Dictation: Part of this note may be an electronic transcription/translation of spoken language to printed text using the Dragon Dictation System.

## 2024-07-18 LAB
BASOPHILS # BLD AUTO: 0.05 10*3/MM3 (ref 0–0.2)
BASOPHILS NFR BLD AUTO: 0.8 % (ref 0–1.5)
CRP SERPL-MCNC: 0.72 MG/DL (ref 0–0.5)
DEPRECATED RDW RBC AUTO: 44 FL (ref 37–54)
EOSINOPHIL # BLD AUTO: 0.52 10*3/MM3 (ref 0–0.4)
EOSINOPHIL NFR BLD AUTO: 8.6 % (ref 0.3–6.2)
ERYTHROCYTE [DISTWIDTH] IN BLOOD BY AUTOMATED COUNT: 13.8 % (ref 12.3–15.4)
HCT VFR BLD AUTO: 36.2 % (ref 37.5–51)
HGB BLD-MCNC: 11.8 G/DL (ref 13–17.7)
IMM GRANULOCYTES # BLD AUTO: 0.01 10*3/MM3 (ref 0–0.05)
IMM GRANULOCYTES NFR BLD AUTO: 0.2 % (ref 0–0.5)
LYMPHOCYTES # BLD AUTO: 1.96 10*3/MM3 (ref 0.7–3.1)
LYMPHOCYTES NFR BLD AUTO: 32.3 % (ref 19.6–45.3)
MCH RBC QN AUTO: 28.9 PG (ref 26.6–33)
MCHC RBC AUTO-ENTMCNC: 32.6 G/DL (ref 31.5–35.7)
MCV RBC AUTO: 88.5 FL (ref 79–97)
MONOCYTES # BLD AUTO: 0.44 10*3/MM3 (ref 0.1–0.9)
MONOCYTES NFR BLD AUTO: 7.2 % (ref 5–12)
NEUTROPHILS NFR BLD AUTO: 3.09 10*3/MM3 (ref 1.7–7)
NEUTROPHILS NFR BLD AUTO: 50.9 % (ref 42.7–76)
NRBC BLD AUTO-RTO: 0 /100 WBC (ref 0–0.2)
PLATELET # BLD AUTO: 269 10*3/MM3 (ref 140–450)
PMV BLD AUTO: 11.2 FL (ref 6–12)
RBC # BLD AUTO: 4.09 10*6/MM3 (ref 4.14–5.8)
WBC NRBC COR # BLD AUTO: 6.07 10*3/MM3 (ref 3.4–10.8)

## 2025-02-04 NOTE — ED PROVIDER NOTES
Subjective   Patient is a 42 y.o. male presenting with flank pain.   History provided by:  Patient   used: No    Flank Pain   Pain location:  R flank  Pain quality: sharp    Pain radiates to:  R flank  Pain severity:  Moderate  Onset quality:  Sudden  Duration:  2 hours  Timing:  Constant  Progression:  Worsening  Chronicity:  New  Context: not alcohol use, not eating, not medication withdrawal, not previous surgeries, not recent illness, not retching, not sick contacts and not suspicious food intake    Relieved by:  Nothing  Worsened by:  Nothing  Ineffective treatments:  None tried  Associated symptoms: nausea and vomiting    Associated symptoms: no anorexia, no belching, no chest pain, no constipation, no dysuria, no fatigue, no fever, no flatus, no hematuria, no melena, no shortness of breath, no sore throat, no vaginal bleeding and no vaginal discharge    Risk factors: no alcohol abuse, no aspirin use, not elderly, has not had multiple surgeries, no NSAID use, not obese, not pregnant and no recent hospitalization        Review of Systems   Constitutional: Negative.  Negative for fatigue and fever.   HENT: Negative.  Negative for sore throat.    Eyes: Negative.    Respiratory: Negative.  Negative for shortness of breath.    Cardiovascular: Negative.  Negative for chest pain.   Gastrointestinal: Positive for nausea and vomiting. Negative for anorexia, constipation, flatus and melena.   Endocrine: Negative.    Genitourinary: Positive for flank pain. Negative for dysuria, hematuria, vaginal bleeding and vaginal discharge.   Skin: Negative.    Allergic/Immunologic: Negative.    Neurological: Negative.    Hematological: Negative.    Psychiatric/Behavioral: Negative.        History reviewed. No pertinent past medical history.    No Known Allergies    Past Surgical History:   Procedure Laterality Date   • HAND SURGERY     • KNEE ARTHROSCOPY     • WISDOM TOOTH EXTRACTION         History reviewed. No  pertinent family history.    Social History     Social History   • Marital status:      Spouse name: N/A   • Number of children: N/A   • Years of education: N/A     Social History Main Topics   • Smoking status: Never Smoker   • Smokeless tobacco: Current User     Types: Snuff   • Alcohol use Yes   • Drug use: No   • Sexual activity: Not Asked     Other Topics Concern   • None     Social History Narrative   • None           Objective   Physical Exam   Constitutional: He is oriented to person, place, and time. He appears well-developed and well-nourished.   HENT:   Head: Normocephalic.   Right Ear: External ear normal.   Left Ear: External ear normal.   Mouth/Throat: Oropharynx is clear and moist.   Eyes: EOM are normal. Pupils are equal, round, and reactive to light.   Neck: Normal range of motion. Neck supple.   Cardiovascular: Normal rate and regular rhythm.    Pulmonary/Chest: Effort normal and breath sounds normal.   Abdominal: Soft. Bowel sounds are normal.   Mild right CVA tenderness   Musculoskeletal: Normal range of motion.   Neurological: He is alert and oriented to person, place, and time.   Skin: Skin is warm and dry.   Psychiatric: He has a normal mood and affect.   Vitals reviewed.      Procedures         ED Course  ED Course                  MDM    Final diagnoses:   Renal colic            Joey Stokes, APRN  02/16/18 2052     04-Feb-2025 20:30

## 2025-05-11 ENCOUNTER — APPOINTMENT (OUTPATIENT)
Dept: CT IMAGING | Facility: HOSPITAL | Age: 50
End: 2025-05-11
Payer: COMMERCIAL

## 2025-05-11 ENCOUNTER — HOSPITAL ENCOUNTER (EMERGENCY)
Facility: HOSPITAL | Age: 50
Discharge: HOME | End: 2025-05-11
Attending: EMERGENCY MEDICINE | Admitting: EMERGENCY MEDICINE
Payer: COMMERCIAL

## 2025-05-11 VITALS
BODY MASS INDEX: 29.99 KG/M2 | SYSTOLIC BLOOD PRESSURE: 129 MMHG | HEIGHT: 65 IN | WEIGHT: 180 LBS | RESPIRATION RATE: 14 BRPM | HEART RATE: 73 BPM | OXYGEN SATURATION: 100 % | DIASTOLIC BLOOD PRESSURE: 71 MMHG | TEMPERATURE: 97.6 F

## 2025-05-11 DIAGNOSIS — N20.0 KIDNEY STONE: Primary | ICD-10-CM

## 2025-05-11 DIAGNOSIS — K57.32 SIGMOID DIVERTICULITIS: ICD-10-CM

## 2025-05-11 LAB
ALBUMIN SERPL-MCNC: 4.1 G/DL (ref 3.5–5.2)
ALBUMIN/GLOB SERPL: 1.2 G/DL
ALP SERPL-CCNC: 45 U/L (ref 39–117)
ALT SERPL W P-5'-P-CCNC: 14 U/L (ref 1–41)
ANION GAP SERPL CALCULATED.3IONS-SCNC: 10.2 MMOL/L (ref 5–15)
AST SERPL-CCNC: 24 U/L (ref 1–40)
BASOPHILS # BLD AUTO: 0.05 10*3/MM3 (ref 0–0.2)
BASOPHILS NFR BLD AUTO: 0.3 % (ref 0–1.5)
BILIRUB SERPL-MCNC: 0.4 MG/DL (ref 0–1.2)
BUN SERPL-MCNC: 16 MG/DL (ref 6–20)
BUN/CREAT SERPL: 12.1 (ref 7–25)
CALCIUM SPEC-SCNC: 9.8 MG/DL (ref 8.6–10.5)
CHLORIDE SERPL-SCNC: 109 MMOL/L (ref 98–107)
CO2 SERPL-SCNC: 18.8 MMOL/L (ref 22–29)
CREAT SERPL-MCNC: 1.32 MG/DL (ref 0.76–1.27)
D-LACTATE SERPL-SCNC: 1 MMOL/L (ref 0.5–2)
DEPRECATED RDW RBC AUTO: 50.9 FL (ref 37–54)
EGFRCR SERPLBLD CKD-EPI 2021: 66.1 ML/MIN/1.73
EOSINOPHIL # BLD AUTO: 0.2 10*3/MM3 (ref 0–0.4)
EOSINOPHIL NFR BLD AUTO: 1.4 % (ref 0.3–6.2)
ERYTHROCYTE [DISTWIDTH] IN BLOOD BY AUTOMATED COUNT: 15.5 % (ref 12.3–15.4)
GLOBULIN UR ELPH-MCNC: 3.5 GM/DL
GLUCOSE SERPL-MCNC: 106 MG/DL (ref 65–99)
HCT VFR BLD AUTO: 42.2 % (ref 37.5–51)
HGB BLD-MCNC: 12.8 G/DL (ref 13–17.7)
HOLD SPECIMEN: NORMAL
HOLD SPECIMEN: NORMAL
IMM GRANULOCYTES # BLD AUTO: 0.06 10*3/MM3 (ref 0–0.05)
IMM GRANULOCYTES NFR BLD AUTO: 0.4 % (ref 0–0.5)
LYMPHOCYTES # BLD AUTO: 1.09 10*3/MM3 (ref 0.7–3.1)
LYMPHOCYTES NFR BLD AUTO: 7.6 % (ref 19.6–45.3)
MCH RBC QN AUTO: 27.5 PG (ref 26.6–33)
MCHC RBC AUTO-ENTMCNC: 30.3 G/DL (ref 31.5–35.7)
MCV RBC AUTO: 90.8 FL (ref 79–97)
MONOCYTES # BLD AUTO: 0.56 10*3/MM3 (ref 0.1–0.9)
MONOCYTES NFR BLD AUTO: 3.9 % (ref 5–12)
NEUTROPHILS NFR BLD AUTO: 12.34 10*3/MM3 (ref 1.7–7)
NEUTROPHILS NFR BLD AUTO: 86.4 % (ref 42.7–76)
NRBC BLD AUTO-RTO: 0 /100 WBC (ref 0–0.2)
PLATELET # BLD AUTO: 315 10*3/MM3 (ref 140–450)
PMV BLD AUTO: 11.7 FL (ref 6–12)
POTASSIUM SERPL-SCNC: 4.7 MMOL/L (ref 3.5–5.2)
PROT SERPL-MCNC: 7.6 G/DL (ref 6–8.5)
RBC # BLD AUTO: 4.65 10*6/MM3 (ref 4.14–5.8)
SODIUM SERPL-SCNC: 138 MMOL/L (ref 136–145)
WBC NRBC COR # BLD AUTO: 14.3 10*3/MM3 (ref 3.4–10.8)
WHOLE BLOOD HOLD COAG: NORMAL
WHOLE BLOOD HOLD SPECIMEN: NORMAL

## 2025-05-11 PROCEDURE — 74177 CT ABD & PELVIS W/CONTRAST: CPT | Performed by: RADIOLOGY

## 2025-05-11 PROCEDURE — 83605 ASSAY OF LACTIC ACID: CPT

## 2025-05-11 PROCEDURE — 25810000003 SODIUM CHLORIDE 0.9 % SOLUTION

## 2025-05-11 PROCEDURE — 96374 THER/PROPH/DIAG INJ IV PUSH: CPT

## 2025-05-11 PROCEDURE — 25510000001 IOPAMIDOL 61 % SOLUTION: Performed by: EMERGENCY MEDICINE

## 2025-05-11 PROCEDURE — 36415 COLL VENOUS BLD VENIPUNCTURE: CPT

## 2025-05-11 PROCEDURE — 80053 COMPREHEN METABOLIC PANEL: CPT

## 2025-05-11 PROCEDURE — 25010000002 ONDANSETRON PER 1 MG

## 2025-05-11 PROCEDURE — 96375 TX/PRO/DX INJ NEW DRUG ADDON: CPT

## 2025-05-11 PROCEDURE — 74177 CT ABD & PELVIS W/CONTRAST: CPT

## 2025-05-11 PROCEDURE — 25010000002 HYDROMORPHONE 1 MG/ML SOLUTION

## 2025-05-11 PROCEDURE — 87040 BLOOD CULTURE FOR BACTERIA: CPT

## 2025-05-11 PROCEDURE — 99285 EMERGENCY DEPT VISIT HI MDM: CPT

## 2025-05-11 PROCEDURE — 96376 TX/PRO/DX INJ SAME DRUG ADON: CPT

## 2025-05-11 PROCEDURE — 85025 COMPLETE CBC W/AUTO DIFF WBC: CPT

## 2025-05-11 RX ORDER — SODIUM CHLORIDE 0.9 % (FLUSH) 0.9 %
10 SYRINGE (ML) INJECTION AS NEEDED
Status: DISCONTINUED | OUTPATIENT
Start: 2025-05-11 | End: 2025-05-11 | Stop reason: HOSPADM

## 2025-05-11 RX ORDER — HYDROCODONE BITARTRATE AND ACETAMINOPHEN 7.5; 325 MG/1; MG/1
1 TABLET ORAL EVERY 8 HOURS PRN
Qty: 9 TABLET | Refills: 0 | Status: SHIPPED | OUTPATIENT
Start: 2025-05-11

## 2025-05-11 RX ORDER — TAMSULOSIN HYDROCHLORIDE 0.4 MG/1
1 CAPSULE ORAL DAILY
Qty: 30 CAPSULE | Refills: 0 | Status: SHIPPED | OUTPATIENT
Start: 2025-05-11

## 2025-05-11 RX ORDER — IOPAMIDOL 612 MG/ML
100 INJECTION, SOLUTION INTRAVASCULAR
Status: COMPLETED | OUTPATIENT
Start: 2025-05-11 | End: 2025-05-11

## 2025-05-11 RX ORDER — ONDANSETRON 2 MG/ML
4 INJECTION INTRAMUSCULAR; INTRAVENOUS ONCE
Status: COMPLETED | OUTPATIENT
Start: 2025-05-11 | End: 2025-05-11

## 2025-05-11 RX ADMIN — ONDANSETRON 4 MG: 2 INJECTION INTRAMUSCULAR; INTRAVENOUS at 12:34

## 2025-05-11 RX ADMIN — IOPAMIDOL 85 ML: 612 INJECTION, SOLUTION INTRAVENOUS at 13:31

## 2025-05-11 RX ADMIN — HYDROMORPHONE HYDROCHLORIDE 1 MG: 1 INJECTION, SOLUTION INTRAMUSCULAR; INTRAVENOUS; SUBCUTANEOUS at 14:18

## 2025-05-11 RX ADMIN — SODIUM CHLORIDE 1000 ML: 9 INJECTION, SOLUTION INTRAVENOUS at 12:34

## 2025-05-11 RX ADMIN — HYDROMORPHONE HYDROCHLORIDE 1 MG: 1 INJECTION, SOLUTION INTRAMUSCULAR; INTRAVENOUS; SUBCUTANEOUS at 12:34

## 2025-05-11 NOTE — ED PROVIDER NOTES
Subjective   History of Present Illness  Samson is a 50-year-old male who presents for evaluation for abdominal pain.  Reports that he has had abdominal pain since last night.  States that it has worsened today.  He does have a history of a pelvic abscess due to diverticulitis, this occurred in June of last year.  Reports that he was having some similar pains in what he had at that time.  Reports sharp pain to his abdomen, he got up this morning to go to work and his pain worsened.  Jaffrey as though that he may have a stomach virus onset, due to similar pain.  Reports nausea as well as chills.  Denies any diarrhea, blood in stool or fever.  Does have a history of diverticulitis as well as diabetes.      Review of Systems   Constitutional:  Positive for chills. Negative for fever.   Gastrointestinal:  Positive for abdominal pain and nausea. Negative for diarrhea and vomiting.       Past Medical History:   Diagnosis Date    Asthma     childhood    Cardiomyopathy     DDD (degenerative disc disease),     Diabetes mellitus     Diverticulitis of colon with perforation 06/16/2024    Heart murmur     as child    Hyperlipidemia     Hypertension     Palpitation     PTSD (post-traumatic stress disorder)     Sleep apnea     C pap use    Umbilical hernia        No Known Allergies    Past Surgical History:   Procedure Laterality Date    APPENDECTOMY      CERVICAL FUSION      C5-6    COLONOSCOPY      DIAGNOSTIC LAPAROSCOPY N/A 6/18/2024    Procedure: laparoscopic drainage of pelvic abscess;  Surgeon: Marie Haque MD;  Location: [facility];  Service: General;  Laterality: N/A;    ENDOSCOPY      HAND SURGERY      KNEE ARTHROSCOPY      VASECTOMY      WISDOM TOOTH EXTRACTION         Family History   Problem Relation Age of Onset    Heart attack Maternal Grandfather     Heart failure Maternal Grandfather     Heart disease Maternal Grandfather     Arthritis Mother     Diabetes Father     Hearing loss Father     Asthma Maternal  Grandmother        Social History     Socioeconomic History    Marital status:    Tobacco Use    Smoking status: Never     Passive exposure: Current    Smokeless tobacco: Current     Types: Snuff   Vaping Use    Vaping status: Never Used   Substance and Sexual Activity    Alcohol use: Not Currently    Drug use: No    Sexual activity: Yes     Partners: Female     Birth control/protection: Surgical           Objective   Physical Exam  Constitutional:       Appearance: Normal appearance. He is normal weight.   HENT:      Head: Normocephalic and atraumatic.      Right Ear: External ear normal.      Left Ear: External ear normal.   Eyes:      Conjunctiva/sclera: Conjunctivae normal.   Cardiovascular:      Rate and Rhythm: Normal rate and regular rhythm.      Pulses: Normal pulses.      Heart sounds: Normal heart sounds.   Pulmonary:      Effort: Pulmonary effort is normal.      Breath sounds: Normal breath sounds.   Abdominal:      General: Abdomen is flat. Bowel sounds are normal.      Palpations: Abdomen is soft.      Tenderness: There is generalized abdominal tenderness.   Musculoskeletal:         General: Normal range of motion.      Cervical back: Normal range of motion and neck supple.   Skin:     General: Skin is warm and dry.      Capillary Refill: Capillary refill takes less than 2 seconds.   Neurological:      General: No focal deficit present.      Mental Status: He is alert and oriented to person, place, and time.   Psychiatric:         Mood and Affect: Mood normal.         Behavior: Behavior normal.         Procedures           ED Course  ED Course as of 05/11/25 1454   Sun May 11, 2025   1314 WBC(!): 14.30 [CE]   1314 Lactate: 1.0 [CE]   1314 Awaiting CT [CE]   1429 Narrative & Impression  PROCEDURE: CT of the abdomen and pelvis performed with IV contrast on  May 11, 2025. Examination was performed with 5 mm axial imaging and 5 mm  sagittal and coronal reconstruction images. Examination was  performed  according to as low as reasonably achievable dose protocol. The patient  was administered Isovue 300 contrast IV without complication.     HISTORY: Abdominal pain. History of diverticulitis.     COMPARISON: None.     FINDINGS:     Normal heart size. No pericardial effusion.  Fatty filtration of the liver.  Small cyst at the upper pole of the right kidney.  Very small cyst at the midpole of the left kidney.  Bilateral nephrolithiasis.  No acute process seen in the spleen.  Patent abdominal aorta with no aneurysm or dissection.  Small umbilical hernia contains only fat.  Mild left colon and sigmoid colon diverticulosis.  Inflammatory stranding identified adjacent to the sigmoid colon  consistent with acute diverticulitis.  Bilateral lower pelvic phleboliths  1 x 2 mm calcification noted at the expected location of the left UVJ  without conclusive features of left renal obstruction.  The calcification is seen best on image 81, series 2.  The appendix is not identified.  No features of acute appendicitis.  No pyelonephritis.  No cystitis.     IMPRESSION:     Nonobstructive 1 x 2 mm calcification at the left UVJ.  No left hydronephrosis or features of left renal obstruction.  Bilateral lower pelvic phleboliths.  Bilateral nephrolithiasis.  Mild sigmoid colon diverticulitis.  Fatty filtration of the liver.  Small bilateral renal cortical cysts  Small umbilical hernia contains only fat.        This report was finalized on 5/11/2025 2:29 PM by Augustine Quiros MD.   [CE]      ED Course User Index  [CE] Varghese Vanegas APRN                                                       Medical Decision Making  50-year-old male who presents for evaluation for abdominal pain.  History of diverticulitis with perforation in the past.  Abdominal pain x 2 days with worsening severity.  Also reports chills as well as nausea.    Problems Addressed:  Kidney stone: complicated acute illness or injury  Sigmoid diverticulitis:  complicated acute illness or injury    Amount and/or Complexity of Data Reviewed  Labs: ordered. Decision-making details documented in ED Course.  Radiology: ordered.    Risk  Prescription drug management.  Risk Details: ED uneventful.  I discussed imaging results with patient and significant other at bedside.  I have prescribed Flomax as well as Augmentin and Norco.  Patient being treated for kidney stone as well as sigmoid diverticulitis.  Verbalized understanding to return to emergency department for any new needs, concerns or changes arise.        Final diagnoses:   Kidney stone   Sigmoid diverticulitis       ED Disposition  ED Disposition       ED Disposition   Discharge    Condition   Stable    Comment   --               Rosalina Pandya, JOSE J  [address] [facility] [address]  [phone]    In 2 days      [facility] EMERGENCY DEPARTMENT  [address] [facility] [id number] [phone]    If symptoms worsen         Medication List        New Prescriptions      amoxicillin-clavulanate 875-125 MG per tablet  Commonly known as: AUGMENTIN  Take 1 tablet by mouth 2 (Two) Times a Day.     naloxone 4 MG/0.1ML nasal spray  Commonly known as: NARCAN  Call 911. Don't prime. [place] in 1 nostril for overdose. Repeat in 2-3 minutes in other nostril if no or minimal breathing/responsiveness.     tamsulosin 0.4 MG capsule 24 hr capsule  Commonly known as: FLOMAX  Take 1 capsule by mouth Daily.               Where to Get Your Medications        These medications were sent to [id number] - [place] - [address] [address]  [facility] [address] [facility] [address] - [phone] PH - [phone]   [address] [facility] [address] [place] [address]      Phone: [phone]   amoxicillin-clavulanate 875-125 MG per tablet  HYDROcodone-acetaminophen 7.5-325 MG per tablet  naloxone 4 MG/0.1ML nasal spray  tamsulosin 0.4 MG capsule 24 hr capsule            Varghese Vanegas, JOSE J  05/11/25  0721

## 2025-05-11 NOTE — Clinical Note
[facility] EMERGENCY DEPARTMENT  [address] [facility] [address]  Phone: [phone]    Samson Kendall was seen and treated in our emergency department on 5/11/2025.  He may return to work on 05/14/2025.         Thank you for choosing [facility].    Varghese Vanegas APRN

## 2025-05-16 LAB
BACTERIA SPEC AEROBE CULT: NORMAL
BACTERIA SPEC AEROBE CULT: NORMAL

## (undated) DEVICE — 40595 XL TRENDELENBURG POSITIONING KIT: Brand: 40595 XL TRENDELENBURG POSITIONING KIT

## (undated) DEVICE — SUT MNCRYL PLS ANTIB UD 4/0 PS2 18IN

## (undated) DEVICE — SYR LL TP 10ML STRL

## (undated) DEVICE — UNDYED BRAIDED (POLYGLACTIN 910), SYNTHETIC ABSORBABLE SUTURE: Brand: COATED VICRYL

## (undated) DEVICE — TROCAR: Brand: KII FIOS FIRST ENTRY

## (undated) DEVICE — SUT VIC 0 LIGA J287G

## (undated) DEVICE — GLV SURG PREMIERPRO MIC LTX PF SZ7 BRN

## (undated) DEVICE — MONOPOLAR METZENBAUM SCISSOR TIP, DISPOSABLE: Brand: MONOPOLAR METZENBAUM SCISSOR TIP, DISPOSABLE

## (undated) DEVICE — ANTIBACTERIAL UNDYED BRAIDED (POLYGLACTIN 910), SYNTHETIC ABSORBABLE SUTURE: Brand: COATED VICRYL

## (undated) DEVICE — PENCL ES MEGADINE EZ/CLEAN BUTN W/HOLSTR 10FT

## (undated) DEVICE — ELECTRD BLD EZ CLN MOD XLNG 2.75IN

## (undated) DEVICE — SUT SILK 3/0 SH CR8 18IN C013D

## (undated) DEVICE — ELECTRD BLD EZ CLN MOD 4IN

## (undated) DEVICE — CONN TBG Y 5 IN 1 LF STRL

## (undated) DEVICE — INSUFFLATION NEEDLE TO ESTABLISH PNEUMOPERITONEUM.: Brand: INSUFFLATION NEEDLE

## (undated) DEVICE — PK LAP GEN 70

## (undated) DEVICE — 2, DISPOSABLE SUCTION/IRRIGATOR WITH DISPOSABLE TIP: Brand: STRYKEFLOW

## (undated) DEVICE — ENDOPATH XCEL BLADELESS TROCARS WITH STABILITY SLEEVES: Brand: ENDOPATH XCEL

## (undated) DEVICE — TROCAR: Brand: KII SLEEVE